# Patient Record
Sex: FEMALE | Race: WHITE | HISPANIC OR LATINO | Employment: OTHER | URBAN - METROPOLITAN AREA
[De-identification: names, ages, dates, MRNs, and addresses within clinical notes are randomized per-mention and may not be internally consistent; named-entity substitution may affect disease eponyms.]

---

## 2017-05-10 RX ORDER — ALBUTEROL SULFATE 0.63 MG/3ML
0.63 SOLUTION RESPIRATORY (INHALATION) EVERY 6 HOURS PRN
Status: ON HOLD | COMMUNITY
End: 2019-05-29 | Stop reason: HOSPADM

## 2017-05-10 RX ORDER — ATORVASTATIN CALCIUM 40 MG/1
40 TABLET, FILM COATED ORAL DAILY
COMMUNITY

## 2017-05-10 RX ORDER — DOXYCYCLINE HYCLATE 100 MG
100 TABLET ORAL 2 TIMES DAILY
COMMUNITY
End: 2018-08-18

## 2017-05-10 RX ORDER — CODEINE PHOSPHATE AND GUAIFENESIN 10; 100 MG/5ML; MG/5ML
5 SOLUTION ORAL 3 TIMES DAILY PRN
Status: ON HOLD | COMMUNITY
End: 2018-09-05 | Stop reason: HOSPADM

## 2017-05-10 RX ORDER — PREDNISONE 20 MG/1
20 TABLET ORAL DAILY
COMMUNITY
End: 2017-06-13

## 2017-05-10 RX ORDER — LEVOTHYROXINE SODIUM 50 UG/1
50 TABLET ORAL DAILY
COMMUNITY

## 2017-05-10 RX ORDER — AMLODIPINE BESYLATE 5 MG/1
5 TABLET ORAL DAILY
COMMUNITY

## 2017-05-10 RX ORDER — ESCITALOPRAM OXALATE 10 MG/1
10 TABLET ORAL DAILY
Status: ON HOLD | COMMUNITY
End: 2018-09-05 | Stop reason: HOSPADM

## 2017-06-13 ENCOUNTER — OFFICE VISIT (OUTPATIENT)
Dept: PULMONOLOGY | Facility: CLINIC | Age: 82
End: 2017-06-13
Payer: MEDICARE

## 2017-06-13 VITALS — SYSTOLIC BLOOD PRESSURE: 110 MMHG | HEART RATE: 53 BPM | DIASTOLIC BLOOD PRESSURE: 60 MMHG | OXYGEN SATURATION: 95 %

## 2017-06-13 DIAGNOSIS — G25.81 RESTLESS LEGS: ICD-10-CM

## 2017-06-13 DIAGNOSIS — R06.09 DYSPNEA ON EXERTION: ICD-10-CM

## 2017-06-13 DIAGNOSIS — R93.89 ABNORMAL CXR: ICD-10-CM

## 2017-06-13 DIAGNOSIS — G47.33 OBSTRUCTIVE SLEEP APNEA SYNDROME: Primary | ICD-10-CM

## 2017-06-13 PROBLEM — G47.30 SLEEP APNEA: Status: ACTIVE | Noted: 2017-06-13

## 2017-06-13 PROCEDURE — 1159F MED LIST DOCD IN RCRD: CPT | Mod: ,,, | Performed by: INTERNAL MEDICINE

## 2017-06-13 PROCEDURE — 99204 OFFICE O/P NEW MOD 45 MIN: CPT | Mod: ,,, | Performed by: INTERNAL MEDICINE

## 2017-06-13 NOTE — PROGRESS NOTES
New Office Visit/Consultation Note    Patient Name: Claribel Moran  MRN: [id number]  : 1934      Reason for visit: Abnormal CXR, dyspnea, possible sleep apnea    HPI:     Had CT scan of chest done by Dr Eduardo showing some chronic scarring and sent here for evaluation.   Seshoshana has chronic SOB, THOMAS and cough but no known h/o chronic lung disease.  She has minimal smoking history but did have second hand smoke exposure, no reported work related exposures (, retired x 20 years).  Has RX for albuterol to try but doesn't have it yet.  Has no known h/o pneumonia or lung injury that she is aware of but does have chronic reflux.  Has noted occ wheezing.  She also has sleep disturbance with some insomnia, fragmented sleep, poor quality sleep,  Doesn't feel rested in the AM, has nocturia and nocturnal frequency, has excessive somnolence during the day ( ESS 12).  Needs further evaluation.  Has also had complaints consistent with restless legs and possible periodic limb movements during sleep.    SLEEP STUDY ORDER    Pt has evidence for sleep apnea including snoring, observed apneas, poor quality sleep, excessive somnolence (Lincoln Sleepiness Score - 12 estimated).  Medically she needs a sleep study for evaluation and diagnosis.  This is a face to face visit regarding the need for a sleep study and possible further evaluation and/or treatment.        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 Rescue      alprazolam (XANAX) 0.5 MG tablet Take 1 tablet (0.5 mg total) by mouth 3 (three) times daily as needed for Anxiety. 30 tablet 0    amlodipine (NORVASC) 5 MG tablet Take 5 mg by mouth once daily.      ascorbic acid (VITAMIN C) 250 MG tablet Take 1 tablet (250 mg total) by mouth once daily.      atorvastatin (LIPITOR) 40 MG tablet Take 40 mg by mouth once daily.      citalopram (CELEXA) 40 MG tablet Take 40 mg by mouth once daily.        doxycycline (VIBRA-TABS) 100 MG tablet Take 100 mg by mouth 2 (two) times daily.      ergocalciferol (ERGOCALCIFEROL) 50,000 unit Cap Take 50,000 Units by mouth every 30 days.       escitalopram oxalate (LEXAPRO) 10 MG tablet Take 10 mg by mouth once daily.      ferrous gluconate (FERGON) 324 MG tablet Take 1 tablet (324 mg total) by mouth 2 (two) times daily with meals.      glipiZIDE (GLUCOTROL) 2.5 MG TR24 Take 5 mg by mouth daily with breakfast.        guaifenesin-codeine 100-10 mg/5 ml (TUSSI-ORGANIDIN NR)  mg/5 mL syrup Take 5 mLs by mouth 3 (three) times daily as needed for Cough.      levothyroxine (SYNTHROID) 50 MCG tablet Take 50 mcg by mouth once daily.      losartan (COZAAR) 100 MG tablet Take 100 mg by mouth once daily.      metformin (GLUCOPHAGE) 500 MG tablet Take 500 mg by mouth 2 (two) times daily with  meals.        multivitamin (THERAGRAN) tablet Take 1 tablet by mouth once daily.      rosuvastatin (CRESTOR) 10 MG tablet Take 10 mg by mouth once daily.       senna-docusate 8.6-50 mg (PERICOLACE) 8.6-50 mg per tablet Take 2 tablets by mouth every evening.      tramadol (ULTRAM) 50 mg tablet Take 50 mg by mouth 3 (three) times daily as needed.      travoprost, benzalkonium, (TRAVATAN) 0.004 % ophthalmic solution Place 1 drop into both eyes every evening.       valsartan (DIOVAN) 160 MG tablet Take 160 mg by mouth once daily. Uses the 320 mg tab and breaks in half.      warfarin (COUMADIN) 4 MG tablet Take 4 mg by mouth once daily.      [DISCONTINUED] predniSONE (DELTASONE) 20 MG tablet Take 20 mg by mouth once daily.      [DISCONTINUED] carvedilol (COREG) 12.5 MG tablet Take 1 tablet (12.5 mg total) by mouth 2 (two) times daily. 60 tablet 3     No current facility-administered medications on file prior to visit.        Allergies  Review of patient's allergies indicates:  No Known Allergies    Review of Systems  Review of Systems   Constitutional: Positive for malaise/fatigue. Negative for chills, diaphoresis, fever and weight loss.   HENT: Negative for congestion.    Eyes: Negative for pain.   Respiratory: Positive for cough, shortness of breath and wheezing. Negative for hemoptysis, sputum production and stridor.    Cardiovascular: Negative for chest pain, palpitations, orthopnea, claudication, leg swelling and PND.   Gastrointestinal: Positive for heartburn and nausea. Negative for abdominal pain, constipation, diarrhea and vomiting.   Genitourinary: Negative for dysuria, frequency and urgency.   Musculoskeletal: Negative for falls and myalgias.   Neurological: Positive for weakness. Negative for sensory change and focal weakness.   Psychiatric/Behavioral: Negative for depression.       Physical Exam    Vitals:    06/13/17 1055   BP: 110/60   Pulse: (!) 53   SpO2: 95%       Physical Exam    Constitutional: She is oriented to person, place, and time. She appears well-developed and well-nourished. No distress.   HENT:   Head: Normocephalic and atraumatic.   Right Ear: External ear normal.   Left Ear: External ear normal.   Nose: Nose normal.   Mouth/Throat: Oropharynx is clear and moist.   Eyes: Conjunctivae and EOM are normal. Pupils are equal, round, and reactive to light.   Neck: Normal range of motion. Neck supple. No JVD present. No tracheal deviation present. No thyromegaly present.   Cardiovascular: Normal rate, regular rhythm, normal heart sounds and intact distal pulses.  Exam reveals no gallop and no friction rub.    No murmur heard.  Pulmonary/Chest: Effort normal and breath sounds normal. No stridor. No respiratory distress. She has no wheezes. She has no rales. She exhibits no tenderness.   Abdominal: Soft. Bowel sounds are normal. She exhibits no distension. There is no tenderness.   Musculoskeletal: Normal range of motion. She exhibits no edema or tenderness.   Lymphadenopathy:     She has no cervical adenopathy.   Neurological: She is alert and oriented to person, place, and time. No cranial nerve deficit.   Skin: Skin is warm and dry. She is not diaphoretic.   Psychiatric: She has a normal mood and affect. Her behavior is normal.   Nursing note and vitals reviewed.      Labs    No results for input(s): CBC in the last 2160 hours.    Invalid input(s): CMP    Xrays    Imaging Results    None         Impression/Plan    Problem List Items Addressed This Visit        Neuro    Sleep apnea  - needs sleep study  - RTC post study       Pulmonary    Dyspnea - Primary  - will order PFT       Other    Abnormal CXR  - probably nonspecific, will follow    Restless legs  - will see sleep study results and follow      Other Visit Diagnoses    None.         Time spent with patient: 45 minutes

## 2017-07-25 ENCOUNTER — TELEPHONE (OUTPATIENT)
Dept: PULMONOLOGY | Facility: CLINIC | Age: 82
End: 2017-07-25

## 2017-07-25 DIAGNOSIS — G47.33 OBSTRUCTIVE SLEEP APNEA SYNDROME: Primary | ICD-10-CM

## 2017-07-26 ENCOUNTER — OFFICE VISIT (OUTPATIENT)
Dept: PULMONOLOGY | Facility: CLINIC | Age: 82
End: 2017-07-26
Payer: MEDICARE

## 2017-07-26 VITALS
HEART RATE: 55 BPM | OXYGEN SATURATION: 97 % | BODY MASS INDEX: 37.98 KG/M2 | SYSTOLIC BLOOD PRESSURE: 110 MMHG | WEIGHT: 201 LBS | DIASTOLIC BLOOD PRESSURE: 74 MMHG

## 2017-07-26 DIAGNOSIS — G25.81 RESTLESS LEGS: ICD-10-CM

## 2017-07-26 DIAGNOSIS — G47.33 OBSTRUCTIVE SLEEP APNEA SYNDROME: Primary | ICD-10-CM

## 2017-07-26 DIAGNOSIS — R06.09 DYSPNEA ON EXERTION: ICD-10-CM

## 2017-07-26 DIAGNOSIS — R93.89 ABNORMAL CXR: ICD-10-CM

## 2017-07-26 PROCEDURE — 99214 OFFICE O/P EST MOD 30 MIN: CPT | Mod: ,,, | Performed by: INTERNAL MEDICINE

## 2017-07-26 PROCEDURE — 1159F MED LIST DOCD IN RCRD: CPT | Mod: ,,, | Performed by: INTERNAL MEDICINE

## 2017-07-26 RX ORDER — CARVEDILOL 12.5 MG/1
TABLET ORAL
Refills: 3 | Status: ON HOLD | COMMUNITY
Start: 2017-06-20 | End: 2018-08-19

## 2017-07-26 RX ORDER — ALBUTEROL SULFATE 90 UG/1
2 AEROSOL, METERED RESPIRATORY (INHALATION)
Refills: 1 | Status: ON HOLD | COMMUNITY
Start: 2017-06-13 | End: 2018-09-05 | Stop reason: HOSPADM

## 2017-07-26 NOTE — PROGRESS NOTES
HPI:    Here for review of sleep study (all questions answered), she needs CPAP titration (has been ordered).  No new complaints, d/w them about how this may explain a lot of her symptoms.  Pt has prior CPAP therapy and has reported good compliance with the machine but she has not had benefit recently and has increased symptoms (which is why we ordered the repeat studies).    SLEEP STUDY ORDER    Pt has evidence for sleep apnea on sleep study RDI -70 (7/2017)  Medically she needs a CPAP titration study for evaluation and diagnosis.  This is a face to face visit regarding the need for a sleep study and possible further evaluation and/or treatment.    Medications    Current Outpatient Prescriptions:     albuterol (ACCUNEB) 1.25 mg/3 mL Nebu, Take 1.25 mg by nebulization every 6 (six) hours as needed. Rescue, Disp: , Rfl:     amlodipine (NORVASC) 5 MG tablet, Take 5 mg by mouth once daily., Disp: , Rfl:     ascorbic acid (VITAMIN C) 250 MG tablet, Take 1 tablet (250 mg total) by mouth once daily., Disp: , Rfl:     atorvastatin (LIPITOR) 40 MG tablet, Take 40 mg by mouth once daily., Disp: , Rfl:     carvedilol (COREG) 12.5 MG tablet, , Disp: , Rfl: 3    citalopram (CELEXA) 40 MG tablet, Take 40 mg by mouth once daily.  , Disp: , Rfl:     doxycycline (VIBRA-TABS) 100 MG tablet, Take 100 mg by mouth 2 (two) times daily., Disp: , Rfl:     ergocalciferol (ERGOCALCIFEROL) 50,000 unit Cap, Take 50,000 Units by mouth every 30 days. , Disp: , Rfl:     escitalopram oxalate (LEXAPRO) 10 MG tablet, Take 10 mg by mouth once daily., Disp: , Rfl:     ferrous gluconate (FERGON) 324 MG tablet, Take 1 tablet (324 mg total) by mouth 2 (two) times daily with meals., Disp: , Rfl:     glipiZIDE (GLUCOTROL) 2.5 MG TR24, Take 5 mg by mouth daily with breakfast.  , Disp: , Rfl:     guaifenesin-codeine 100-10 mg/5 ml (TUSSI-ORGANIDIN NR)  mg/5 mL syrup, Take 5 mLs by mouth 3 (three) times daily as needed for Cough., Disp: ,  Rfl:     levothyroxine (SYNTHROID) 50 MCG tablet, Take 50 mcg by mouth once daily., Disp: , Rfl:     losartan (COZAAR) 100 MG tablet, Take 100 mg by mouth once daily., Disp: , Rfl:     metformin (GLUCOPHAGE) 500 MG tablet, Take 500 mg by mouth 2 (two) times daily with meals.  , Disp: , Rfl:     multivitamin (THERAGRAN) tablet, Take 1 tablet by mouth once daily., Disp: , Rfl:     rosuvastatin (CRESTOR) 10 MG tablet, Take 10 mg by mouth once daily. , Disp: , Rfl:     senna-docusate 8.6-50 mg (PERICOLACE) 8.6-50 mg per tablet, Take 2 tablets by mouth every evening., Disp: , Rfl:     tramadol (ULTRAM) 50 mg tablet, Take 50 mg by mouth 3 (three) times daily as needed., Disp: , Rfl:     travoprost, benzalkonium, (TRAVATAN) 0.004 % ophthalmic solution, Place 1 drop into both eyes every evening. , Disp: , Rfl:     valsartan (DIOVAN) 160 MG tablet, Take 160 mg by mouth once daily. Uses the 320 mg tab and breaks in half., Disp: , Rfl:     VENTOLIN HFA 90 mcg/actuation inhaler, , Disp: , Rfl: 1    warfarin (COUMADIN) 4 MG tablet, Take 4 mg by mouth once daily., Disp: , Rfl:     alprazolam (XANAX) 0.5 MG tablet, Take 1 tablet (0.5 mg total) by mouth 3 (three) times daily as needed for Anxiety., Disp: 30 tablet, Rfl: 0    Allergies  Review of patient's allergies indicates:  No Known Allergies    Social History    History   Smoking Status    Former Smoker    Quit date: 12/28/1954   Smokeless Tobacco    Not on file     ETOH - 0 drinks per week.  History   Drug Use No     Occupation - retired, cook in school system    Family History  Family History   Problem Relation Age of Onset    Stroke Mother     Arthritis Mother     Cancer Mother     Heart disease Mother     Hypertension Mother     Heart disease Father     Hypertension Brother     Drug abuse Daughter     Hypertension Daughter     Depression Son     Diabetes Son     Drug abuse Son     Hypertension Son        ROS  Review of Systems   Constitutional:  Positive for malaise/fatigue. Negative for chills, diaphoresis, fever and weight loss.   HENT: Negative for congestion.    Eyes: Negative for pain.   Respiratory: Positive for shortness of breath. Negative for cough, hemoptysis, sputum production, wheezing and stridor.    Cardiovascular: Negative for chest pain, palpitations, orthopnea, claudication, leg swelling and PND.   Gastrointestinal: Negative for abdominal pain, constipation, diarrhea, heartburn, nausea and vomiting.   Genitourinary: Negative for dysuria, frequency and urgency.   Musculoskeletal: Negative for falls and myalgias.   Neurological: Negative for sensory change, focal weakness and weakness.   Psychiatric/Behavioral: Positive for depression. The patient is not nervous/anxious.         Memory problem       Physical Exam    Vitals:    07/26/17 1012   BP: 110/74   Pulse: (!) 55   SpO2: 97%   Weight: 91.2 kg (201 lb)       Physical Exam   Constitutional: She is oriented to person, place, and time. She appears well-developed and well-nourished. No distress.   HENT:   Head: Normocephalic and atraumatic.   Nose: Nose normal.   Mouth/Throat: Oropharynx is clear and moist.   Eyes: Conjunctivae and EOM are normal. Pupils are equal, round, and reactive to light.   Neck: Normal range of motion. Neck supple. No JVD present. No tracheal deviation present. No thyromegaly present.   Cardiovascular: Normal rate, regular rhythm, normal heart sounds and intact distal pulses.  Exam reveals no gallop and no friction rub.    No murmur heard.  Pulmonary/Chest: Effort normal and breath sounds normal. No stridor. No respiratory distress. She has no wheezes. She has no rales. She exhibits no tenderness.   Abdominal: Soft. Bowel sounds are normal. She exhibits no distension. There is no tenderness.   Musculoskeletal: Normal range of motion. She exhibits no edema or tenderness.   Lymphadenopathy:     She has no cervical adenopathy.   Neurological: She is alert and oriented to  person, place, and time. No cranial nerve deficit.   Skin: Skin is warm and dry. She is not diaphoretic.   Psychiatric: She has a normal mood and affect. Her behavior is normal.   Nursing note and vitals reviewed.      Lab        Xray  Imaging Results    None         Impression/Plan  Problem List Items Addressed This Visit        Neuro    Sleep apnea - Primary  - have ordered CPAP titration  - RTC after study       Pulmonary    Dyspnea  - stable, awaiting PFT       Other    Abnormal CXR  - follow    Restless legs  - follow       Other Visit Diagnoses    None.

## 2017-07-31 DIAGNOSIS — G47.33 OBSTRUCTIVE SLEEP APNEA SYNDROME: Primary | ICD-10-CM

## 2017-11-01 ENCOUNTER — OFFICE VISIT (OUTPATIENT)
Dept: PULMONOLOGY | Facility: CLINIC | Age: 82
End: 2017-11-01
Payer: MEDICARE

## 2017-11-01 VITALS
BODY MASS INDEX: 38.33 KG/M2 | OXYGEN SATURATION: 98 % | HEIGHT: 61 IN | SYSTOLIC BLOOD PRESSURE: 140 MMHG | DIASTOLIC BLOOD PRESSURE: 80 MMHG | WEIGHT: 203 LBS | HEART RATE: 63 BPM

## 2017-11-01 DIAGNOSIS — G47.33 OBSTRUCTIVE SLEEP APNEA SYNDROME: Primary | ICD-10-CM

## 2017-11-01 DIAGNOSIS — R06.09 DYSPNEA ON EXERTION: ICD-10-CM

## 2017-11-01 PROCEDURE — 99214 OFFICE O/P EST MOD 30 MIN: CPT | Mod: ,,, | Performed by: INTERNAL MEDICINE

## 2017-11-01 NOTE — PROGRESS NOTES
HPI:    7/26/2017 - Here for review of sleep study (all questions answered), she needs CPAP titration (has been ordered).  No new complaints, d/w them about how this may explain a lot of her symptoms.  Pt has prior CPAP therapy and has reported good compliance with the machine but she has not had benefit recently and has increased symptoms (which is why we ordered the repeat studies).    11/1/2017 - Here for follow up, has started CPAP, having trouble falling asleep with it on, she is working with it and uses it nightly (sometimes for up to 8 hours).  Still feels that her sleep is disturbed and broken up.  No new complaints.    CPAP Therapy    CPAP therapy - autoPAP - 12- 20    Date of sleep study - 7/17/2017 RDI - 70.6    Pt reports good compliance and benefit with the therapy though she is struggling some with tolerance.    Face to face visit with pt concerning their CPAP therapy.  I have stressed continued compliance with the treatment and all questions were answered.    Medications    Current Outpatient Prescriptions:     albuterol (ACCUNEB) 1.25 mg/3 mL Nebu, Take 1.25 mg by nebulization every 6 (six) hours as needed. Rescue, Disp: , Rfl:     amlodipine (NORVASC) 5 MG tablet, Take 5 mg by mouth once daily., Disp: , Rfl:     ascorbic acid (VITAMIN C) 250 MG tablet, Take 1 tablet (250 mg total) by mouth once daily., Disp: , Rfl:     atorvastatin (LIPITOR) 40 MG tablet, Take 40 mg by mouth once daily., Disp: , Rfl:     carvedilol (COREG) 12.5 MG tablet, , Disp: , Rfl: 3    citalopram (CELEXA) 40 MG tablet, Take 40 mg by mouth once daily.  , Disp: , Rfl:     doxycycline (VIBRA-TABS) 100 MG tablet, Take 100 mg by mouth 2 (two) times daily., Disp: , Rfl:     ergocalciferol (ERGOCALCIFEROL) 50,000 unit Cap, Take 50,000 Units by mouth every 30 days. , Disp: , Rfl:     escitalopram oxalate (LEXAPRO) 10 MG tablet, Take 10 mg by mouth once daily., Disp: , Rfl:     ferrous gluconate (FERGON) 324 MG tablet, Take 1  tablet (324 mg total) by mouth 2 (two) times daily with meals., Disp: , Rfl:     glipiZIDE (GLUCOTROL) 2.5 MG TR24, Take 5 mg by mouth daily with breakfast.  , Disp: , Rfl:     guaifenesin-codeine 100-10 mg/5 ml (TUSSI-ORGANIDIN NR)  mg/5 mL syrup, Take 5 mLs by mouth 3 (three) times daily as needed for Cough., Disp: , Rfl:     levothyroxine (SYNTHROID) 50 MCG tablet, Take 50 mcg by mouth once daily., Disp: , Rfl:     losartan (COZAAR) 100 MG tablet, Take 100 mg by mouth once daily., Disp: , Rfl:     metformin (GLUCOPHAGE) 500 MG tablet, Take 500 mg by mouth 2 (two) times daily with meals.  , Disp: , Rfl:     multivitamin (THERAGRAN) tablet, Take 1 tablet by mouth once daily., Disp: , Rfl:     rosuvastatin (CRESTOR) 10 MG tablet, Take 10 mg by mouth once daily. , Disp: , Rfl:     senna-docusate 8.6-50 mg (PERICOLACE) 8.6-50 mg per tablet, Take 2 tablets by mouth every evening., Disp: , Rfl:     tramadol (ULTRAM) 50 mg tablet, Take 50 mg by mouth 3 (three) times daily as needed., Disp: , Rfl:     travoprost, benzalkonium, (TRAVATAN) 0.004 % ophthalmic solution, Place 1 drop into both eyes every evening. , Disp: , Rfl:     valsartan (DIOVAN) 160 MG tablet, Take 160 mg by mouth once daily. Uses the 320 mg tab and breaks in half., Disp: , Rfl:     VENTOLIN HFA 90 mcg/actuation inhaler, , Disp: , Rfl: 1    warfarin (COUMADIN) 4 MG tablet, Take 4 mg by mouth once daily., Disp: , Rfl:     alprazolam (XANAX) 0.5 MG tablet, Take 1 tablet (0.5 mg total) by mouth 3 (three) times daily as needed for Anxiety., Disp: 30 tablet, Rfl: 0    Allergies  Review of patient's allergies indicates:  No Known Allergies    Social History    History   Smoking Status    Former Smoker    Quit date: 12/28/1954   Smokeless Tobacco    Never Used     ETOH - 0 drinks per week.  History   Drug Use No     Occupation - retired, cook in school system    Family History  Family History   Problem Relation Age of Onset    Stroke  "Mother     Arthritis Mother     Cancer Mother     Heart disease Mother     Hypertension Mother     Heart disease Father     Hypertension Brother     Drug abuse Daughter     Hypertension Daughter     Depression Son     Diabetes Son     Drug abuse Son     Hypertension Son        ROS  Review of Systems   Constitutional: Positive for malaise/fatigue. Negative for chills, diaphoresis, fever and weight loss.   HENT: Negative for congestion.    Eyes: Negative for pain.   Respiratory: Positive for shortness of breath. Negative for cough, hemoptysis, sputum production, wheezing and stridor.    Cardiovascular: Negative for chest pain, palpitations, orthopnea, claudication, leg swelling and PND.   Gastrointestinal: Negative for abdominal pain, constipation, diarrhea, heartburn, nausea and vomiting.   Genitourinary: Negative for dysuria, frequency and urgency.   Musculoskeletal: Negative for falls and myalgias.   Neurological: Negative for sensory change, focal weakness and weakness.   Psychiatric/Behavioral: Positive for depression. The patient is not nervous/anxious.         Memory problem       Physical Exam    Vitals:    11/01/17 1040   BP: (!) 140/80   Pulse: 63   SpO2: 98%   Weight: 92.1 kg (203 lb)   Height: 5' 1" (1.549 m)       Physical Exam   Constitutional: She is oriented to person, place, and time. She appears well-developed and well-nourished. No distress.   HENT:   Head: Normocephalic and atraumatic.   Nose: Nose normal.   Mouth/Throat: Oropharynx is clear and moist.   Eyes: Conjunctivae and EOM are normal. Pupils are equal, round, and reactive to light.   Neck: Normal range of motion. Neck supple. No JVD present. No tracheal deviation present. No thyromegaly present.   Cardiovascular: Normal rate, regular rhythm, normal heart sounds and intact distal pulses.  Exam reveals no gallop and no friction rub.    No murmur heard.  Pulmonary/Chest: Effort normal and breath sounds normal. No stridor. No " respiratory distress. She has no wheezes. She has no rales. She exhibits no tenderness.   Abdominal: Soft. Bowel sounds are normal. She exhibits no distension. There is no tenderness.   Musculoskeletal: Normal range of motion. She exhibits no edema or tenderness.   Lymphadenopathy:     She has no cervical adenopathy.   Neurological: She is alert and oriented to person, place, and time. No cranial nerve deficit.   Skin: Skin is warm and dry. She is not diaphoretic.   Psychiatric: She has a normal mood and affect. Her behavior is normal.   Nursing note and vitals reviewed.      Lab        Xray  Imaging Results    None         Impression/Plan  Problem List Items Addressed This Visit        Neuro    Sleep apnea - Primary  - doing ok with therapy, d/w pt  - RTC 3 months       Pulmonary    Dyspnea  - stable       Other    Abnormal CXR  - follow    Restless legs  - follow       Other Visit Diagnoses    None.

## 2018-02-05 ENCOUNTER — OFFICE VISIT (OUTPATIENT)
Dept: PULMONOLOGY | Facility: CLINIC | Age: 83
End: 2018-02-05
Payer: MEDICARE

## 2018-02-05 VITALS
WEIGHT: 203 LBS | DIASTOLIC BLOOD PRESSURE: 68 MMHG | OXYGEN SATURATION: 98 % | HEART RATE: 62 BPM | HEIGHT: 61 IN | BODY MASS INDEX: 38.33 KG/M2 | SYSTOLIC BLOOD PRESSURE: 120 MMHG

## 2018-02-05 DIAGNOSIS — G47.33 OBSTRUCTIVE SLEEP APNEA SYNDROME: Primary | ICD-10-CM

## 2018-02-05 DIAGNOSIS — R93.89 ABNORMAL CXR: ICD-10-CM

## 2018-02-05 DIAGNOSIS — R06.09 DYSPNEA ON EXERTION: ICD-10-CM

## 2018-02-05 PROCEDURE — 99214 OFFICE O/P EST MOD 30 MIN: CPT | Mod: ,,, | Performed by: INTERNAL MEDICINE

## 2018-02-05 PROCEDURE — 1159F MED LIST DOCD IN RCRD: CPT | Mod: ,,, | Performed by: INTERNAL MEDICINE

## 2018-02-05 NOTE — PROGRESS NOTES
"HPI:    7/26/2017 - Here for review of sleep study (all questions answered), she needs CPAP titration (has been ordered).  No new complaints, d/w them about how this may explain a lot of her symptoms.  Pt has prior CPAP therapy and has reported good compliance with the machine but she has not had benefit recently and has increased symptoms (which is why we ordered the repeat studies).    11/1/2017 - Here for follow up, has started CPAP, having trouble falling asleep with it on, she is working with it and uses it nightly (sometimes for up to 8 hours).  Still feels that her sleep is disturbed and broken up.  No new complaints.    2/5/2018 - Here for follow up, still struggling some with CPAP (has trouble falling asleep while wearing it).  Is using it "about 4-5 hours per night).  Otherwise doing ok, still sleepy during the day.  Breathing has been ok but is "shallow " at times.  Has done well trough the flu season.    CPAP Therapy    CPAP therapy - autoPAP - 12- 20    Date of sleep study - 7/17/2017 RDI - 70.6    Pt reports good compliance and benefit with the therapy though she is struggling some with tolerance.    Face to face visit with pt concerning their CPAP therapy.  I have stressed continued compliance with the treatment and all questions were answered.    Medications    Current Outpatient Prescriptions:     albuterol (ACCUNEB) 1.25 mg/3 mL Nebu, Take 1.25 mg by nebulization every 6 (six) hours as needed. Rescue, Disp: , Rfl:     amlodipine (NORVASC) 5 MG tablet, Take 5 mg by mouth once daily., Disp: , Rfl:     ascorbic acid (VITAMIN C) 250 MG tablet, Take 1 tablet (250 mg total) by mouth once daily., Disp: , Rfl:     atorvastatin (LIPITOR) 40 MG tablet, Take 40 mg by mouth once daily., Disp: , Rfl:     carvedilol (COREG) 12.5 MG tablet, , Disp: , Rfl: 3    citalopram (CELEXA) 40 MG tablet, Take 40 mg by mouth once daily.  , Disp: , Rfl:     doxycycline (VIBRA-TABS) 100 MG tablet, Take 100 mg by mouth 2 " (two) times daily., Disp: , Rfl:     ergocalciferol (ERGOCALCIFEROL) 50,000 unit Cap, Take 50,000 Units by mouth every 30 days. , Disp: , Rfl:     escitalopram oxalate (LEXAPRO) 10 MG tablet, Take 10 mg by mouth once daily., Disp: , Rfl:     ferrous gluconate (FERGON) 324 MG tablet, Take 1 tablet (324 mg total) by mouth 2 (two) times daily with meals., Disp: , Rfl:     glipiZIDE (GLUCOTROL) 2.5 MG TR24, Take 5 mg by mouth daily with breakfast.  , Disp: , Rfl:     guaifenesin-codeine 100-10 mg/5 ml (TUSSI-ORGANIDIN NR)  mg/5 mL syrup, Take 5 mLs by mouth 3 (three) times daily as needed for Cough., Disp: , Rfl:     levothyroxine (SYNTHROID) 50 MCG tablet, Take 50 mcg by mouth once daily., Disp: , Rfl:     losartan (COZAAR) 100 MG tablet, Take 100 mg by mouth once daily., Disp: , Rfl:     metformin (GLUCOPHAGE) 500 MG tablet, Take 500 mg by mouth 2 (two) times daily with meals.  , Disp: , Rfl:     multivitamin (THERAGRAN) tablet, Take 1 tablet by mouth once daily., Disp: , Rfl:     rosuvastatin (CRESTOR) 10 MG tablet, Take 10 mg by mouth once daily. , Disp: , Rfl:     senna-docusate 8.6-50 mg (PERICOLACE) 8.6-50 mg per tablet, Take 2 tablets by mouth every evening., Disp: , Rfl:     tramadol (ULTRAM) 50 mg tablet, Take 50 mg by mouth 3 (three) times daily as needed., Disp: , Rfl:     travoprost, benzalkonium, (TRAVATAN) 0.004 % ophthalmic solution, Place 1 drop into both eyes every evening. , Disp: , Rfl:     valsartan (DIOVAN) 160 MG tablet, Take 160 mg by mouth once daily. Uses the 320 mg tab and breaks in half., Disp: , Rfl:     VENTOLIN HFA 90 mcg/actuation inhaler, , Disp: , Rfl: 1    warfarin (COUMADIN) 4 MG tablet, Take 4 mg by mouth once daily., Disp: , Rfl:     alprazolam (XANAX) 0.5 MG tablet, Take 1 tablet (0.5 mg total) by mouth 3 (three) times daily as needed for Anxiety., Disp: 30 tablet, Rfl: 0    Allergies  Review of patient's allergies indicates:  No Known Allergies    Social  "History    History   Smoking Status    Former Smoker    Quit date: 12/28/1954   Smokeless Tobacco    Never Used     ETOH - 0 drinks per week.  History   Drug Use No     Occupation - retired, cook in school system    Family History  Family History   Problem Relation Age of Onset    Stroke Mother     Arthritis Mother     Cancer Mother     Heart disease Mother     Hypertension Mother     Heart disease Father     Hypertension Brother     Drug abuse Daughter     Hypertension Daughter     Depression Son     Diabetes Son     Drug abuse Son     Hypertension Son        ROS  Review of Systems   Constitutional: Positive for malaise/fatigue. Negative for chills, diaphoresis, fever and weight loss.   HENT: Negative for congestion.    Eyes: Negative for pain.   Respiratory: Positive for shortness of breath. Negative for cough, hemoptysis, sputum production, wheezing and stridor.    Cardiovascular: Negative for chest pain, palpitations, orthopnea, claudication, leg swelling and PND.   Gastrointestinal: Negative for abdominal pain, constipation, diarrhea, heartburn, nausea and vomiting.   Genitourinary: Negative for dysuria, frequency and urgency.   Musculoskeletal: Negative for falls and myalgias.   Neurological: Negative for sensory change, focal weakness and weakness.   Psychiatric/Behavioral: Positive for depression. The patient is not nervous/anxious.         Memory problem       Physical Exam    Vitals:    02/05/18 1146   BP: 120/68   Pulse: 62   SpO2: 98%   Weight: 92.1 kg (203 lb)   Height: 5' 1" (1.549 m)       Physical Exam   Constitutional: She is oriented to person, place, and time. She appears well-developed and well-nourished. No distress.   HENT:   Head: Normocephalic and atraumatic.   Nose: Nose normal.   Mouth/Throat: Oropharynx is clear and moist.   Eyes: Conjunctivae and EOM are normal. Pupils are equal, round, and reactive to light.   Neck: Normal range of motion. Neck supple. No JVD present. No " tracheal deviation present. No thyromegaly present.   Cardiovascular: Normal rate, regular rhythm, normal heart sounds and intact distal pulses.  Exam reveals no gallop and no friction rub.    No murmur heard.  Pulmonary/Chest: Effort normal and breath sounds normal. No stridor. No respiratory distress. She has no wheezes. She has no rales. She exhibits no tenderness.   Abdominal: Soft. Bowel sounds are normal. She exhibits no distension. There is no tenderness.   Musculoskeletal: Normal range of motion. She exhibits no edema or tenderness.   Lymphadenopathy:     She has no cervical adenopathy.   Neurological: She is alert and oriented to person, place, and time. No cranial nerve deficit.   Skin: Skin is warm and dry. She is not diaphoretic.   Psychiatric: She has a normal mood and affect. Her behavior is normal.   Nursing note and vitals reviewed.      Lab        Xray  Imaging Results    None         Impression/Plan  Problem List Items Addressed This Visit        Neuro    Sleep apnea - Primary  - doing ok with therapy, d/w pt  - RTC 6 months  Post nasal drip  - d/w pt about things to try       Pulmonary    Dyspnea  - stable, at baseline       Other    Abnormal CXR  - follow, last CT 2016 - ok    Restless legs  - follow       Other Visit Diagnoses    None.

## 2018-08-18 ENCOUNTER — HOSPITAL ENCOUNTER (INPATIENT)
Facility: HOSPITAL | Age: 83
LOS: 6 days | Discharge: NURSING, REHAB OR LONG-TERM CARE | DRG: 565 | End: 2018-08-24
Attending: EMERGENCY MEDICINE | Admitting: HOSPITALIST
Payer: MEDICARE

## 2018-08-18 DIAGNOSIS — R52 PAIN: ICD-10-CM

## 2018-08-18 DIAGNOSIS — M25.469 EFFUSION OF KNEE, UNSPECIFIED LATERALITY: Primary | ICD-10-CM

## 2018-08-18 LAB
ALBUMIN SERPL BCP-MCNC: 3.3 G/DL
ALP SERPL-CCNC: 93 U/L
ALT SERPL W/O P-5'-P-CCNC: 23 U/L
ANION GAP SERPL CALC-SCNC: 12 MMOL/L
AST SERPL-CCNC: 27 U/L
BASOPHILS # BLD AUTO: 0.03 K/UL
BASOPHILS NFR BLD: 0.4 %
BILIRUB SERPL-MCNC: 0.8 MG/DL
BUN SERPL-MCNC: 16 MG/DL
CALCIUM SERPL-MCNC: 9.5 MG/DL
CHLORIDE SERPL-SCNC: 100 MMOL/L
CO2 SERPL-SCNC: 24 MMOL/L
CREAT SERPL-MCNC: 1 MG/DL
DIFFERENTIAL METHOD: ABNORMAL
EOSINOPHIL # BLD AUTO: 0.2 K/UL
EOSINOPHIL NFR BLD: 2.7 %
ERYTHROCYTE [DISTWIDTH] IN BLOOD BY AUTOMATED COUNT: 15.2 %
EST. GFR  (AFRICAN AMERICAN): >60 ML/MIN/1.73 M^2
EST. GFR  (NON AFRICAN AMERICAN): 52.2 ML/MIN/1.73 M^2
ESTIMATED AVG GLUCOSE: 197 MG/DL
GLUCOSE SERPL-MCNC: 174 MG/DL
HBA1C MFR BLD HPLC: 8.5 %
HCT VFR BLD AUTO: 43.5 %
HGB BLD-MCNC: 13.9 G/DL
IMM GRANULOCYTES # BLD AUTO: 0.02 K/UL
IMM GRANULOCYTES NFR BLD AUTO: 0.2 %
INR PPP: 2.2
LYMPHOCYTES # BLD AUTO: 1.6 K/UL
LYMPHOCYTES NFR BLD: 19.2 %
MCH RBC QN AUTO: 29.1 PG
MCHC RBC AUTO-ENTMCNC: 32 G/DL
MCV RBC AUTO: 91 FL
MONOCYTES # BLD AUTO: 0.8 K/UL
MONOCYTES NFR BLD: 9.2 %
NEUTROPHILS # BLD AUTO: 5.8 K/UL
NEUTROPHILS NFR BLD: 68.3 %
NRBC BLD-RTO: 0 /100 WBC
PLATELET # BLD AUTO: 194 K/UL
PMV BLD AUTO: 11.5 FL
POCT GLUCOSE: 164 MG/DL (ref 70–110)
POCT GLUCOSE: 215 MG/DL (ref 70–110)
POTASSIUM SERPL-SCNC: 4.4 MMOL/L
PROT SERPL-MCNC: 7.5 G/DL
PROTHROMBIN TIME: 21.1 SEC
RBC # BLD AUTO: 4.77 M/UL
SODIUM SERPL-SCNC: 136 MMOL/L
WBC # BLD AUTO: 8.47 K/UL

## 2018-08-18 PROCEDURE — 94640 AIRWAY INHALATION TREATMENT: CPT

## 2018-08-18 PROCEDURE — 96372 THER/PROPH/DIAG INJ SC/IM: CPT

## 2018-08-18 PROCEDURE — 25000003 PHARM REV CODE 250: Performed by: EMERGENCY MEDICINE

## 2018-08-18 PROCEDURE — 25000003 PHARM REV CODE 250: Performed by: STUDENT IN AN ORGANIZED HEALTH CARE EDUCATION/TRAINING PROGRAM

## 2018-08-18 PROCEDURE — 11000001 HC ACUTE MED/SURG PRIVATE ROOM

## 2018-08-18 PROCEDURE — 94761 N-INVAS EAR/PLS OXIMETRY MLT: CPT

## 2018-08-18 PROCEDURE — 99285 EMERGENCY DEPT VISIT HI MDM: CPT | Mod: 25

## 2018-08-18 PROCEDURE — 63600175 PHARM REV CODE 636 W HCPCS: Performed by: STUDENT IN AN ORGANIZED HEALTH CARE EDUCATION/TRAINING PROGRAM

## 2018-08-18 PROCEDURE — 25000242 PHARM REV CODE 250 ALT 637 W/ HCPCS: Performed by: STUDENT IN AN ORGANIZED HEALTH CARE EDUCATION/TRAINING PROGRAM

## 2018-08-18 PROCEDURE — 93005 ELECTROCARDIOGRAM TRACING: CPT

## 2018-08-18 PROCEDURE — 82962 GLUCOSE BLOOD TEST: CPT

## 2018-08-18 PROCEDURE — 80053 COMPREHEN METABOLIC PANEL: CPT

## 2018-08-18 PROCEDURE — 83036 HEMOGLOBIN GLYCOSYLATED A1C: CPT

## 2018-08-18 PROCEDURE — 99232 SBSQ HOSP IP/OBS MODERATE 35: CPT | Mod: GC,,, | Performed by: HOSPITALIST

## 2018-08-18 PROCEDURE — 85610 PROTHROMBIN TIME: CPT

## 2018-08-18 PROCEDURE — 85025 COMPLETE CBC W/AUTO DIFF WBC: CPT

## 2018-08-18 PROCEDURE — 99284 EMERGENCY DEPT VISIT MOD MDM: CPT | Mod: ,,, | Performed by: EMERGENCY MEDICINE

## 2018-08-18 RX ORDER — SODIUM CHLORIDE 0.9 % (FLUSH) 0.9 %
5 SYRINGE (ML) INJECTION
Status: DISCONTINUED | OUTPATIENT
Start: 2018-08-18 | End: 2018-08-24 | Stop reason: HOSPADM

## 2018-08-18 RX ORDER — AMLODIPINE BESYLATE 5 MG/1
5 TABLET ORAL DAILY
Status: DISCONTINUED | OUTPATIENT
Start: 2018-08-18 | End: 2018-08-24 | Stop reason: HOSPADM

## 2018-08-18 RX ORDER — LEVOTHYROXINE SODIUM 50 UG/1
50 TABLET ORAL
Status: DISCONTINUED | OUTPATIENT
Start: 2018-08-18 | End: 2018-08-24 | Stop reason: HOSPADM

## 2018-08-18 RX ORDER — TRAMADOL HYDROCHLORIDE 50 MG/1
50 TABLET ORAL EVERY 6 HOURS PRN
Status: DISCONTINUED | OUTPATIENT
Start: 2018-08-18 | End: 2018-08-24 | Stop reason: HOSPADM

## 2018-08-18 RX ORDER — TRAMADOL HYDROCHLORIDE 50 MG/1
50 TABLET ORAL EVERY 6 HOURS PRN
Status: ON HOLD | COMMUNITY
End: 2018-09-05 | Stop reason: HOSPADM

## 2018-08-18 RX ORDER — GLUCAGON 1 MG
1 KIT INJECTION
Status: DISCONTINUED | OUTPATIENT
Start: 2018-08-18 | End: 2018-08-24 | Stop reason: HOSPADM

## 2018-08-18 RX ORDER — LEVOTHYROXINE SODIUM 50 UG/1
50 TABLET ORAL DAILY
Status: DISCONTINUED | OUTPATIENT
Start: 2018-08-19 | End: 2018-08-18

## 2018-08-18 RX ORDER — IBUPROFEN 200 MG
24 TABLET ORAL
Status: DISCONTINUED | OUTPATIENT
Start: 2018-08-18 | End: 2018-08-24 | Stop reason: HOSPADM

## 2018-08-18 RX ORDER — AMLODIPINE BESYLATE 5 MG/1
5 TABLET ORAL DAILY
Status: DISCONTINUED | OUTPATIENT
Start: 2018-08-19 | End: 2018-08-18

## 2018-08-18 RX ORDER — IBUPROFEN 200 MG
16 TABLET ORAL
Status: DISCONTINUED | OUTPATIENT
Start: 2018-08-18 | End: 2018-08-24 | Stop reason: HOSPADM

## 2018-08-18 RX ORDER — ACETAMINOPHEN 325 MG/1
650 TABLET ORAL EVERY 6 HOURS PRN
Status: DISCONTINUED | OUTPATIENT
Start: 2018-08-18 | End: 2018-08-24 | Stop reason: HOSPADM

## 2018-08-18 RX ORDER — LOSARTAN POTASSIUM 50 MG/1
100 TABLET ORAL DAILY
Status: DISCONTINUED | OUTPATIENT
Start: 2018-08-18 | End: 2018-08-22

## 2018-08-18 RX ORDER — CARVEDILOL 12.5 MG/1
12.5 TABLET ORAL 2 TIMES DAILY
Status: DISCONTINUED | OUTPATIENT
Start: 2018-08-18 | End: 2018-08-18

## 2018-08-18 RX ORDER — IPRATROPIUM BROMIDE AND ALBUTEROL SULFATE 2.5; .5 MG/3ML; MG/3ML
3 SOLUTION RESPIRATORY (INHALATION)
Status: DISCONTINUED | OUTPATIENT
Start: 2018-08-18 | End: 2018-08-24 | Stop reason: HOSPADM

## 2018-08-18 RX ORDER — LOSARTAN POTASSIUM 50 MG/1
100 TABLET ORAL DAILY
Status: DISCONTINUED | OUTPATIENT
Start: 2018-08-19 | End: 2018-08-18

## 2018-08-18 RX ORDER — WARFARIN 2 MG/1
4 TABLET ORAL DAILY
Status: DISCONTINUED | OUTPATIENT
Start: 2018-08-18 | End: 2018-08-23

## 2018-08-18 RX ORDER — INSULIN ASPART 100 [IU]/ML
0-5 INJECTION, SOLUTION INTRAVENOUS; SUBCUTANEOUS
Status: DISCONTINUED | OUTPATIENT
Start: 2018-08-18 | End: 2018-08-24 | Stop reason: HOSPADM

## 2018-08-18 RX ORDER — CARVEDILOL 12.5 MG/1
12.5 TABLET ORAL 2 TIMES DAILY
Status: DISCONTINUED | OUTPATIENT
Start: 2018-08-18 | End: 2018-08-19

## 2018-08-18 RX ORDER — ACETAMINOPHEN 500 MG
1000 TABLET ORAL
Status: COMPLETED | OUTPATIENT
Start: 2018-08-18 | End: 2018-08-18

## 2018-08-18 RX ADMIN — IPRATROPIUM BROMIDE AND ALBUTEROL SULFATE 3 ML: .5; 3 SOLUTION RESPIRATORY (INHALATION) at 09:08

## 2018-08-18 RX ADMIN — WARFARIN SODIUM 4 MG: 2 TABLET ORAL at 06:08

## 2018-08-18 RX ADMIN — TRAMADOL HYDROCHLORIDE 50 MG: 50 TABLET, COATED ORAL at 06:08

## 2018-08-18 RX ADMIN — CARVEDILOL 12.5 MG: 12.5 TABLET, FILM COATED ORAL at 04:08

## 2018-08-18 RX ADMIN — ACETAMINOPHEN 1000 MG: 500 TABLET ORAL at 01:08

## 2018-08-18 RX ADMIN — IPRATROPIUM BROMIDE AND ALBUTEROL SULFATE 3 ML: .5; 3 SOLUTION RESPIRATORY (INHALATION) at 03:08

## 2018-08-18 RX ADMIN — LOSARTAN POTASSIUM 100 MG: 50 TABLET, FILM COATED ORAL at 04:08

## 2018-08-18 RX ADMIN — ACETAMINOPHEN 650 MG: 325 TABLET, FILM COATED ORAL at 10:08

## 2018-08-18 RX ADMIN — INSULIN ASPART 2 UNITS: 100 INJECTION, SOLUTION INTRAVENOUS; SUBCUTANEOUS at 04:08

## 2018-08-18 RX ADMIN — AMLODIPINE BESYLATE 5 MG: 5 TABLET ORAL at 04:08

## 2018-08-18 RX ADMIN — LEVOTHYROXINE SODIUM 50 MCG: 50 TABLET ORAL at 04:08

## 2018-08-18 NOTE — ED TRIAGE NOTES
Arrives per EMS from home- pt has a hx of falling out of bed and was seen at [facility] on 8/16/2018 and dx'\d w/ contusions to left knee and left hip. Returns today w// increased pain in left knee and inability to bear weight. Wearing incontinence pads. AAOx4, skin w/d. Respirations even and un;labored. Placed in gown  . States the only med she took today was tramadol and xanax.

## 2018-08-18 NOTE — HOSPITAL COURSE
8/18- VSS, patient admitted to the 9th floor and started on home anti HTN medications. Tramadol was ordered PRN for severe L knee pain.  8/19- NAEON, patient seen today and she is vitally stable, BP 98/62. Carvedilol was dc'ed, continue on Amlodipine and Losartan. Ice applied on L knee to reduce pain. F/u with CR level as it was increased from 1.0 to 1.3. PT tomorrow.  8/20- NAEON except of one episode of desating at 88%. Currently she is stable on room air. Patient seen today and she is vitally stable, /62. Cr level is 1.2. She reports that L knee pain has improved. CPAP was ordered at bedtime. enoxaparin 90 mg Q12h was started. F/u INR.  8/21- NAEON, patient seen today and vitally stable. On NC 1L sating at 99%. L knee pain has improved. INR today is 1.6. Plan to transfer to SNF at time of discharge.  8/22- 500 cc bolus given for bump in Cr. Held losartan, will monitor Cr and BP. Will discuss with pt/PT/OT as pt may need SNF.  8/23- NAEON, patient seen today and she is vitally table, /78. Cr level improved 1.5>1.3. Waiting for SNF placement.  8/24- NAEON, patient seen today and vitally stable. mucinex 600 BID was ordered as an expectorant. Warfarin dose changed to 6 mg yesterday. Cr level improved to 1.2. L knee pain is much more better and patient took a shower today morning. Waiting for SNF placement.

## 2018-08-18 NOTE — PROGRESS NOTES
[facility] Hospital Medicine  Progress Note    Patient Name: Claribel Moran  MRN: [id number]  Patient Class: IP- Inpatient   Admission Date: 8/18/2018  Length of Stay: 0 days  Attending Physician: Vicky Arenas MD  Primary Care Provider: Moises Eduardo MD    [facility] Medicine Team: [facility] HOSP MED 5 Lisbeth Dalal MD    Subjective:     Principal Problem:<principal problem not specified>    HPI:  83 y.o. female with co-morbidities including: A-Fib, right total knee replacement (2008), left total knee replacement (2011), type 2 DM, HTN, CKD, CAD, and CHF who presents to the ED with a complaint of worsening severe left thigh and knee pain of initial onset 2 days ago. Pt states she fell out of her bed and struck her left knee/hip approximately 2 days ago. She shortly reported to [facility] and received treatment, then was discharged later that day. Pain is 10/10 and begun radiating up and down her leg towards her groin. There are no associated symptoms at this time and no pain at the level below the knee. She is on severe pain at the knee level and above which is very sensitive to touch on examination not relived by 1g PO Tylenol. Pt states she has been unable to stand/ambulate/move comfortably since the injury and has significantly worsened over the 24 hours. She has not taken her coumadin this morning and reports no recent missed medications concerning her coumadin. Denies fever, chills, head trauma, and any LOC. No hx of numbness or tingling sensation. She is on Glipizide but did not took it last month because of insurance issues. Other systemic review was unremarkable.        Hospital Course:  8/18- VSS, patient admitted to the 9th floor and started on home anti HTN medications. Tramadol was ordered PRN for severe L knee pain.    Past Medical History:   Diagnosis Date    Acid reflux 2/14/2013    Anticoagulant long-term use     Anxiety 12/17/2012    Arthritis     Atrial  fibrillation     Blood transfusion     CAD (coronary artery disease) 2013    CHF (congestive heart failure)     CKD (chronic kidney disease) stage 3, GFR 30-59 ml/min 2013    Dry mouth     Encounter for blood transfusion     Hepatitis B 2013    Hyperlipidemia 2012    Hypertension 2012    Reactive airway disease with wheezing 2013    Sleep apnea     Thyroid disease     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    Type II diabetes mellitus 2012    Vitamin D deficiency disease 2012       Past Surgical History:   Procedure Laterality Date    ABDOMINAL SURGERY       SECTION, CLASSIC      FRACTURE SURGERY      ankles, ribs    HYSTERECTOMY      KIDNEY STONE SURGERY      TOTAL KNEE ARTHROPLASTY Right     Right knee    TOTAL KNEE ARTHROPLASTY Left 02/15/2013       Review of patient's allergies indicates:  No Known Allergies    No current facility-administered medications on file prior to encounter.      Current Outpatient Medications on File Prior to Encounter   Medication Sig    albuterol (ACCUNEB) 1.25 mg/3 mL Nebu Take 1.25 mg by nebulization every 6 (six) hours as needed. Rescue    alprazolam (XANAX) 0.5 MG tablet Take 1 tablet (0.5 mg total) by mouth 3 (three) times daily as needed for Anxiety.    amlodipine (NORVASC) 5 MG tablet Take 5 mg by mouth once daily.    ascorbic acid (VITAMIN C) 250 MG tablet Take 1 tablet (250 mg total) by mouth once daily.    atorvastatin (LIPITOR) 40 MG tablet Take 40 mg by mouth once daily.    carvedilol (COREG) 12.5 MG tablet     citalopram (CELEXA) 40 MG tablet Take 40 mg by mouth once daily.      ergocalciferol (ERGOCALCIFEROL) 50,000 unit Cap Take 50,000 Units by mouth every 30 days.     escitalopram oxalate (LEXAPRO) 10 MG tablet Take 10 mg by mouth once daily.    glipiZIDE (GLUCOTROL) 2.5 MG TR24 Take 5 mg by mouth daily with breakfast.      levothyroxine (SYNTHROID) 50 MCG tablet Take 50 mcg by mouth  once daily.    losartan (COZAAR) 100 MG tablet Take 100 mg by mouth once daily.    senna-docusate 8.6-50 mg (PERICOLACE) 8.6-50 mg per tablet Take 2 tablets by mouth every evening.    traMADol (ULTRAM) 50 mg tablet Take 50 mg by mouth every 6 (six) hours as needed for Pain.    valsartan (DIOVAN) 160 MG tablet Take 160 mg by mouth once daily. Uses the 320 mg tab and breaks in half.    VENTOLIN HFA 90 mcg/actuation inhaler     warfarin (COUMADIN) 4 MG tablet Take 4 mg by mouth once daily.    [DISCONTINUED] rosuvastatin (CRESTOR) 10 MG tablet Take 10 mg by mouth once daily.     ferrous gluconate (FERGON) 324 MG tablet Take 1 tablet (324 mg total) by mouth 2 (two) times daily with meals.    guaifenesin-codeine 100-10 mg/5 ml (TUSSI-ORGANIDIN NR)  mg/5 mL syrup Take 5 mLs by mouth 3 (three) times daily as needed for Cough.    metformin (GLUCOPHAGE) 500 MG tablet Take 500 mg by mouth 2 (two) times daily with meals.      multivitamin (THERAGRAN) tablet Take 1 tablet by mouth once daily.    travoprost, benzalkonium, (TRAVATAN) 0.004 % ophthalmic solution Place 1 drop into both eyes every evening.     [DISCONTINUED] doxycycline (VIBRA-TABS) 100 MG tablet Take 100 mg by mouth 2 (two) times daily.     Family History     Problem Relation (Age of Onset)    Arthritis Mother    Cancer Mother    Depression Son    Diabetes Son    Drug abuse Daughter    Heart disease Mother, Father    Hypertension Mother, Brother, Daughter, Son    Stroke Mother        Tobacco Use    Smoking status: Former Smoker     Types: Cigarettes     Last attempt to quit: 1954     Years since quittin.6    Smokeless tobacco: Never Used    Tobacco comment: 1 pack for month only for a few years, but second hand smoke from  for decades.    Substance and Sexual Activity    Alcohol use: No    Drug use: No    Sexual activity: Not Currently     Review of Systems   Constitutional: Negative for appetite change, chills, fatigue and  fever.   HENT: Negative for ear pain and sinus pain.    Eyes: Negative for photophobia and pain.   Respiratory: Negative for cough and shortness of breath.    Cardiovascular: Negative for chest pain and leg swelling.   Gastrointestinal: Negative for abdominal pain, blood in stool, nausea and vomiting.   Endocrine: Negative for polydipsia and polyphagia.   Genitourinary: Negative for dysuria and hematuria.   Musculoskeletal: Positive for arthralgias and joint swelling. Negative for gait problem, neck pain and neck stiffness.        Bilateral knee pain more severe on the L side 2/2 fall 2 days ago which is not relieved by 1g Tylenol PO.   Skin: Positive for color change. Negative for rash and wound.        Redness over the L knee compared to the R knee.   Allergic/Immunologic: Negative for immunocompromised state.   Neurological: Negative for dizziness, facial asymmetry, numbness and headaches.   Psychiatric/Behavioral: Negative for confusion.     Objective:     Vital Signs (Most Recent):  Temp: 97.8 °F (36.6 °C) (08/18/18 1739)  Pulse: 76 (08/18/18 1739)  Resp: 18 (08/18/18 1739)  BP: 136/70 (08/18/18 1739)  SpO2: (!) 93 % (08/18/18 1739) Vital Signs (24h Range):  Temp:  [97.7 °F (36.5 °C)-97.8 °F (36.6 °C)] 97.8 °F (36.6 °C)  Pulse:  [67-97] 76  Resp:  [16-20] 18  SpO2:  [93 %-98 %] 93 %  BP: (128-191)/(70-88) 136/70        There is no height or weight on file to calculate BMI.    Physical Exam   Constitutional: She is oriented to person, place, and time. She appears well-developed and well-nourished. No distress.   HENT:   Head: Normocephalic and atraumatic.   Eyes: EOM are normal. Pupils are equal, round, and reactive to light. Right eye exhibits no discharge. Left eye exhibits no discharge.   Neck: Normal range of motion. Neck supple.   Cardiovascular: Normal rate and normal heart sounds. Exam reveals no gallop.   Irregular pulse was noted.   Pulmonary/Chest: Effort normal and breath sounds normal. No respiratory  distress.   Abdominal: Soft. Bowel sounds are normal. She exhibits no mass. There is no tenderness.   Musculoskeletal: Normal range of motion. She exhibits edema and tenderness. She exhibits no deformity.   Severe, very tender L knee pain which extends up to the L groin. Mild L knee swelling compared to the R knee.   Neurological: She is alert and oriented to person, place, and time. No cranial nerve deficit or sensory deficit.   Skin: Skin is warm and dry. Capillary refill takes less than 2 seconds. She is not diaphoretic. There is erythema.   L knee is warm at palpation when compared it to the R knee.   Psychiatric: She has a normal mood and affect.         CRANIAL NERVES     CN III, IV, VI   Pupils are equal, round, and reactive to light.  Extraocular motions are normal.        Significant Labs: All pertinent labs within the past 24 hours have been reviewed.    Significant Imaging: I have reviewed all pertinent imaging results/findings within the past 24 hours.    Assessment/Plan:      Effusion of knee    - severe L knee pain 2/2 fall from the bed with severe tenderness. Tramadol was ordered PRN.  - Hip, tibia, fibula and ankle X-rays showed no evidence of fractures. L Knee X- ray showed arthroplasty hardware projects in expected position, no evidence of hardware failure, no displaced fracture or subluxation, no suspicious bone lesion and unchanged moderate left knee suprapatellar synovial complex/effusion.          VTE Risk Mitigation (From admission, onward)        Ordered     warfarin (COUMADIN) tablet 4 mg  Daily      08/18/18 4504              Lisbeth Dalal MD  Department of Hospital Medicine   [facility]

## 2018-08-18 NOTE — ED PROVIDER NOTES
Encounter Date: 8/18/2018    SCRIBE #1 NOTE: I, Dallas Portillo, am scribing for, and in the presence of, Dr. Ordoñez.       History     Chief Complaint   Patient presents with    Knee Pain     Pt fell out of bed two days ago and was diagnosed with a contusion. Pt continues to have pain and swelling.      Time patient was seen by the provider: 10:10 AM    The patient is a 83 y.o. 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 Pt states she fell out of her bed and struck her left knee/hip approximately 2 days ago. She shortly reported to [facility] and received treatment, then was discharged later that day. Pain has begun radiating up and down he leg towards her groin. There are no associated symptoms at this time. Pt states she has been unable to stand/ambulate/move comfortably since the injury and has significantly worsened over the 24 hours. She has not taken her coumadin this morning and reports no recent missed medications concerning her coumadin. Denies fever, chills, head trauma, and any LOC.         The history is provided by the patient and medical records.     Review of patient's allergies indicates:  No Known Allergies  Past Medical History:   Diagnosis Date    Acid reflux 2/14/2013    Anticoagulant long-term use     Anxiety 12/17/2012    Arthritis     Atrial fibrillation     Blood transfusion     CAD (coronary artery disease) 2/14/2013    CHF (congestive heart failure)     CKD (chronic kidney disease) stage 3, GFR 30-59 ml/min 2/14/2013    Dry mouth     Encounter for blood transfusion     Hepatitis B 2/14/2013    Hyperlipidemia 12/17/2012    Hypertension 12/17/2012    Reactive airway disease with wheezing 2/19/2013    Sleep apnea     Thyroid disease     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    Type II  diabetes mellitus 2012    Vitamin D deficiency disease 2012     Past Surgical History:   Procedure Laterality Date    ABDOMINAL SURGERY       SECTION, CLASSIC      FRACTURE SURGERY      ankles, ribs    HYSTERECTOMY      TOTAL KNEE ARTHROPLASTY Right     Right knee    TOTAL KNEE ARTHROPLASTY Left 02/15/2013     Family History   Problem Relation Age of Onset    Stroke Mother     Arthritis Mother     Cancer Mother     Heart disease Mother     Hypertension Mother     Heart disease Father     Hypertension Brother     Drug abuse Daughter     Hypertension Daughter     Depression Son     Diabetes Son     Drug abuse Son     Hypertension Son      Social History     Tobacco Use    Smoking status: Former Smoker     Last attempt to quit: 1954     Years since quittin.6    Smokeless tobacco: Never Used   Substance Use Topics    Alcohol use: No    Drug use: No     Review of Systems   Constitutional: Negative for chills and fever.   HENT: Negative for sore throat.    Respiratory: Negative for shortness of breath.    Cardiovascular: Negative for chest pain.   Gastrointestinal: Negative for nausea.   Genitourinary: Negative for dysuria.   Musculoskeletal: Negative for back pain.        Pt presents with severe worsening left leg knee pain that radiates up to the groin.    Skin: Negative for rash.   Neurological: Negative for syncope.   Hematological: Does not bruise/bleed easily.       Physical Exam     Initial Vitals [18 0959]   BP Pulse Resp Temp SpO2   136/86 67 16 -- 98 %      MAP       --         Physical Exam    Nursing note and vitals reviewed.  Constitutional: She appears well-developed and well-nourished.   Appears very uncomfortable.    HENT:   Head: Atraumatic.   Eyes: EOM are normal. Pupils are equal, round, and reactive to light.   Neck: No tracheal deviation present. No JVD present.   Cardiovascular: Normal rate, regular rhythm, normal heart sounds and intact  distal pulses.   Pulmonary/Chest: Breath sounds normal. No stridor. No respiratory distress.   Abdominal: Soft. She exhibits no distension. There is no tenderness.   Musculoskeletal:   Knee is elevated with decreased ROM. Tenderness with palpitation throughout left knee with swelling.    Psychiatric: Her behavior is normal. Thought content normal.         ED Course   Procedures  Labs Reviewed   CBC W/ AUTO DIFFERENTIAL - Abnormal; Notable for the following components:       Result Value    RDW 15.2 (*)     All other components within normal limits   COMPREHENSIVE METABOLIC PANEL - Abnormal; Notable for the following components:    Glucose 174 (*)     Albumin 3.3 (*)     eGFR if non  52.2 (*)     All other components within normal limits   PROTIME-INR - Abnormal; Notable for the following components:    Prothrombin Time 21.1 (*)     INR 2.2 (*)     All other components within normal limits          Imaging Results          X-Ray Knee 1 or 2 View Left (Final result)  Result time 08/18/18 11:05:40   Procedure changed from X-Ray Knee 3 View Left     Final result by Joel Alcaraz MD (08/18/18 11:05:40)                 Impression:      Unchanged moderate left knee suprapatellar synovial complex/effusion.      Electronically signed by: Joel Alcaraz MD  Date:    08/18/2018  Time:    11:05             Narrative:    EXAMINATION:  XR KNEE 1 OR 2 VIEW LEFT    CLINICAL HISTORY:  pain ;  Pain, unspecified    TECHNIQUE:  Left knee, two views    COMPARISON:  August 16, 2018    FINDINGS:  Left knee arthroplasty hardware projects in expected position.  No evidence of hardware failure.    No displaced fracture or subluxation.  No suspicious bone lesion.    Unchanged moderate left knee suprapatellar synovial complex/effusion.                                 Medical Decision Making:   History:   Old Medical Records: I decided to obtain old medical records.  Clinical Tests:   Lab Tests: Ordered and  Reviewed  Radiological Study: Ordered and Reviewed  ED Management:  Discussed care with orthopedic resident due to the fact that she is a former total arthoplasty patient as well as on coumadin. Plan set in place is ace wrap and supportive care.     Patient ultimately admitted due to not being able to take care of herself at home. She cannot move without severe pain, cannot walk, and cannot complete her activities of daily living. She and her family expressed severe concern and discomfort with returning home. Patient will be admitted to prevent further potential complications and continue management.            Scribe Attestation:   Scribe #1: I performed the above scribed service and the documentation accurately describes the services I performed. I attest to the accuracy of the note.    Attending Attestation:             Attending ED Notes:   10:14 AM  Bedside ultrasound demonstrates free fluid in areas superior and medial to the patella and in the underlined joint spaces.              Clinical Impression:   The primary encounter diagnosis was Effusion of knee, unspecified laterality. A diagnosis of Pain was also pertinent to this visit.                             Jose Ordoñez MD  08/18/18 1121       Jose Ordoñez MD  08/18/18 1204

## 2018-08-18 NOTE — ED NOTES
LOC: The patient is awake and alert; oriented x 3 and speaking appropriately.  APPEARANCE: Patient is c/o pain in left knee and hip patient is clean and well groomed, wearing incontinence pads  SKIN: warm and dry, normal skin turgor & moist mucus membranes, skin intact, no breakdown noted.  MUSCULOSKELETAL: Patient moving all extremities well, no obvious swelling or deformities noted, pain in left hip and left knee post fall a few days ago.   RESPIRATORY: Airway is open and patent, respirations are spontaneous, normal effort and rate  CARDIAC: Patient has a normal rate, no peripheral edema noted, capillary refill < 3 seconds; No complaints of chest pain   ABDOMEN: Soft and denies abd pain.

## 2018-08-18 NOTE — HPI
83 y.o. female with co-morbidities including: A-Fib, right total knee replacement (2008), left total knee replacement (2011), type 2 DM, HTN, CKD, CAD, and CHF who presents to the ED with a complaint of worsening severe left thigh and knee pain of initial onset 2 days ago. Pt states she fell out of her bed and struck her left knee/hip approximately 2 days ago. She shortly reported to [facility] and received treatment, then was discharged later that day. Pain is 10/10 and begun radiating up and down her leg towards her groin. There are no associated symptoms at this time and no pain at the level below the knee. She is on severe pain at the knee level and above which is very sensitive to touch on examination not relived by 1g PO Tylenol. Pt states she has been unable to stand/ambulate/move comfortably since the injury and has significantly worsened over the 24 hours. She has not taken her coumadin this morning and reports no recent missed medications concerning her coumadin. Denies fever, chills, head trauma, and any LOC. No hx of numbness or tingling sensation. She is on Glipizide but did not took it last month because of insurance issues. Other systemic review was unremarkable.

## 2018-08-18 NOTE — ASSESSMENT & PLAN NOTE
- severe L knee pain 2/2 fall from the bed with severe tenderness. Tramadol was ordered PRN.  - Hip, tibia, fibula and ankle X-rays showed no evidence of fractures. L Knee X- ray showed arthroplasty hardware projects in expected position, no evidence of hardware failure, no displaced fracture or subluxation, no suspicious bone lesion and unchanged moderate left knee suprapatellar synovial complex/effusion.

## 2018-08-18 NOTE — MEDICAL/APP STUDENT
[facility] Medicine   History & Physical    Patient Name: Claribel Moran  MRN: [id number]  Admission Date: 08/18/2018  Attending Physician: Vicky Arenas MD    PCP:     Moises Eduardo MD    CC:     Chief Complaint   Patient presents with    Knee Pain     Pt fell out of bed two days ago and was diagnosed with a contusion. Pt continues to have pain and swelling.      HISTORY OF PRESENT ILLNESS:     Claribel Moran is a 83 y.o. female presented with worsening knee pain and swelling 2/2 a fall 2 days ago, in the background of prior total knee arthroplasty (2013) and long term anticoagulant use.  She fell out of her bed and struck her left knee/hip, initially reported to [facility] and received treatment and discharged on the same day. Her pain was significantly worsen in the last 24 hr, constant pain, 10/10, radiate to her groin and her leg, limiting her ability to ambulate.  She denies fever, chills, head trauma, N/V, rash, and any focal neurological deficits.     She had total knee replacement bilaterally(2011), and a prior history of ankles and ribs fractures (more than 20 years ago).  History of Afib currently on warfarin.  She reports being compliance to his medication except missing her morning dose today.     In the emergency department, case was discussed with ortho resident, and advised to have supportive care at this point.     Hospital medicine has been asked to admit to prevent further potential complications and continue management.     REVIEW OF SYSTEMS:     -- Constitutional: No fever.  Pos chills.  -- Eyes: No diplopia, pain, tearing, blind spots, or discharge. Burry vision from glaucoma and cataract  -- Ears, nose, mouth, throat, and face: No epistaxis, d/c, bleeding gums, masses, or dental issues. Pos for congestion, sore throat 2/2 to reflux, dry mouth and stiff neck.    -- Respiratory: No cough, hemoptysis, stridor, wheezing. Chronic SOB, and some night  sweat  -- Cardiovascular: No THOMAS, syncope, PND, edema, cyanosis. Chronic chest pain and palpitation.   -- Gastrointestinal: No vomiting, abdominal pain, hematemesis, melena, dyspepsia, or change in bowel habits.  -- Genitourinary: No hematuria, dysuria, urgency, or incontinence. Chronic frequency, polyuria and nocturia. Hx of kidney stone, and vaginal yeast infection.   -- Integument/breast: No pruritis, pigmentation changes, dryness, or changes in hair.  Rash 2/2 vaginal yeast infection.   -- Hematologic/lymphatic: No easy bruising or lymphadenopathy.   -- Musculoskeletal: No acute myalgias, joint swelling, or acute muscular weakness. Pos arthralgias resulted in LRM  -- Neurological: No seizures, headaches, incoordination, paraesthesias, ataxia, vertigo, or tremors.  -- Behavioral/Psych: No auditory or visual hallucinations, depression, or suicidal/homicidal ideations.  -- Endocrine: No heat or cold intolerance, polydipsia, or unintentional weight gain / loss.  -- Allergy/Immunologic: No recurrent infections or adverse reaction to food, insects, or difficulty breathing.    Pain Scale  10 on scale of 1 to 10    PAST MEDICAL / SURGICAL HISTORY:     Past Medical History:   Diagnosis Date    Acid reflux 2013    Anticoagulant long-term use     Anxiety 2012    Arthritis     Atrial fibrillation     Blood transfusion     CAD (coronary artery disease) 2013    CHF (congestive heart failure)     CKD (chronic kidney disease) stage 3, GFR 30-59 ml/min 2013    Dry mouth     Encounter for blood transfusion     Hepatitis B 2013    Hyperlipidemia 2012    Hypertension 2012    Reactive airway disease with wheezing 2013    Sleep apnea     Thyroid disease     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    Type II diabetes mellitus 2012    Vitamin D deficiency disease 2012     Past Surgical History:   Procedure Laterality Date    ABDOMINAL SURGERY        SECTION, CLASSIC      FRACTURE SURGERY      ankles, ribs    HYSTERECTOMY      KIDNEY STONE SURGERY      TOTAL KNEE ARTHROPLASTY Right     Right knee    TOTAL KNEE ARTHROPLASTY Left 02/15/2013       FAMILY HISTORY:     Family History   Problem Relation Age of Onset    Stroke Mother     Arthritis Mother     Cancer Mother         foot    Heart disease Mother     Hypertension Mother     Heart disease Father     Hypertension Brother     Drug abuse Daughter     Hypertension Daughter     Depression Son     Diabetes Son     Hypertension Son        SOCIAL HISTORY:     Social History     Socioeconomic History    Marital status:      Spouse name: None    Number of children: None    Years of education: None    Highest education level: None   Social Needs    Financial resource strain: None    Food insecurity - worry: None    Food insecurity - inability: None    Transportation needs - medical: None    Transportation needs - non-medical: None   Occupational History    None   Tobacco Use    Smoking status: Former Smoker     Types: Cigarettes     Last attempt to quit: 1954     Years since quittin.6    Smokeless tobacco: Never Used    Tobacco comment: 1 pack for month only for a few years, but second hand smoke from  for decades.    Substance and Sexual Activity    Alcohol use: No    Drug use: No    Sexual activity: Not Currently   Other Topics Concern    None   Social History Narrative    None       ALLERGIES:     Review of patient's allergies indicates:  No Known Allergies    HEALTH SCREENING:     No vaccinations for years.     HOME MEDICATIONS:     Prior to Admission medications    Medication Sig Start Date End Date Taking? Authorizing Provider   alprazolam (XANAX) 0.5 MG tablet Take 1 tablet (0.5 mg total) by mouth 3 (three) times daily as needed for Anxiety. 16 Yes Wm Dee MD   amlodipine (NORVASC) 5 MG tablet Take 5 mg by mouth once daily.    Yes Historical Provider, MD   ascorbic acid (VITAMIN C) 250 MG tablet Take 1 tablet (250 mg total) by mouth once daily. 2/28/13  Yes Rene Almaguer NP   atorvastatin (LIPITOR) 40 MG tablet Take 40 mg by mouth once daily.   Yes Historical Provider, MD   carvedilol (COREG) 12.5 MG tablet  6/20/17  Yes Historical Provider, MD   citalopram (CELEXA) 40 MG tablet Take 40 mg by mouth once daily.     Yes Historical Provider, MD   ergocalciferol (ERGOCALCIFEROL) 50,000 unit Cap Take 50,000 Units by mouth every 30 days.    Yes Historical Provider, MD   escitalopram oxalate (LEXAPRO) 10 MG tablet Take 10 mg by mouth once daily.   Yes Historical Provider, MD   glipiZIDE (GLUCOTROL) 2.5 MG TR24 Take 5 mg by mouth daily with breakfast.     Yes Historical Provider, MD   levothyroxine (SYNTHROID) 50 MCG tablet Take 50 mcg by mouth once daily.   Yes Historical Provider, MD   losartan (COZAAR) 100 MG tablet Take 100 mg by mouth once daily.   Yes Historical Provider, MD   senna-docusate 8.6-50 mg (PERICOLACE) 8.6-50 mg per tablet Take 2 tablets by mouth every evening. 2/28/13  Yes Rene Almaguer NP   traMADol (ULTRAM) 50 mg tablet Take 50 mg by mouth every 6 (six) hours as needed for Pain.   Yes Historical Provider, MD   travoprost, benzalkonium, (TRAVATAN) 0.004 % ophthalmic solution Place 1 drop into both eyes every evening.    Yes Historical Provider, MD   valsartan (DIOVAN) 160 MG tablet Take 160 mg by mouth once daily. Uses the 320 mg tab and breaks in half.   Yes Historical Provider, MD   VENTOLIN HFA 90 mcg/actuation inhaler  6/13/17  Yes Historical Provider, MD   warfarin (COUMADIN) 4 MG tablet Take 4 mg by mouth once daily.   Yes Historical Provider, MD   albuterol (ACCUNEB) 1.25 mg/3 mL Nebu Take 1.25 mg by nebulization every 6 (six) hours as needed. Rescue    Historical Provider, MD   guaifenesin-codeine 100-10 mg/5 ml (TUSSI-ORGANIDIN NR)  mg/5 mL syrup Take 5 mLs by mouth 3 (three) times daily as needed  for Cough.    Historical Provider, MD      [facility] MEDICATIONS:     Scheduled Meds:    albuterol-ipratropium  3 mL Nebulization Q4H WAKE     Continuous Infusions:   PRN Meds:     PHYSICAL EXAM:     Wt Readings from Last 1 Encounters:   08/16/18 1126 91.6 kg (202 lb)     There is no height or weight on file to calculate BMI.  Vitals:    08/18/18 1201 08/18/18 1231 08/18/18 1300 08/18/18 1302   BP: 128/70 (!) 191/88  (!) 182/77   Pulse: 79 80  82   Resp: 18 20 18 18   SpO2: (!) 94% (!) 94%  95%      -- General appearance: well developed. appears stated age   -- Head: normocephalic, atraumatic   -- Eyes: conjunctivae clear. Extraocular muscles intact  -- Nose: Nares normal. Septum midline.   -- Mouth/Throat: lips, mucosa, and tongue normal. no throat erythema.   -- Neck: supple, symmetrical, trachea midline, no JVD and thyroid not grossly enlarged, appears symmetric  -- Lungs: clear to auscultation bilaterally. normal respiratory effort. No use of accessory muscles.   -- Chest wall: no tenderness. equal bilateral chest rise   -- Heart: regular rate and regular rhythm. S1, S2 normal.  no click, rub or gallop   -- Abdomen: soft, non-tender, non-distended, non-tympanic; bowel sounds normal; no masses  -- Extremities: no cyanosis, clubbing.  Left knee is tender to palpation, no change of color, no swelling, no warmth.   -- Pulses: 2+ and symmetric   -- Skin: color normal, texture normal, turgor normal. No rashes or lesions.   -- Neurologic: Normal strength and tone of her UE and right LE.  Limited range of motion in her left knee. No focal numbness or weakness. CNII-XII intact. [place] coma scale: eyes open spontaneously-4, oriented & converses-5, obeys commands-6.      LABORATORY STUDIES:     Recent Results (from the past 24 hour(s))   CBC auto differential    Collection Time: 08/18/18 10:30 AM   Result Value Ref Range    WBC 8.47 3.90 - 12.70 K/uL    RBC 4.77 4.00 - 5.40 M/uL    Hemoglobin 13.9 12.0 - 16.0 g/dL     Hematocrit 43.5 37.0 - 48.5 %    MCV 91 82 - 98 fL    MCH 29.1 27.0 - 31.0 pg    MCHC 32.0 32.0 - 36.0 g/dL    RDW 15.2 (H) 11.5 - 14.5 %    Platelets 194 150 - 350 K/uL    MPV 11.5 9.2 - 12.9 fL    Immature Granulocytes 0.2 0.0 - 0.5 %    Gran # (ANC) 5.8 1.8 - 7.7 K/uL    Immature Grans (Abs) 0.02 0.00 - 0.04 K/uL    Lymph # 1.6 1.0 - 4.8 K/uL    Mono # 0.8 0.3 - 1.0 K/uL    Eos # 0.2 0.0 - 0.5 K/uL    Baso # 0.03 0.00 - 0.20 K/uL    nRBC 0 0 /100 WBC    Gran% 68.3 38.0 - 73.0 %    Lymph% 19.2 18.0 - 48.0 %    Mono% 9.2 4.0 - 15.0 %    Eosinophil% 2.7 0.0 - 8.0 %    Basophil% 0.4 0.0 - 1.9 %    Differential Method Automated    Comprehensive metabolic panel    Collection Time: 08/18/18 10:30 AM   Result Value Ref Range    Sodium 136 136 - 145 mmol/L    Potassium 4.4 3.5 - 5.1 mmol/L    Chloride 100 95 - 110 mmol/L    CO2 24 23 - 29 mmol/L    Glucose 174 (H) 70 - 110 mg/dL    BUN, Bld 16 8 - 23 mg/dL    Creatinine 1.0 0.5 - 1.4 mg/dL    Calcium 9.5 8.7 - 10.5 mg/dL    Total Protein 7.5 6.0 - 8.4 g/dL    Albumin 3.3 (L) 3.5 - 5.2 g/dL    Total Bilirubin 0.8 0.1 - 1.0 mg/dL    Alkaline Phosphatase 93 55 - 135 U/L    AST 27 10 - 40 U/L    ALT 23 10 - 44 U/L    Anion Gap 12 8 - 16 mmol/L    eGFR if African American >60.0 >60 mL/min/1.73 m^2    eGFR if non  52.2 (A) >60 mL/min/1.73 m^2   Protime-INR    Collection Time: 08/18/18 10:30 AM   Result Value Ref Range    Prothrombin Time 21.1 (H) 9.0 - 12.5 sec    INR 2.2 (H) 0.8 - 1.2        Lab Results   Component Value Date    INR 2.2 (H) 08/18/2018    INR 1.8 (H) 03/23/2018    INR 2.3 (H) 01/02/2014     Lab Results   Component Value Date    HGBA1C 7.1 (H) 12/06/2017     No results for input(s): POCTGLUCOSE in the last 72 hours.    MICROBIOLOGY DATA:     No Microbiology samples are collected.     IMAGING:     X-ray of knee: Unchanged moderate left knee suprapatellar synovial complex/effusion.    CONSULTS:     Ortho    ASSESSMENT & PLAN:     Claribel Moran is  a 83 y.o. female presented with left knee effusion secondary to fall s/p arthoplasty:     Primary Diagnosis:  Knee effusion     Diagnosis    Effusion of knee [M25.469]  - currently stable, X-ray shows no changes to prior ED visit.   - supportive management with ACE wrap to stabilize joint  - consult ortho   - pain management: ibuprofen, tramadol, tylenol PO PRN    History of Afib   - stable   - continue with home regiment    CHF  - stable   - continue with home regiment     Type 2 Diabetes Mellitus   - stable   - continue with home regiment     Thyroid disease   - stable   - continue with home regiment     Hypertension   - stable   - continue with home regiment     Hyperlipidemia   - stable   - continue with home regiment     Hypertension   - stable   - continue with home regiment      VTE Risk Mitigation (From admission, onward)    Warfarin         Adult PRN medications available   DVT prophylaxis given     DISPOSITION:     Will admit to the Hospital Medicine service for further evaluation and treatment.    Chart reviewed and updated where applicable.    ===============================================================    Jane Cleary  M3 medical student

## 2018-08-18 NOTE — SUBJECTIVE & OBJECTIVE
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      Current Outpatient Medications on File Prior to Encounter   Medication Sig    albuterol (ACCUNEB) 1.25 mg/3 mL Nebu Take 1.25 mg by nebulization every 6 (six) hours as needed. Rescue    alprazolam (XANAX) 0.5 MG tablet Take 1 tablet (0.5 mg total) by mouth 3 (three) times daily as needed for Anxiety.    amlodipine (NORVASC) 5 MG tablet Take 5 mg by mouth once daily.    ascorbic acid (VITAMIN C) 250 MG tablet Take 1 tablet (250 mg total) by mouth once daily.    atorvastatin (LIPITOR) 40 MG tablet Take 40 mg by mouth once daily.    carvedilol (COREG) 12.5 MG tablet     citalopram (CELEXA) 40 MG tablet Take 40 mg by mouth once daily.      ergocalciferol (ERGOCALCIFEROL) 50,000 unit Cap Take 50,000 Units by mouth every 30 days.     escitalopram oxalate (LEXAPRO) 10 MG tablet Take 10 mg by mouth once daily.     glipiZIDE (GLUCOTROL) 2.5 MG TR24 Take 5 mg by mouth daily with breakfast.      levothyroxine (SYNTHROID) 50 MCG tablet Take 50 mcg by mouth once daily.    losartan (COZAAR) 100 MG tablet Take 100 mg by mouth once daily.    senna-docusate 8.6-50 mg (PERICOLACE) 8.6-50 mg per tablet Take 2 tablets by mouth every evening.    traMADol (ULTRAM) 50 mg tablet Take 50 mg by mouth every 6 (six) hours as needed for Pain.    valsartan (DIOVAN) 160 MG tablet Take 160 mg by mouth once daily. Uses the 320 mg tab and breaks in half.    VENTOLIN HFA 90 mcg/actuation inhaler     warfarin (COUMADIN) 4 MG tablet Take 4 mg by mouth once daily.    [DISCONTINUED] rosuvastatin (CRESTOR) 10 MG tablet Take 10 mg by mouth once daily.     ferrous gluconate (FERGON) 324 MG tablet Take 1 tablet (324 mg total) by mouth 2 (two) times daily with meals.    guaifenesin-codeine 100-10 mg/5 ml (TUSSI-ORGANIDIN NR)  mg/5 mL syrup Take 5 mLs by mouth 3 (three) times daily as needed for Cough.    metformin (GLUCOPHAGE) 500 MG tablet Take 500 mg by mouth 2 (two) times daily with meals.      multivitamin (THERAGRAN) tablet Take 1 tablet by mouth once daily.    travoprost, benzalkonium, (TRAVATAN) 0.004 % ophthalmic solution Place 1 drop into both eyes every evening.     [DISCONTINUED] doxycycline (VIBRA-TABS) 100 MG tablet Take 100 mg by mouth 2 (two) times daily.     Family History     Problem Relation (Age of Onset)    Arthritis Mother    Cancer Mother    Depression Son    Diabetes Son    Drug abuse Daughter    Heart disease Mother, Father    Hypertension Mother, Brother, Daughter, Son    Stroke Mother        Tobacco Use    Smoking status: Former Smoker     Types: Cigarettes     Last attempt to quit: 1954     Years since quittin.6    Smokeless tobacco: Never Used    Tobacco comment: 1 pack for month only for a few years, but second hand smoke from  for decades.    Substance and Sexual Activity    Alcohol  use: No    Drug use: No    Sexual activity: Not Currently     Review of Systems   Constitutional: Negative for appetite change, chills, fatigue and fever.   HENT: Negative for ear pain and sinus pain.    Eyes: Negative for photophobia and pain.   Respiratory: Negative for cough and shortness of breath.    Cardiovascular: Negative for chest pain and leg swelling.   Gastrointestinal: Negative for abdominal pain, blood in stool, nausea and vomiting.   Endocrine: Negative for polydipsia and polyphagia.   Genitourinary: Negative for dysuria and hematuria.   Musculoskeletal: Positive for arthralgias and joint swelling. Negative for gait problem, neck pain and neck stiffness.        Bilateral knee pain more severe on the L side 2/2 fall 2 days ago which is not relieved by 1g Tylenol PO.   Skin: Positive for color change. Negative for rash and wound.        Redness over the L knee compared to the R knee.   Allergic/Immunologic: Negative for immunocompromised state.   Neurological: Negative for dizziness, facial asymmetry, numbness and headaches.   Psychiatric/Behavioral: Negative for confusion.     Objective:     Vital Signs (Most Recent):  Temp: 97.8 °F (36.6 °C) (08/18/18 1739)  Pulse: 76 (08/18/18 1739)  Resp: 18 (08/18/18 1739)  BP: 136/70 (08/18/18 1739)  SpO2: (!) 93 % (08/18/18 1739) Vital Signs (24h Range):  Temp:  [97.7 °F (36.5 °C)-97.8 °F (36.6 °C)] 97.8 °F (36.6 °C)  Pulse:  [67-97] 76  Resp:  [16-20] 18  SpO2:  [93 %-98 %] 93 %  BP: (128-191)/(70-88) 136/70        There is no height or weight on file to calculate BMI.    Physical Exam   Constitutional: She is oriented to person, place, and time. She appears well-developed and well-nourished. No distress.   HENT:   Head: Normocephalic and atraumatic.   Eyes: EOM are normal. Pupils are equal, round, and reactive to light. Right eye exhibits no discharge. Left eye exhibits no discharge.   Neck: Normal range of motion. Neck supple.   Cardiovascular: Normal rate  and normal heart sounds. Exam reveals no gallop.   Irregular pulse was noted.   Pulmonary/Chest: Effort normal and breath sounds normal. No respiratory distress.   Abdominal: Soft. Bowel sounds are normal. She exhibits no mass. There is no tenderness.   Musculoskeletal: Normal range of motion. She exhibits edema and tenderness. She exhibits no deformity.   Severe, very tender L knee pain which extends up to the L groin. Mild L knee swelling compared to the R knee.   Neurological: She is alert and oriented to person, place, and time. No cranial nerve deficit or sensory deficit.   Skin: Skin is warm and dry. Capillary refill takes less than 2 seconds. She is not diaphoretic. There is erythema.   L knee is warm at palpation when compared it to the R knee.   Psychiatric: She has a normal mood and affect.         CRANIAL NERVES     CN III, IV, VI   Pupils are equal, round, and reactive to light.  Extraocular motions are normal.        Significant Labs: All pertinent labs within the past 24 hours have been reviewed.    Significant Imaging: I have reviewed all pertinent imaging results/findings within the past 24 hours.

## 2018-08-19 LAB
ANION GAP SERPL CALC-SCNC: 9 MMOL/L
ANION GAP SERPL CALC-SCNC: 9 MMOL/L
BASOPHILS # BLD AUTO: 0.04 K/UL
BASOPHILS NFR BLD: 0.5 %
BUN SERPL-MCNC: 23 MG/DL
BUN SERPL-MCNC: 24 MG/DL
CALCIUM SERPL-MCNC: 9 MG/DL
CALCIUM SERPL-MCNC: 9.2 MG/DL
CHLORIDE SERPL-SCNC: 99 MMOL/L
CHLORIDE SERPL-SCNC: 99 MMOL/L
CO2 SERPL-SCNC: 26 MMOL/L
CO2 SERPL-SCNC: 28 MMOL/L
CREAT SERPL-MCNC: 1.3 MG/DL
CREAT SERPL-MCNC: 1.3 MG/DL
CREAT UR-MCNC: 61 MG/DL
DIFFERENTIAL METHOD: ABNORMAL
EOSINOPHIL # BLD AUTO: 0.2 K/UL
EOSINOPHIL NFR BLD: 2.8 %
ERYTHROCYTE [DISTWIDTH] IN BLOOD BY AUTOMATED COUNT: 15.2 %
EST. GFR  (AFRICAN AMERICAN): 43.8 ML/MIN/1.73 M^2
EST. GFR  (AFRICAN AMERICAN): 43.8 ML/MIN/1.73 M^2
EST. GFR  (NON AFRICAN AMERICAN): 38 ML/MIN/1.73 M^2
EST. GFR  (NON AFRICAN AMERICAN): 38 ML/MIN/1.73 M^2
GLUCOSE SERPL-MCNC: 200 MG/DL
GLUCOSE SERPL-MCNC: 208 MG/DL
HCT VFR BLD AUTO: 40.5 %
HGB BLD-MCNC: 12.9 G/DL
IMM GRANULOCYTES # BLD AUTO: 0.04 K/UL
IMM GRANULOCYTES NFR BLD AUTO: 0.5 %
INR PPP: 1.8
LYMPHOCYTES # BLD AUTO: 1.9 K/UL
LYMPHOCYTES NFR BLD: 24.5 %
MAGNESIUM SERPL-MCNC: 1.9 MG/DL
MCH RBC QN AUTO: 29.2 PG
MCHC RBC AUTO-ENTMCNC: 31.9 G/DL
MCV RBC AUTO: 92 FL
MONOCYTES # BLD AUTO: 0.9 K/UL
MONOCYTES NFR BLD: 11.5 %
NEUTROPHILS # BLD AUTO: 4.7 K/UL
NEUTROPHILS NFR BLD: 60.2 %
NRBC BLD-RTO: 0 /100 WBC
PHOSPHATE SERPL-MCNC: 2.9 MG/DL
PLATELET # BLD AUTO: 180 K/UL
PMV BLD AUTO: 11.6 FL
POCT GLUCOSE: 185 MG/DL (ref 70–110)
POCT GLUCOSE: 193 MG/DL (ref 70–110)
POCT GLUCOSE: 198 MG/DL (ref 70–110)
POCT GLUCOSE: 225 MG/DL (ref 70–110)
POCT GLUCOSE: 233 MG/DL (ref 70–110)
POTASSIUM SERPL-SCNC: 4.2 MMOL/L
POTASSIUM SERPL-SCNC: 4.8 MMOL/L
PROTHROMBIN TIME: 17.6 SEC
RBC # BLD AUTO: 4.42 M/UL
SODIUM SERPL-SCNC: 134 MMOL/L
SODIUM SERPL-SCNC: 136 MMOL/L
SODIUM UR-SCNC: 67 MMOL/L
UUN UR-MCNC: 463 MG/DL
WBC # BLD AUTO: 7.76 K/UL

## 2018-08-19 PROCEDURE — 84540 ASSAY OF URINE/UREA-N: CPT

## 2018-08-19 PROCEDURE — 83735 ASSAY OF MAGNESIUM: CPT

## 2018-08-19 PROCEDURE — 11000001 HC ACUTE MED/SURG PRIVATE ROOM

## 2018-08-19 PROCEDURE — 99900035 HC TECH TIME PER 15 MIN (STAT)

## 2018-08-19 PROCEDURE — 25000003 PHARM REV CODE 250: Performed by: STUDENT IN AN ORGANIZED HEALTH CARE EDUCATION/TRAINING PROGRAM

## 2018-08-19 PROCEDURE — 94640 AIRWAY INHALATION TREATMENT: CPT

## 2018-08-19 PROCEDURE — 85025 COMPLETE CBC W/AUTO DIFF WBC: CPT

## 2018-08-19 PROCEDURE — 27000221 HC OXYGEN, UP TO 24 HOURS

## 2018-08-19 PROCEDURE — 94761 N-INVAS EAR/PLS OXIMETRY MLT: CPT

## 2018-08-19 PROCEDURE — 36415 COLL VENOUS BLD VENIPUNCTURE: CPT

## 2018-08-19 PROCEDURE — 84100 ASSAY OF PHOSPHORUS: CPT

## 2018-08-19 PROCEDURE — 25000242 PHARM REV CODE 250 ALT 637 W/ HCPCS: Performed by: STUDENT IN AN ORGANIZED HEALTH CARE EDUCATION/TRAINING PROGRAM

## 2018-08-19 PROCEDURE — 99232 SBSQ HOSP IP/OBS MODERATE 35: CPT | Mod: GC,,, | Performed by: HOSPITALIST

## 2018-08-19 PROCEDURE — 80048 BASIC METABOLIC PNL TOTAL CA: CPT

## 2018-08-19 PROCEDURE — 97165 OT EVAL LOW COMPLEX 30 MIN: CPT

## 2018-08-19 PROCEDURE — 84300 ASSAY OF URINE SODIUM: CPT

## 2018-08-19 PROCEDURE — 82570 ASSAY OF URINE CREATININE: CPT

## 2018-08-19 PROCEDURE — 85610 PROTHROMBIN TIME: CPT

## 2018-08-19 RX ORDER — ALPRAZOLAM 0.5 MG/1
0.5 TABLET ORAL DAILY PRN
Status: ON HOLD | COMMUNITY
End: 2018-08-19

## 2018-08-19 RX ORDER — ACETAMINOPHEN 500 MG
500 TABLET ORAL DAILY PRN
Status: ON HOLD | COMMUNITY
End: 2018-09-05 | Stop reason: HOSPADM

## 2018-08-19 RX ORDER — CARVEDILOL 12.5 MG/1
6.25 TABLET ORAL 2 TIMES DAILY
Status: ON HOLD | COMMUNITY
End: 2018-09-05 | Stop reason: HOSPADM

## 2018-08-19 RX ORDER — ALPRAZOLAM 0.25 MG/1
0.25 TABLET ORAL DAILY PRN
Status: ON HOLD | COMMUNITY
End: 2018-09-05 | Stop reason: HOSPADM

## 2018-08-19 RX ORDER — ATORVASTATIN CALCIUM 20 MG/1
40 TABLET, FILM COATED ORAL DAILY
Status: DISCONTINUED | OUTPATIENT
Start: 2018-08-19 | End: 2018-08-24 | Stop reason: HOSPADM

## 2018-08-19 RX ORDER — CARVEDILOL 6.25 MG/1
6.25 TABLET ORAL 2 TIMES DAILY
Status: DISCONTINUED | OUTPATIENT
Start: 2018-08-19 | End: 2018-08-19

## 2018-08-19 RX ADMIN — IPRATROPIUM BROMIDE AND ALBUTEROL SULFATE 3 ML: .5; 3 SOLUTION RESPIRATORY (INHALATION) at 08:08

## 2018-08-19 RX ADMIN — TRAMADOL HYDROCHLORIDE 50 MG: 50 TABLET, COATED ORAL at 03:08

## 2018-08-19 RX ADMIN — WARFARIN SODIUM 4 MG: 2 TABLET ORAL at 05:08

## 2018-08-19 RX ADMIN — AMLODIPINE BESYLATE 5 MG: 5 TABLET ORAL at 09:08

## 2018-08-19 RX ADMIN — LOSARTAN POTASSIUM 100 MG: 50 TABLET, FILM COATED ORAL at 09:08

## 2018-08-19 RX ADMIN — TRAMADOL HYDROCHLORIDE 50 MG: 50 TABLET, COATED ORAL at 09:08

## 2018-08-19 RX ADMIN — ATORVASTATIN CALCIUM 40 MG: 20 TABLET, FILM COATED ORAL at 12:08

## 2018-08-19 RX ADMIN — SODIUM CHLORIDE 500 ML: 0.9 INJECTION, SOLUTION INTRAVENOUS at 09:08

## 2018-08-19 RX ADMIN — TRAMADOL HYDROCHLORIDE 50 MG: 50 TABLET, COATED ORAL at 01:08

## 2018-08-19 RX ADMIN — LEVOTHYROXINE SODIUM 50 MCG: 50 TABLET ORAL at 06:08

## 2018-08-19 RX ADMIN — INSULIN ASPART 1 UNITS: 100 INJECTION, SOLUTION INTRAVENOUS; SUBCUTANEOUS at 10:08

## 2018-08-19 RX ADMIN — CARVEDILOL 12.5 MG: 12.5 TABLET, FILM COATED ORAL at 09:08

## 2018-08-19 NOTE — PT/OT/SLP EVAL
Occupational Therapy   Evaluation    Name: Claribel Moran  MRN: [id number]  Admitting Diagnosis:  <principal problem not specified>      Recommendations:     Discharge Recommendations:  home with home health   Discharge Equipment Recommendations:  none  Barriers to discharge:  None    History:     Occupational Profile:  Living Environment: Pt daughter and her son live with her in 1 story house with 9 steps to enter with B railing   Previous level of function: Pt states she was able to complete basic self care with occasional assist as needed - pt having difficulties with step and states she is getting an elevator put in her home   Equipment Used at Home:  3-in-1 commode, rollator, bath bench, walker, jayjay  Assistance upon Discharge: family able to assist     Past Medical History:   Diagnosis Date    Acid reflux 2013    Anticoagulant long-term use     Anxiety 2012    Arthritis     Atrial fibrillation     Blood transfusion     CAD (coronary artery disease) 2013    CHF (congestive heart failure)     CKD (chronic kidney disease) stage 3, GFR 30-59 ml/min 2013    Dry mouth     Encounter for blood transfusion     Hepatitis B 2013    Hyperlipidemia 2012    Hypertension 2012    Reactive airway disease with wheezing 2013    Sleep apnea     Thyroid disease     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    Type II diabetes mellitus 2012    Vitamin D deficiency disease 2012       Past Surgical History:   Procedure Laterality Date    ABDOMINAL SURGERY       SECTION, CLASSIC      FRACTURE SURGERY      ankles, ribs    HYSTERECTOMY      KIDNEY STONE SURGERY      TOTAL KNEE ARTHROPLASTY Right     Right knee    TOTAL KNEE ARTHROPLASTY Left 02/15/2013       Subjective     Chief Complaint: pain in knee   Patient/Family Comments/goals: return to home   Pain/Comfort:  · Pain Rating 1: 9/10  · Location - Side 1: Left  · Location 1: knee  · Pain Addressed  1: Reposition, Distraction    Patients cultural, spiritual, Faith conflicts given the current situation: none stated     Objective:     Communicated with: RN prior to session.  Patient found all lines intact and (lines intact ) upon OT entry to room.    General Precautions: Standard, fall   Orthopedic Precautions:N/A   Braces: N/A     Occupational Performance:    Bed Mobility:    · Pt with functional bed mobility but knee pain inhibits movement and pts willingness to participate,.     Functional Mobility/Transfers:  · Functional Mobility: Pt unable to tolerate standing due to knee pain     Activities of Daily Living:  · Pt able ot complete upper extremity self care with set up and unable to tolerate lower extremity self care     Cognitive/Visual Perceptual:  Cognitive/Psychosocial Skills:     -       Oriented to: Person, Place, Time and Situation   -       Follows Commands/attention:Follows two-step commands  -       Communication: clear/fluent  -       Memory: No Deficits noted  -       Safety awareness/insight to disability: intact   -       Mood/Affect/Coping skills/emotional control: Appropriate to situation    Physical Exam:  Upper Extremity Range of Motion:     -       Right Upper Extremity: WFL    -       Left Upper Extremity: WFL      Upper Extremity Strength:    -       Right Upper Extremity: WFL  -       Left Upper Extremity: WFL      AMPAC 6 Click ADL:  AMPAC Total Score: 17    Treatment & Education:  Evaluation complete and goals set.  Pt educated on safety, role of OT, importance of increased participation in self care for gains , expectations for participation, expectations for gains, POC, energy conservation, caregiver strain. White board updated.     Education:    Patient left supine with all lines intact    Assessment:     Claribel Moran is a 83 y.o. female with a medical diagnosis of <principal problem not specified>.  She presents with the following performance deficits affecting function:  "impaired endurance, impaired self care skills, impaired functional mobilty, decreased lower extremity function.      Rehab Prognosis: Good; patient would benefit from acute skilled OT services to address these deficits and reach maximum level of function.         Clinical Decision Makin.  OT Low:  "Pt evaluation falls under low complexity for evaluation coding due to performance deficits noted in 1-3 areas as stated above and 0 co-morbities affecting current functional status. Data obtained from problem focused assessments. No modifications or assistance was required for completion of evaluation. Only brief occupational profile and history review completed."     Plan:     Patient to be seen   to address the above listed problems via self-care/home management, therapeutic activities, therapeutic exercises  · Plan of Care Expires: 18  · Plan of Care Reviewed with: patient    This Plan of care has been discussed with the patient who was involved in its development and understands and is in agreement with the identified goals and treatment plan    GOALS:   Multidisciplinary Problems     Occupational Therapy Goals        Problem: Occupational Therapy Goal    Goal Priority Disciplines Outcome Interventions   Occupational Therapy Goal     OT, PT/OT Ongoing (interventions implemented as appropriate)    Description:  Goals to be met by: 9/10     Patient will increase functional independence with ADLs by performing:    UE Dressing with [place].  LE Dressing with Contact Guard Assistance.  Grooming while seated with [place].  Toileting from toilet with Contact Guard Assistance for hygiene and clothing management.                       Time Tracking:     OT Date of Treatment: 18  OT Start Time: 0740  OT Stop Time: 0755  OT Total Time (min): 15 min    Billable Minutes:Evaluation 15    Jade Salas, OT  2018    "

## 2018-08-19 NOTE — PLAN OF CARE
Problem: Occupational Therapy Goal  Goal: Occupational Therapy Goal  Goals to be met by: 9/10     Patient will increase functional independence with ADLs by performing:    UE Dressing with [place].  LE Dressing with Contact Guard Assistance.  Grooming while seated with [place].  Toileting from toilet with Contact Guard Assistance for hygiene and clothing management.     Outcome: Ongoing (interventions implemented as appropriate)  Evaluation complete and goals set.  Cont with POC  Jade Salas, OT\  8/19/2018

## 2018-08-19 NOTE — PROGRESS NOTES
[facility] Hospital Medicine  Progress Note    Patient Name: Claribel Moran  MRN: [id number]  Patient Class: IP- Inpatient   Admission Date: 8/18/2018  Length of Stay: 1 days  Attending Physician: Vicky Arneas MD  Primary Care Provider: Moises Eduardo MD    [facility] Medicine Team: [facility] 5 Lisbeth Dalal MD    Subjective:     Principal Problem:<principal problem not specified>    HPI:  83 y.o. female with co-morbidities including: A-Fib, right total knee replacement (2008), left total knee replacement (2011), type 2 DM, HTN, CKD, CAD, and CHF who presents to the ED with a complaint of worsening severe left thigh and knee pain of initial onset 2 days ago. Pt states she fell out of her bed and struck her left knee/hip approximately 2 days ago. She shortly reported to [facility] and received treatment, then was discharged later that day. Pain is 10/10 and begun radiating up and down her leg towards her groin. There are no associated symptoms at this time and no pain at the level below the knee. She is on severe pain at the knee level and above which is very sensitive to touch on examination not relived by 1g PO Tylenol. Pt states she has been unable to stand/ambulate/move comfortably since the injury and has significantly worsened over the 24 hours. She has not taken her coumadin this morning and reports no recent missed medications concerning her coumadin. Denies fever, chills, head trauma, and any LOC. No hx of numbness or tingling sensation. She is on Glipizide but did not took it last month because of insurance issues. Other systemic review was unremarkable.        Hospital Course:  8/18- VSS, patient admitted to the 9th floor and started on home anti HTN medications. Tramadol was ordered PRN for severe L knee pain.  8/19- NAEON, patient seen today and she is vitally stable, BP 98/62. Carvedilol was dc'ed, continue on Amlodipine and Losartan. Ice applied on L knee to  reduce pain. F/u with CR level as it was increased from 1.0 to 1.3. PT tomorrow.    No new subjective & objective note has been filed under this hospital service since the last note was generated.    Assessment/Plan:      Effusion of knee    - severe L knee pain 2/2 fall from the bed with severe tenderness. Tramadol was ordered PRN.  - Hip, tibia, fibula and ankle X-rays showed no evidence of fractures. L Knee X- ray showed arthroplasty hardware projects in expected position, no evidence of hardware failure, no displaced fracture or subluxation, no suspicious bone lesion and unchanged moderate left knee suprapatellar synovial complex/effusion.  - Ice was applied on L knee to reduce the pain. PT to f/u tomorrow. Will reeavulate for pain and swelling.          VTE Risk Mitigation (From admission, onward)        Ordered     warfarin (COUMADIN) tablet 4 mg  Daily      08/18/18 3152              Lisbeth Dalal MD  Department of Hospital Medicine   [facility]

## 2018-08-19 NOTE — SUBJECTIVE & OBJECTIVE
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      Current Outpatient Medications on File Prior to Encounter   Medication Sig    acetaminophen (TYLENOL) 500 MG tablet Take 500 mg by mouth daily as needed.     albuterol (ACCUNEB) 1.25 mg/3 mL Nebu Take 1.25 mg by nebulization every 6 (six) hours as needed. Rescue    ALPRAZolam (XANAX) 0.25 MG tablet Take 0.25 mg by mouth daily as needed for Anxiety.    amlodipine (NORVASC) 5 MG tablet Take 5 mg by mouth once daily.    atorvastatin (LIPITOR) 40 MG tablet Take 40 mg by mouth once daily.    carvedilol (COREG) 12.5 MG tablet Take 6.25 mg by mouth 2 (two) times daily.    citalopram (CELEXA) 40 MG tablet Take 40 mg by mouth once daily.      ergocalciferol (ERGOCALCIFEROL) 50,000 unit Cap Take 50,000 Units by mouth every 30 days.     escitalopram oxalate (LEXAPRO) 10 MG tablet Take 10 mg by mouth once daily.     glipiZIDE (GLUCOTROL) 2.5 MG TR24 Take 5 mg by mouth daily with breakfast.      guaifenesin-codeine 100-10 mg/5 ml (TUSSI-ORGANIDIN NR)  mg/5 mL syrup Take 5 mLs by mouth 3 (three) times daily as needed for Cough.    levothyroxine (SYNTHROID) 50 MCG tablet Take 50 mcg by mouth once daily.    losartan (COZAAR) 100 MG tablet Take 100 mg by mouth once daily.    ranitidine (ZANTAC) 75 MG tablet Take 75 mg by mouth daily as needed for Heartburn.    senna-docusate 8.6-50 mg (PERICOLACE) 8.6-50 mg per tablet Take 2 tablets by mouth every evening.    traMADol (ULTRAM) 50 mg tablet Take 50 mg by mouth every 6 (six) hours as needed for Pain.    VENTOLIN HFA 90 mcg/actuation inhaler Inhale 2 puffs into the lungs every 4 to 6 hours as needed for Shortness of Breath.     warfarin (COUMADIN) 4 MG tablet Takes 1 tablet by mouth daily except 1/2 tablet on Tuesday and Thursday    [DISCONTINUED] alprazolam (XANAX) 0.5 MG tablet Take 1 tablet (0.5 mg total) by mouth 3 (three) times daily as needed for Anxiety.    [DISCONTINUED] ALPRAZolam (XANAX) 0.5 MG tablet Take 0.5 mg by mouth daily as needed for Anxiety (when tramadol is taken).    [DISCONTINUED] ascorbic acid (VITAMIN C) 250 MG tablet Take 1 tablet (250 mg total) by mouth once daily.    [DISCONTINUED] carvedilol (COREG) 12.5 MG tablet     [DISCONTINUED] ferrous gluconate (FERGON) 324 MG tablet Take 1 tablet (324 mg total) by mouth 2 (two) times daily with meals.    [DISCONTINUED] metformin (GLUCOPHAGE) 500 MG tablet Take 500 mg by mouth 2 (two) times daily with meals.      [DISCONTINUED] multivitamin (THERAGRAN) tablet Take 1 tablet by mouth once daily.    [DISCONTINUED] travoprost, benzalkonium, (TRAVATAN) 0.004 % ophthalmic solution Place 1 drop into both eyes every evening.     [DISCONTINUED] valsartan (DIOVAN) 160 MG tablet Take 160 mg by mouth once daily. Uses the 320 mg tab and breaks in half.     Family History     Problem Relation (Age of  Onset)    Arthritis Mother    Cancer Mother    Depression Son    Diabetes Son    Drug abuse Daughter    Heart disease Mother, Father    Hypertension Mother, Brother, Daughter, Son    Stroke Mother        Tobacco Use    Smoking status: Former Smoker     Types: Cigarettes     Last attempt to quit: 1954     Years since quittin.6    Smokeless tobacco: Never Used    Tobacco comment: 1 pack for month only for a few years, but second hand smoke from  for decades.    Substance and Sexual Activity    Alcohol use: No    Drug use: No    Sexual activity: Not Currently     Review of Systems   Constitutional: Negative for appetite change, chills, fatigue and fever.   HENT: Negative for ear pain and sinus pain.    Eyes: Negative for photophobia and pain.   Respiratory: Negative for cough and shortness of breath.    Cardiovascular: Negative for chest pain and leg swelling.   Gastrointestinal: Negative for abdominal pain, blood in stool, nausea and vomiting.   Endocrine: Negative for polydipsia and polyphagia.   Genitourinary: Negative for dysuria and hematuria.   Musculoskeletal: Positive for arthralgias and joint swelling. Negative for gait problem, neck pain and neck stiffness.        Bilateral knee pain more severe on the L side 2/2 fall 2 days ago which is not relieved by 1g Tylenol PO.   Skin: Positive for color change. Negative for rash and wound.        Redness over the L knee compared to the R knee.   Allergic/Immunologic: Negative for immunocompromised state.   Neurological: Negative for dizziness, facial asymmetry, numbness and headaches.   Psychiatric/Behavioral: Negative for confusion.     Objective:     Vital Signs (Most Recent):  Temp: 98 °F (36.7 °C) (18 1156)  Pulse: 76 (18 1157)  Resp: 20 (18 0850)  BP: 98/62 (18 1205)  SpO2: (!) 92 % (18 1156) Vital Signs (24h Range):  Temp:  [97.6 °F (36.4 °C)-98 °F (36.7 °C)] 98 °F (36.7 °C)  Pulse:  [55-97] 76  Resp:   [17-20] 20  SpO2:  [91 %-95 %] 92 %  BP: ()/(43-71) 98/62        There is no height or weight on file to calculate BMI.    Physical Exam   Constitutional: She is oriented to person, place, and time. She appears well-developed and well-nourished. No distress.   HENT:   Head: Normocephalic and atraumatic.   Eyes: EOM are normal. Pupils are equal, round, and reactive to light. Right eye exhibits no discharge. Left eye exhibits no discharge.   Neck: Normal range of motion. Neck supple.   Cardiovascular: Normal rate and normal heart sounds. Exam reveals no gallop.   Irregular pulse was noted.   Pulmonary/Chest: Effort normal and breath sounds normal. No respiratory distress.   Abdominal: Soft. Bowel sounds are normal. She exhibits no mass. There is no tenderness.   Musculoskeletal: Normal range of motion. She exhibits edema and tenderness. She exhibits no deformity.   Severe, very tender L knee pain which extends up to the L groin. Mild L knee swelling compared to the R knee.   Neurological: She is alert and oriented to person, place, and time. No cranial nerve deficit or sensory deficit.   Skin: Skin is warm and dry. Capillary refill takes less than 2 seconds. She is not diaphoretic. There is erythema.   L knee is warm at palpation when compared it to the R knee.   Psychiatric: She has a normal mood and affect.         CRANIAL NERVES     CN III, IV, VI   Pupils are equal, round, and reactive to light.  Extraocular motions are normal.        Significant Labs: All pertinent labs within the past 24 hours have been reviewed.    Significant Imaging: I have reviewed all pertinent imaging results/findings within the past 24 hours.

## 2018-08-19 NOTE — ASSESSMENT & PLAN NOTE
- severe L knee pain 2/2 fall from the bed with severe tenderness. Tramadol was ordered PRN.  - Hip, tibia, fibula and ankle X-rays showed no evidence of fractures. L Knee X- ray showed arthroplasty hardware projects in expected position, no evidence of hardware failure, no displaced fracture or subluxation, no suspicious bone lesion and unchanged moderate left knee suprapatellar synovial complex/effusion.  - Ice was applied on L knee to reduce the pain. PT to f/u tomorrow. Will reeavulate for pain and swelling.

## 2018-08-19 NOTE — PHARMACY MED REC
"Admission Medication Reconciliation - Pharmacy Consult Note    The home medication history was taken by Nicky Flores, Pharmacy Technician.  Based on information gathered and subsequent review by the clinical pharmacist, the items below may need attention.     You may go to "Admission" then "Reconcile Home Medications" tabs to review and/or act upon these items.     Potentially problematic discrepancies with current MAR  o Patient IS taking the following which was not ordered upon admit  o Atorvastatin 40 mg daily   o Citalopram 40 mg daily   o Escitalopram 10 mg daily   o Patient is taking a drug DIFFERENTLY than how ordered upon admit  o Carvedilol 12.5 mg BID ordered, taking 6.25 mg BID at home   o Warfarin 4 mg daily ordered inpatient; takes 4 mg daily except for 2 mg on Tuesdays and Thursdays at home    Potential issues to be addressed PRIOR TO DISCHARGE  o Duplicate therapy with citalopram and escitalopram   o Medication Affordability;  patientt has intermittent filling of medications due to cost / insurance coverage   o Glucose control going home    Please address this information as you see fit.  Feel free to contact us if you have any questions or require assistance.    Mattie Corona, PharmD  PGY-2 Internal Medicine Pharmacy Resident  [phone] [id number]        "

## 2018-08-19 NOTE — PLAN OF CARE
Problem: Diabetes, Type 2 (Adult)  Goal: Signs and Symptoms of Listed Potential Problems Will be Absent, Minimized or Managed (Diabetes, Type 2)  Signs and symptoms of listed potential problems will be absent, minimized or managed by discharge/transition of care (reference Diabetes, Type 2 (Adult) CPG).  Outcome: Ongoing (interventions implemented as appropriate)   08/19/18 1840   Diabetes, Type 2   Problems Assessed (Type 2 Diabetes) all   Problems Present (Type 2 Diabetes) hyperglycemia       Problem: Patient Care Overview  Goal: Plan of Care Review  Outcome: Ongoing (interventions implemented as appropriate)  Urine specimen sent to lab, BS checked as ordered, no distress noted, will continue to monitor.

## 2018-08-19 NOTE — SUBJECTIVE & OBJECTIVE
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      Current Outpatient Medications on File Prior to Encounter   Medication Sig    acetaminophen (TYLENOL) 500 MG tablet Take 500 mg by mouth daily as needed.     albuterol (ACCUNEB) 1.25 mg/3 mL Nebu Take 1.25 mg by nebulization every 6 (six) hours as needed. Rescue    ALPRAZolam (XANAX) 0.25 MG tablet Take 0.25 mg by mouth daily as needed for Anxiety.    amlodipine (NORVASC) 5 MG tablet Take 5 mg by mouth once daily.    atorvastatin (LIPITOR) 40 MG tablet Take 40 mg by mouth once daily.    carvedilol (COREG) 12.5 MG tablet Take 6.25 mg by mouth 2 (two) times daily.    citalopram (CELEXA) 40 MG tablet Take 40 mg by mouth once daily.      ergocalciferol (ERGOCALCIFEROL) 50,000 unit Cap Take 50,000 Units by mouth every 30 days.     escitalopram oxalate (LEXAPRO) 10 MG tablet Take 10 mg by mouth once daily.     glipiZIDE (GLUCOTROL) 2.5 MG TR24 Take 5 mg by mouth daily with breakfast.      guaifenesin-codeine 100-10 mg/5 ml (TUSSI-ORGANIDIN NR)  mg/5 mL syrup Take 5 mLs by mouth 3 (three) times daily as needed for Cough.    levothyroxine (SYNTHROID) 50 MCG tablet Take 50 mcg by mouth once daily.    losartan (COZAAR) 100 MG tablet Take 100 mg by mouth once daily.    ranitidine (ZANTAC) 75 MG tablet Take 75 mg by mouth daily as needed for Heartburn.    senna-docusate 8.6-50 mg (PERICOLACE) 8.6-50 mg per tablet Take 2 tablets by mouth every evening.    traMADol (ULTRAM) 50 mg tablet Take 50 mg by mouth every 6 (six) hours as needed for Pain.    VENTOLIN HFA 90 mcg/actuation inhaler Inhale 2 puffs into the lungs every 4 to 6 hours as needed for Shortness of Breath.     warfarin (COUMADIN) 4 MG tablet Takes 1 tablet by mouth daily except 1/2 tablet on Tuesday and Thursday    [DISCONTINUED] alprazolam (XANAX) 0.5 MG tablet Take 1 tablet (0.5 mg total) by mouth 3 (three) times daily as needed for Anxiety.    [DISCONTINUED] ALPRAZolam (XANAX) 0.5 MG tablet Take 0.5 mg by mouth daily as needed for Anxiety (when tramadol is taken).    [DISCONTINUED] ascorbic acid (VITAMIN C) 250 MG tablet Take 1 tablet (250 mg total) by mouth once daily.    [DISCONTINUED] carvedilol (COREG) 12.5 MG tablet     [DISCONTINUED] ferrous gluconate (FERGON) 324 MG tablet Take 1 tablet (324 mg total) by mouth 2 (two) times daily with meals.    [DISCONTINUED] metformin (GLUCOPHAGE) 500 MG tablet Take 500 mg by mouth 2 (two) times daily with meals.      [DISCONTINUED] multivitamin (THERAGRAN) tablet Take 1 tablet by mouth once daily.    [DISCONTINUED] travoprost, benzalkonium, (TRAVATAN) 0.004 % ophthalmic solution Place 1 drop into both eyes every evening.     [DISCONTINUED] valsartan (DIOVAN) 160 MG tablet Take 160 mg by mouth once daily. Uses the 320 mg tab and breaks in half.     Family History     Problem Relation (Age of  Onset)    Arthritis Mother    Cancer Mother    Depression Son    Diabetes Son    Drug abuse Daughter    Heart disease Mother, Father    Hypertension Mother, Brother, Daughter, Son    Stroke Mother        Tobacco Use    Smoking status: Former Smoker     Types: Cigarettes     Last attempt to quit: 1954     Years since quittin.6    Smokeless tobacco: Never Used    Tobacco comment: 1 pack for month only for a few years, but second hand smoke from  for decades.    Substance and Sexual Activity    Alcohol use: No    Drug use: No    Sexual activity: Not Currently     Review of Systems   Constitutional: Negative for appetite change, chills, fatigue and fever.   HENT: Negative for ear pain and sinus pain.    Eyes: Negative for photophobia and pain.   Respiratory: Negative for cough and shortness of breath.    Cardiovascular: Negative for chest pain and leg swelling.   Gastrointestinal: Negative for abdominal pain, blood in stool, nausea and vomiting.   Endocrine: Negative for polydipsia and polyphagia.   Genitourinary: Negative for dysuria and hematuria.   Musculoskeletal: Positive for arthralgias and joint swelling. Negative for gait problem, neck pain and neck stiffness.        Bilateral knee pain more severe on the L side 2/2 fall 2 days ago which is not relieved by 1g Tylenol PO.   Skin: Positive for color change. Negative for rash and wound.        Redness over the L knee compared to the R knee.   Allergic/Immunologic: Negative for immunocompromised state.   Neurological: Negative for dizziness, facial asymmetry, numbness and headaches.   Psychiatric/Behavioral: Negative for confusion.     Objective:     Vital Signs (Most Recent):  Temp: 97.7 °F (36.5 °C) (18 0836)  Pulse: (!) 55 (18 0850)  Resp: 20 (18 0850)  BP: (!) 140/61 (18 0836)  SpO2: (!) 94 % (18 0850) Vital Signs (24h Range):  Temp:  [97.6 °F (36.4 °C)-97.9 °F (36.6 °C)] 97.7 °F (36.5 °C)  Pulse:  [55-97]  55  Resp:  [17-20] 20  SpO2:  [91 %-95 %] 94 %  BP: (105-191)/(54-88) 140/61        There is no height or weight on file to calculate BMI.    Physical Exam   Constitutional: She is oriented to person, place, and time. She appears well-developed and well-nourished. No distress.   HENT:   Head: Normocephalic and atraumatic.   Eyes: EOM are normal. Pupils are equal, round, and reactive to light. Right eye exhibits no discharge. Left eye exhibits no discharge.   Neck: Normal range of motion. Neck supple.   Cardiovascular: Normal rate and normal heart sounds. Exam reveals no gallop.   Irregular pulse was noted.   Pulmonary/Chest: Effort normal and breath sounds normal. No respiratory distress.   Abdominal: Soft. Bowel sounds are normal. She exhibits no mass. There is no tenderness.   Musculoskeletal: Normal range of motion. She exhibits edema and tenderness. She exhibits no deformity.   Severe, very tender L knee pain which extends up to the L groin. Mild L knee swelling compared to the R knee.   Neurological: She is alert and oriented to person, place, and time. No cranial nerve deficit or sensory deficit.   Skin: Skin is warm and dry. Capillary refill takes less than 2 seconds. She is not diaphoretic. There is erythema.   L knee is warm at palpation when compared it to the R knee.   Psychiatric: She has a normal mood and affect.         CRANIAL NERVES     CN III, IV, VI   Pupils are equal, round, and reactive to light.  Extraocular motions are normal.        Significant Labs: All pertinent labs within the past 24 hours have been reviewed.    Significant Imaging: I have reviewed all pertinent imaging results/findings within the past 24 hours.

## 2018-08-19 NOTE — H&P
[facility] Hospital Medicine  History & Physical    Patient Name: Claribel Moran  MRN: [id number]  Admission Date: 8/18/2018  Attending Physician: Vicky Arenas MD   Primary Care Provider: Moises Eduardo MD    [facility] Medicine Team: [facility] HOSP MED 5 Lsibeth Dalal MD     Patient information was obtained from patient and ER records.     Subjective:     Principal Problem:<principal problem not specified>    Chief Complaint:   Chief Complaint   Patient presents with    Knee Pain     Pt fell out of bed two days ago and was diagnosed with a contusion. Pt continues to have pain and swelling.         HPI: 83 y.o. female with co-morbidities including: A-Fib, right total knee replacement (2008), left total knee replacement (2011), type 2 DM, HTN, CKD, CAD, and CHF who presents to the ED with a complaint of worsening severe left thigh and knee pain of initial onset 2 days ago. Pt states she fell out of her bed and struck her left knee/hip approximately 2 days ago. She shortly reported to [facility] and received treatment, then was discharged later that day. Pain is 10/10 and begun radiating up and down her leg towards her groin. There are no associated symptoms at this time and no pain at the level below the knee. She is on severe pain at the knee level and above which is very sensitive to touch on examination not relived by 1g PO Tylenol. Pt states she has been unable to stand/ambulate/move comfortably since the injury and has significantly worsened over the 24 hours. She has not taken her coumadin this morning and reports no recent missed medications concerning her coumadin. Denies fever, chills, head trauma, and any LOC. No hx of numbness or tingling sensation. She is on Glipizide but did not took it last month because of insurance issues. Other systemic review was unremarkable.        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      Current Outpatient Medications on File Prior to Encounter   Medication Sig    acetaminophen (TYLENOL) 500 MG tablet Take 500 mg by mouth daily as needed.     albuterol (ACCUNEB) 1.25 mg/3 mL Nebu Take 1.25 mg by nebulization every 6 (six) hours as needed. Rescue    ALPRAZolam (XANAX) 0.25 MG tablet Take 0.25 mg by mouth daily as needed for Anxiety.    amlodipine (NORVASC) 5 MG tablet Take 5 mg by mouth once daily.    atorvastatin (LIPITOR) 40 MG tablet Take 40 mg by mouth once daily.    carvedilol (COREG) 12.5 MG tablet Take 6.25 mg by mouth 2 (two) times daily.    citalopram (CELEXA) 40 MG tablet Take 40 mg by mouth once daily.      ergocalciferol (ERGOCALCIFEROL) 50,000 unit Cap Take 50,000 Units by mouth every 30 days.     escitalopram oxalate (LEXAPRO) 10 MG tablet Take 10 mg by mouth once daily.    glipiZIDE (GLUCOTROL) 2.5 MG TR24 Take 5 mg by mouth daily with breakfast.       guaifenesin-codeine 100-10 mg/5 ml (TUSSI-ORGANIDIN NR)  mg/5 mL syrup Take 5 mLs by mouth 3 (three) times daily as needed for Cough.    levothyroxine (SYNTHROID) 50 MCG tablet Take 50 mcg by mouth once daily.    losartan (COZAAR) 100 MG tablet Take 100 mg by mouth once daily.    ranitidine (ZANTAC) 75 MG tablet Take 75 mg by mouth daily as needed for Heartburn.    senna-docusate 8.6-50 mg (PERICOLACE) 8.6-50 mg per tablet Take 2 tablets by mouth every evening.    traMADol (ULTRAM) 50 mg tablet Take 50 mg by mouth every 6 (six) hours as needed for Pain.    VENTOLIN HFA 90 mcg/actuation inhaler Inhale 2 puffs into the lungs every 4 to 6 hours as needed for Shortness of Breath.     warfarin (COUMADIN) 4 MG tablet Takes 1 tablet by mouth daily except 1/2 tablet on Tuesday and Thursday    [DISCONTINUED] alprazolam (XANAX) 0.5 MG tablet Take 1 tablet (0.5 mg total) by mouth 3 (three) times daily as needed for Anxiety.    [DISCONTINUED] ALPRAZolam (XANAX) 0.5 MG tablet Take 0.5 mg by mouth daily as needed for Anxiety (when tramadol is taken).    [DISCONTINUED] ascorbic acid (VITAMIN C) 250 MG tablet Take 1 tablet (250 mg total) by mouth once daily.    [DISCONTINUED] carvedilol (COREG) 12.5 MG tablet     [DISCONTINUED] ferrous gluconate (FERGON) 324 MG tablet Take 1 tablet (324 mg total) by mouth 2 (two) times daily with meals.    [DISCONTINUED] metformin (GLUCOPHAGE) 500 MG tablet Take 500 mg by mouth 2 (two) times daily with meals.      [DISCONTINUED] multivitamin (THERAGRAN) tablet Take 1 tablet by mouth once daily.    [DISCONTINUED] travoprost, benzalkonium, (TRAVATAN) 0.004 % ophthalmic solution Place 1 drop into both eyes every evening.     [DISCONTINUED] valsartan (DIOVAN) 160 MG tablet Take 160 mg by mouth once daily. Uses the 320 mg tab and breaks in half.     Family History     Problem Relation (Age of Onset)    Arthritis Mother    Cancer Mother    Depression Son    Diabetes Son    Drug  abuse Daughter    Heart disease Mother, Father    Hypertension Mother, Brother, Daughter, Son    Stroke Mother        Tobacco Use    Smoking status: Former Smoker     Types: Cigarettes     Last attempt to quit: 1954     Years since quittin.6    Smokeless tobacco: Never Used    Tobacco comment: 1 pack for month only for a few years, but second hand smoke from  for decades.    Substance and Sexual Activity    Alcohol use: No    Drug use: No    Sexual activity: Not Currently     Review of Systems   Constitutional: Negative for appetite change, chills, fatigue and fever.   HENT: Negative for ear pain and sinus pain.    Eyes: Negative for photophobia and pain.   Respiratory: Negative for cough and shortness of breath.    Cardiovascular: Negative for chest pain and leg swelling.   Gastrointestinal: Negative for abdominal pain, blood in stool, nausea and vomiting.   Endocrine: Negative for polydipsia and polyphagia.   Genitourinary: Negative for dysuria and hematuria.   Musculoskeletal: Positive for arthralgias and joint swelling. Negative for gait problem, neck pain and neck stiffness.        Bilateral knee pain more severe on the L side 2/2 fall 2 days ago which is not relieved by 1g Tylenol PO.   Skin: Positive for color change. Negative for rash and wound.        Redness over the L knee compared to the R knee.   Allergic/Immunologic: Negative for immunocompromised state.   Neurological: Negative for dizziness, facial asymmetry, numbness and headaches.   Psychiatric/Behavioral: Negative for confusion.     Objective:     Vital Signs (Most Recent):  Temp: 97.7 °F (36.5 °C) (18 0836)  Pulse: (!) 55 (18 0850)  Resp: 20 (18 0850)  BP: (!) 140/61 (18 0836)  SpO2: (!) 94 % (18 0850) Vital Signs (24h Range):  Temp:  [97.6 °F (36.4 °C)-97.9 °F (36.6 °C)] 97.7 °F (36.5 °C)  Pulse:  [55-97] 55  Resp:  [17-20] 20  SpO2:  [91 %-95 %] 94 %  BP: (105-191)/(54-88) 140/61         There is no height or weight on file to calculate BMI.    Physical Exam   Constitutional: She is oriented to person, place, and time. She appears well-developed and well-nourished. No distress.   HENT:   Head: Normocephalic and atraumatic.   Eyes: EOM are normal. Pupils are equal, round, and reactive to light. Right eye exhibits no discharge. Left eye exhibits no discharge.   Neck: Normal range of motion. Neck supple.   Cardiovascular: Normal rate and normal heart sounds. Exam reveals no gallop.   Irregular pulse was noted.   Pulmonary/Chest: Effort normal and breath sounds normal. No respiratory distress.   Abdominal: Soft. Bowel sounds are normal. She exhibits no mass. There is no tenderness.   Musculoskeletal: Normal range of motion. She exhibits edema and tenderness. She exhibits no deformity.   Severe, very tender L knee pain which extends up to the L groin. Mild L knee swelling compared to the R knee.   Neurological: She is alert and oriented to person, place, and time. No cranial nerve deficit or sensory deficit.   Skin: Skin is warm and dry. Capillary refill takes less than 2 seconds. She is not diaphoretic. There is erythema.   L knee is warm at palpation when compared it to the R knee.   Psychiatric: She has a normal mood and affect.         CRANIAL NERVES     CN III, IV, VI   Pupils are equal, round, and reactive to light.  Extraocular motions are normal.        Significant Labs: All pertinent labs within the past 24 hours have been reviewed.    Significant Imaging: I have reviewed all pertinent imaging results/findings within the past 24 hours.    Assessment/Plan:     Effusion of knee    - severe L knee pain 2/2 fall from the bed with severe tenderness. Tramadol was ordered PRN.  - Hip, tibia, fibula and ankle X-rays showed no evidence of fractures. L Knee X- ray showed arthroplasty hardware projects in expected position, no evidence of hardware failure, no displaced fracture or subluxation, no  suspicious bone lesion and unchanged moderate left knee suprapatellar synovial complex/effusion.          VTE Risk Mitigation (From admission, onward)        Ordered     warfarin (COUMADIN) tablet 4 mg  Daily      08/18/18 1610             Lisbeth Dalal MD  Department of Hospital Medicine   [facility]

## 2018-08-19 NOTE — PLAN OF CARE
Problem: Diabetes, Type 2 (Adult)  Goal: Signs and Symptoms of Listed Potential Problems Will be Absent, Minimized or Managed (Diabetes, Type 2)  Signs and symptoms of listed potential problems will be absent, minimized or managed by discharge/transition of care (reference Diabetes, Type 2 (Adult) CPG).  Outcome: Ongoing (interventions implemented as appropriate)   08/19/18 0753   Diabetes, Type 2   Problems Assessed (Type 2 Diabetes) all   Problems Present (Type 2 Diabetes) hyperglycemia       Problem: Fall Risk (Adult)  Goal: Absence of Falls  Patient will demonstrate the desired outcomes by discharge/transition of care.  Outcome: Ongoing (interventions implemented as appropriate)   08/19/18 0753   Fall Risk (Adult)   Absence of Falls making progress toward outcome       Problem: Patient Care Overview  Goal: Plan of Care Review  Outcome: Outcome(s) achieved Date Met: 08/19/18 08/19/18 0753   Coping/Psychosocial   Plan Of Care Reviewed With patient       Problem: Pressure Ulcer Risk (Robert Scale) (Adult,Obstetrics,Pediatric)  Goal: Skin Integrity  Patient will demonstrate the desired outcomes by discharge/transition of care.  Outcome: Ongoing (interventions implemented as appropriate)   08/19/18 0753   Pressure Ulcer Risk (Robert Scale) (Adult,Obstetrics,Pediatric)   Skin Integrity making progress toward outcome

## 2018-08-20 LAB
ANION GAP SERPL CALC-SCNC: 8 MMOL/L
BASOPHILS # BLD AUTO: 0.04 K/UL
BASOPHILS NFR BLD: 0.6 %
BUN SERPL-MCNC: 25 MG/DL
CALCIUM SERPL-MCNC: 8.9 MG/DL
CHLORIDE SERPL-SCNC: 103 MMOL/L
CO2 SERPL-SCNC: 24 MMOL/L
CREAT SERPL-MCNC: 1.2 MG/DL
DIFFERENTIAL METHOD: ABNORMAL
EOSINOPHIL # BLD AUTO: 0.2 K/UL
EOSINOPHIL NFR BLD: 3 %
ERYTHROCYTE [DISTWIDTH] IN BLOOD BY AUTOMATED COUNT: 15.7 %
EST. GFR  (AFRICAN AMERICAN): 48.3 ML/MIN/1.73 M^2
EST. GFR  (NON AFRICAN AMERICAN): 41.9 ML/MIN/1.73 M^2
GLUCOSE SERPL-MCNC: 188 MG/DL
HCT VFR BLD AUTO: 39 %
HGB BLD-MCNC: 11.9 G/DL
IMM GRANULOCYTES # BLD AUTO: 0.04 K/UL
IMM GRANULOCYTES NFR BLD AUTO: 0.6 %
INR PPP: 1.6
LYMPHOCYTES # BLD AUTO: 1.5 K/UL
LYMPHOCYTES NFR BLD: 21.4 %
MAGNESIUM SERPL-MCNC: 1.7 MG/DL
MCH RBC QN AUTO: 28.7 PG
MCHC RBC AUTO-ENTMCNC: 30.5 G/DL
MCV RBC AUTO: 94 FL
MONOCYTES # BLD AUTO: 0.8 K/UL
MONOCYTES NFR BLD: 11.8 %
NEUTROPHILS # BLD AUTO: 4.3 K/UL
NEUTROPHILS NFR BLD: 62.6 %
NRBC BLD-RTO: 0 /100 WBC
PHOSPHATE SERPL-MCNC: 2.7 MG/DL
PLATELET # BLD AUTO: 174 K/UL
PMV BLD AUTO: 11.3 FL
POCT GLUCOSE: 165 MG/DL (ref 70–110)
POCT GLUCOSE: 176 MG/DL (ref 70–110)
POCT GLUCOSE: 185 MG/DL (ref 70–110)
POCT GLUCOSE: 255 MG/DL (ref 70–110)
POTASSIUM SERPL-SCNC: 4.7 MMOL/L
PROTHROMBIN TIME: 16.2 SEC
RBC # BLD AUTO: 4.15 M/UL
SODIUM SERPL-SCNC: 135 MMOL/L
WBC # BLD AUTO: 6.92 K/UL

## 2018-08-20 PROCEDURE — 97162 PT EVAL MOD COMPLEX 30 MIN: CPT

## 2018-08-20 PROCEDURE — 63600175 PHARM REV CODE 636 W HCPCS: Performed by: STUDENT IN AN ORGANIZED HEALTH CARE EDUCATION/TRAINING PROGRAM

## 2018-08-20 PROCEDURE — 25000242 PHARM REV CODE 250 ALT 637 W/ HCPCS: Performed by: STUDENT IN AN ORGANIZED HEALTH CARE EDUCATION/TRAINING PROGRAM

## 2018-08-20 PROCEDURE — 83735 ASSAY OF MAGNESIUM: CPT

## 2018-08-20 PROCEDURE — 27000221 HC OXYGEN, UP TO 24 HOURS

## 2018-08-20 PROCEDURE — 80048 BASIC METABOLIC PNL TOTAL CA: CPT

## 2018-08-20 PROCEDURE — 25000003 PHARM REV CODE 250: Performed by: STUDENT IN AN ORGANIZED HEALTH CARE EDUCATION/TRAINING PROGRAM

## 2018-08-20 PROCEDURE — G8978 MOBILITY CURRENT STATUS: HCPCS | Mod: CM

## 2018-08-20 PROCEDURE — 99232 SBSQ HOSP IP/OBS MODERATE 35: CPT | Mod: GC,,, | Performed by: HOSPITALIST

## 2018-08-20 PROCEDURE — 97530 THERAPEUTIC ACTIVITIES: CPT

## 2018-08-20 PROCEDURE — 85025 COMPLETE CBC W/AUTO DIFF WBC: CPT

## 2018-08-20 PROCEDURE — 94761 N-INVAS EAR/PLS OXIMETRY MLT: CPT

## 2018-08-20 PROCEDURE — G8980 MOBILITY D/C STATUS: HCPCS | Mod: CM

## 2018-08-20 PROCEDURE — 84100 ASSAY OF PHOSPHORUS: CPT

## 2018-08-20 PROCEDURE — 11000001 HC ACUTE MED/SURG PRIVATE ROOM

## 2018-08-20 PROCEDURE — 94640 AIRWAY INHALATION TREATMENT: CPT

## 2018-08-20 PROCEDURE — G8979 MOBILITY GOAL STATUS: HCPCS | Mod: CL

## 2018-08-20 PROCEDURE — 85610 PROTHROMBIN TIME: CPT

## 2018-08-20 PROCEDURE — 36415 COLL VENOUS BLD VENIPUNCTURE: CPT

## 2018-08-20 RX ORDER — DIPHENHYDRAMINE HCL 25 MG
25 CAPSULE ORAL EVERY 6 HOURS PRN
Status: DISCONTINUED | OUTPATIENT
Start: 2018-08-20 | End: 2018-08-24 | Stop reason: HOSPADM

## 2018-08-20 RX ORDER — ENOXAPARIN SODIUM 100 MG/ML
1 INJECTION SUBCUTANEOUS
Status: DISCONTINUED | OUTPATIENT
Start: 2018-08-20 | End: 2018-08-22

## 2018-08-20 RX ADMIN — ATORVASTATIN CALCIUM 40 MG: 20 TABLET, FILM COATED ORAL at 09:08

## 2018-08-20 RX ADMIN — INSULIN ASPART 1 UNITS: 100 INJECTION, SOLUTION INTRAVENOUS; SUBCUTANEOUS at 08:08

## 2018-08-20 RX ADMIN — DIPHENHYDRAMINE HYDROCHLORIDE 25 MG: 25 CAPSULE ORAL at 05:08

## 2018-08-20 RX ADMIN — LEVOTHYROXINE SODIUM 50 MCG: 50 TABLET ORAL at 07:08

## 2018-08-20 RX ADMIN — TRAMADOL HYDROCHLORIDE 50 MG: 50 TABLET, COATED ORAL at 07:08

## 2018-08-20 RX ADMIN — WARFARIN SODIUM 4 MG: 2 TABLET ORAL at 05:08

## 2018-08-20 RX ADMIN — TRAMADOL HYDROCHLORIDE 50 MG: 50 TABLET, COATED ORAL at 02:08

## 2018-08-20 RX ADMIN — AMLODIPINE BESYLATE 5 MG: 5 TABLET ORAL at 09:08

## 2018-08-20 RX ADMIN — ENOXAPARIN SODIUM 90 MG: 100 INJECTION SUBCUTANEOUS at 12:08

## 2018-08-20 RX ADMIN — IPRATROPIUM BROMIDE AND ALBUTEROL SULFATE 3 ML: .5; 3 SOLUTION RESPIRATORY (INHALATION) at 08:08

## 2018-08-20 RX ADMIN — IPRATROPIUM BROMIDE AND ALBUTEROL SULFATE 3 ML: .5; 3 SOLUTION RESPIRATORY (INHALATION) at 03:08

## 2018-08-20 RX ADMIN — IPRATROPIUM BROMIDE AND ALBUTEROL SULFATE 3 ML: .5; 3 SOLUTION RESPIRATORY (INHALATION) at 11:08

## 2018-08-20 RX ADMIN — LOSARTAN POTASSIUM 100 MG: 50 TABLET, FILM COATED ORAL at 09:08

## 2018-08-20 NOTE — PROGRESS NOTES
Team at bedside, informed that patient is unable to walk. O2 sats on room air is bet 95-97%. MD said ok not to do 6min walk.

## 2018-08-20 NOTE — NURSING
Report received from Faby SORIANO, upon shift change, pt denies denies at this time, call bell in reach, will monitor.

## 2018-08-20 NOTE — PROGRESS NOTES
Home Oxygen Evaluation    Date Performed: 2018    1) Patient's Home O2 Sat on room air, while at rest: 95%        If O2 sats on room air at rest are 88% or below, patient qualifies. No additional testing needed. Document N/A in steps 2 and 3. If 89% or above, complete steps 2.      2) Patient's O2 Sat on room air while exercisin%        If O2 sats on room air while exercising remain 89% or above patient does not qualify, no further testing needed Document N/A in step 3. If O2 sats on room air while exercising are 88% or below, continue to step 3.      3) Patient's O2 Sat while exercising on O2: 95 % at 2 LPM         (Must show improvement from #2 for patients to qualify)    If O2 sats improve on oxygen, patient qualifies for portable oxygen. If not, the patient does not qualify.

## 2018-08-20 NOTE — ASSESSMENT & PLAN NOTE
- she is on Amlodipine and Losartan.  - on 8/19 her BP was 98/62, 500cc boluses was given twice.  - monitor BP.

## 2018-08-20 NOTE — SUBJECTIVE & OBJECTIVE
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      Current Outpatient Medications on File Prior to Encounter   Medication Sig    acetaminophen (TYLENOL) 500 MG tablet Take 500 mg by mouth daily as needed.     albuterol (ACCUNEB) 1.25 mg/3 mL Nebu Take 1.25 mg by nebulization every 6 (six) hours as needed. Rescue    ALPRAZolam (XANAX) 0.25 MG tablet Take 0.25 mg by mouth daily as needed for Anxiety.    amlodipine (NORVASC) 5 MG tablet Take 5 mg by mouth once daily.    atorvastatin (LIPITOR) 40 MG tablet Take 40 mg by mouth once daily.    carvedilol (COREG) 12.5 MG tablet Take 6.25 mg by mouth 2 (two) times daily.    citalopram (CELEXA) 40 MG tablet Take 40 mg by mouth once daily.      ergocalciferol (ERGOCALCIFEROL) 50,000 unit Cap Take 50,000 Units by mouth every 30 days.     escitalopram oxalate (LEXAPRO) 10 MG tablet Take 10 mg by mouth once daily.     glipiZIDE (GLUCOTROL) 2.5 MG TR24 Take 5 mg by mouth daily with breakfast.      guaifenesin-codeine 100-10 mg/5 ml (TUSSI-ORGANIDIN NR)  mg/5 mL syrup Take 5 mLs by mouth 3 (three) times daily as needed for Cough.    levothyroxine (SYNTHROID) 50 MCG tablet Take 50 mcg by mouth once daily.    losartan (COZAAR) 100 MG tablet Take 100 mg by mouth once daily.    ranitidine (ZANTAC) 75 MG tablet Take 75 mg by mouth daily as needed for Heartburn.    senna-docusate 8.6-50 mg (PERICOLACE) 8.6-50 mg per tablet Take 2 tablets by mouth every evening.    traMADol (ULTRAM) 50 mg tablet Take 50 mg by mouth every 6 (six) hours as needed for Pain.    VENTOLIN HFA 90 mcg/actuation inhaler Inhale 2 puffs into the lungs every 4 to 6 hours as needed for Shortness of Breath.     warfarin (COUMADIN) 4 MG tablet Takes 1 tablet by mouth daily except 1/2 tablet on Tuesday and Thursday     Family History     Problem Relation (Age of Onset)    Arthritis Mother    Cancer Mother    Depression Son    Diabetes Son    Drug abuse Daughter    Heart disease Mother, Father    Hypertension Mother, Brother, Daughter, Son    Stroke Mother        Tobacco Use    Smoking status: Former Smoker     Types: Cigarettes     Last attempt to quit: 1954     Years since quittin.6    Smokeless tobacco: Never Used    Tobacco comment: 1 pack for month only for a few years, but second hand smoke from  for decades.    Substance and Sexual Activity    Alcohol use: No    Drug use: No    Sexual activity: Not Currently     Review of Systems   Constitutional: Negative for appetite change, chills, fatigue and fever.   HENT: Negative for ear pain and sinus pain.    Eyes: Negative for photophobia and pain.   Respiratory: Negative for cough and shortness of breath.    Cardiovascular: Negative for chest pain and leg swelling.   Gastrointestinal: Negative for abdominal pain, blood in stool, nausea and vomiting.   Endocrine: Negative for  polydipsia and polyphagia.   Genitourinary: Negative for dysuria and hematuria.   Musculoskeletal: Positive for arthralgias and joint swelling. Negative for gait problem, neck pain and neck stiffness.        Bilateral knee pain more severe on the L side 2/2 fall 2 days ago which is not relieved by 1g Tylenol PO.   Skin: Positive for color change. Negative for rash and wound.        Redness over the L knee compared to the R knee.   Allergic/Immunologic: Negative for immunocompromised state.   Neurological: Negative for dizziness, facial asymmetry, numbness and headaches.   Psychiatric/Behavioral: Negative for confusion.     Objective:     Vital Signs (Most Recent):  Temp: 98.5 °F (36.9 °C) (08/20/18 1445)  Pulse: 75 (08/20/18 1234)  Resp: 18 (08/20/18 1234)  BP: (!) 142/76 (08/20/18 1234)  SpO2: (!) 93 % (08/20/18 1234) Vital Signs (24h Range):  Temp:  [96.5 °F (35.8 °C)-100.6 °F (38.1 °C)] 98.5 °F (36.9 °C)  Pulse:  [70-92] 75  Resp:  [18-20] 18  SpO2:  [92 %-98 %] 93 %  BP: (120-142)/(62-84) 142/76     Weight: 91.6 kg (202 lb)  Body mass index is 38.17 kg/m².    Physical Exam   Constitutional: She is oriented to person, place, and time. She appears well-developed and well-nourished. No distress.   HENT:   Head: Normocephalic and atraumatic.   Eyes: EOM are normal. Pupils are equal, round, and reactive to light. Right eye exhibits no discharge. Left eye exhibits no discharge.   Neck: Normal range of motion. Neck supple.   Cardiovascular: Normal rate and normal heart sounds. Exam reveals no gallop.   Irregular pulse was noted.   Pulmonary/Chest: Effort normal and breath sounds normal. No respiratory distress.   Abdominal: Soft. Bowel sounds are normal. She exhibits no mass. There is no tenderness.   Musculoskeletal: Normal range of motion. She exhibits edema and tenderness. She exhibits no deformity.   Severe, very tender L knee pain which extends up to the L groin. Mild L knee swelling compared to the R knee.    Neurological: She is alert and oriented to person, place, and time. No cranial nerve deficit or sensory deficit.   Skin: Skin is warm and dry. Capillary refill takes less than 2 seconds. She is not diaphoretic. There is erythema.   L knee is warm at palpation when compared it to the R knee.   Psychiatric: She has a normal mood and affect.         CRANIAL NERVES     CN III, IV, VI   Pupils are equal, round, and reactive to light.  Extraocular motions are normal.     Interval History: CPAP was ordered at bedtime. Started on enoxaparin to meet goal of INR > 2.0. She reports L knee pain has improved.    Significant Labs: All pertinent labs within the past 24 hours have been reviewed.    Significant Imaging: I have reviewed all pertinent imaging results/findings within the past 24 hours.

## 2018-08-20 NOTE — PLAN OF CARE
Problem: Physical Therapy Goal  Goal: Physical Therapy Goal  Goals to be met by: 18     Patient will increase functional independence with mobility by performin. Supine to sit with MInimal Assistance  2. Sit to supine with MInimal Assistance  3. Sit to stand transfer with Maximum Assistance with LRD.   4. Bed to chair transfer with Maximum Assistance using Rolling Walker or LRD.  5. Gait  x 10 feet with Maximum Assistance using Rolling Walker or LRD.     Outcome: Ongoing (interventions implemented as appropriate)  Patient evaluated today. All goals established are appropriate for patient progression at this time.   Cm Berrios PT, DPT  2018  Pager: [phone]

## 2018-08-20 NOTE — PLAN OF CARE
Problem: Patient Care Overview  Goal: Plan of Care Review  Outcome: Ongoing (interventions implemented as appropriate)  Patient is alert and oriented,able to verbalized needs and wants. POC ongoing and  reviewed with the patient. Questions and concerns addressed. No acute distress noted. Safety precautions maintained,call light within reached.

## 2018-08-20 NOTE — PLAN OF CARE
Moises Eduardo MD     Payor: MEDICARE / Plan: MEDICARE PART A & B / Product Type: Government /      Extended Emergency Contact Information  Primary Emergency Contact: Soraya Mccarty  Address: [address]  [place], [place] [address] [place] [address]  Home Phone: [phone]  Mobile Phone: [phone]  Relation: Daughter  Secondary Emergency Contact: Nahum Moran  Mobile Phone: [phone]  Relation: Son  Preferred language: English   needed? No        08/20/18 1538   Discharge Assessment   Assessment Type Discharge Planning Assessment   Confirmed/corrected address and phone number on facesheet? Yes   Assessment information obtained from? Patient   Expected Length of Stay (days) 3   Communicated expected length of stay with patient/caregiver yes   Prior to hospitilization cognitive status: Alert/Oriented   Prior to hospitalization functional status: Assistive Equipment   Current cognitive status: Alert/Oriented   Current Functional Status: Assistive Equipment   Lives With child(ruddy), adult;grandchild(ruddy)   Able to Return to Prior Arrangements yes   Is patient able to care for self after discharge? Unable to determine at this time (comments)   Patient's perception of discharge disposition home or selfcare   Readmission Within The Last 30 Days no previous admission in last 30 days   Patient currently being followed by outpatient case management? No   Patient currently receives any other outside agency services? No   Equipment Currently Used at Home 3-in-1 commode;walker, jayjay;rollator;bath bench   Do you have any problems affording any of your prescribed medications? No   Is the patient taking medications as prescribed? yes   Does the patient have transportation home? Yes   Transportation Available family or friend will provide   Does the patient receive services at the [facility]? No   Discharge Plan A Home with family;Home Health   Discharge Plan B Skilled Nursing Facility    Patient/Family In Agreement With Plan yes   Does the patient have transportation to healthcare appointments? Yes

## 2018-08-20 NOTE — PLAN OF CARE
Problem: Patient Care Overview  Goal: Plan of Care Review  Outcome: Ongoing (interventions implemented as appropriate)  POC reviewd with patient, required O2 last night as her O2 concentration was 88%, 86% with SOB with getting on and off bedpan. Received relief with PO pain med for left knee pain, ice applied prn also gave her relief. Report given to Мария

## 2018-08-20 NOTE — PROGRESS NOTES
[facility] Hospital Medicine  Progress Note    Patient Name: Claribel Moran  MRN: [id number]  Patient Class: IP- Inpatient   Admission Date: 8/18/2018  Length of Stay: 2 days  Attending Physician: Vicky Arenas MD  Primary Care Provider: Moises Eduardo MD    [facility] Medicine Team: [facility] HOSP MED 5 Lisbeth Dalal MD    Subjective:     Principal Problem:Effusion of knee    HPI:  83 y.o. female with co-morbidities including: A-Fib, right total knee replacement (2008), left total knee replacement (2011), type 2 DM, HTN, CKD, CAD, and CHF who presents to the ED with a complaint of worsening severe left thigh and knee pain of initial onset 2 days ago. Pt states she fell out of her bed and struck her left knee/hip approximately 2 days ago. She shortly reported to [facility] and received treatment, then was discharged later that day. Pain is 10/10 and begun radiating up and down her leg towards her groin. There are no associated symptoms at this time and no pain at the level below the knee. She is on severe pain at the knee level and above which is very sensitive to touch on examination not relived by 1g PO Tylenol. Pt states she has been unable to stand/ambulate/move comfortably since the injury and has significantly worsened over the 24 hours. She has not taken her coumadin this morning and reports no recent missed medications concerning her coumadin. Denies fever, chills, head trauma, and any LOC. No hx of numbness or tingling sensation. She is on Glipizide but did not took it last month because of insurance issues. Other systemic review was unremarkable.        Hospital Course:  8/18- VSS, patient admitted to the 9th floor and started on home anti HTN medications. Tramadol was ordered PRN for severe L knee pain.  8/19- NAEON, patient seen today and she is vitally stable, BP 98/62. Carvedilol was dc'ed, continue on Amlodipine and Losartan. Ice applied on L knee to reduce pain. F/u  with CR level as it was increased from 1.0 to 1.3. PT tomorrow.  - NAEON except of one episode of desating at 88%. Currently she is stable on room air. Patient seen today and she is vitally stable, /62. Cr level is 1.2. She reports that L knee pain has improved. CPAP was ordered at bedtime. enoxaparin 90 mg Q12h was started. F/u INR.    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      Current Outpatient Medications on File Prior to Encounter   Medication Sig    acetaminophen (TYLENOL) 500 MG tablet Take 500 mg by mouth daily as needed.     albuterol (ACCUNEB) 1.25 mg/3 mL Nebu Take 1.25 mg by nebulization every 6 (six) hours as needed. Rescue    ALPRAZolam (XANAX) 0.25 MG tablet Take 0.25 mg by mouth daily as needed for Anxiety.    amlodipine (NORVASC) 5 MG tablet Take 5 mg by mouth once daily.    atorvastatin (LIPITOR) 40 MG tablet Take 40 mg  by mouth once daily.    carvedilol (COREG) 12.5 MG tablet Take 6.25 mg by mouth 2 (two) times daily.    citalopram (CELEXA) 40 MG tablet Take 40 mg by mouth once daily.      ergocalciferol (ERGOCALCIFEROL) 50,000 unit Cap Take 50,000 Units by mouth every 30 days.     escitalopram oxalate (LEXAPRO) 10 MG tablet Take 10 mg by mouth once daily.    glipiZIDE (GLUCOTROL) 2.5 MG TR24 Take 5 mg by mouth daily with breakfast.      guaifenesin-codeine 100-10 mg/5 ml (TUSSI-ORGANIDIN NR)  mg/5 mL syrup Take 5 mLs by mouth 3 (three) times daily as needed for Cough.    levothyroxine (SYNTHROID) 50 MCG tablet Take 50 mcg by mouth once daily.    losartan (COZAAR) 100 MG tablet Take 100 mg by mouth once daily.    ranitidine (ZANTAC) 75 MG tablet Take 75 mg by mouth daily as needed for Heartburn.    senna-docusate 8.6-50 mg (PERICOLACE) 8.6-50 mg per tablet Take 2 tablets by mouth every evening.    traMADol (ULTRAM) 50 mg tablet Take 50 mg by mouth every 6 (six) hours as needed for Pain.    VENTOLIN HFA 90 mcg/actuation inhaler Inhale 2 puffs into the lungs every 4 to 6 hours as needed for Shortness of Breath.     warfarin (COUMADIN) 4 MG tablet Takes 1 tablet by mouth daily except 1/2 tablet on Tuesday and Thursday     Family History     Problem Relation (Age of Onset)    Arthritis Mother    Cancer Mother    Depression Son    Diabetes Son    Drug abuse Daughter    Heart disease Mother, Father    Hypertension Mother, Brother, Daughter, Son    Stroke Mother        Tobacco Use    Smoking status: Former Smoker     Types: Cigarettes     Last attempt to quit: 1954     Years since quittin.6    Smokeless tobacco: Never Used    Tobacco comment: 1 pack for month only for a few years, but second hand smoke from  for decades.    Substance and Sexual Activity    Alcohol use: No    Drug use: No    Sexual activity: Not Currently     Review of Systems   Constitutional: Negative for appetite change,  chills, fatigue and fever.   HENT: Negative for ear pain and sinus pain.    Eyes: Negative for photophobia and pain.   Respiratory: Negative for cough and shortness of breath.    Cardiovascular: Negative for chest pain and leg swelling.   Gastrointestinal: Negative for abdominal pain, blood in stool, nausea and vomiting.   Endocrine: Negative for polydipsia and polyphagia.   Genitourinary: Negative for dysuria and hematuria.   Musculoskeletal: Positive for arthralgias and joint swelling. Negative for gait problem, neck pain and neck stiffness.        Bilateral knee pain more severe on the L side 2/2 fall 2 days ago which is not relieved by 1g Tylenol PO.   Skin: Positive for color change. Negative for rash and wound.        Redness over the L knee compared to the R knee.   Allergic/Immunologic: Negative for immunocompromised state.   Neurological: Negative for dizziness, facial asymmetry, numbness and headaches.   Psychiatric/Behavioral: Negative for confusion.     Objective:     Vital Signs (Most Recent):  Temp: 98.5 °F (36.9 °C) (08/20/18 1445)  Pulse: 75 (08/20/18 1234)  Resp: 18 (08/20/18 1234)  BP: (!) 142/76 (08/20/18 1234)  SpO2: (!) 93 % (08/20/18 1234) Vital Signs (24h Range):  Temp:  [96.5 °F (35.8 °C)-100.6 °F (38.1 °C)] 98.5 °F (36.9 °C)  Pulse:  [70-92] 75  Resp:  [18-20] 18  SpO2:  [92 %-98 %] 93 %  BP: (120-142)/(62-84) 142/76     Weight: 91.6 kg (202 lb)  Body mass index is 38.17 kg/m².    Physical Exam   Constitutional: She is oriented to person, place, and time. She appears well-developed and well-nourished. No distress.   HENT:   Head: Normocephalic and atraumatic.   Eyes: EOM are normal. Pupils are equal, round, and reactive to light. Right eye exhibits no discharge. Left eye exhibits no discharge.   Neck: Normal range of motion. Neck supple.   Cardiovascular: Normal rate and normal heart sounds. Exam reveals no gallop.   Irregular pulse was noted.   Pulmonary/Chest: Effort normal and breath  sounds normal. No respiratory distress.   Abdominal: Soft. Bowel sounds are normal. She exhibits no mass. There is no tenderness.   Musculoskeletal: Normal range of motion. She exhibits edema and tenderness. She exhibits no deformity.   Severe, very tender L knee pain which extends up to the L groin. Mild L knee swelling compared to the R knee.   Neurological: She is alert and oriented to person, place, and time. No cranial nerve deficit or sensory deficit.   Skin: Skin is warm and dry. Capillary refill takes less than 2 seconds. She is not diaphoretic. There is erythema.   L knee is warm at palpation when compared it to the R knee.   Psychiatric: She has a normal mood and affect.         CRANIAL NERVES     CN III, IV, VI   Pupils are equal, round, and reactive to light.  Extraocular motions are normal.     Interval History: CPAP was ordered at bedtime. Started on enoxaparin to meet goal of INR > 2.0. She reports L knee pain has improved.    Significant Labs: All pertinent labs within the past 24 hours have been reviewed.    Significant Imaging: I have reviewed all pertinent imaging results/findings within the past 24 hours.    Assessment/Plan:      * Effusion of knee    - severe L knee pain 2/2 fall from the bed with severe tenderness. Tramadol was ordered PRN.  - Hip, tibia, fibula and ankle X-rays showed no evidence of fractures. L Knee X- ray showed arthroplasty hardware projects in expected position, no evidence of hardware failure, no displaced fracture or subluxation, no suspicious bone lesion and unchanged moderate left knee suprapatellar synovial complex/effusion.  - Ice was applied on L knee to reduce the pain. PT to f/u tomorrow. Will reeavulate for pain and swelling.        Sleep apnea    - Diagnosed on (7/2017).  - CPAP titration was performed but she denied using it at home.  - CPAP was ordered to be used at bedtime if needed.  - 6 min walk test might be difficult to perform given pt. Hx of L knee  trauma.          TIA (transient ischemic attack)              CAD (coronary artery disease)              Hyperlipidemia    - on Atorvastatin 40 mg.          Hypertension    - she is on Amlodipine and Losartan.  - on 8/19 her BP was 98/62, 500cc boluses was given twice.  - monitor BP.        Diabetes mellitus type II, non insulin dependent    - on isulin aspart 0-5U prn.          Chronic atrial fibrillation    - she is on home dose warfarin.  - enoxaparin was added to meet the goal of INR>2.0.          VTE Risk Mitigation (From admission, onward)        Ordered     enoxaparin injection 90 mg  Every 12 hours (non-standard times)      08/20/18 1031     warfarin (COUMADIN) tablet 4 mg  Daily      08/18/18 1610              Lisbeth Dalal MD  Department of Hospital Medicine   [facility]

## 2018-08-20 NOTE — PT/OT/SLP EVAL
Physical Therapy Evaluation    Patient Name:  Claribel Moran   MRN:  [id number]    Recommendations:     Discharge Recommendations:  ([facility] SNF )   Discharge Equipment Recommendations: (TBD)   Barriers to discharge: Inaccessible home    Assessment:     Claribel Moran is a 83 y.o. female admitted with a medical diagnosis of Effusion of knee.  She presents with the following impairments/functional limitations:  weakness, impaired endurance, impaired functional mobilty, orthopedic precautions, gait instability, impaired balance, decreased ROM, decreased lower extremity function, pain, impaired joint extensibility Patient tolerated evaluation   poorly. Patient limited at this time by increased pian in L knee with all movement and tender to touch at medial knee. Pt did have some relief in pain with gentle effleurage.  Patient will continue to require skilled PT services to address the above impairments to return to prior level of function as independent as possible. Discharge recommendation  to skilled nursing facility  to maximize functional mobility, safety and decrease caregiver burden prior to returning home. Pt may be able to progress with better tolerance to pain.     .    Rehab Prognosis:  fair; patient would benefit from acute skilled PT services to address these deficits and reach maximum level of function.      Recent Surgery: * No surgery found *      Plan:     During this hospitalization, patient to be seen 4 x/week to address the above listed problems via gait training, therapeutic activities, therapeutic exercises, neuromuscular re-education  · Plan of Care Expires:  09/19/18   Plan of Care Reviewed with: patient    Subjective     Communicated with RN prior to session.  Patient found supine upon PT entry to room, agreeable to evaluation.      Chief Complaint: pain in L knee  Patient comments/goals: to return home  Pain/Comfort:  · Pain Rating 1: (not rated; pain at medial knee with touch/movement)  · Location -  Side 1: Left    Patients cultural, spiritual, Orthodox conflicts given the current situation: none stated    Living Environment:  Pt lives in a H with her grandson and daugther with 9 EMILY.   Prior to admission, patients level of function was mod (I ) using AD when walking int he community. Pt not req any assistance with gait or ADLs.  Patient has the following equipment: (3-in- 1 commode; rollator; bath bench; walker,jayjay).  DME owned (not currently used): none.  Upon discharge, patient will have assistance from grandson/daughter.    Objective:     Patient found with: (all lines intact)     General Precautions: Standard, fall   Orthopedic Precautions:N/A   Braces: N/A       Exams    Cognition:  Patient is Oriented X 4, Alert and Cooperative  Patient Follows multistep  commands 100 % of the time.     Coordination  · WFL  · Other noted coordination deficits: none    Sensation  Patient's sensation was Intact to light touch  bilaterally at foot, ankle, shin, leg and knee    Skin integrity    · -       pt with irritated skin at left lateral knee/leg from scratching; noted redness as well   · Edema to L knee compared to R with slight increase in warmth    Posture  · -       Rounded shoulders  · -       Forward head  · -       B knee valgus    ROM and Strength   LLE MMT RLE MMT   Hip flexion 3+/5 lmited by pain 4/5   Knee ext 2/5 limited by pain 4+/5   Knee flex 2/5 limited by pain 4+/5   Ankle DF 3+/5 limited by pain 3+/5   Ankle PF 3+/5 limited by pain 3+/5   Other          LLE ROM RLE ROM   Hip flexion moderately limited within normal limits   Knee ext WFL  within normal limits   Knee flex Limited PROM 2/2 pain within normal limits   Ankle PF WFL within normal limits   Ankle DF WFL within normal limits       Functional Mobilty    Bed Mobility:  Rolling Left:  moderate assistance  Rolling Right: moderate assistance  Scooting: maximal assistance  Supine to Sit: maximal assistance  Sit to Supine: maximal  assistance    Further mobility not tested 2/2 pain with movement      Sitting Balance Static  Dynamic     GOOD-: Takes MODERATE challenges from all directions but inconsistently GOOD-: Incosistently Maintains balance through MODERATE excursions of active trunk movement,      Standing Balance Not assessed Not assessed         AM-PAC 6 CLICK MOBILITY  Total Score:9       Therapeutic Activities and Exercises  ·  Whiteboard updated in patients room to current assistance level  · All of patients questions were answered within the scope of PT    Patient education  · Patient educated on the role of PT and POC  · Patient educated on importance  activity while in the hosptial per tolerance for improved endurance and to limit deconditioning   · Patient educated on safe transfers with nursing as appropriate  · Patient educated on energy conservation, pursed lip breathing  · Patient educated on proper transfer mechanics and safety    Lower extremity exercise:    · Pt instructed on LE exercise: AP, quad sets and glut sets for edema control  · effleurage applied to medial knee x 5 minutes for edema control; pt instructed on self massage while in bed  · Pt states some reduction in pain with massage      Patient left HOB elevated with all lines intact.    GOALS:   Multidisciplinary Problems     Physical Therapy Goals        Problem: Physical Therapy Goal    Goal Priority Disciplines Outcome Goal Variances Interventions   Physical Therapy Goal     PT, PT/OT Ongoing (interventions implemented as appropriate)     Description:  Goals to be met by: 18     Patient will increase functional independence with mobility by performin. Supine to sit with MInimal Assistance  2. Sit to supine with MInimal Assistance  3. Sit to stand transfer with Maximum Assistance with LRD.   4. Bed to chair transfer with Maximum Assistance using Rolling Walker or LRD.  5. Gait  x 10 feet with Maximum Assistance using Rolling Walker or LRD.                        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    Type II diabetes mellitus 2012    Vitamin D deficiency disease 2012       Past Surgical History:   Procedure Laterality Date    ABDOMINAL SURGERY       SECTION, CLASSIC      FRACTURE SURGERY      ankles, ribs    HYSTERECTOMY      KIDNEY STONE SURGERY      TOTAL KNEE ARTHROPLASTY Right     Right knee    TOTAL KNEE ARTHROPLASTY Left 02/15/2013       Clinical Decision Making:     History  Co-morbidities and personal factors that may impact the plan of care Examination  Body Structures and Functions, activity limitations and participation restrictions that may impact the plan of care Clinical Presentation   Decision Making/ Complexity Score   Co-morbidities:   [] Time since onset of injury / illness / exacerbation  [] Status of current condition  []Patient's cognitive status and safety concerns    [x] Multiple Medical Problems (see med hx)  Personal Factors:   [] Patient's age  [] Prior Level of function   [] Patient's home situation (environment and family support)  [] Patient's level of motivation  [] Expected progression of patient      HISTORY:(criteria)    [] 31123 - no personal factors/history    [x] 69957 - has 1-2 personal factor/comorbidity     [] 05299 - has >3 personal factor/comorbidity     Body Regions:  [] Objective examination findings  [] Head     []  Neck   [] Trunk   [] Upper Extremity  [x] Lower Extremity    Body Systems:  [] For communication ability, affect, cognition, language, and learning style: the assessment of the ability to make needs known, consciousness, orientation (person, place, and time), expected emotional /behavioral responses, and learning preferences (eg, learning barriers, education  needs)  [] For the neuromuscular system: a general assessment of gross coordinated movement (eg, balance, gait, locomotion, transfers, and transitions) and motor function  (motor control and motor learning)  [x] For the musculoskeletal system: the assessment of gross symmetry, gross range of motion, gross strength, height, and weight  [] For the integumentary system: the assessment of pliability(texture), presence of scar formation, skin color, and skin integrity  [] For cardiovascular/pulmonary system: the assessment of heart rate, respiratory rate, blood pressure, and edema     Activity limitations:    [] Patient's cognitive status and saf ety concerns          [] Status of current condition      [] Weight bearing restriction  [] Cardiopulmunary Restriction    Participation Restrictions:   [] Goals and goal agreement with the patient     [] Rehab potential (prognosis) and probable outcome      Examination of Body System: (criteria)    [] 73457 - addressing 1-2 elements    [x] 89113 - addressing a total of 3 or more elements     [] 17146 -  Addressing a total of 4 or more elements         Clinical Presentation: (criteria)  Stable - 71175     On examination of body system using standardized tests and measures patient presents with 1-2 elements from any of the following: body structures and functions, activity limitations, and/or participation restrictions.  Leading to a clinical presentation that is considered stable and/or uncomplicated                              Clinical Decision Making  (Eval Complexity):  Low- 22597     Time Tracking:     PT Received On:  08/20/18  PT Start Time: 0939     PT Stop Time: 1000  PT Total Time (min): 21 min     Billable Minutes: Evaluation 10 min and Therapeutic Activity 8 min       Cm Berrios, PT  08/20/2018

## 2018-08-20 NOTE — ASSESSMENT & PLAN NOTE
- Diagnosed on (7/2017).  - CPAP titration was performed but she denied using it at home.  - CPAP was ordered to be used at bedtime if needed.  - 6 min walk test might be difficult to perform given pt. Hx of L knee trauma.

## 2018-08-21 LAB
ANION GAP SERPL CALC-SCNC: 10 MMOL/L
BASOPHILS # BLD AUTO: 0.03 K/UL
BASOPHILS NFR BLD: 0.5 %
BUN SERPL-MCNC: 24 MG/DL
CALCIUM SERPL-MCNC: 9.1 MG/DL
CHLORIDE SERPL-SCNC: 102 MMOL/L
CO2 SERPL-SCNC: 24 MMOL/L
CREAT SERPL-MCNC: 1.2 MG/DL
DIFFERENTIAL METHOD: ABNORMAL
EOSINOPHIL # BLD AUTO: 0.2 K/UL
EOSINOPHIL NFR BLD: 3.7 %
ERYTHROCYTE [DISTWIDTH] IN BLOOD BY AUTOMATED COUNT: 15.7 %
EST. GFR  (AFRICAN AMERICAN): 48.3 ML/MIN/1.73 M^2
EST. GFR  (NON AFRICAN AMERICAN): 41.9 ML/MIN/1.73 M^2
GLUCOSE SERPL-MCNC: 171 MG/DL
HCT VFR BLD AUTO: 38.6 %
HGB BLD-MCNC: 12.2 G/DL
IMM GRANULOCYTES # BLD AUTO: 0.03 K/UL
IMM GRANULOCYTES NFR BLD AUTO: 0.5 %
INR PPP: 1.6
LYMPHOCYTES # BLD AUTO: 1.6 K/UL
LYMPHOCYTES NFR BLD: 27 %
MAGNESIUM SERPL-MCNC: 1.9 MG/DL
MCH RBC QN AUTO: 29.3 PG
MCHC RBC AUTO-ENTMCNC: 31.6 G/DL
MCV RBC AUTO: 93 FL
MONOCYTES # BLD AUTO: 0.7 K/UL
MONOCYTES NFR BLD: 12.1 %
NEUTROPHILS # BLD AUTO: 3.3 K/UL
NEUTROPHILS NFR BLD: 56.2 %
NRBC BLD-RTO: 0 /100 WBC
PHOSPHATE SERPL-MCNC: 2.8 MG/DL
PLATELET # BLD AUTO: 178 K/UL
PMV BLD AUTO: 11.7 FL
POCT GLUCOSE: 140 MG/DL (ref 70–110)
POCT GLUCOSE: 174 MG/DL (ref 70–110)
POCT GLUCOSE: 217 MG/DL (ref 70–110)
POCT GLUCOSE: 258 MG/DL (ref 70–110)
POTASSIUM SERPL-SCNC: 4.9 MMOL/L
PROTHROMBIN TIME: 16 SEC
RBC # BLD AUTO: 4.17 M/UL
SODIUM SERPL-SCNC: 136 MMOL/L
WBC # BLD AUTO: 5.93 K/UL

## 2018-08-21 PROCEDURE — 27000190 HC CPAP FULL FACE MASK W/VALVE

## 2018-08-21 PROCEDURE — 80048 BASIC METABOLIC PNL TOTAL CA: CPT

## 2018-08-21 PROCEDURE — 36415 COLL VENOUS BLD VENIPUNCTURE: CPT

## 2018-08-21 PROCEDURE — 85025 COMPLETE CBC W/AUTO DIFF WBC: CPT

## 2018-08-21 PROCEDURE — 63600175 PHARM REV CODE 636 W HCPCS: Performed by: STUDENT IN AN ORGANIZED HEALTH CARE EDUCATION/TRAINING PROGRAM

## 2018-08-21 PROCEDURE — 25000003 PHARM REV CODE 250: Performed by: STUDENT IN AN ORGANIZED HEALTH CARE EDUCATION/TRAINING PROGRAM

## 2018-08-21 PROCEDURE — 83735 ASSAY OF MAGNESIUM: CPT

## 2018-08-21 PROCEDURE — 27000221 HC OXYGEN, UP TO 24 HOURS

## 2018-08-21 PROCEDURE — 94761 N-INVAS EAR/PLS OXIMETRY MLT: CPT

## 2018-08-21 PROCEDURE — 85610 PROTHROMBIN TIME: CPT

## 2018-08-21 PROCEDURE — 84100 ASSAY OF PHOSPHORUS: CPT

## 2018-08-21 PROCEDURE — 97535 SELF CARE MNGMENT TRAINING: CPT

## 2018-08-21 PROCEDURE — 11000001 HC ACUTE MED/SURG PRIVATE ROOM

## 2018-08-21 PROCEDURE — 99232 SBSQ HOSP IP/OBS MODERATE 35: CPT | Mod: GC,,, | Performed by: HOSPITALIST

## 2018-08-21 PROCEDURE — 94640 AIRWAY INHALATION TREATMENT: CPT

## 2018-08-21 PROCEDURE — 25000242 PHARM REV CODE 250 ALT 637 W/ HCPCS: Performed by: STUDENT IN AN ORGANIZED HEALTH CARE EDUCATION/TRAINING PROGRAM

## 2018-08-21 PROCEDURE — 94660 CPAP INITIATION&MGMT: CPT

## 2018-08-21 PROCEDURE — 25000242 PHARM REV CODE 250 ALT 637 W/ HCPCS: Performed by: HOSPITALIST

## 2018-08-21 RX ORDER — FLUTICASONE FUROATE AND VILANTEROL 100; 25 UG/1; UG/1
1 POWDER RESPIRATORY (INHALATION) DAILY
Status: DISCONTINUED | OUTPATIENT
Start: 2018-08-21 | End: 2018-08-24 | Stop reason: HOSPADM

## 2018-08-21 RX ORDER — POLYETHYLENE GLYCOL 3350 17 G/17G
17 POWDER, FOR SOLUTION ORAL 2 TIMES DAILY
Status: DISCONTINUED | OUTPATIENT
Start: 2018-08-21 | End: 2018-08-24 | Stop reason: HOSPADM

## 2018-08-21 RX ADMIN — ENOXAPARIN SODIUM 90 MG: 100 INJECTION SUBCUTANEOUS at 12:08

## 2018-08-21 RX ADMIN — WARFARIN SODIUM 4 MG: 2 TABLET ORAL at 05:08

## 2018-08-21 RX ADMIN — AMLODIPINE BESYLATE 5 MG: 5 TABLET ORAL at 08:08

## 2018-08-21 RX ADMIN — DIPHENHYDRAMINE HYDROCHLORIDE 25 MG: 25 CAPSULE ORAL at 02:08

## 2018-08-21 RX ADMIN — LEVOTHYROXINE SODIUM 50 MCG: 50 TABLET ORAL at 05:08

## 2018-08-21 RX ADMIN — IPRATROPIUM BROMIDE AND ALBUTEROL SULFATE 3 ML: .5; 3 SOLUTION RESPIRATORY (INHALATION) at 03:08

## 2018-08-21 RX ADMIN — FLUTICASONE FUROATE AND VILANTEROL TRIFENATATE 1 PUFF: 100; 25 POWDER RESPIRATORY (INHALATION) at 11:08

## 2018-08-21 RX ADMIN — TRAMADOL HYDROCHLORIDE 50 MG: 50 TABLET, COATED ORAL at 05:08

## 2018-08-21 RX ADMIN — LOSARTAN POTASSIUM 100 MG: 50 TABLET, FILM COATED ORAL at 08:08

## 2018-08-21 RX ADMIN — IPRATROPIUM BROMIDE AND ALBUTEROL SULFATE 3 ML: .5; 3 SOLUTION RESPIRATORY (INHALATION) at 09:08

## 2018-08-21 RX ADMIN — IPRATROPIUM BROMIDE AND ALBUTEROL SULFATE 3 ML: .5; 3 SOLUTION RESPIRATORY (INHALATION) at 07:08

## 2018-08-21 RX ADMIN — INSULIN ASPART 2 UNITS: 100 INJECTION, SOLUTION INTRAVENOUS; SUBCUTANEOUS at 12:08

## 2018-08-21 RX ADMIN — DIPHENHYDRAMINE HYDROCHLORIDE 25 MG: 25 CAPSULE ORAL at 05:08

## 2018-08-21 RX ADMIN — INSULIN ASPART 1 UNITS: 100 INJECTION, SOLUTION INTRAVENOUS; SUBCUTANEOUS at 10:08

## 2018-08-21 RX ADMIN — IPRATROPIUM BROMIDE AND ALBUTEROL SULFATE 3 ML: .5; 3 SOLUTION RESPIRATORY (INHALATION) at 12:08

## 2018-08-21 RX ADMIN — ATORVASTATIN CALCIUM 40 MG: 20 TABLET, FILM COATED ORAL at 08:08

## 2018-08-21 RX ADMIN — TRAMADOL HYDROCHLORIDE 50 MG: 50 TABLET, COATED ORAL at 02:08

## 2018-08-21 NOTE — ASSESSMENT & PLAN NOTE
- severe L knee pain 2/2 fall from the bed with severe tenderness. Tramadol was ordered PRN.  - Hip, tibia, fibula and ankle X-rays showed no evidence of fractures. L Knee X- ray showed arthroplasty hardware projects in expected position, no evidence of hardware failure, no displaced fracture or subluxation, no suspicious bone lesion and unchanged moderate left knee suprapatellar synovial complex/effusion.  - Ice was applied on L knee to reduce the pain. PT to f/u tomorrow. Will reeavulate for pain and swelling.  - pain has improved. Possible to transfer to SNF.

## 2018-08-21 NOTE — PT/OT/SLP PROGRESS
Occupational Therapy   Treatment    Name: Claribel Moran  MRN: [id number]  Admitting Diagnosis:  Effusion of knee       Recommendations:     Discharge Recommendations: home with home health  Discharge Equipment Recommendations:  walker, rolling, wheelchair  Barriers to discharge:  None    Subjective     Communicated with: patient prior to session.  Pain/Comfort:  · Pain Rating 1: (patient did not rate)  · Location - Side 1: Left  · Location - Orientation 1: generalized  · Location 1: leg  · Pain Addressed 1: Distraction, Reposition  · Pain Rating Post-Intervention 1: (patient did not rate)    Patients cultural, spiritual, Temple conflicts given the current situation: none stated     Objective:     General Precautions: Standard, fall   Orthopedic Precautions:N/A   Braces: N/A     Occupational Performance:    Bed Mobility:    · Patient completed Rolling/Turning to Left with  minimum assistance  · Patient completed Rolling/Turning to Right with minimum assistance  · Patient completed Supine to Sit with minimum assistance  · Patient completed Sit to Supine with moderate assistance     Functional Mobility/Transfers:  · Patient completed Sit <> Stand Transfer with maximal assistance  with  hand-held assist     Activities of Daily Living:  · Upper Body Dressing: moderate assistance Donning and doffing back gown  · Lower Body Dressing: total assistance Donning and doffing socks  · Toileting: maximal assistance (Patient needed to go to the bathroom urgently and needed the bedpan during OT session). Patient was able to assist minimally for hygiene management    Patient left HOB elevated with call button in reach and all needs met.     [facility] 6 Click:  [facility] Total Score: 15  Education:    Assessment:     Claribel Moran is a 83 y.o. female with a medical diagnosis of Effusion of knee.  Performance deficits affecting function are weakness, impaired endurance, impaired self care skills, impaired functional mobilty, gait  instability, impaired balance, decreased ROM, pain, decreased lower extremity function, impaired joint extensibility. Patient tolerated treatment session and was motivated to complete tasks.     Rehab Prognosis:  good; patient would benefit from acute skilled OT services to address these deficits and reach maximum level of function.       Plan:     Patient to be seen 3 x/week to address the above listed problems via self-care/home management, therapeutic activities, therapeutic exercises  · Plan of Care Expires: 09/19/18  · Plan of Care Reviewed with: patient    This Plan of care has been discussed with the patient who was involved in its development and understands and is in agreement with the identified goals and treatment plan    GOALS:   Multidisciplinary Problems     Occupational Therapy Goals        Problem: Occupational Therapy Goal    Goal Priority Disciplines Outcome Interventions   Occupational Therapy Goal     OT, PT/OT Ongoing (interventions implemented as appropriate)    Description:  Goals to be met by: 9/10     Patient will increase functional independence with ADLs by performing:    UE Dressing with Sloan.  LE Dressing with Contact Guard Assistance.  Grooming while seated with Sloan.  Toileting from toilet with Contact Guard Assistance for hygiene and clothing management.                       Time Tracking:     OT Date of Treatment: 08/21/18  OT Start Time: 1330  OT Stop Time: 1400  OT Total Time (min): 30 min    Billable Minutes:Self Care/Home Management 30    DEIDRE Gomez  8/21/2018

## 2018-08-21 NOTE — PROGRESS NOTES
[facility] Hospital Medicine  Progress Note    Patient Name: Claribel Moran  MRN: [id number]  Patient Class: IP- Inpatient   Admission Date: 8/18/2018  Length of Stay: 3 days  Attending Physician: Vicky Arenas MD  Primary Care Provider: Moises Eduardo MD    [facility] Medicine Team: [facility] MED 5 Lisbeth Dalal MD    Subjective:     Principal Problem:Effusion of knee    HPI:  83 y.o. female with co-morbidities including: A-Fib, right total knee replacement (2008), left total knee replacement (2011), type 2 DM, HTN, CKD, CAD, and CHF who presents to the ED with a complaint of worsening severe left thigh and knee pain of initial onset 2 days ago. Pt states she fell out of her bed and struck her left knee/hip approximately 2 days ago. She shortly reported to [facility] and received treatment, then was discharged later that day. Pain is 10/10 and begun radiating up and down her leg towards her groin. There are no associated symptoms at this time and no pain at the level below the knee. She is on severe pain at the knee level and above which is very sensitive to touch on examination not relived by 1g PO Tylenol. Pt states she has been unable to stand/ambulate/move comfortably since the injury and has significantly worsened over the 24 hours. She has not taken her coumadin this morning and reports no recent missed medications concerning her coumadin. Denies fever, chills, head trauma, and any LOC. No hx of numbness or tingling sensation. She is on Glipizide but did not took it last month because of insurance issues. Other systemic review was unremarkable.        Hospital Course:  8/18- VSS, patient admitted to the 9th floor and started on home anti HTN medications. Tramadol was ordered PRN for severe L knee pain.  8/19- NAEON, patient seen today and she is vitally stable, BP 98/62. Carvedilol was dc'ed, continue on Amlodipine and Losartan. Ice applied on L knee to reduce pain. F/u  with CR level as it was increased from 1.0 to 1.3. PT tomorrow.  - NAEON except of one episode of desating at 88%. Currently she is stable on room air. Patient seen today and she is vitally stable, /62. Cr level is 1.2. She reports that L knee pain has improved. CPAP was ordered at bedtime. enoxaparin 90 mg Q12h was started. F/u INR.  - NAEON, patient seen today and vitally stable. On NC 1L sating at 99%. L knee pain has improved. INR today is 1.6. Plan to transfer to SNF at time of discharge.    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      Current Outpatient Medications on File Prior to Encounter   Medication Sig    acetaminophen (TYLENOL) 500 MG tablet Take 500 mg by mouth daily as needed.     albuterol (ACCUNEB) 1.25 mg/3 mL Nebu Take 1.25 mg by nebulization every 6 (six) hours as needed. Rescue    ALPRAZolam (XANAX) 0.25 MG tablet  Take 0.25 mg by mouth daily as needed for Anxiety.    amlodipine (NORVASC) 5 MG tablet Take 5 mg by mouth once daily.    atorvastatin (LIPITOR) 40 MG tablet Take 40 mg by mouth once daily.    carvedilol (COREG) 12.5 MG tablet Take 6.25 mg by mouth 2 (two) times daily.    citalopram (CELEXA) 40 MG tablet Take 40 mg by mouth once daily.      ergocalciferol (ERGOCALCIFEROL) 50,000 unit Cap Take 50,000 Units by mouth every 30 days.     escitalopram oxalate (LEXAPRO) 10 MG tablet Take 10 mg by mouth once daily.    glipiZIDE (GLUCOTROL) 2.5 MG TR24 Take 5 mg by mouth daily with breakfast.      guaifenesin-codeine 100-10 mg/5 ml (TUSSI-ORGANIDIN NR)  mg/5 mL syrup Take 5 mLs by mouth 3 (three) times daily as needed for Cough.    levothyroxine (SYNTHROID) 50 MCG tablet Take 50 mcg by mouth once daily.    losartan (COZAAR) 100 MG tablet Take 100 mg by mouth once daily.    ranitidine (ZANTAC) 75 MG tablet Take 75 mg by mouth daily as needed for Heartburn.    senna-docusate 8.6-50 mg (PERICOLACE) 8.6-50 mg per tablet Take 2 tablets by mouth every evening.    traMADol (ULTRAM) 50 mg tablet Take 50 mg by mouth every 6 (six) hours as needed for Pain.    VENTOLIN HFA 90 mcg/actuation inhaler Inhale 2 puffs into the lungs every 4 to 6 hours as needed for Shortness of Breath.     warfarin (COUMADIN) 4 MG tablet Takes 1 tablet by mouth daily except 1/2 tablet on Tuesday and Thursday     Family History     Problem Relation (Age of Onset)    Arthritis Mother    Cancer Mother    Depression Son    Diabetes Son    Drug abuse Daughter    Heart disease Mother, Father    Hypertension Mother, Brother, Daughter, Son    Stroke Mother        Tobacco Use    Smoking status: Former Smoker     Types: Cigarettes     Last attempt to quit: 1954     Years since quittin.6    Smokeless tobacco: Never Used    Tobacco comment: 1 pack for month only for a few years, but second hand smoke from  for decades.     Substance and Sexual Activity    Alcohol use: No    Drug use: No    Sexual activity: Not Currently     Review of Systems   Constitutional: Negative for appetite change, chills, fatigue and fever.   HENT: Negative for ear pain and sinus pain.    Eyes: Negative for photophobia and pain.   Respiratory: Negative for cough and shortness of breath.    Cardiovascular: Negative for chest pain and leg swelling.   Gastrointestinal: Negative for abdominal pain, blood in stool, nausea and vomiting.   Endocrine: Negative for polydipsia and polyphagia.   Genitourinary: Negative for dysuria and hematuria.   Musculoskeletal: Positive for arthralgias and joint swelling. Negative for gait problem, neck pain and neck stiffness.        Bilateral knee pain more severe on the L side 2/2 fall 2 days ago which is not relieved by 1g Tylenol PO.   Skin: Positive for color change. Negative for rash and wound.        Redness over the L knee compared to the R knee.   Allergic/Immunologic: Negative for immunocompromised state.   Neurological: Negative for dizziness, facial asymmetry, numbness and headaches.   Psychiatric/Behavioral: Negative for confusion.     Objective:     Vital Signs (Most Recent):  Temp: 97.8 °F (36.6 °C) (08/21/18 1545)  Pulse: 91 (08/21/18 1619)  Resp: 20 (08/21/18 1557)  BP: 137/69 (08/21/18 1545)  SpO2: (!) 94 % (08/21/18 1557) Vital Signs (24h Range):  Temp:  [96.4 °F (35.8 °C)-98 °F (36.7 °C)] 97.8 °F (36.6 °C)  Pulse:  [70-98] 91  Resp:  [16-20] 20  SpO2:  [91 %-99 %] 94 %  BP: (117-138)/(60-89) 137/69     Weight: 91.6 kg (202 lb 0 oz)  Body mass index is 38.17 kg/m².    Physical Exam   Constitutional: She is oriented to person, place, and time. She appears well-developed and well-nourished. No distress.   HENT:   Head: Normocephalic and atraumatic.   Eyes: EOM are normal. Pupils are equal, round, and reactive to light. Right eye exhibits no discharge. Left eye exhibits no discharge.   Neck: Normal range of  motion. Neck supple.   Cardiovascular: Normal rate and normal heart sounds. Exam reveals no gallop.   Irregular pulse was noted.   Pulmonary/Chest: Effort normal and breath sounds normal. No respiratory distress.   Abdominal: Soft. Bowel sounds are normal. She exhibits no mass. There is no tenderness.   Musculoskeletal: Normal range of motion. She exhibits edema and tenderness. She exhibits no deformity.   Severe, very tender L knee pain which extends up to the L groin. Mild L knee swelling compared to the R knee.   Neurological: She is alert and oriented to person, place, and time. No cranial nerve deficit or sensory deficit.   Skin: Skin is warm and dry. Capillary refill takes less than 2 seconds. She is not diaphoretic. There is erythema.   L knee is warm at palpation when compared it to the R knee.   Psychiatric: She has a normal mood and affect.         CRANIAL NERVES     CN III, IV, VI   Pupils are equal, round, and reactive to light.  Extraocular motions are normal.     Interval History: CPAP was ordered at bedtime. Started on enoxaparin to meet goal of INR > 2.0. She reports L knee pain has improved.    Significant Labs: All pertinent labs within the past 24 hours have been reviewed.    Significant Imaging: I have reviewed all pertinent imaging results/findings within the past 24 hours.    Assessment/Plan:      * Effusion of knee    - severe L knee pain 2/2 fall from the bed with severe tenderness. Tramadol was ordered PRN.  - Hip, tibia, fibula and ankle X-rays showed no evidence of fractures. L Knee X- ray showed arthroplasty hardware projects in expected position, no evidence of hardware failure, no displaced fracture or subluxation, no suspicious bone lesion and unchanged moderate left knee suprapatellar synovial complex/effusion.  - Ice was applied on L knee to reduce the pain. PT to f/u tomorrow. Will reeavulate for pain and swelling.  - pain has improved. Possible to transfer to SNF.        Sleep  apnea    - Diagnosed on (7/2017).  - CPAP titration was performed but she denied using it at home.  - CPAP was ordered to be used at bedtime if needed.  - 6 min walk test might be difficult to perform given pt. Hx of L knee trauma.          TIA (transient ischemic attack)              CAD (coronary artery disease)              Hyperlipidemia    - on Atorvastatin 40 mg.          Hypertension    - she is on Amlodipine.  - on 8/19 her BP was 98/62, 500cc boluses was given twice.  - monitor BP.  -         Diabetes mellitus type II, non insulin dependent    - on isulin aspart 0-5U prn.          Chronic atrial fibrillation    - she is on home dose warfarin.  - enoxaparin was added to meet the goal of INR>2.0.          VTE Risk Mitigation (From admission, onward)        Ordered     enoxaparin injection 90 mg  Every 12 hours (non-standard times)      08/20/18 1031     warfarin (COUMADIN) tablet 4 mg  Daily      08/18/18 1610              Lisbeth Dalal MD  Department of Hospital Medicine   [facility]

## 2018-08-21 NOTE — SUBJECTIVE & OBJECTIVE
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      Current Outpatient Medications on File Prior to Encounter   Medication Sig    acetaminophen (TYLENOL) 500 MG tablet Take 500 mg by mouth daily as needed.     albuterol (ACCUNEB) 1.25 mg/3 mL Nebu Take 1.25 mg by nebulization every 6 (six) hours as needed. Rescue    ALPRAZolam (XANAX) 0.25 MG tablet Take 0.25 mg by mouth daily as needed for Anxiety.    amlodipine (NORVASC) 5 MG tablet Take 5 mg by mouth once daily.    atorvastatin (LIPITOR) 40 MG tablet Take 40 mg by mouth once daily.    carvedilol (COREG) 12.5 MG tablet Take 6.25 mg by mouth 2 (two) times daily.    citalopram (CELEXA) 40 MG tablet Take 40 mg by mouth once daily.      ergocalciferol (ERGOCALCIFEROL) 50,000 unit Cap Take 50,000 Units by mouth every 30 days.     escitalopram oxalate (LEXAPRO) 10 MG tablet Take 10 mg by mouth once daily.     glipiZIDE (GLUCOTROL) 2.5 MG TR24 Take 5 mg by mouth daily with breakfast.      guaifenesin-codeine 100-10 mg/5 ml (TUSSI-ORGANIDIN NR)  mg/5 mL syrup Take 5 mLs by mouth 3 (three) times daily as needed for Cough.    levothyroxine (SYNTHROID) 50 MCG tablet Take 50 mcg by mouth once daily.    losartan (COZAAR) 100 MG tablet Take 100 mg by mouth once daily.    ranitidine (ZANTAC) 75 MG tablet Take 75 mg by mouth daily as needed for Heartburn.    senna-docusate 8.6-50 mg (PERICOLACE) 8.6-50 mg per tablet Take 2 tablets by mouth every evening.    traMADol (ULTRAM) 50 mg tablet Take 50 mg by mouth every 6 (six) hours as needed for Pain.    VENTOLIN HFA 90 mcg/actuation inhaler Inhale 2 puffs into the lungs every 4 to 6 hours as needed for Shortness of Breath.     warfarin (COUMADIN) 4 MG tablet Takes 1 tablet by mouth daily except 1/2 tablet on Tuesday and Thursday     Family History     Problem Relation (Age of Onset)    Arthritis Mother    Cancer Mother    Depression Son    Diabetes Son    Drug abuse Daughter    Heart disease Mother, Father    Hypertension Mother, Brother, Daughter, Son    Stroke Mother        Tobacco Use    Smoking status: Former Smoker     Types: Cigarettes     Last attempt to quit: 1954     Years since quittin.6    Smokeless tobacco: Never Used    Tobacco comment: 1 pack for month only for a few years, but second hand smoke from  for decades.    Substance and Sexual Activity    Alcohol use: No    Drug use: No    Sexual activity: Not Currently     Review of Systems   Constitutional: Negative for appetite change, chills, fatigue and fever.   HENT: Negative for ear pain and sinus pain.    Eyes: Negative for photophobia and pain.   Respiratory: Negative for cough and shortness of breath.    Cardiovascular: Negative for chest pain and leg swelling.   Gastrointestinal: Negative for abdominal pain, blood in stool, nausea and vomiting.   Endocrine: Negative for  polydipsia and polyphagia.   Genitourinary: Negative for dysuria and hematuria.   Musculoskeletal: Positive for arthralgias and joint swelling. Negative for gait problem, neck pain and neck stiffness.        Bilateral knee pain more severe on the L side 2/2 fall 2 days ago which is not relieved by 1g Tylenol PO.   Skin: Positive for color change. Negative for rash and wound.        Redness over the L knee compared to the R knee.   Allergic/Immunologic: Negative for immunocompromised state.   Neurological: Negative for dizziness, facial asymmetry, numbness and headaches.   Psychiatric/Behavioral: Negative for confusion.     Objective:     Vital Signs (Most Recent):  Temp: 97.8 °F (36.6 °C) (08/21/18 1545)  Pulse: 91 (08/21/18 1619)  Resp: 20 (08/21/18 1557)  BP: 137/69 (08/21/18 1545)  SpO2: (!) 94 % (08/21/18 1557) Vital Signs (24h Range):  Temp:  [96.4 °F (35.8 °C)-98 °F (36.7 °C)] 97.8 °F (36.6 °C)  Pulse:  [70-98] 91  Resp:  [16-20] 20  SpO2:  [91 %-99 %] 94 %  BP: (117-138)/(60-89) 137/69     Weight: 91.6 kg (202 lb 0 oz)  Body mass index is 38.17 kg/m².    Physical Exam   Constitutional: She is oriented to person, place, and time. She appears well-developed and well-nourished. No distress.   HENT:   Head: Normocephalic and atraumatic.   Eyes: EOM are normal. Pupils are equal, round, and reactive to light. Right eye exhibits no discharge. Left eye exhibits no discharge.   Neck: Normal range of motion. Neck supple.   Cardiovascular: Normal rate and normal heart sounds. Exam reveals no gallop.   Irregular pulse was noted.   Pulmonary/Chest: Effort normal and breath sounds normal. No respiratory distress.   Abdominal: Soft. Bowel sounds are normal. She exhibits no mass. There is no tenderness.   Musculoskeletal: Normal range of motion. She exhibits edema and tenderness. She exhibits no deformity.   Severe, very tender L knee pain which extends up to the L groin. Mild L knee swelling compared to the R knee.    Neurological: She is alert and oriented to person, place, and time. No cranial nerve deficit or sensory deficit.   Skin: Skin is warm and dry. Capillary refill takes less than 2 seconds. She is not diaphoretic. There is erythema.   L knee is warm at palpation when compared it to the R knee.   Psychiatric: She has a normal mood and affect.         CRANIAL NERVES     CN III, IV, VI   Pupils are equal, round, and reactive to light.  Extraocular motions are normal.     Interval History: CPAP was ordered at bedtime. Started on enoxaparin to meet goal of INR > 2.0. She reports L knee pain has improved.    Significant Labs: All pertinent labs within the past 24 hours have been reviewed.    Significant Imaging: I have reviewed all pertinent imaging results/findings within the past 24 hours.

## 2018-08-21 NOTE — PLAN OF CARE
Problem: Occupational Therapy Goal  Goal: Occupational Therapy Goal  Goals to be met by: 9/10     Patient will increase functional independence with ADLs by performing:    UE Dressing with Becker.  LE Dressing with Contact Guard Assistance.  Grooming while seated with Becker.  Toileting from toilet with Contact Guard Assistance for hygiene and clothing management.      Outcome: Ongoing (interventions implemented as appropriate)  Patient's goals are appropriate.   DEIDRE Gomez  8/21/2018

## 2018-08-21 NOTE — PLAN OF CARE
Problem: Diabetes, Type 2 (Adult)  Goal: Signs and Symptoms of Listed Potential Problems Will be Absent, Minimized or Managed (Diabetes, Type 2)  Signs and symptoms of listed potential problems will be absent, minimized or managed by discharge/transition of care (reference Diabetes, Type 2 (Adult) CPG).  Outcome: Ongoing (interventions implemented as appropriate)   08/21/18 0207   Diabetes, Type 2   Problems Assessed (Type 2 Diabetes) all   Problems Present (Type 2 Diabetes) hyperglycemia       Problem: Fall Risk (Adult)  Goal: Absence of Falls  Patient will demonstrate the desired outcomes by discharge/transition of care.  Outcome: Ongoing (interventions implemented as appropriate)   08/21/18 0207   Fall Risk (Adult)   Absence of Falls making progress toward outcome       Problem: Patient Care Overview  Goal: Plan of Care Review  Outcome: Ongoing (interventions implemented as appropriate)   08/21/18 0207   Coping/Psychosocial   Plan Of Care Reviewed With patient

## 2018-08-21 NOTE — ASSESSMENT & PLAN NOTE
- she is on Amlodipine.  - on 8/19 her BP was 98/62, 500cc boluses was given twice.  - monitor BP.  -

## 2018-08-22 ENCOUNTER — TELEPHONE (OUTPATIENT)
Dept: PHARMACY | Facility: CLINIC | Age: 83
End: 2018-08-22

## 2018-08-22 LAB
ANION GAP SERPL CALC-SCNC: 8 MMOL/L
BASOPHILS # BLD AUTO: 0.04 K/UL
BASOPHILS NFR BLD: 0.7 %
BUN SERPL-MCNC: 26 MG/DL
CALCIUM SERPL-MCNC: 8.8 MG/DL
CHLORIDE SERPL-SCNC: 102 MMOL/L
CO2 SERPL-SCNC: 25 MMOL/L
CREAT SERPL-MCNC: 1.5 MG/DL
DIFFERENTIAL METHOD: ABNORMAL
EOSINOPHIL # BLD AUTO: 0.2 K/UL
EOSINOPHIL NFR BLD: 3.6 %
ERYTHROCYTE [DISTWIDTH] IN BLOOD BY AUTOMATED COUNT: 15.5 %
EST. GFR  (AFRICAN AMERICAN): 36.9 ML/MIN/1.73 M^2
EST. GFR  (NON AFRICAN AMERICAN): 32 ML/MIN/1.73 M^2
GLUCOSE SERPL-MCNC: 190 MG/DL
HCT VFR BLD AUTO: 37.8 %
HGB BLD-MCNC: 11.9 G/DL
IMM GRANULOCYTES # BLD AUTO: 0.05 K/UL
IMM GRANULOCYTES NFR BLD AUTO: 0.8 %
INR PPP: 1.6
LYMPHOCYTES # BLD AUTO: 1.6 K/UL
LYMPHOCYTES NFR BLD: 26.1 %
MAGNESIUM SERPL-MCNC: 1.9 MG/DL
MCH RBC QN AUTO: 29.1 PG
MCHC RBC AUTO-ENTMCNC: 31.5 G/DL
MCV RBC AUTO: 92 FL
MONOCYTES # BLD AUTO: 0.8 K/UL
MONOCYTES NFR BLD: 13.4 %
NEUTROPHILS # BLD AUTO: 3.4 K/UL
NEUTROPHILS NFR BLD: 55.4 %
NRBC BLD-RTO: 0 /100 WBC
PHOSPHATE SERPL-MCNC: 2.7 MG/DL
PLATELET # BLD AUTO: 178 K/UL
PMV BLD AUTO: 12 FL
POCT GLUCOSE: 166 MG/DL (ref 70–110)
POCT GLUCOSE: 207 MG/DL (ref 70–110)
POCT GLUCOSE: 216 MG/DL (ref 70–110)
POCT GLUCOSE: 274 MG/DL (ref 70–110)
POTASSIUM SERPL-SCNC: 4.6 MMOL/L
PROTHROMBIN TIME: 15.9 SEC
RBC # BLD AUTO: 4.09 M/UL
SODIUM SERPL-SCNC: 135 MMOL/L
WBC # BLD AUTO: 6.13 K/UL

## 2018-08-22 PROCEDURE — 63600175 PHARM REV CODE 636 W HCPCS: Performed by: STUDENT IN AN ORGANIZED HEALTH CARE EDUCATION/TRAINING PROGRAM

## 2018-08-22 PROCEDURE — 25000003 PHARM REV CODE 250: Performed by: STUDENT IN AN ORGANIZED HEALTH CARE EDUCATION/TRAINING PROGRAM

## 2018-08-22 PROCEDURE — 99232 SBSQ HOSP IP/OBS MODERATE 35: CPT | Mod: GC,,, | Performed by: HOSPITALIST

## 2018-08-22 PROCEDURE — 94761 N-INVAS EAR/PLS OXIMETRY MLT: CPT

## 2018-08-22 PROCEDURE — 97530 THERAPEUTIC ACTIVITIES: CPT

## 2018-08-22 PROCEDURE — 80048 BASIC METABOLIC PNL TOTAL CA: CPT

## 2018-08-22 PROCEDURE — 84100 ASSAY OF PHOSPHORUS: CPT

## 2018-08-22 PROCEDURE — 85025 COMPLETE CBC W/AUTO DIFF WBC: CPT

## 2018-08-22 PROCEDURE — 83735 ASSAY OF MAGNESIUM: CPT

## 2018-08-22 PROCEDURE — 36415 COLL VENOUS BLD VENIPUNCTURE: CPT

## 2018-08-22 PROCEDURE — 85610 PROTHROMBIN TIME: CPT

## 2018-08-22 PROCEDURE — 27000221 HC OXYGEN, UP TO 24 HOURS

## 2018-08-22 PROCEDURE — 11000001 HC ACUTE MED/SURG PRIVATE ROOM

## 2018-08-22 PROCEDURE — 94640 AIRWAY INHALATION TREATMENT: CPT

## 2018-08-22 PROCEDURE — 97110 THERAPEUTIC EXERCISES: CPT

## 2018-08-22 PROCEDURE — 25000242 PHARM REV CODE 250 ALT 637 W/ HCPCS: Performed by: STUDENT IN AN ORGANIZED HEALTH CARE EDUCATION/TRAINING PROGRAM

## 2018-08-22 RX ORDER — ENOXAPARIN SODIUM 100 MG/ML
1 INJECTION SUBCUTANEOUS
Status: DISCONTINUED | OUTPATIENT
Start: 2018-08-23 | End: 2018-08-23

## 2018-08-22 RX ADMIN — IPRATROPIUM BROMIDE AND ALBUTEROL SULFATE 3 ML: .5; 3 SOLUTION RESPIRATORY (INHALATION) at 08:08

## 2018-08-22 RX ADMIN — TRAMADOL HYDROCHLORIDE 50 MG: 50 TABLET, COATED ORAL at 08:08

## 2018-08-22 RX ADMIN — DIPHENHYDRAMINE HYDROCHLORIDE 25 MG: 25 CAPSULE ORAL at 08:08

## 2018-08-22 RX ADMIN — IPRATROPIUM BROMIDE AND ALBUTEROL SULFATE 3 ML: .5; 3 SOLUTION RESPIRATORY (INHALATION) at 04:08

## 2018-08-22 RX ADMIN — SODIUM CHLORIDE 500 ML: 0.9 INJECTION, SOLUTION INTRAVENOUS at 08:08

## 2018-08-22 RX ADMIN — LEVOTHYROXINE SODIUM 50 MCG: 50 TABLET ORAL at 06:08

## 2018-08-22 RX ADMIN — TRAMADOL HYDROCHLORIDE 50 MG: 50 TABLET, COATED ORAL at 05:08

## 2018-08-22 RX ADMIN — ATORVASTATIN CALCIUM 40 MG: 20 TABLET, FILM COATED ORAL at 08:08

## 2018-08-22 RX ADMIN — FLUTICASONE FUROATE AND VILANTEROL TRIFENATATE 1 PUFF: 100; 25 POWDER RESPIRATORY (INHALATION) at 08:08

## 2018-08-22 RX ADMIN — TRAMADOL HYDROCHLORIDE 50 MG: 50 TABLET, COATED ORAL at 12:08

## 2018-08-22 RX ADMIN — IPRATROPIUM BROMIDE AND ALBUTEROL SULFATE 3 ML: .5; 3 SOLUTION RESPIRATORY (INHALATION) at 12:08

## 2018-08-22 RX ADMIN — ENOXAPARIN SODIUM 90 MG: 100 INJECTION SUBCUTANEOUS at 12:08

## 2018-08-22 RX ADMIN — DIPHENHYDRAMINE HYDROCHLORIDE 25 MG: 25 CAPSULE ORAL at 05:08

## 2018-08-22 RX ADMIN — POLYETHYLENE GLYCOL 3350 17 G: 17 POWDER, FOR SOLUTION ORAL at 08:08

## 2018-08-22 RX ADMIN — WARFARIN SODIUM 4 MG: 2 TABLET ORAL at 05:08

## 2018-08-22 RX ADMIN — AMLODIPINE BESYLATE 5 MG: 5 TABLET ORAL at 08:08

## 2018-08-22 RX ADMIN — INSULIN ASPART 1 UNITS: 100 INJECTION, SOLUTION INTRAVENOUS; SUBCUTANEOUS at 09:08

## 2018-08-22 RX ADMIN — ACETAMINOPHEN 650 MG: 325 TABLET, FILM COATED ORAL at 09:08

## 2018-08-22 RX ADMIN — INSULIN ASPART 2 UNITS: 100 INJECTION, SOLUTION INTRAVENOUS; SUBCUTANEOUS at 05:08

## 2018-08-22 RX ADMIN — INSULIN ASPART 2 UNITS: 100 INJECTION, SOLUTION INTRAVENOUS; SUBCUTANEOUS at 12:08

## 2018-08-22 RX ADMIN — DIPHENHYDRAMINE HYDROCHLORIDE 25 MG: 25 CAPSULE ORAL at 12:08

## 2018-08-22 NOTE — ASSESSMENT & PLAN NOTE
- she is on Amlodipine.  - on 8/19 her BP was 98/62, 500cc boluses was given twice.  - 8/22 held losartan given bump in Cr, monitor

## 2018-08-22 NOTE — PROGRESS NOTES
[facility] Hospital Medicine  Progress Note    Patient Name: Claribel Moran  MRN: [id number]  Patient Class: IP- Inpatient   Admission Date: 8/18/2018  Length of Stay: 4 days  Attending Physician: Vicky Arenas MD  Primary Care Provider: Moises Eduardo MD    [facility] Medicine Team: [facility] 5 Tamica Leary MD    Subjective:     Principal Problem:Effusion of knee    HPI:  83 y.o. female with co-morbidities including: A-Fib, right total knee replacement (2008), left total knee replacement (2011), type 2 DM, HTN, CKD, CAD, and CHF who presents to the ED with a complaint of worsening severe left thigh and knee pain of initial onset 2 days ago. Pt states she fell out of her bed and struck her left knee/hip approximately 2 days ago. She shortly reported to [facility] and received treatment, then was discharged later that day. Pain is 10/10 and begun radiating up and down her leg towards her groin. There are no associated symptoms at this time and no pain at the level below the knee. She is on severe pain at the knee level and above which is very sensitive to touch on examination not relived by 1g PO Tylenol. Pt states she has been unable to stand/ambulate/move comfortably since the injury and has significantly worsened over the 24 hours. She has not taken her coumadin this morning and reports no recent missed medications concerning her coumadin. Denies fever, chills, head trauma, and any LOC. No hx of numbness or tingling sensation. She is on Glipizide but did not took it last month because of insurance issues. Other systemic review was unremarkable.        Hospital Course:  8/18- VSS, patient admitted to the 9th floor and started on home anti HTN medications. Tramadol was ordered PRN for severe L knee pain.  8/19- NAEON, patient seen today and she is vitally stable, BP 98/62. Carvedilol was dc'ed, continue on Amlodipine and Losartan. Ice applied on L knee to reduce pain. F/u with  CR level as it was increased from 1.0 to 1.3. PT tomorrow.  - NAEON except of one episode of desating at 88%. Currently she is stable on room air. Patient seen today and she is vitally stable, /62. Cr level is 1.2. She reports that L knee pain has improved. CPAP was ordered at bedtime. enoxaparin 90 mg Q12h was started. F/u INR.  - NAEON, patient seen today and vitally stable. On NC 1L sating at 99%. L knee pain has improved. INR today is 1.6. Plan to transfer to SNF at time of discharge.  - 500 cc bolus given for bump in Cr. Held losartan, will monitor Cr and BP. Will discuss with pt/PT/OT as pt may need SNF.    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      Current Outpatient Medications on File Prior to Encounter   Medication Sig    acetaminophen (TYLENOL) 500 MG tablet Take 500 mg by mouth daily as needed.     albuterol (ACCUNEB)  1.25 mg/3 mL Nebu Take 1.25 mg by nebulization every 6 (six) hours as needed. Rescue    ALPRAZolam (XANAX) 0.25 MG tablet Take 0.25 mg by mouth daily as needed for Anxiety.    amlodipine (NORVASC) 5 MG tablet Take 5 mg by mouth once daily.    atorvastatin (LIPITOR) 40 MG tablet Take 40 mg by mouth once daily.    carvedilol (COREG) 12.5 MG tablet Take 6.25 mg by mouth 2 (two) times daily.    citalopram (CELEXA) 40 MG tablet Take 40 mg by mouth once daily.      ergocalciferol (ERGOCALCIFEROL) 50,000 unit Cap Take 50,000 Units by mouth every 30 days.     escitalopram oxalate (LEXAPRO) 10 MG tablet Take 10 mg by mouth once daily.    glipiZIDE (GLUCOTROL) 2.5 MG TR24 Take 5 mg by mouth daily with breakfast.      guaifenesin-codeine 100-10 mg/5 ml (TUSSI-ORGANIDIN NR)  mg/5 mL syrup Take 5 mLs by mouth 3 (three) times daily as needed for Cough.    levothyroxine (SYNTHROID) 50 MCG tablet Take 50 mcg by mouth once daily.    losartan (COZAAR) 100 MG tablet Take 100 mg by mouth once daily.    ranitidine (ZANTAC) 75 MG tablet Take 75 mg by mouth daily as needed for Heartburn.    senna-docusate 8.6-50 mg (PERICOLACE) 8.6-50 mg per tablet Take 2 tablets by mouth every evening.    traMADol (ULTRAM) 50 mg tablet Take 50 mg by mouth every 6 (six) hours as needed for Pain.    VENTOLIN HFA 90 mcg/actuation inhaler Inhale 2 puffs into the lungs every 4 to 6 hours as needed for Shortness of Breath.     warfarin (COUMADIN) 4 MG tablet Takes 1 tablet by mouth daily except 1/2 tablet on Tuesday and Thursday     Family History     Problem Relation (Age of Onset)    Arthritis Mother    Cancer Mother    Depression Son    Diabetes Son    Drug abuse Daughter    Heart disease Mother, Father    Hypertension Mother, Brother, Daughter, Son    Stroke Mother        Tobacco Use    Smoking status: Former Smoker     Types: Cigarettes     Last attempt to quit: 1954     Years since quittin.6    Smokeless tobacco: Never  Used    Tobacco comment: 1 pack for month only for a few years, but second hand smoke from  for decades.    Substance and Sexual Activity    Alcohol use: No    Drug use: No    Sexual activity: Not Currently     Review of Systems   Constitutional: Negative for appetite change, chills, fatigue and fever.   HENT: Negative for ear pain and sinus pain.    Eyes: Negative for photophobia and pain.   Respiratory: Negative for cough and shortness of breath.    Cardiovascular: Negative for chest pain and leg swelling.   Gastrointestinal: Negative for abdominal pain, blood in stool, nausea and vomiting.   Endocrine: Negative for polydipsia and polyphagia.   Genitourinary: Negative for dysuria and hematuria.   Musculoskeletal: Positive for arthralgias and joint swelling. Negative for gait problem, neck pain and neck stiffness.        Bilateral knee pain L>R 2/2 fall, pain improving, reports some stiffness   Skin: Positive for color change. Negative for rash and wound.        Redness over the L knee compared to the R knee.   Allergic/Immunologic: Negative for immunocompromised state.   Neurological: Negative for dizziness, facial asymmetry, numbness and headaches.   Psychiatric/Behavioral: Negative for confusion.     Objective:     Vital Signs (Most Recent):  Temp: 97.3 °F (36.3 °C) (08/22/18 1113)  Pulse: 78 (08/22/18 1226)  Resp: 18 (08/22/18 1226)  BP: (!) 127/58 (08/22/18 1113)  SpO2: (!) 94 % (08/22/18 1226) Vital Signs (24h Range):  Temp:  [96.5 °F (35.8 °C)-98.1 °F (36.7 °C)] 97.3 °F (36.3 °C)  Pulse:  [66-97] 78  Resp:  [16-27] 18  SpO2:  [93 %-100 %] 94 %  BP: (110-137)/(58-69) 127/58     Weight: 91.6 kg (202 lb 0 oz)  Body mass index is 38.17 kg/m².    Physical Exam   Constitutional: She is oriented to person, place, and time. She appears well-developed and well-nourished. No distress.   HENT:   Head: Normocephalic and atraumatic.   Eyes: EOM are normal. Pupils are equal, round, and reactive to light. Right  eye exhibits no discharge. Left eye exhibits no discharge.   Neck: Normal range of motion. Neck supple.   Cardiovascular: Normal rate and normal heart sounds. Exam reveals no gallop.   Irregular pulse was noted.   Pulmonary/Chest: Effort normal and breath sounds normal. No respiratory distress.   Abdominal: Soft. Bowel sounds are normal. She exhibits no mass. There is no tenderness.   Musculoskeletal: Normal range of motion. She exhibits edema and tenderness. She exhibits no deformity.   Tender L knee pain. Mild L knee swelling compared to the R knee.   Neurological: She is alert and oriented to person, place, and time. No cranial nerve deficit or sensory deficit.   Skin: Skin is warm and dry. Capillary refill takes less than 2 seconds. She is not diaphoretic. There is erythema.   Psychiatric: She has a normal mood and affect.         CRANIAL NERVES     CN III, IV, VI   Pupils are equal, round, and reactive to light.  Extraocular motions are normal.     Interval History: CPAP was ordered at bedtime. Started on enoxaparin to meet goal of INR > 2.0. She reports L knee pain has improved.    Significant Labs: All pertinent labs within the past 24 hours have been reviewed.    Significant Imaging: I have reviewed all pertinent imaging results/findings within the past 24 hours.    Assessment/Plan:      * Effusion of knee    - severe L knee pain 2/2 fall from the bed with severe tenderness. Tramadol was ordered PRN.  - Hip, tibia, fibula and ankle X-rays showed no evidence of fractures. L Knee X- ray showed arthroplasty hardware projects in expected position, no evidence of hardware failure, no displaced fracture or subluxation, no suspicious bone lesion and unchanged moderate left knee suprapatellar synovial complex/effusion.  - Ice was applied on L knee to reduce the pain. PT to f/u tomorrow. Will reeavulate for pain and swelling.  - pain has improved. Possible to transfer to SNF.        Sleep apnea    - Diagnosed on  (7/2017).  - CPAP titration was performed but she denied using it at home.  - CPAP was ordered to be used at bedtime if needed.  - 6 min walk test might be difficult to perform given pt. Hx of L knee trauma.          TIA (transient ischemic attack)              CAD (coronary artery disease)              Hyperlipidemia    - on Atorvastatin 40 mg.          Hypertension    - she is on Amlodipine.  - on 8/19 her BP was 98/62, 500cc boluses was given twice.  - 8/22 held losartan given bump in Cr, monitor        Diabetes mellitus type II, non insulin dependent    - on isulin aspart 0-5U prn.          Chronic atrial fibrillation    - she is on home dose warfarin.  - enoxaparin was added to meet the goal of INR>2.0.          VTE Risk Mitigation (From admission, onward)        Ordered     enoxaparin injection 90 mg  Every 24 hours (non-standard times)      08/22/18 1004     warfarin (COUMADIN) tablet 4 mg  Daily      08/18/18 1610              Tamica Leary MD  Department of Hospital Medicine   [facility]

## 2018-08-22 NOTE — TELEPHONE ENCOUNTER
I'm sorry but there are no available programs to assist with the medications on patients profile.  I will help her apply for Low Income Subsidy but I need specific information so we will have to wait until she has been discharge and at home.

## 2018-08-22 NOTE — PLAN OF CARE
Problem: Physical Therapy Goal  Goal: Physical Therapy Goal  Goals to be met by: 18     Patient will increase functional independence with mobility by performin. Supine to sit with MInimal Assistance  2. Sit to supine with MInimal Assistance  3. Sit to stand transfer with Maximum Assistance with LRD.   4. Bed to chair transfer with Maximum Assistance using Rolling Walker or LRD.  5. Gait  x 10 feet with Maximum Assistance using Rolling Walker or LRD.      Outcome: Ongoing (interventions implemented as appropriate)  Goals remain appropriate.

## 2018-08-22 NOTE — PLAN OF CARE
Problem: Diabetes, Type 2 (Adult)  Goal: Signs and Symptoms of Listed Potential Problems Will be Absent, Minimized or Managed (Diabetes, Type 2)  Signs and symptoms of listed potential problems will be absent, minimized or managed by discharge/transition of care (reference Diabetes, Type 2 (Adult) CPG).  Outcome: Ongoing (interventions implemented as appropriate)   08/22/18 1641   Diabetes, Type 2   Problems Assessed (Type 2 Diabetes) all   Problems Present (Type 2 Diabetes) hyperglycemia       Problem: Fall Risk (Adult)  Goal: Identify Related Risk Factors and Signs and Symptoms  Related risk factors and signs and symptoms are identified upon initiation of Human Response Clinical Practice Guideline (CPG)  Outcome: Ongoing (interventions implemented as appropriate)   08/22/18 1641   Fall Risk   Related Risk Factors (Fall Risk) gait/mobility problems;history of falls   Signs and Symptoms (Fall Risk) presence of risk factors       Problem: Patient Care Overview  Goal: Plan of Care Review  Outcome: Ongoing (interventions implemented as appropriate)   08/22/18 1641   Coping/Psychosocial   Plan Of Care Reviewed With patient

## 2018-08-22 NOTE — PLAN OF CARE
Problem: Fall Risk (Adult)  Goal: Identify Related Risk Factors and Signs and Symptoms  Related risk factors and signs and symptoms are identified upon initiation of Human Response Clinical Practice Guideline (CPG)  Outcome: Ongoing (interventions implemented as appropriate)   08/21/18 1700   Fall Risk   Related Risk Factors (Fall Risk) age-related changes;fatigue/slow reaction;gait/mobility problems;history of falls;polypharmacy;culprit medication(s);environment unfamiliar   Signs and Symptoms (Fall Risk) presence of risk factors     Goal: Absence of Falls  Patient will demonstrate the desired outcomes by discharge/transition of care.  Outcome: Ongoing (interventions implemented as appropriate)   08/21/18 1700   Fall Risk (Adult)   Absence of Falls making progress toward outcome

## 2018-08-22 NOTE — SUBJECTIVE & OBJECTIVE
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      Current Outpatient Medications on File Prior to Encounter   Medication Sig    acetaminophen (TYLENOL) 500 MG tablet Take 500 mg by mouth daily as needed.     albuterol (ACCUNEB) 1.25 mg/3 mL Nebu Take 1.25 mg by nebulization every 6 (six) hours as needed. Rescue    ALPRAZolam (XANAX) 0.25 MG tablet Take 0.25 mg by mouth daily as needed for Anxiety.    amlodipine (NORVASC) 5 MG tablet Take 5 mg by mouth once daily.    atorvastatin (LIPITOR) 40 MG tablet Take 40 mg by mouth once daily.    carvedilol (COREG) 12.5 MG tablet Take 6.25 mg by mouth 2 (two) times daily.    citalopram (CELEXA) 40 MG tablet Take 40 mg by mouth once daily.      ergocalciferol (ERGOCALCIFEROL) 50,000 unit Cap Take 50,000 Units by mouth every 30 days.     escitalopram oxalate (LEXAPRO) 10 MG tablet Take 10 mg by mouth once daily.     glipiZIDE (GLUCOTROL) 2.5 MG TR24 Take 5 mg by mouth daily with breakfast.      guaifenesin-codeine 100-10 mg/5 ml (TUSSI-ORGANIDIN NR)  mg/5 mL syrup Take 5 mLs by mouth 3 (three) times daily as needed for Cough.    levothyroxine (SYNTHROID) 50 MCG tablet Take 50 mcg by mouth once daily.    losartan (COZAAR) 100 MG tablet Take 100 mg by mouth once daily.    ranitidine (ZANTAC) 75 MG tablet Take 75 mg by mouth daily as needed for Heartburn.    senna-docusate 8.6-50 mg (PERICOLACE) 8.6-50 mg per tablet Take 2 tablets by mouth every evening.    traMADol (ULTRAM) 50 mg tablet Take 50 mg by mouth every 6 (six) hours as needed for Pain.    VENTOLIN HFA 90 mcg/actuation inhaler Inhale 2 puffs into the lungs every 4 to 6 hours as needed for Shortness of Breath.     warfarin (COUMADIN) 4 MG tablet Takes 1 tablet by mouth daily except 1/2 tablet on Tuesday and Thursday     Family History     Problem Relation (Age of Onset)    Arthritis Mother    Cancer Mother    Depression Son    Diabetes Son    Drug abuse Daughter    Heart disease Mother, Father    Hypertension Mother, Brother, Daughter, Son    Stroke Mother        Tobacco Use    Smoking status: Former Smoker     Types: Cigarettes     Last attempt to quit: 1954     Years since quittin.6    Smokeless tobacco: Never Used    Tobacco comment: 1 pack for month only for a few years, but second hand smoke from  for decades.    Substance and Sexual Activity    Alcohol use: No    Drug use: No    Sexual activity: Not Currently     Review of Systems   Constitutional: Negative for appetite change, chills, fatigue and fever.   HENT: Negative for ear pain and sinus pain.    Eyes: Negative for photophobia and pain.   Respiratory: Negative for cough and shortness of breath.    Cardiovascular: Negative for chest pain and leg swelling.   Gastrointestinal: Negative for abdominal pain, blood in stool, nausea and vomiting.   Endocrine: Negative for  polydipsia and polyphagia.   Genitourinary: Negative for dysuria and hematuria.   Musculoskeletal: Positive for arthralgias and joint swelling. Negative for gait problem, neck pain and neck stiffness.        Bilateral knee pain L>R 2/2 fall, pain improving, reports some stiffness   Skin: Positive for color change. Negative for rash and wound.        Redness over the L knee compared to the R knee.   Allergic/Immunologic: Negative for immunocompromised state.   Neurological: Negative for dizziness, facial asymmetry, numbness and headaches.   Psychiatric/Behavioral: Negative for confusion.     Objective:     Vital Signs (Most Recent):  Temp: 97.3 °F (36.3 °C) (08/22/18 1113)  Pulse: 78 (08/22/18 1226)  Resp: 18 (08/22/18 1226)  BP: (!) 127/58 (08/22/18 1113)  SpO2: (!) 94 % (08/22/18 1226) Vital Signs (24h Range):  Temp:  [96.5 °F (35.8 °C)-98.1 °F (36.7 °C)] 97.3 °F (36.3 °C)  Pulse:  [66-97] 78  Resp:  [16-27] 18  SpO2:  [93 %-100 %] 94 %  BP: (110-137)/(58-69) 127/58     Weight: 91.6 kg (202 lb 0 oz)  Body mass index is 38.17 kg/m².    Physical Exam   Constitutional: She is oriented to person, place, and time. She appears well-developed and well-nourished. No distress.   HENT:   Head: Normocephalic and atraumatic.   Eyes: EOM are normal. Pupils are equal, round, and reactive to light. Right eye exhibits no discharge. Left eye exhibits no discharge.   Neck: Normal range of motion. Neck supple.   Cardiovascular: Normal rate and normal heart sounds. Exam reveals no gallop.   Irregular pulse was noted.   Pulmonary/Chest: Effort normal and breath sounds normal. No respiratory distress.   Abdominal: Soft. Bowel sounds are normal. She exhibits no mass. There is no tenderness.   Musculoskeletal: Normal range of motion. She exhibits edema and tenderness. She exhibits no deformity.   Tender L knee pain. Mild L knee swelling compared to the R knee.   Neurological: She is alert and oriented to person, place, and time. No  cranial nerve deficit or sensory deficit.   Skin: Skin is warm and dry. Capillary refill takes less than 2 seconds. She is not diaphoretic. There is erythema.   Psychiatric: She has a normal mood and affect.         CRANIAL NERVES     CN III, IV, VI   Pupils are equal, round, and reactive to light.  Extraocular motions are normal.     Interval History: CPAP was ordered at bedtime. Started on enoxaparin to meet goal of INR > 2.0. She reports L knee pain has improved.    Significant Labs: All pertinent labs within the past 24 hours have been reviewed.    Significant Imaging: I have reviewed all pertinent imaging results/findings within the past 24 hours.

## 2018-08-22 NOTE — PT/OT/SLP PROGRESS
"Physical Therapy Treatment    Patient Name:  Claribel Moran   MRN:  [id number]    Recommendations:     Discharge Recommendations:  [facility] SNF   Discharge Equipment Recommendations: walker, rolling, wheelchair   Barriers to discharge: Inaccessible home    Assessment:     Claribel Moran is a 83 y.o. female admitted with a medical diagnosis of Effusion of knee.  She presents with the following impairments/functional limitations:  weakness, impaired endurance, impaired self care skills, impaired functional mobilty, gait instability, impaired balance, decreased ROM, decreased lower extremity function, impaired joint extensibility. Pt tolerated session well with focus on bed mobility, transfers, and therex. Pt progresses well tolerating EOB and standing trials this day despite pt c/o L knee pain and poor tolerance for LLE weight bearing. Pt will continue to benefit from therapy services to improve impairments listed above.     Rehab Prognosis:  Fair ; patient would benefit from acute skilled PT services to address these deficits and reach maximum level of function.      Recent Surgery: * No surgery found *      Plan:     During this hospitalization, patient to be seen 4 x/week to address the above listed problems via gait training, therapeutic activities, therapeutic exercises, neuromuscular re-education  · Plan of Care Expires:  09/19/18   Plan of Care Reviewed with: patient    Subjective     Communicated with NSG prior to session.  Patient found supine upon PTA entry to room, agreeable to treatment.      Chief Complaint: L knee pain  Patient comments/goals: "I try to put some weight into it and it just hurts me all the way up so bad."  Pain/Comfort:  · Pain Rating 1: 5/10  · Location - Side 1: Left  · Location - Orientation 1: generalized  · Location 1: knee  · Pain Addressed 1: Reposition, Distraction, Cessation of Activity  · Pain Rating Post-Intervention 1: 10/10    Patients cultural, spiritual, Confucianist conflicts " given the current situation: none stated    Objective:     Patient found with: oxygen     General Precautions: Standard, fall   Orthopedic Precautions:N/A   Braces: N/A     Functional Mobility:  · Bed Mobility:     · Rolling Left:  minimum assistance  · Scooting: moderate assistance  · Supine to Sit: moderate assistance  · Sit to Supine: moderate assistance  · Transfers:     · Sit to Stand:  minimum assistance with rolling walker  · Gait: Pt unable to take steps this day.   · Balance: Pt sits EOB with SBA for safety. Pt stands with Min A, holds LLE in TTWB      AM-PAC 6 CLICK MOBILITY  Turning over in bed (including adjusting bedclothes, sheets and blankets)?: 3  Sitting down on and standing up from a chair with arms (e.g., wheelchair, bedside commode, etc.): 2  Moving from lying on back to sitting on the side of the bed?: 1  Moving to and from a bed to a chair (including a wheelchair)?: 1  Need to walk in hospital room?: 1  Climbing 3-5 steps with a railing?: 1  Basic Mobility Total Score: 9       Therapeutic Activities and Exercises:  Pt assisted with functional mobility as noted above.   Pt sits EOB ~14 minutes with SBA. While at EOB pt performs seated therex and attempts standing x 2 trials.   Pt performs seated BLE therex: AP,GS, LAQ, Seated marching, and Hip ABD/ADD x 20 reps.   Pt stands x 2 trials with Min A and RW, pt attempts LLE weight bearing but is unable to tolerate d/t pain and holds LLE in TTWB.   Pt educated on safety with all mobility and importance of increased time UIC throughout the day.     Patient left supine with all lines intact and call button in reach..    GOALS:   Multidisciplinary Problems     Physical Therapy Goals        Problem: Physical Therapy Goal    Goal Priority Disciplines Outcome Goal Variances Interventions   Physical Therapy Goal     PT, PT/OT Ongoing (interventions implemented as appropriate)     Description:  Goals to be met by: 8/30/18     Patient will increase functional  independence with mobility by performin. Supine to sit with MInimal Assistance  2. Sit to supine with MInimal Assistance  3. Sit to stand transfer with Maximum Assistance with LRD.   4. Bed to chair transfer with Maximum Assistance using Rolling Walker or LRD.  5. Gait  x 10 feet with Maximum Assistance using Rolling Walker or LRD.                       Time Tracking:     PT Received On: 18  PT Start Time: 1433     PT Stop Time: 1457  PT Total Time (min): 24 min     Billable Minutes: Therapeutic Activity 15 and Therapeutic Exercise 9    Treatment Type: Treatment  PT/PTA: PTA     PTA Visit Number: 1     Eriberto Brooks, PTA  2018

## 2018-08-22 NOTE — PLAN OF CARE
Problem: Diabetes, Type 2 (Adult)  Goal: Signs and Symptoms of Listed Potential Problems Will be Absent, Minimized or Managed (Diabetes, Type 2)  Signs and symptoms of listed potential problems will be absent, minimized or managed by discharge/transition of care (reference Diabetes, Type 2 (Adult) CPG).  Outcome: Ongoing (interventions implemented as appropriate)   08/22/18 0206   Diabetes, Type 2   Problems Assessed (Type 2 Diabetes) all   Problems Present (Type 2 Diabetes) hyperglycemia       Problem: Fall Risk (Adult)  Goal: Absence of Falls  Patient will demonstrate the desired outcomes by discharge/transition of care.  Outcome: Ongoing (interventions implemented as appropriate)   08/22/18 0206   Fall Risk (Adult)   Absence of Falls making progress toward outcome       Problem: Patient Care Overview  Goal: Plan of Care Review  Outcome: Ongoing (interventions implemented as appropriate)   08/22/18 0206   Coping/Psychosocial   Plan Of Care Reviewed With patient

## 2018-08-23 LAB
ANION GAP SERPL CALC-SCNC: 9 MMOL/L
BASOPHILS # BLD AUTO: 0.03 K/UL
BASOPHILS NFR BLD: 0.5 %
BUN SERPL-MCNC: 24 MG/DL
CALCIUM SERPL-MCNC: 9.1 MG/DL
CHLORIDE SERPL-SCNC: 104 MMOL/L
CO2 SERPL-SCNC: 24 MMOL/L
CREAT SERPL-MCNC: 1.3 MG/DL
DIFFERENTIAL METHOD: ABNORMAL
EOSINOPHIL # BLD AUTO: 0.3 K/UL
EOSINOPHIL NFR BLD: 4.3 %
ERYTHROCYTE [DISTWIDTH] IN BLOOD BY AUTOMATED COUNT: 15.4 %
EST. GFR  (AFRICAN AMERICAN): 43.8 ML/MIN/1.73 M^2
EST. GFR  (NON AFRICAN AMERICAN): 38 ML/MIN/1.73 M^2
GLUCOSE SERPL-MCNC: 188 MG/DL
HCT VFR BLD AUTO: 38.9 %
HGB BLD-MCNC: 12.2 G/DL
IMM GRANULOCYTES # BLD AUTO: 0.04 K/UL
IMM GRANULOCYTES NFR BLD AUTO: 0.6 %
INR PPP: 1.6
LYMPHOCYTES # BLD AUTO: 1.9 K/UL
LYMPHOCYTES NFR BLD: 28.2 %
MAGNESIUM SERPL-MCNC: 1.9 MG/DL
MCH RBC QN AUTO: 29.1 PG
MCHC RBC AUTO-ENTMCNC: 31.4 G/DL
MCV RBC AUTO: 93 FL
MONOCYTES # BLD AUTO: 0.9 K/UL
MONOCYTES NFR BLD: 13.1 %
NEUTROPHILS # BLD AUTO: 3.5 K/UL
NEUTROPHILS NFR BLD: 53.3 %
NRBC BLD-RTO: 0 /100 WBC
PHOSPHATE SERPL-MCNC: 3 MG/DL
PLATELET # BLD AUTO: 190 K/UL
PMV BLD AUTO: 11.3 FL
POCT GLUCOSE: 182 MG/DL (ref 70–110)
POCT GLUCOSE: 190 MG/DL (ref 70–110)
POCT GLUCOSE: 195 MG/DL (ref 70–110)
POCT GLUCOSE: 220 MG/DL (ref 70–110)
POTASSIUM SERPL-SCNC: 4.8 MMOL/L
PROTHROMBIN TIME: 16 SEC
RBC # BLD AUTO: 4.19 M/UL
SODIUM SERPL-SCNC: 137 MMOL/L
WBC # BLD AUTO: 6.56 K/UL

## 2018-08-23 PROCEDURE — 63600175 PHARM REV CODE 636 W HCPCS: Performed by: HOSPITALIST

## 2018-08-23 PROCEDURE — 94660 CPAP INITIATION&MGMT: CPT

## 2018-08-23 PROCEDURE — 85025 COMPLETE CBC W/AUTO DIFF WBC: CPT

## 2018-08-23 PROCEDURE — 94640 AIRWAY INHALATION TREATMENT: CPT

## 2018-08-23 PROCEDURE — 25000003 PHARM REV CODE 250: Performed by: STUDENT IN AN ORGANIZED HEALTH CARE EDUCATION/TRAINING PROGRAM

## 2018-08-23 PROCEDURE — 11000001 HC ACUTE MED/SURG PRIVATE ROOM

## 2018-08-23 PROCEDURE — 85610 PROTHROMBIN TIME: CPT

## 2018-08-23 PROCEDURE — 80048 BASIC METABOLIC PNL TOTAL CA: CPT

## 2018-08-23 PROCEDURE — 99900035 HC TECH TIME PER 15 MIN (STAT)

## 2018-08-23 PROCEDURE — 25000242 PHARM REV CODE 250 ALT 637 W/ HCPCS: Performed by: STUDENT IN AN ORGANIZED HEALTH CARE EDUCATION/TRAINING PROGRAM

## 2018-08-23 PROCEDURE — 99232 SBSQ HOSP IP/OBS MODERATE 35: CPT | Mod: GC,,, | Performed by: HOSPITALIST

## 2018-08-23 PROCEDURE — 97535 SELF CARE MNGMENT TRAINING: CPT

## 2018-08-23 PROCEDURE — 94761 N-INVAS EAR/PLS OXIMETRY MLT: CPT

## 2018-08-23 PROCEDURE — 83735 ASSAY OF MAGNESIUM: CPT

## 2018-08-23 PROCEDURE — 25000003 PHARM REV CODE 250: Performed by: HOSPITALIST

## 2018-08-23 PROCEDURE — 97530 THERAPEUTIC ACTIVITIES: CPT

## 2018-08-23 PROCEDURE — 27000221 HC OXYGEN, UP TO 24 HOURS

## 2018-08-23 PROCEDURE — 84100 ASSAY OF PHOSPHORUS: CPT

## 2018-08-23 PROCEDURE — 36415 COLL VENOUS BLD VENIPUNCTURE: CPT

## 2018-08-23 RX ORDER — ENOXAPARIN SODIUM 100 MG/ML
1 INJECTION SUBCUTANEOUS
Status: DISCONTINUED | OUTPATIENT
Start: 2018-08-23 | End: 2018-08-24 | Stop reason: HOSPADM

## 2018-08-23 RX ORDER — WARFARIN 2 MG/1
6 TABLET ORAL DAILY
Status: COMPLETED | OUTPATIENT
Start: 2018-08-23 | End: 2018-08-23

## 2018-08-23 RX ADMIN — IPRATROPIUM BROMIDE AND ALBUTEROL SULFATE 3 ML: .5; 3 SOLUTION RESPIRATORY (INHALATION) at 04:08

## 2018-08-23 RX ADMIN — ACETAMINOPHEN 650 MG: 325 TABLET, FILM COATED ORAL at 09:08

## 2018-08-23 RX ADMIN — ENOXAPARIN SODIUM 90 MG: 100 INJECTION SUBCUTANEOUS at 09:08

## 2018-08-23 RX ADMIN — FLUTICASONE FUROATE AND VILANTEROL TRIFENATATE 1 PUFF: 100; 25 POWDER RESPIRATORY (INHALATION) at 09:08

## 2018-08-23 RX ADMIN — WARFARIN SODIUM 6 MG: 2 TABLET ORAL at 05:08

## 2018-08-23 RX ADMIN — TRAMADOL HYDROCHLORIDE 50 MG: 50 TABLET, COATED ORAL at 12:08

## 2018-08-23 RX ADMIN — DIPHENHYDRAMINE HYDROCHLORIDE 25 MG: 25 CAPSULE ORAL at 12:08

## 2018-08-23 RX ADMIN — IPRATROPIUM BROMIDE AND ALBUTEROL SULFATE 3 ML: .5; 3 SOLUTION RESPIRATORY (INHALATION) at 08:08

## 2018-08-23 RX ADMIN — AMLODIPINE BESYLATE 5 MG: 5 TABLET ORAL at 09:08

## 2018-08-23 RX ADMIN — DIPHENHYDRAMINE HYDROCHLORIDE 25 MG: 25 CAPSULE ORAL at 06:08

## 2018-08-23 RX ADMIN — POLYETHYLENE GLYCOL 3350 17 G: 17 POWDER, FOR SOLUTION ORAL at 09:08

## 2018-08-23 RX ADMIN — TRAMADOL HYDROCHLORIDE 50 MG: 50 TABLET, COATED ORAL at 06:08

## 2018-08-23 RX ADMIN — LEVOTHYROXINE SODIUM 50 MCG: 50 TABLET ORAL at 05:08

## 2018-08-23 RX ADMIN — ATORVASTATIN CALCIUM 40 MG: 20 TABLET, FILM COATED ORAL at 09:08

## 2018-08-23 RX ADMIN — INSULIN ASPART 1 UNITS: 100 INJECTION, SOLUTION INTRAVENOUS; SUBCUTANEOUS at 09:08

## 2018-08-23 RX ADMIN — IPRATROPIUM BROMIDE AND ALBUTEROL SULFATE 3 ML: .5; 3 SOLUTION RESPIRATORY (INHALATION) at 12:08

## 2018-08-23 NOTE — PLAN OF CARE
Problem: Diabetes, Type 2 (Adult)  Goal: Signs and Symptoms of Listed Potential Problems Will be Absent, Minimized or Managed (Diabetes, Type 2)  Signs and symptoms of listed potential problems will be absent, minimized or managed by discharge/transition of care (reference Diabetes, Type 2 (Adult) CPG).  Outcome: Ongoing (interventions implemented as appropriate)   08/23/18 1701   Diabetes, Type 2   Problems Assessed (Type 2 Diabetes) all   Problems Present (Type 2 Diabetes) hyperglycemia       Problem: Fall Risk (Adult)  Goal: Identify Related Risk Factors and Signs and Symptoms  Related risk factors and signs and symptoms are identified upon initiation of Human Response Clinical Practice Guideline (CPG)  Outcome: Ongoing (interventions implemented as appropriate)   08/23/18 1701   Fall Risk   Related Risk Factors (Fall Risk) culprit medication(s);bladder function altered;history of falls;gait/mobility problems       Problem: Patient Care Overview  Goal: Plan of Care Review  Outcome: Ongoing (interventions implemented as appropriate)   08/23/18 1701   Coping/Psychosocial   Plan Of Care Reviewed With patient

## 2018-08-23 NOTE — PT/OT/SLP PROGRESS
Physical Therapy      Patient Name:  Clairbel Moran   MRN:  [id number]    Patient not seen today secondary to Unavailable (Comment)(Pt bathing with PCT upon PTA attempt. PTA unable to make follow-up attempt this day. ). Will follow-up per POC.    Eriberto Brooks, PTA

## 2018-08-23 NOTE — PLAN OF CARE
Problem: Occupational Therapy Goal  Goal: Occupational Therapy Goal  Goals to be met by: 9/10     Patient will increase functional independence with ADLs by performing:    UE Dressing with [place].  LE Dressing with Contact Guard Assistance.  Grooming while seated with [place].  Toileting from toilet with Contact Guard Assistance for hygiene and clothing management.      Outcome: Ongoing (interventions implemented as appropriate)  Goals addressed and unmet.  Cont with POC  Jade Salas OT  8/23/2018

## 2018-08-23 NOTE — PLAN OF CARE
Provided patient with SNF list. Will call or visit patient later to obtain choices from snf list       08/23/18 8361   Discharge Reassessment   Assessment Type Discharge Planning Reassessment   Provided patient/caregiver education on the expected discharge date and the discharge plan Yes   Do you have any problems affording any of your prescribed medications? No   Discharge Plan A Skilled Nursing Facility   Discharge Plan B Home with family;Home Health

## 2018-08-23 NOTE — PLAN OF CARE
Problem: Diabetes, Type 2 (Adult)  Goal: Signs and Symptoms of Listed Potential Problems Will be Absent, Minimized or Managed (Diabetes, Type 2)  Signs and symptoms of listed potential problems will be absent, minimized or managed by discharge/transition of care (reference Diabetes, Type 2 (Adult) CPG).  Outcome: Ongoing (interventions implemented as appropriate)   08/23/18 0227   Diabetes, Type 2   Problems Assessed (Type 2 Diabetes) all   Problems Present (Type 2 Diabetes) hyperglycemia       Problem: Fall Risk (Adult)  Goal: Absence of Falls  Patient will demonstrate the desired outcomes by discharge/transition of care.  Outcome: Ongoing (interventions implemented as appropriate)   08/23/18 0227   Fall Risk (Adult)   Absence of Falls making progress toward outcome       Problem: Patient Care Overview  Goal: Plan of Care Review  Outcome: Ongoing (interventions implemented as appropriate)   08/23/18 0227   Coping/Psychosocial   Plan Of Care Reviewed With patient

## 2018-08-23 NOTE — PT/OT/SLP PROGRESS
Occupational Therapy   Treatment    Name: Claribel Moran  MRN: [id number]  Admitting Diagnosis:  Effusion of knee       Recommendations:     Discharge Recommendations: nursing facility, skilled  Discharge Equipment Recommendations:  walker, rolling, wheelchair  Barriers to discharge:  None    Subjective     Communicated with: RN prior to session.  Pain/Comfort:  · Pain Rating 1: 3/10(at rest)  · Location - Side 1: Left  · Location - Orientation 1: generalized  · Location 1: knee  · Pain Addressed 1: Reposition, Distraction  · Pain Rating Post-Intervention 1: 10/10(with movement )    Patients cultural, spiritual, Gnosticist conflicts given the current situation: none stated     Objective:     Patient found with: oxygen(lines intact )    General Precautions: Standard, fall   Orthopedic Precautions:N/A   Braces: N/A     Occupational Performance:    Bed Mobility:    · Pt with pain in knee with bed mobility and unable to tolerate edge of bed    Activities of Daily Living:  · Feeding:  independence    · Grooming: stand by assistance set up    Patient left supine with all lines intact    AMPAC 6 Click:  AMPAC Total Score: 15    Treatment & Education:  Pt educated on safety, role of OT, importance of increased participation in self care for gains , expectations for participation, expectations for gains, POC, energy conservation, caregiver strain. White board updated.   - Upper extremity exercises provided for improved functional performance.  50 forward punches, 50 upward punches, 30 lateral extended arm circles x2, arm presses x15  - grooming with set up     Education:    Assessment:     Claribel Moran is a 83 y.o. female with a medical diagnosis of Effusion of knee.  She presents with pain in knee and decreasing functional mobility . Pt able to complete therex and instructed to perform 3x day Performance deficits affecting function are weakness, impaired endurance, impaired self care skills, impaired functional mobilty,  gait instability, impaired balance, decreased ROM, decreased lower extremity function, impaired joint extensibility.      Rehab Prognosis:  good; patient would benefit from acute skilled OT services to address these deficits and reach maximum level of function.       Plan:     Patient to be seen 3 x/week to address the above listed problems via self-care/home management, therapeutic activities, therapeutic exercises  · Plan of Care Expires: 09/19/18  · Plan of Care Reviewed with: patient    This Plan of care has been discussed with the patient who was involved in its development and understands and is in agreement with the identified goals and treatment plan    GOALS:   Multidisciplinary Problems     Occupational Therapy Goals        Problem: Occupational Therapy Goal    Goal Priority Disciplines Outcome Interventions   Occupational Therapy Goal     OT, PT/OT Ongoing (interventions implemented as appropriate)    Description:  Goals to be met by: 9/10     Patient will increase functional independence with ADLs by performing:    UE Dressing with Chambers.  LE Dressing with Contact Guard Assistance.  Grooming while seated with [place].  Toileting from toilet with Contact Guard Assistance for hygiene and clothing management.                       Time Tracking:     OT Date of Treatment: 08/23/18  OT Start Time: 1345  OT Stop Time: 1415  OT Total Time (min): 30 min    Billable Minutes:Self Care/Home Management 15  Therapeutic Exercise 15    Jade Salas, OT  8/23/2018

## 2018-08-24 ENCOUNTER — HOSPITAL ENCOUNTER (INPATIENT)
Facility: HOSPITAL | Age: 83
LOS: 14 days | Discharge: HOME | DRG: 948 | End: 2018-09-07
Attending: HOSPITALIST | Admitting: HOSPITALIST
Payer: MEDICARE

## 2018-08-24 VITALS
DIASTOLIC BLOOD PRESSURE: 87 MMHG | TEMPERATURE: 98 F | BODY MASS INDEX: 41.22 KG/M2 | HEIGHT: 61 IN | WEIGHT: 218.31 LBS | RESPIRATION RATE: 18 BRPM | SYSTOLIC BLOOD PRESSURE: 132 MMHG | OXYGEN SATURATION: 92 % | HEART RATE: 84 BPM

## 2018-08-24 DIAGNOSIS — I10 ESSENTIAL HYPERTENSION: ICD-10-CM

## 2018-08-24 DIAGNOSIS — Z79.01 ANTICOAGULATED ON COUMADIN: Chronic | ICD-10-CM

## 2018-08-24 DIAGNOSIS — J45.20 MILD INTERMITTENT REACTIVE AIRWAY DISEASE WITH WHEEZING WITHOUT COMPLICATION: ICD-10-CM

## 2018-08-24 DIAGNOSIS — E11.9 DIABETES MELLITUS TYPE II, NON INSULIN DEPENDENT: Chronic | ICD-10-CM

## 2018-08-24 DIAGNOSIS — M25.462 EFFUSION OF LEFT KNEE: ICD-10-CM

## 2018-08-24 DIAGNOSIS — E78.5 HYPERLIPIDEMIA, UNSPECIFIED HYPERLIPIDEMIA TYPE: ICD-10-CM

## 2018-08-24 DIAGNOSIS — N18.30 CKD (CHRONIC KIDNEY DISEASE) STAGE 3, GFR 30-59 ML/MIN: Chronic | ICD-10-CM

## 2018-08-24 DIAGNOSIS — R53.81 DEBILITY: Primary | ICD-10-CM

## 2018-08-24 DIAGNOSIS — I48.20 CHRONIC ATRIAL FIBRILLATION: Chronic | ICD-10-CM

## 2018-08-24 DIAGNOSIS — E03.9 ACQUIRED HYPOTHYROIDISM: ICD-10-CM

## 2018-08-24 DIAGNOSIS — M25.469 EFFUSION OF KNEE, UNSPECIFIED LATERALITY: ICD-10-CM

## 2018-08-24 DIAGNOSIS — G47.33 OBSTRUCTIVE SLEEP APNEA SYNDROME: ICD-10-CM

## 2018-08-24 LAB
ANION GAP SERPL CALC-SCNC: 10 MMOL/L
BASOPHILS # BLD AUTO: 0.04 K/UL
BASOPHILS NFR BLD: 0.5 %
BUN SERPL-MCNC: 22 MG/DL
CALCIUM SERPL-MCNC: 9.7 MG/DL
CHLORIDE SERPL-SCNC: 101 MMOL/L
CO2 SERPL-SCNC: 27 MMOL/L
CREAT SERPL-MCNC: 1.2 MG/DL
DIFFERENTIAL METHOD: ABNORMAL
EOSINOPHIL # BLD AUTO: 0.3 K/UL
EOSINOPHIL NFR BLD: 3.5 %
ERYTHROCYTE [DISTWIDTH] IN BLOOD BY AUTOMATED COUNT: 15.5 %
EST. GFR  (AFRICAN AMERICAN): 48.3 ML/MIN/1.73 M^2
EST. GFR  (NON AFRICAN AMERICAN): 41.9 ML/MIN/1.73 M^2
GLUCOSE SERPL-MCNC: 187 MG/DL
HCT VFR BLD AUTO: 41.3 %
HGB BLD-MCNC: 13 G/DL
IMM GRANULOCYTES # BLD AUTO: 0.06 K/UL
IMM GRANULOCYTES NFR BLD AUTO: 0.8 %
INR PPP: 1.6
LYMPHOCYTES # BLD AUTO: 2 K/UL
LYMPHOCYTES NFR BLD: 24.9 %
MAGNESIUM SERPL-MCNC: 1.9 MG/DL
MCH RBC QN AUTO: 29 PG
MCHC RBC AUTO-ENTMCNC: 31.5 G/DL
MCV RBC AUTO: 92 FL
MONOCYTES # BLD AUTO: 0.7 K/UL
MONOCYTES NFR BLD: 8.9 %
NEUTROPHILS # BLD AUTO: 4.9 K/UL
NEUTROPHILS NFR BLD: 61.4 %
NRBC BLD-RTO: 0 /100 WBC
PHOSPHATE SERPL-MCNC: 3.2 MG/DL
PLATELET # BLD AUTO: 212 K/UL
PMV BLD AUTO: 11.4 FL
POCT GLUCOSE: 287 MG/DL (ref 70–110)
POCT GLUCOSE: 292 MG/DL (ref 70–110)
POCT GLUCOSE: 354 MG/DL (ref 70–110)
POTASSIUM SERPL-SCNC: 5 MMOL/L
PROTHROMBIN TIME: 16.2 SEC
RBC # BLD AUTO: 4.48 M/UL
SODIUM SERPL-SCNC: 138 MMOL/L
WBC # BLD AUTO: 7.96 K/UL

## 2018-08-24 PROCEDURE — 85025 COMPLETE CBC W/AUTO DIFF WBC: CPT

## 2018-08-24 PROCEDURE — 25000242 PHARM REV CODE 250 ALT 637 W/ HCPCS: Performed by: STUDENT IN AN ORGANIZED HEALTH CARE EDUCATION/TRAINING PROGRAM

## 2018-08-24 PROCEDURE — 84100 ASSAY OF PHOSPHORUS: CPT

## 2018-08-24 PROCEDURE — 25000003 PHARM REV CODE 250: Performed by: HOSPITALIST

## 2018-08-24 PROCEDURE — 25000003 PHARM REV CODE 250: Performed by: STUDENT IN AN ORGANIZED HEALTH CARE EDUCATION/TRAINING PROGRAM

## 2018-08-24 PROCEDURE — 94660 CPAP INITIATION&MGMT: CPT

## 2018-08-24 PROCEDURE — 99232 SBSQ HOSP IP/OBS MODERATE 35: CPT | Mod: GC,,, | Performed by: HOSPITALIST

## 2018-08-24 PROCEDURE — 27000190 HC CPAP FULL FACE MASK W/VALVE

## 2018-08-24 PROCEDURE — 63600175 PHARM REV CODE 636 W HCPCS: Performed by: STUDENT IN AN ORGANIZED HEALTH CARE EDUCATION/TRAINING PROGRAM

## 2018-08-24 PROCEDURE — 85610 PROTHROMBIN TIME: CPT

## 2018-08-24 PROCEDURE — 99900035 HC TECH TIME PER 15 MIN (STAT)

## 2018-08-24 PROCEDURE — 94761 N-INVAS EAR/PLS OXIMETRY MLT: CPT

## 2018-08-24 PROCEDURE — 63600175 PHARM REV CODE 636 W HCPCS: Performed by: HOSPITALIST

## 2018-08-24 PROCEDURE — 27000221 HC OXYGEN, UP TO 24 HOURS

## 2018-08-24 PROCEDURE — 11000004 HC SNF PRIVATE

## 2018-08-24 PROCEDURE — 83735 ASSAY OF MAGNESIUM: CPT

## 2018-08-24 PROCEDURE — 97530 THERAPEUTIC ACTIVITIES: CPT

## 2018-08-24 PROCEDURE — 97110 THERAPEUTIC EXERCISES: CPT

## 2018-08-24 PROCEDURE — 36415 COLL VENOUS BLD VENIPUNCTURE: CPT

## 2018-08-24 PROCEDURE — 94640 AIRWAY INHALATION TREATMENT: CPT

## 2018-08-24 PROCEDURE — 80048 BASIC METABOLIC PNL TOTAL CA: CPT

## 2018-08-24 RX ORDER — ATORVASTATIN CALCIUM 20 MG/1
40 TABLET, FILM COATED ORAL DAILY
Status: CANCELLED | OUTPATIENT
Start: 2018-08-25

## 2018-08-24 RX ORDER — TRAMADOL HYDROCHLORIDE 50 MG/1
50 TABLET ORAL EVERY 6 HOURS PRN
Status: DISCONTINUED | OUTPATIENT
Start: 2018-08-24 | End: 2018-09-07 | Stop reason: HOSPADM

## 2018-08-24 RX ORDER — POLYETHYLENE GLYCOL 3350 17 G/17G
17 POWDER, FOR SOLUTION ORAL 2 TIMES DAILY
Status: DISCONTINUED | OUTPATIENT
Start: 2018-08-24 | End: 2018-09-07 | Stop reason: HOSPADM

## 2018-08-24 RX ORDER — POLYETHYLENE GLYCOL 3350 17 G/17G
17 POWDER, FOR SOLUTION ORAL 2 TIMES DAILY
Status: CANCELLED | OUTPATIENT
Start: 2018-08-24

## 2018-08-24 RX ORDER — DIPHENHYDRAMINE HCL 25 MG
25 CAPSULE ORAL EVERY 6 HOURS PRN
Status: CANCELLED | OUTPATIENT
Start: 2018-08-24

## 2018-08-24 RX ORDER — ATORVASTATIN CALCIUM 20 MG/1
40 TABLET, FILM COATED ORAL DAILY
Status: DISCONTINUED | OUTPATIENT
Start: 2018-08-25 | End: 2018-09-07 | Stop reason: HOSPADM

## 2018-08-24 RX ORDER — SODIUM CHLORIDE 0.9 % (FLUSH) 0.9 %
5 SYRINGE (ML) INJECTION
Status: CANCELLED | OUTPATIENT
Start: 2018-08-24

## 2018-08-24 RX ORDER — ACETAMINOPHEN 325 MG/1
650 TABLET ORAL EVERY 6 HOURS PRN
Status: DISCONTINUED | OUTPATIENT
Start: 2018-08-24 | End: 2018-09-07 | Stop reason: HOSPADM

## 2018-08-24 RX ORDER — INSULIN ASPART 100 [IU]/ML
0-5 INJECTION, SOLUTION INTRAVENOUS; SUBCUTANEOUS
Status: CANCELLED | OUTPATIENT
Start: 2018-08-24

## 2018-08-24 RX ORDER — IBUPROFEN 200 MG
24 TABLET ORAL
Status: CANCELLED | OUTPATIENT
Start: 2018-08-24

## 2018-08-24 RX ORDER — IBUPROFEN 200 MG
16 TABLET ORAL
Status: CANCELLED | OUTPATIENT
Start: 2018-08-24

## 2018-08-24 RX ORDER — ENOXAPARIN SODIUM 100 MG/ML
1 INJECTION SUBCUTANEOUS
Status: DISCONTINUED | OUTPATIENT
Start: 2018-08-24 | End: 2018-08-25

## 2018-08-24 RX ORDER — DIPHENHYDRAMINE HCL 25 MG
25 CAPSULE ORAL EVERY 6 HOURS PRN
Status: DISCONTINUED | OUTPATIENT
Start: 2018-08-24 | End: 2018-09-07 | Stop reason: HOSPADM

## 2018-08-24 RX ORDER — GUAIFENESIN 600 MG/1
600 TABLET, EXTENDED RELEASE ORAL 2 TIMES DAILY PRN
Status: DISCONTINUED | OUTPATIENT
Start: 2018-08-24 | End: 2018-09-07 | Stop reason: HOSPADM

## 2018-08-24 RX ORDER — FLUTICASONE FUROATE AND VILANTEROL 100; 25 UG/1; UG/1
1 POWDER RESPIRATORY (INHALATION) DAILY
Status: DISCONTINUED | OUTPATIENT
Start: 2018-08-25 | End: 2018-09-07 | Stop reason: HOSPADM

## 2018-08-24 RX ORDER — INSULIN ASPART 100 [IU]/ML
0-5 INJECTION, SOLUTION INTRAVENOUS; SUBCUTANEOUS
Status: DISCONTINUED | OUTPATIENT
Start: 2018-08-24 | End: 2018-09-07 | Stop reason: HOSPADM

## 2018-08-24 RX ORDER — GUAIFENESIN 600 MG/1
600 TABLET, EXTENDED RELEASE ORAL 2 TIMES DAILY PRN
Status: CANCELLED | OUTPATIENT
Start: 2018-08-24

## 2018-08-24 RX ORDER — LEVOTHYROXINE SODIUM 50 UG/1
50 TABLET ORAL
Status: DISCONTINUED | OUTPATIENT
Start: 2018-08-25 | End: 2018-09-07 | Stop reason: HOSPADM

## 2018-08-24 RX ORDER — GLUCAGON 1 MG
1 KIT INJECTION
Status: CANCELLED | OUTPATIENT
Start: 2018-08-24

## 2018-08-24 RX ORDER — ACETAMINOPHEN 325 MG/1
650 TABLET ORAL EVERY 6 HOURS PRN
Status: CANCELLED | OUTPATIENT
Start: 2018-08-24

## 2018-08-24 RX ORDER — TRAMADOL HYDROCHLORIDE 50 MG/1
50 TABLET ORAL EVERY 6 HOURS PRN
Status: CANCELLED | OUTPATIENT
Start: 2018-08-24

## 2018-08-24 RX ORDER — SODIUM CHLORIDE 0.9 % (FLUSH) 0.9 %
5 SYRINGE (ML) INJECTION
Status: DISCONTINUED | OUTPATIENT
Start: 2018-08-24 | End: 2018-08-25

## 2018-08-24 RX ORDER — GLUCAGON 1 MG
1 KIT INJECTION
Status: DISCONTINUED | OUTPATIENT
Start: 2018-08-24 | End: 2018-09-07 | Stop reason: HOSPADM

## 2018-08-24 RX ORDER — IPRATROPIUM BROMIDE AND ALBUTEROL SULFATE 2.5; .5 MG/3ML; MG/3ML
3 SOLUTION RESPIRATORY (INHALATION)
Status: CANCELLED | OUTPATIENT
Start: 2018-08-24

## 2018-08-24 RX ORDER — LEVOTHYROXINE SODIUM 50 UG/1
50 TABLET ORAL
Status: CANCELLED | OUTPATIENT
Start: 2018-08-25

## 2018-08-24 RX ORDER — WARFARIN 2 MG/1
6 TABLET ORAL DAILY
Status: DISCONTINUED | OUTPATIENT
Start: 2018-08-24 | End: 2018-08-24 | Stop reason: HOSPADM

## 2018-08-24 RX ORDER — GUAIFENESIN 600 MG/1
600 TABLET, EXTENDED RELEASE ORAL 2 TIMES DAILY PRN
Status: DISCONTINUED | OUTPATIENT
Start: 2018-08-24 | End: 2018-08-24 | Stop reason: HOSPADM

## 2018-08-24 RX ORDER — AMLODIPINE BESYLATE 5 MG/1
5 TABLET ORAL DAILY
Status: CANCELLED | OUTPATIENT
Start: 2018-08-25

## 2018-08-24 RX ORDER — IBUPROFEN 200 MG
16 TABLET ORAL
Status: DISCONTINUED | OUTPATIENT
Start: 2018-08-24 | End: 2018-09-07 | Stop reason: HOSPADM

## 2018-08-24 RX ORDER — IPRATROPIUM BROMIDE AND ALBUTEROL SULFATE 2.5; .5 MG/3ML; MG/3ML
3 SOLUTION RESPIRATORY (INHALATION)
Status: DISCONTINUED | OUTPATIENT
Start: 2018-08-24 | End: 2018-08-30

## 2018-08-24 RX ORDER — IBUPROFEN 200 MG
24 TABLET ORAL
Status: DISCONTINUED | OUTPATIENT
Start: 2018-08-24 | End: 2018-09-07 | Stop reason: HOSPADM

## 2018-08-24 RX ORDER — FLUTICASONE FUROATE AND VILANTEROL 100; 25 UG/1; UG/1
1 POWDER RESPIRATORY (INHALATION) DAILY
Status: CANCELLED | OUTPATIENT
Start: 2018-08-25

## 2018-08-24 RX ORDER — AMLODIPINE BESYLATE 5 MG/1
5 TABLET ORAL DAILY
Status: DISCONTINUED | OUTPATIENT
Start: 2018-08-25 | End: 2018-09-07 | Stop reason: HOSPADM

## 2018-08-24 RX ORDER — ENOXAPARIN SODIUM 100 MG/ML
1 INJECTION SUBCUTANEOUS
Status: CANCELLED | OUTPATIENT
Start: 2018-08-24

## 2018-08-24 RX ADMIN — IPRATROPIUM BROMIDE AND ALBUTEROL SULFATE 3 ML: .5; 3 SOLUTION RESPIRATORY (INHALATION) at 03:08

## 2018-08-24 RX ADMIN — AMLODIPINE BESYLATE 5 MG: 5 TABLET ORAL at 10:08

## 2018-08-24 RX ADMIN — INSULIN ASPART 3 UNITS: 100 INJECTION, SOLUTION INTRAVENOUS; SUBCUTANEOUS at 09:08

## 2018-08-24 RX ADMIN — IPRATROPIUM BROMIDE AND ALBUTEROL SULFATE 3 ML: .5; 3 SOLUTION RESPIRATORY (INHALATION) at 12:08

## 2018-08-24 RX ADMIN — LEVOTHYROXINE SODIUM 50 MCG: 50 TABLET ORAL at 06:08

## 2018-08-24 RX ADMIN — WARFARIN SODIUM 6 MG: 2 TABLET ORAL at 07:08

## 2018-08-24 RX ADMIN — IPRATROPIUM BROMIDE AND ALBUTEROL SULFATE 3 ML: .5; 3 SOLUTION RESPIRATORY (INHALATION) at 10:08

## 2018-08-24 RX ADMIN — ATORVASTATIN CALCIUM 40 MG: 20 TABLET, FILM COATED ORAL at 10:08

## 2018-08-24 RX ADMIN — POLYETHYLENE GLYCOL 3350 17 G: 17 POWDER, FOR SOLUTION ORAL at 09:08

## 2018-08-24 RX ADMIN — FLUTICASONE FUROATE AND VILANTEROL TRIFENATATE 1 PUFF: 100; 25 POWDER RESPIRATORY (INHALATION) at 10:08

## 2018-08-24 RX ADMIN — TRAMADOL HYDROCHLORIDE 50 MG: 50 TABLET, COATED ORAL at 09:08

## 2018-08-24 RX ADMIN — ENOXAPARIN SODIUM 90 MG: 100 INJECTION SUBCUTANEOUS at 10:08

## 2018-08-24 RX ADMIN — DIPHENHYDRAMINE HYDROCHLORIDE 25 MG: 25 CAPSULE ORAL at 04:08

## 2018-08-24 RX ADMIN — GUAIFENESIN 600 MG: 600 TABLET, EXTENDED RELEASE ORAL at 02:08

## 2018-08-24 RX ADMIN — IPRATROPIUM BROMIDE AND ALBUTEROL SULFATE 3 ML: .5; 3 SOLUTION RESPIRATORY (INHALATION) at 09:08

## 2018-08-24 RX ADMIN — TRAMADOL HYDROCHLORIDE 50 MG: 50 TABLET, COATED ORAL at 04:08

## 2018-08-24 RX ADMIN — ENOXAPARIN SODIUM 90 MG: 100 INJECTION SUBCUTANEOUS at 09:08

## 2018-08-24 RX ADMIN — INSULIN ASPART 3 UNITS: 100 INJECTION, SOLUTION INTRAVENOUS; SUBCUTANEOUS at 11:08

## 2018-08-24 NOTE — SUBJECTIVE & OBJECTIVE
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      Current Outpatient Medications on File Prior to Encounter   Medication Sig    acetaminophen (TYLENOL) 500 MG tablet Take 500 mg by mouth daily as needed.     albuterol (ACCUNEB) 1.25 mg/3 mL Nebu Take 1.25 mg by nebulization every 6 (six) hours as needed. Rescue    ALPRAZolam (XANAX) 0.25 MG tablet Take 0.25 mg by mouth daily as needed for Anxiety.    amlodipine (NORVASC) 5 MG tablet Take 5 mg by mouth once daily.    atorvastatin (LIPITOR) 40 MG tablet Take 40 mg by mouth once daily.    carvedilol (COREG) 12.5 MG tablet Take 6.25 mg by mouth 2 (two) times daily.    citalopram (CELEXA) 40 MG tablet Take 40 mg by mouth once daily.      ergocalciferol (ERGOCALCIFEROL) 50,000 unit Cap Take 50,000 Units by mouth every 30 days.     escitalopram oxalate (LEXAPRO) 10 MG tablet Take 10 mg by mouth once daily.     glipiZIDE (GLUCOTROL) 2.5 MG TR24 Take 5 mg by mouth daily with breakfast.      guaifenesin-codeine 100-10 mg/5 ml (TUSSI-ORGANIDIN NR)  mg/5 mL syrup Take 5 mLs by mouth 3 (three) times daily as needed for Cough.    levothyroxine (SYNTHROID) 50 MCG tablet Take 50 mcg by mouth once daily.    losartan (COZAAR) 100 MG tablet Take 100 mg by mouth once daily.    ranitidine (ZANTAC) 75 MG tablet Take 75 mg by mouth daily as needed for Heartburn.    senna-docusate 8.6-50 mg (PERICOLACE) 8.6-50 mg per tablet Take 2 tablets by mouth every evening.    traMADol (ULTRAM) 50 mg tablet Take 50 mg by mouth every 6 (six) hours as needed for Pain.    VENTOLIN HFA 90 mcg/actuation inhaler Inhale 2 puffs into the lungs every 4 to 6 hours as needed for Shortness of Breath.     warfarin (COUMADIN) 4 MG tablet Takes 1 tablet by mouth daily except 1/2 tablet on Tuesday and Thursday     Family History     Problem Relation (Age of Onset)    Arthritis Mother    Cancer Mother    Depression Son    Diabetes Son    Drug abuse Daughter    Heart disease Mother, Father    Hypertension Mother, Brother, Daughter, Son    Stroke Mother        Tobacco Use    Smoking status: Former Smoker     Types: Cigarettes     Last attempt to quit: 1954     Years since quittin.6    Smokeless tobacco: Never Used    Tobacco comment: 1 pack for month only for a few years, but second hand smoke from  for decades.    Substance and Sexual Activity    Alcohol use: No    Drug use: No    Sexual activity: Not Currently     Review of Systems   Constitutional: Negative for appetite change, chills, fatigue and fever.   HENT: Negative for ear pain and sinus pain.    Eyes: Negative for photophobia and pain.   Respiratory: Negative for cough and shortness of breath.    Cardiovascular: Negative for chest pain and leg swelling.   Gastrointestinal: Negative for abdominal pain, blood in stool, nausea and vomiting.   Endocrine: Negative for  polydipsia and polyphagia.   Genitourinary: Negative for dysuria and hematuria.   Musculoskeletal: Positive for arthralgias and joint swelling. Negative for gait problem, neck pain and neck stiffness.        Bilateral knee pain L>R 2/2 fall, pain improving, reports some stiffness   Skin: Negative for color change, rash and wound.   Allergic/Immunologic: Negative for immunocompromised state.   Neurological: Negative for dizziness, facial asymmetry, numbness and headaches.   Psychiatric/Behavioral: Negative for confusion.     Objective:     Vital Signs (Most Recent):  Temp: 98 °F (36.7 °C) (08/23/18 1622)  Pulse: 100 (08/23/18 1801)  Resp: 16 (08/23/18 1637)  BP: 125/67 (08/23/18 1622)  SpO2: 97 % (08/23/18 1637) Vital Signs (24h Range):  Temp:  [96.5 °F (35.8 °C)-98.2 °F (36.8 °C)] 98 °F (36.7 °C)  Pulse:  [] 100  Resp:  [15-20] 16  SpO2:  [91 %-98 %] 97 %  BP: (110-143)/(60-78) 125/67     Weight: 91.6 kg (202 lb 0 oz)  Body mass index is 38.17 kg/m².    Physical Exam   Constitutional: She is oriented to person, place, and time. She appears well-developed and well-nourished. No distress.   HENT:   Head: Normocephalic and atraumatic.   Eyes: EOM are normal. Pupils are equal, round, and reactive to light. Right eye exhibits no discharge. Left eye exhibits no discharge.   Neck: Normal range of motion. Neck supple.   Cardiovascular: Normal rate and normal heart sounds. Exam reveals no gallop.   Irregular pulse was noted.   Pulmonary/Chest: Effort normal and breath sounds normal. No respiratory distress.   Abdominal: Soft. Bowel sounds are normal. She exhibits no mass. There is no tenderness.   Musculoskeletal: Normal range of motion. She exhibits edema and tenderness. She exhibits no deformity.   Tender L knee pain. Mild L knee swelling compared to the R knee.   Neurological: She is alert and oriented to person, place, and time. No cranial nerve deficit or sensory deficit.   Skin: Skin is warm and dry. Capillary  refill takes less than 2 seconds. She is not diaphoretic. No erythema.   Psychiatric: She has a normal mood and affect.         CRANIAL NERVES     CN III, IV, VI   Pupils are equal, round, and reactive to light.  Extraocular motions are normal.     Interval History: CPAP was ordered at bedtime. Started on enoxaparin to meet goal of INR > 2.0. She reports L knee pain has improved.    Significant Labs: All pertinent labs within the past 24 hours have been reviewed.    Significant Imaging: I have reviewed all pertinent imaging results/findings within the past 24 hours.

## 2018-08-24 NOTE — PLAN OF CARE
CM explained snf list. Pt requested [facility]. Referral sent to [facility]. Awaiting acceptance. Advised pt to choose two more snf facilities off list.

## 2018-08-24 NOTE — CONSULTS
OSNF consult approved for admit to SNF today will need discharge re-admit Gen SNF orders entered into Epic prior to transfer.  Call nurse report to [phone] call Physician report to .

## 2018-08-24 NOTE — ASSESSMENT & PLAN NOTE
- she is on Amlodipine.  - on 8/19 her BP was 98/62, 500cc boluses was given twice.  - 8/22 held losartan given bump in Cr, monitor  - Cr level improved from 1.5>1.3>1.2   VACCINE ADMINISTRATION RECORD  PARENT / GUARDIAN APPROVAL  Date: 2024  Vaccine administered to: Katrina Gardner     : 2024    MRN: [id number]    A copy of the appropriate Centers for Disease Control and Prevention Vaccine Information statement has been provided. I have read or have had explained the information about the diseases and the vaccines listed below. There was an opportunity to ask questions and any questions were answered satisfactorily. I believe that I understand the benefits and risks of the vaccine cited and ask that the vaccine(s) listed below be given to me or to the person named above (for whom I am authorized to make this request).    VACCINES ADMINISTERED:  Pediarix   and Prevnar      I have read and hereby agree to be bound by the terms of this agreement as stated above. My signature is valid until revoked by me in writing.  This document is signed by parent, relationship: parent on 2024.:                                                                                                                                         Parent / Guardian Signature                                                Date    Mariaa SHULTZ MA served as a witness to authentication that the identity of the person signing electronically is in fact the person represented as signing.    This document was generated by Mariaa SHULTZ MA on 2024.

## 2018-08-24 NOTE — PROGRESS NOTES
[facility] Hospital Medicine  Progress Note    Patient Name: Claribel Moran  MRN: [id number]  Patient Class: IP- Inpatient   Admission Date: 8/18/2018  Length of Stay: 5 days  Attending Physician: Vicky Arenas MD  Primary Care Provider: Moises Eduardo MD    [facility] Medicine Team: [facility] 5 Lisbeth Dalal MD    Subjective:     Principal Problem:Effusion of knee    HPI:  83 y.o. female with co-morbidities including: A-Fib, right total knee replacement (2008), left total knee replacement (2011), type 2 DM, HTN, CKD, CAD, and CHF who presents to the ED with a complaint of worsening severe left thigh and knee pain of initial onset 2 days ago. Pt states she fell out of her bed and struck her left knee/hip approximately 2 days ago. She shortly reported to [facility] and received treatment, then was discharged later that day. Pain is 10/10 and begun radiating up and down her leg towards her groin. There are no associated symptoms at this time and no pain at the level below the knee. She is on severe pain at the knee level and above which is very sensitive to touch on examination not relived by 1g PO Tylenol. Pt states she has been unable to stand/ambulate/move comfortably since the injury and has significantly worsened over the 24 hours. She has not taken her coumadin this morning and reports no recent missed medications concerning her coumadin. Denies fever, chills, head trauma, and any LOC. No hx of numbness or tingling sensation. She is on Glipizide but did not took it last month because of insurance issues. Other systemic review was unremarkable.        Hospital Course:  8/18- VSS, patient admitted to the 9th floor and started on home anti HTN medications. Tramadol was ordered PRN for severe L knee pain.  8/19- NAEON, patient seen today and she is vitally stable, BP 98/62. Carvedilol was dc'ed, continue on Amlodipine and Losartan. Ice applied on L knee to reduce pain. F/u  with CR level as it was increased from 1.0 to 1.3. PT tomorrow.  - NAEON except of one episode of desating at 88%. Currently she is stable on room air. Patient seen today and she is vitally stable, /62. Cr level is 1.2. She reports that L knee pain has improved. CPAP was ordered at bedtime. enoxaparin 90 mg Q12h was started. F/u INR.  - NA, patient seen today and vitally stable. On NC 1L sating at 99%. L knee pain has improved. INR today is 1.6. Plan to transfer to SNF at time of discharge.  - 500 cc bolus given for bump in Cr. Held losartan, will monitor Cr and BP. Will discuss with pt/PT/OT as pt may need SNF.  - NA, patient seen today and she is vitally table, /78. Cr level improved 1.5>1.3. Waiting for SNF placement.    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      Current Outpatient Medications on File Prior to  Encounter   Medication Sig    acetaminophen (TYLENOL) 500 MG tablet Take 500 mg by mouth daily as needed.     albuterol (ACCUNEB) 1.25 mg/3 mL Nebu Take 1.25 mg by nebulization every 6 (six) hours as needed. Rescue    ALPRAZolam (XANAX) 0.25 MG tablet Take 0.25 mg by mouth daily as needed for Anxiety.    amlodipine (NORVASC) 5 MG tablet Take 5 mg by mouth once daily.    atorvastatin (LIPITOR) 40 MG tablet Take 40 mg by mouth once daily.    carvedilol (COREG) 12.5 MG tablet Take 6.25 mg by mouth 2 (two) times daily.    citalopram (CELEXA) 40 MG tablet Take 40 mg by mouth once daily.      ergocalciferol (ERGOCALCIFEROL) 50,000 unit Cap Take 50,000 Units by mouth every 30 days.     escitalopram oxalate (LEXAPRO) 10 MG tablet Take 10 mg by mouth once daily.    glipiZIDE (GLUCOTROL) 2.5 MG TR24 Take 5 mg by mouth daily with breakfast.      guaifenesin-codeine 100-10 mg/5 ml (TUSSI-ORGANIDIN NR)  mg/5 mL syrup Take 5 mLs by mouth 3 (three) times daily as needed for Cough.    levothyroxine (SYNTHROID) 50 MCG tablet Take 50 mcg by mouth once daily.    losartan (COZAAR) 100 MG tablet Take 100 mg by mouth once daily.    ranitidine (ZANTAC) 75 MG tablet Take 75 mg by mouth daily as needed for Heartburn.    senna-docusate 8.6-50 mg (PERICOLACE) 8.6-50 mg per tablet Take 2 tablets by mouth every evening.    traMADol (ULTRAM) 50 mg tablet Take 50 mg by mouth every 6 (six) hours as needed for Pain.    VENTOLIN HFA 90 mcg/actuation inhaler Inhale 2 puffs into the lungs every 4 to 6 hours as needed for Shortness of Breath.     warfarin (COUMADIN) 4 MG tablet Takes 1 tablet by mouth daily except 1/2 tablet on Tuesday and Thursday     Family History     Problem Relation (Age of Onset)    Arthritis Mother    Cancer Mother    Depression Son    Diabetes Son    Drug abuse Daughter    Heart disease Mother, Father    Hypertension Mother, Brother, Daughter, Son    Stroke Mother        Tobacco Use    Smoking status:  Former Smoker     Types: Cigarettes     Last attempt to quit: 1954     Years since quittin.6    Smokeless tobacco: Never Used    Tobacco comment: 1 pack for month only for a few years, but second hand smoke from  for decades.    Substance and Sexual Activity    Alcohol use: No    Drug use: No    Sexual activity: Not Currently     Review of Systems   Constitutional: Negative for appetite change, chills, fatigue and fever.   HENT: Negative for ear pain and sinus pain.    Eyes: Negative for photophobia and pain.   Respiratory: Negative for cough and shortness of breath.    Cardiovascular: Negative for chest pain and leg swelling.   Gastrointestinal: Negative for abdominal pain, blood in stool, nausea and vomiting.   Endocrine: Negative for polydipsia and polyphagia.   Genitourinary: Negative for dysuria and hematuria.   Musculoskeletal: Positive for arthralgias and joint swelling. Negative for gait problem, neck pain and neck stiffness.        Bilateral knee pain L>R 2/2 fall, pain improving, reports some stiffness   Skin: Negative for color change, rash and wound.   Allergic/Immunologic: Negative for immunocompromised state.   Neurological: Negative for dizziness, facial asymmetry, numbness and headaches.   Psychiatric/Behavioral: Negative for confusion.     Objective:     Vital Signs (Most Recent):  Temp: 98 °F (36.7 °C) (18 1622)  Pulse: 100 (18 1801)  Resp: 16 (18 1637)  BP: 125/67 (18 1622)  SpO2: 97 % (18 1637) Vital Signs (24h Range):  Temp:  [96.5 °F (35.8 °C)-98.2 °F (36.8 °C)] 98 °F (36.7 °C)  Pulse:  [] 100  Resp:  [15-20] 16  SpO2:  [91 %-98 %] 97 %  BP: (110-143)/(60-78) 125/67     Weight: 91.6 kg (202 lb 0 oz)  Body mass index is 38.17 kg/m².    Physical Exam   Constitutional: She is oriented to person, place, and time. She appears well-developed and well-nourished. No distress.   HENT:   Head: Normocephalic and atraumatic.   Eyes: EOM are normal.  Pupils are equal, round, and reactive to light. Right eye exhibits no discharge. Left eye exhibits no discharge.   Neck: Normal range of motion. Neck supple.   Cardiovascular: Normal rate and normal heart sounds. Exam reveals no gallop.   Irregular pulse was noted.   Pulmonary/Chest: Effort normal and breath sounds normal. No respiratory distress.   Abdominal: Soft. Bowel sounds are normal. She exhibits no mass. There is no tenderness.   Musculoskeletal: Normal range of motion. She exhibits edema and tenderness. She exhibits no deformity.   Tender L knee pain. Mild L knee swelling compared to the R knee.   Neurological: She is alert and oriented to person, place, and time. No cranial nerve deficit or sensory deficit.   Skin: Skin is warm and dry. Capillary refill takes less than 2 seconds. She is not diaphoretic. No erythema.   Psychiatric: She has a normal mood and affect.         CRANIAL NERVES     CN III, IV, VI   Pupils are equal, round, and reactive to light.  Extraocular motions are normal.     Interval History: CPAP was ordered at bedtime. Started on enoxaparin to meet goal of INR > 2.0. She reports L knee pain has improved.    Significant Labs: All pertinent labs within the past 24 hours have been reviewed.    Significant Imaging: I have reviewed all pertinent imaging results/findings within the past 24 hours.    Assessment/Plan:      * Effusion of knee    - severe L knee pain 2/2 fall from the bed with severe tenderness. Tramadol was ordered PRN.  - Hip, tibia, fibula and ankle X-rays showed no evidence of fractures. L Knee X- ray showed arthroplasty hardware projects in expected position, no evidence of hardware failure, no displaced fracture or subluxation, no suspicious bone lesion and unchanged moderate left knee suprapatellar synovial complex/effusion.  - Ice was applied on L knee to reduce the pain. PT to f/u tomorrow. Will reeavulate for pain and swelling.  - pain has improved. Possible to transfer  to SNF.        Sleep apnea    - Diagnosed on (7/2017).  - CPAP titration was performed but she denied using it at home.  - CPAP was ordered to be used at bedtime if needed.  - 6 min walk test might be difficult to perform given pt. Hx of L knee trauma.          TIA (transient ischemic attack)              CAD (coronary artery disease)              Hyperlipidemia    - on Atorvastatin 40 mg.          Hypertension    - she is on Amlodipine.  - on 8/19 her BP was 98/62, 500cc boluses was given twice.  - 8/22 held losartan given bump in Cr, monitor  - Cr level improved from 1.5>1.3.        Diabetes mellitus type II, non insulin dependent    - on isulin aspart 0-5U prn.          Chronic atrial fibrillation    - she is on home dose warfarin.  - enoxaparin was added to meet the goal of INR>2.0.          VTE Risk Mitigation (From admission, onward)        Ordered     enoxaparin injection 90 mg  Every 12 hours (non-standard times)      08/23/18 1237              Lisbeth Dalal MD  Department of Hospital Medicine   [facility]

## 2018-08-24 NOTE — PROGRESS NOTES
[facility] Hospital Medicine  Progress Note    Patient Name: Claribel Moran  MRN: [id number]  Patient Class: IP- Inpatient   Admission Date: 8/18/2018  Length of Stay: 6 days  Attending Physician: Vicky Arenas MD  Primary Care Provider: Moises Eduardo MD    [facility] Medicine Team: [facility] [place] [facility] MED 5 Lisbeth Dalal MD    Subjective:     Principal Problem:Effusion of knee    HPI:  83 y.o. female with co-morbidities including: A-Fib, right total knee replacement (2008), left total knee replacement (2011), type 2 DM, HTN, CKD, CAD, and CHF who presents to the ED with a complaint of worsening severe left thigh and knee pain of initial onset 2 days ago. Pt states she fell out of her bed and struck her left knee/hip approximately 2 days ago. She shortly reported to [facility] and received treatment, then was discharged later that day. Pain is 10/10 and begun radiating up and down her leg towards her groin. There are no associated symptoms at this time and no pain at the level below the knee. She is on severe pain at the knee level and above which is very sensitive to touch on examination not relived by 1g PO Tylenol. Pt states she has been unable to stand/ambulate/move comfortably since the injury and has significantly worsened over the 24 hours. She has not taken her coumadin this morning and reports no recent missed medications concerning her coumadin. Denies fever, chills, head trauma, and any LOC. No hx of numbness or tingling sensation. She is on Glipizide but did not took it last month because of insurance issues. Other systemic review was unremarkable.    Temp:  [97.6 °F (36.4 °C)-98.8 °F (37.1 °C)] 97.6 °F (36.4 °C)  Pulse:  [] 84  Resp:  [15-23] 18  SpO2:  [92 %-97 %] 92 %  BP: (120-160)/(67-87) 132/87    Physical Exam   Constitutional: She is oriented to person, place, and time. She appears well-developed and well-nourished. No distress.   HENT:   Head: Normocephalic and  atraumatic.   Eyes: EOM are normal. Pupils are equal, round, and reactive to light. Right eye exhibits no discharge. Left eye exhibits no discharge.   Neck: Normal range of motion. Neck supple.   Cardiovascular: Normal rate and normal heart sounds. Exam reveals no gallop.   Irregular pulse was noted.   Pulmonary/Chest: Effort normal and breath sounds normal. No respiratory distress.   Abdominal: Soft. Bowel sounds are normal. She exhibits no mass. There is no tenderness.   Musculoskeletal: Normal range of motion. She exhibits edema and tenderness. She exhibits no deformity.   Tender L knee pain. Mild L knee swelling compared to the R knee.   Neurological: She is alert and oriented to person, place, and time. No cranial nerve deficit or sensory deficit.   Skin: Skin is warm and dry. Capillary refill takes less than 2 seconds. She is not diaphoretic. No erythema.   Psychiatric: She has a normal mood and affect.       Hospital Course:  8/18- VSS, patient admitted to the 9th floor and started on home anti HTN medications. Tramadol was ordered PRN for severe L knee pain.  8/19- NAEON, patient seen today and she is vitally stable, BP 98/62. Carvedilol was dc'ed, continue on Amlodipine and Losartan. Ice applied on L knee to reduce pain. F/u with CR level as it was increased from 1.0 to 1.3. PT tomorrow.  8/20- NAEON except of one episode of desating at 88%. Currently she is stable on room air. Patient seen today and she is vitally stable, /62. Cr level is 1.2. She reports that L knee pain has improved. CPAP was ordered at bedtime. enoxaparin 90 mg Q12h was started. F/u INR.  8/21- NAEON, patient seen today and vitally stable. On NC 1L sating at 99%. L knee pain has improved. INR today is 1.6. Plan to transfer to SNF at time of discharge.  8/22- 500 cc bolus given for bump in Cr. Held losartan, will monitor Cr and BP. Will discuss with pt/PT/OT as pt may need SNF.  8/23- NAEON, patient seen today and she is vitally  table, /78. Cr level improved 1.5>1.3. Waiting for SNF placement.  - NAEON, patient seen today and vitally stable. mucinex 600 BID was ordered as an expectorant. Warfarin dose changed to 6 mg yesterday. Cr level improved to 1.2. L knee pain is much more better and patient took a shower today morning. Waiting for SNF placement.    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      Current Outpatient Medications on File Prior to Encounter   Medication Sig    acetaminophen (TYLENOL) 500 MG tablet Take 500 mg by mouth daily as needed.     albuterol (ACCUNEB) 1.25 mg/3 mL Nebu Take 1.25 mg by nebulization every 6 (six) hours as needed. Rescue    ALPRAZolam (XANAX) 0.25 MG tablet Take 0.25 mg by mouth daily as needed for Anxiety.    amlodipine (NORVASC) 5 MG tablet Take 5 mg by mouth once daily.    atorvastatin (LIPITOR) 40 MG tablet Take 40 mg by  mouth once daily.    carvedilol (COREG) 12.5 MG tablet Take 6.25 mg by mouth 2 (two) times daily.    citalopram (CELEXA) 40 MG tablet Take 40 mg by mouth once daily.      ergocalciferol (ERGOCALCIFEROL) 50,000 unit Cap Take 50,000 Units by mouth every 30 days.     escitalopram oxalate (LEXAPRO) 10 MG tablet Take 10 mg by mouth once daily.    glipiZIDE (GLUCOTROL) 2.5 MG TR24 Take 5 mg by mouth daily with breakfast.      guaifenesin-codeine 100-10 mg/5 ml (TUSSI-ORGANIDIN NR)  mg/5 mL syrup Take 5 mLs by mouth 3 (three) times daily as needed for Cough.    levothyroxine (SYNTHROID) 50 MCG tablet Take 50 mcg by mouth once daily.    losartan (COZAAR) 100 MG tablet Take 100 mg by mouth once daily.    ranitidine (ZANTAC) 75 MG tablet Take 75 mg by mouth daily as needed for Heartburn.    senna-docusate 8.6-50 mg (PERICOLACE) 8.6-50 mg per tablet Take 2 tablets by mouth every evening.    traMADol (ULTRAM) 50 mg tablet Take 50 mg by mouth every 6 (six) hours as needed for Pain.    VENTOLIN HFA 90 mcg/actuation inhaler Inhale 2 puffs into the lungs every 4 to 6 hours as needed for Shortness of Breath.     warfarin (COUMADIN) 4 MG tablet Takes 1 tablet by mouth daily except 1/2 tablet on Tuesday and Thursday     Family History     Problem Relation (Age of Onset)    Arthritis Mother    Cancer Mother    Depression Son    Diabetes Son    Drug abuse Daughter    Heart disease Mother, Father    Hypertension Mother, Brother, Daughter, Son    Stroke Mother        Tobacco Use    Smoking status: Former Smoker     Types: Cigarettes     Last attempt to quit: 1954     Years since quittin.6    Smokeless tobacco: Never Used    Tobacco comment: 1 pack for month only for a few years, but second hand smoke from  for decades.    Substance and Sexual Activity    Alcohol use: No    Drug use: No    Sexual activity: Not Currently     Review of Systems   Constitutional: Negative for appetite change, chills,  fatigue and fever.   HENT: Negative for ear pain and sinus pain.    Eyes: Negative for photophobia and pain.   Respiratory: Negative for cough and shortness of breath.    Cardiovascular: Negative for chest pain and leg swelling.   Gastrointestinal: Negative for abdominal pain, blood in stool, nausea and vomiting.   Endocrine: Negative for polydipsia and polyphagia.   Genitourinary: Negative for dysuria and hematuria.   Musculoskeletal: Positive for arthralgias and joint swelling. Negative for gait problem, neck pain and neck stiffness.        Bilateral knee pain L>R 2/2 fall, pain improving, reports some stiffness   Skin: Negative for color change, rash and wound.   Allergic/Immunologic: Negative for immunocompromised state.   Neurological: Negative for dizziness, facial asymmetry, numbness and headaches.   Psychiatric/Behavioral: Negative for confusion.     Objective:     Vital Signs (Most Recent):  Temp: 97.6 °F (36.4 °C) (08/24/18 1216)  Pulse: 91 (08/24/18 1216)  Resp: 19 (08/24/18 1216)  BP: 132/87 (08/24/18 1216)  SpO2: (!) 94 % (08/24/18 1216) Vital Signs (24h Range):  Temp:  [97.6 °F (36.4 °C)-98.8 °F (37.1 °C)] 97.6 °F (36.4 °C)  Pulse:  [] 91  Resp:  [15-23] 19  SpO2:  [93 %-97 %] 94 %  BP: (120-160)/(67-87) 132/87     Weight: 99 kg (218 lb 4.8 oz)  Body mass index is 41.25 kg/m².    Physical Exam   Constitutional: She is oriented to person, place, and time. She appears well-developed and well-nourished. No distress.   HENT:   Head: Normocephalic and atraumatic.   Eyes: EOM are normal. Pupils are equal, round, and reactive to light. Right eye exhibits no discharge. Left eye exhibits no discharge.   Neck: Normal range of motion. Neck supple.   Cardiovascular: Normal rate and normal heart sounds. Exam reveals no gallop.   Irregular pulse was noted.   Pulmonary/Chest: Effort normal and breath sounds normal. No respiratory distress.   Abdominal: Soft. Bowel sounds are normal. She exhibits no mass. There  is no tenderness.   Musculoskeletal: Normal range of motion. She exhibits edema and tenderness. She exhibits no deformity.   Tender L knee pain. Mild L knee swelling compared to the R knee.   Neurological: She is alert and oriented to person, place, and time. No cranial nerve deficit or sensory deficit.   Skin: Skin is warm and dry. Capillary refill takes less than 2 seconds. She is not diaphoretic. No erythema.   Psychiatric: She has a normal mood and affect.         CRANIAL NERVES     CN III, IV, VI   Pupils are equal, round, and reactive to light.  Extraocular motions are normal.     Interval History: CPAP was ordered at bedtime. Started on enoxaparin to meet goal of INR > 2.0. She reports L knee pain has improved.    Significant Labs: All pertinent labs within the past 24 hours have been reviewed.    Significant Imaging: I have reviewed all pertinent imaging results/findings within the past 24 hours.    Assessment/Plan:      * Effusion of knee    - severe L knee pain 2/2 fall from the bed with severe tenderness. Tramadol was ordered PRN.  - Hip, tibia, fibula and ankle X-rays showed no evidence of fractures. L Knee X- ray showed arthroplasty hardware projects in expected position, no evidence of hardware failure, no displaced fracture or subluxation, no suspicious bone lesion and unchanged moderate left knee suprapatellar synovial complex/effusion.  - Ice was applied on L knee to reduce the pain. PT to f/u tomorrow. Will reeavulate for pain and swelling.  - pain has improved. Possible to transfer to SNF.        Sleep apnea    - Diagnosed on (7/2017).  - CPAP titration was performed but she denied using it at home.  - CPAP was ordered to be used at bedtime if needed.  - 6 min walk test might be difficult to perform given pt. Hx of L knee trauma.          TIA (transient ischemic attack)              CAD (coronary artery disease)              Hyperlipidemia    - on Atorvastatin 40 mg.          Hypertension    - she  is on Amlodipine.  - on 8/19 her BP was 98/62, 500cc boluses was given twice.  - 8/22 held losartan given bump in Cr, monitor  - Cr level improved from 1.5>1.3>1.2        Diabetes mellitus type II, non insulin dependent    - on isulin aspart 0-5U prn.          Chronic atrial fibrillation    - she is on warfarin 6 mg.  - enoxaparin was added to meet the goal of INR>2.0.          VTE Risk Mitigation (From admission, onward)        Ordered     enoxaparin injection 90 mg  Every 12 hours (non-standard times)      08/23/18 2366              Lisbeth Dalal MD  Department of Hospital Medicine   [facility]

## 2018-08-24 NOTE — SUBJECTIVE & OBJECTIVE
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      Current Outpatient Medications on File Prior to Encounter   Medication Sig    acetaminophen (TYLENOL) 500 MG tablet Take 500 mg by mouth daily as needed.     albuterol (ACCUNEB) 1.25 mg/3 mL Nebu Take 1.25 mg by nebulization every 6 (six) hours as needed. Rescue    ALPRAZolam (XANAX) 0.25 MG tablet Take 0.25 mg by mouth daily as needed for Anxiety.    amlodipine (NORVASC) 5 MG tablet Take 5 mg by mouth once daily.    atorvastatin (LIPITOR) 40 MG tablet Take 40 mg by mouth once daily.    carvedilol (COREG) 12.5 MG tablet Take 6.25 mg by mouth 2 (two) times daily.    citalopram (CELEXA) 40 MG tablet Take 40 mg by mouth once daily.      ergocalciferol (ERGOCALCIFEROL) 50,000 unit Cap Take 50,000 Units by mouth every 30 days.     escitalopram oxalate (LEXAPRO) 10 MG tablet Take 10 mg by mouth once daily.     glipiZIDE (GLUCOTROL) 2.5 MG TR24 Take 5 mg by mouth daily with breakfast.      guaifenesin-codeine 100-10 mg/5 ml (TUSSI-ORGANIDIN NR)  mg/5 mL syrup Take 5 mLs by mouth 3 (three) times daily as needed for Cough.    levothyroxine (SYNTHROID) 50 MCG tablet Take 50 mcg by mouth once daily.    losartan (COZAAR) 100 MG tablet Take 100 mg by mouth once daily.    ranitidine (ZANTAC) 75 MG tablet Take 75 mg by mouth daily as needed for Heartburn.    senna-docusate 8.6-50 mg (PERICOLACE) 8.6-50 mg per tablet Take 2 tablets by mouth every evening.    traMADol (ULTRAM) 50 mg tablet Take 50 mg by mouth every 6 (six) hours as needed for Pain.    VENTOLIN HFA 90 mcg/actuation inhaler Inhale 2 puffs into the lungs every 4 to 6 hours as needed for Shortness of Breath.     warfarin (COUMADIN) 4 MG tablet Takes 1 tablet by mouth daily except 1/2 tablet on Tuesday and Thursday     Family History     Problem Relation (Age of Onset)    Arthritis Mother    Cancer Mother    Depression Son    Diabetes Son    Drug abuse Daughter    Heart disease Mother, Father    Hypertension Mother, Brother, Daughter, Son    Stroke Mother        Tobacco Use    Smoking status: Former Smoker     Types: Cigarettes     Last attempt to quit: 1954     Years since quittin.6    Smokeless tobacco: Never Used    Tobacco comment: 1 pack for month only for a few years, but second hand smoke from  for decades.    Substance and Sexual Activity    Alcohol use: No    Drug use: No    Sexual activity: Not Currently     Review of Systems   Constitutional: Negative for appetite change, chills, fatigue and fever.   HENT: Negative for ear pain and sinus pain.    Eyes: Negative for photophobia and pain.   Respiratory: Negative for cough and shortness of breath.    Cardiovascular: Negative for chest pain and leg swelling.   Gastrointestinal: Negative for abdominal pain, blood in stool, nausea and vomiting.   Endocrine: Negative for  polydipsia and polyphagia.   Genitourinary: Negative for dysuria and hematuria.   Musculoskeletal: Positive for arthralgias and joint swelling. Negative for gait problem, neck pain and neck stiffness.        Bilateral knee pain L>R 2/2 fall, pain improving, reports some stiffness   Skin: Negative for color change, rash and wound.   Allergic/Immunologic: Negative for immunocompromised state.   Neurological: Negative for dizziness, facial asymmetry, numbness and headaches.   Psychiatric/Behavioral: Negative for confusion.     Objective:     Vital Signs (Most Recent):  Temp: 97.6 °F (36.4 °C) (08/24/18 1216)  Pulse: 91 (08/24/18 1216)  Resp: 19 (08/24/18 1216)  BP: 132/87 (08/24/18 1216)  SpO2: (!) 94 % (08/24/18 1216) Vital Signs (24h Range):  Temp:  [97.6 °F (36.4 °C)-98.8 °F (37.1 °C)] 97.6 °F (36.4 °C)  Pulse:  [] 91  Resp:  [15-23] 19  SpO2:  [93 %-97 %] 94 %  BP: (120-160)/(67-87) 132/87     Weight: 99 kg (218 lb 4.8 oz)  Body mass index is 41.25 kg/m².    Physical Exam   Constitutional: She is oriented to person, place, and time. She appears well-developed and well-nourished. No distress.   HENT:   Head: Normocephalic and atraumatic.   Eyes: EOM are normal. Pupils are equal, round, and reactive to light. Right eye exhibits no discharge. Left eye exhibits no discharge.   Neck: Normal range of motion. Neck supple.   Cardiovascular: Normal rate and normal heart sounds. Exam reveals no gallop.   Irregular pulse was noted.   Pulmonary/Chest: Effort normal and breath sounds normal. No respiratory distress.   Abdominal: Soft. Bowel sounds are normal. She exhibits no mass. There is no tenderness.   Musculoskeletal: Normal range of motion. She exhibits edema and tenderness. She exhibits no deformity.   Tender L knee pain. Mild L knee swelling compared to the R knee.   Neurological: She is alert and oriented to person, place, and time. No cranial nerve deficit or sensory deficit.   Skin: Skin is warm and dry.  Capillary refill takes less than 2 seconds. She is not diaphoretic. No erythema.   Psychiatric: She has a normal mood and affect.         CRANIAL NERVES     CN III, IV, VI   Pupils are equal, round, and reactive to light.  Extraocular motions are normal.     Interval History: CPAP was ordered at bedtime. Started on enoxaparin to meet goal of INR > 2.0. She reports L knee pain has improved.    Significant Labs: All pertinent labs within the past 24 hours have been reviewed.    Significant Imaging: I have reviewed all pertinent imaging results/findings within the past 24 hours.

## 2018-08-24 NOTE — PLAN OF CARE
Covering SW paged physician to inform that [facility] is able to accept patient today. Orders are needed. SW waiting to hear back.    3:15 PM  Covering CRISTIAN paged resident for IM-5. SW spoke with resident to request SNF orders. SW waiting on SNF orders.     4:12 PM  SW requested SNF orders from team. SW waiting on orders.     RN can call report to [phone].    CRISTIAN arranged transportation with [facility] ([phone]) for  at 6:00 PM.    RN Leigha souza.    4:35 PM  Covering CRISTIAN requested SNF orders from team. SW informed that the physician will complete the orders for SNF. SW Informed team that orders are needed to discharge pt to SNF today and transport is scheduled for 6 pm.     Radha Sanchez, OLIMPIA  [facility]

## 2018-08-24 NOTE — PT/OT/SLP PROGRESS
"Physical Therapy Treatment    Patient Name:  Claribel Moran   MRN:  [id number]    Recommendations:     Discharge Recommendations:  nursing facility, skilled   Discharge Equipment Recommendations: walker, rolling, wheelchair   Barriers to discharge: Inaccessible home    Assessment:     Claribel Moran is a 83 y.o. female admitted with a medical diagnosis of Effusion of knee.  She presents with the following impairments/functional limitations:  weakness, impaired endurance, impaired self care skills, impaired functional mobilty, gait instability, impaired balance, decreased lower extremity function, impaired joint extensibility, decreased ROM. Pt tolerated session well with focus on bed mobility, transfers, and therex. Pt progressing fairly with pt able to accept PWB onto LLE, but pt remains limited by 10/10 pain and limited endurance for standing. Pt will continue to benefit from therapy services to improve impairments listed above.     Rehab Prognosis:  Fair ; patient would benefit from acute skilled PT services to address these deficits and reach maximum level of function.      Recent Surgery: * No surgery found *      Plan:     During this hospitalization, patient to be seen 4 x/week to address the above listed problems via gait training, therapeutic activities, therapeutic exercises, neuromuscular re-education  · Plan of Care Expires:  09/19/18   Plan of Care Reviewed with: patient    Subjective     Communicated with NSG prior to session.  Patient found supine upon PTA entry to room, agreeable to treatment.      Chief Complaint: coughing  Patient comments/goals: "I've been coughing so much, its really tired me out and had me winded."  Pain/Comfort:  · Pain Rating 1: 4/10(at rest)  · Pain Rating Post-Intervention 1: 10/10(while attempting weight bearing)  · Pain Addressed 2: Reposition, Distraction, Cessation of Activity, Nurse notified    Patients cultural, spiritual, Yazdanism conflicts given the current " situation: none stated    Objective:     Patient found with: oxygen     General Precautions: Standard, fall   Orthopedic Precautions:N/A   Braces: N/A     Functional Mobility:  · Bed Mobility:     · Scooting: contact guard assistance  · Supine to Sit: minimum assistance  · Sit to Supine: contact guard assistance  · Transfers:     · Sit to Stand:  minimum assistance with rolling walker  · Balance: Pt sits at EOB with SBA and stands with Min A required and limited weight bearing on LLE.       AM-PAC 6 CLICK MOBILITY  Turning over in bed (including adjusting bedclothes, sheets and blankets)?: 3  Sitting down on and standing up from a chair with arms (e.g., wheelchair, bedside commode, etc.): 2  Moving from lying on back to sitting on the side of the bed?: 1  Moving to and from a bed to a chair (including a wheelchair)?: 1  Need to walk in hospital room?: 1  Climbing 3-5 steps with a railing?: 1  Basic Mobility Total Score: 9       Therapeutic Activities and Exercises:   Pt assisted with functional mobility as noted above.   Pt sits at EOB for ~15 minutes with BUE support and SBA.   Pt performs BLE seated therex: AP, GS, LAQ, Seated Marching, Hip ABD/ADD x 20 reps. LAQ with occasional painful response noted inconsistently throughout reps.   Pt attempts standing x 2 trials with pt able to stand with Min A for ~ 2 minutes each trial. Pt starts off maintaining LLE in TTWB and is able to stand with foot flat to ground and PWB <50% with painful response rated 10/10 and poor tolerance.   Pt educated on increased time upright/UIC throughout the day and on PT POC.     Patient left HOB elevated with all lines intact and call button in reach..    GOALS:   Multidisciplinary Problems     Physical Therapy Goals        Problem: Physical Therapy Goal    Goal Priority Disciplines Outcome Goal Variances Interventions   Physical Therapy Goal     PT, PT/OT Ongoing (interventions implemented as appropriate)     Description:  Goals to be  met by: 18     Patient will increase functional independence with mobility by performin. Supine to sit with MInimal Assistance  2. Sit to supine with MInimal Assistance  3. Sit to stand transfer with Maximum Assistance with LRD.   4. Bed to chair transfer with Maximum Assistance using Rolling Walker or LRD.  5. Gait  x 10 feet with Maximum Assistance using Rolling Walker or LRD.                       Time Tracking:     PT Received On: 18  PT Start Time: 1420     PT Stop Time: 1445  PT Total Time (min): 25 min     Billable Minutes: Therapeutic Activity 15 and Therapeutic Exercise 10    Treatment Type: Treatment  PT/PTA: PTA     PTA Visit Number: 2     Eriberto Brooks, PTA  2018

## 2018-08-24 NOTE — ASSESSMENT & PLAN NOTE
- she is on Amlodipine.  - on 8/19 her BP was 98/62, 500cc boluses was given twice.  - 8/22 held losartan given bump in Cr, monitor  - Cr level improved from 1.5>1.3.

## 2018-08-24 NOTE — PLAN OF CARE
Problem: Patient Care Overview  Goal: Plan of Care Review  Outcome: Ongoing (interventions implemented as appropriate)  Plan of care reviewed. Pt lying in bed AAO on 1.5LNC. No distress noted. Awaiting transport to [facility]. Report has been called. Will cont to monitor

## 2018-08-25 PROBLEM — E03.9 ACQUIRED HYPOTHYROIDISM: Status: ACTIVE | Noted: 2018-08-25

## 2018-08-25 LAB
ANION GAP SERPL CALC-SCNC: 11 MMOL/L
BASOPHILS # BLD AUTO: 0.04 K/UL
BASOPHILS NFR BLD: 0.5 %
BUN SERPL-MCNC: 22 MG/DL
CALCIUM SERPL-MCNC: 9.6 MG/DL
CHLORIDE SERPL-SCNC: 101 MMOL/L
CO2 SERPL-SCNC: 25 MMOL/L
CREAT SERPL-MCNC: 1.2 MG/DL
DIFFERENTIAL METHOD: ABNORMAL
EOSINOPHIL # BLD AUTO: 0.3 K/UL
EOSINOPHIL NFR BLD: 3.9 %
ERYTHROCYTE [DISTWIDTH] IN BLOOD BY AUTOMATED COUNT: 15.4 %
EST. GFR  (AFRICAN AMERICAN): 48.3 ML/MIN/1.73 M^2
EST. GFR  (NON AFRICAN AMERICAN): 41.9 ML/MIN/1.73 M^2
GLUCOSE SERPL-MCNC: 194 MG/DL
HCT VFR BLD AUTO: 40 %
HGB BLD-MCNC: 12.7 G/DL
IMM GRANULOCYTES # BLD AUTO: 0.06 K/UL
IMM GRANULOCYTES NFR BLD AUTO: 0.7 %
INR PPP: 2
LYMPHOCYTES # BLD AUTO: 2.2 K/UL
LYMPHOCYTES NFR BLD: 26.5 %
MAGNESIUM SERPL-MCNC: 2 MG/DL
MCH RBC QN AUTO: 29.1 PG
MCHC RBC AUTO-ENTMCNC: 31.8 G/DL
MCV RBC AUTO: 92 FL
MONOCYTES # BLD AUTO: 0.7 K/UL
MONOCYTES NFR BLD: 8.5 %
NEUTROPHILS # BLD AUTO: 4.9 K/UL
NEUTROPHILS NFR BLD: 59.9 %
NRBC BLD-RTO: 0 /100 WBC
PHOSPHATE SERPL-MCNC: 3.5 MG/DL
PLATELET # BLD AUTO: 228 K/UL
PMV BLD AUTO: 12 FL
POCT GLUCOSE: 174 MG/DL (ref 70–110)
POCT GLUCOSE: 267 MG/DL (ref 70–110)
POCT GLUCOSE: 306 MG/DL (ref 70–110)
POTASSIUM SERPL-SCNC: 4.8 MMOL/L
PROTHROMBIN TIME: 19.5 SEC
RBC # BLD AUTO: 4.36 M/UL
SODIUM SERPL-SCNC: 137 MMOL/L
WBC # BLD AUTO: 8.22 K/UL

## 2018-08-25 PROCEDURE — 97535 SELF CARE MNGMENT TRAINING: CPT

## 2018-08-25 PROCEDURE — 36415 COLL VENOUS BLD VENIPUNCTURE: CPT

## 2018-08-25 PROCEDURE — 11000004 HC SNF PRIVATE

## 2018-08-25 PROCEDURE — 85610 PROTHROMBIN TIME: CPT

## 2018-08-25 PROCEDURE — 99223 1ST HOSP IP/OBS HIGH 75: CPT | Mod: ,,, | Performed by: HOSPITALIST

## 2018-08-25 PROCEDURE — 94640 AIRWAY INHALATION TREATMENT: CPT

## 2018-08-25 PROCEDURE — 27000221 HC OXYGEN, UP TO 24 HOURS

## 2018-08-25 PROCEDURE — 25000003 PHARM REV CODE 250: Performed by: HOSPITALIST

## 2018-08-25 PROCEDURE — 63600175 PHARM REV CODE 636 W HCPCS: Performed by: STUDENT IN AN ORGANIZED HEALTH CARE EDUCATION/TRAINING PROGRAM

## 2018-08-25 PROCEDURE — 97110 THERAPEUTIC EXERCISES: CPT

## 2018-08-25 PROCEDURE — 97165 OT EVAL LOW COMPLEX 30 MIN: CPT

## 2018-08-25 PROCEDURE — 85025 COMPLETE CBC W/AUTO DIFF WBC: CPT

## 2018-08-25 PROCEDURE — 84100 ASSAY OF PHOSPHORUS: CPT

## 2018-08-25 PROCEDURE — 83735 ASSAY OF MAGNESIUM: CPT

## 2018-08-25 PROCEDURE — 25000003 PHARM REV CODE 250: Performed by: STUDENT IN AN ORGANIZED HEALTH CARE EDUCATION/TRAINING PROGRAM

## 2018-08-25 PROCEDURE — 80048 BASIC METABOLIC PNL TOTAL CA: CPT

## 2018-08-25 PROCEDURE — 97116 GAIT TRAINING THERAPY: CPT

## 2018-08-25 PROCEDURE — 25000242 PHARM REV CODE 250 ALT 637 W/ HCPCS: Performed by: STUDENT IN AN ORGANIZED HEALTH CARE EDUCATION/TRAINING PROGRAM

## 2018-08-25 PROCEDURE — 94761 N-INVAS EAR/PLS OXIMETRY MLT: CPT

## 2018-08-25 PROCEDURE — 97161 PT EVAL LOW COMPLEX 20 MIN: CPT

## 2018-08-25 RX ORDER — WARFARIN 2.5 MG/1
5 TABLET ORAL DAILY
Status: DISCONTINUED | OUTPATIENT
Start: 2018-08-25 | End: 2018-09-02

## 2018-08-25 RX ADMIN — IPRATROPIUM BROMIDE AND ALBUTEROL SULFATE 3 ML: .5; 3 SOLUTION RESPIRATORY (INHALATION) at 08:08

## 2018-08-25 RX ADMIN — LEVOTHYROXINE SODIUM 50 MCG: 50 TABLET ORAL at 05:08

## 2018-08-25 RX ADMIN — WARFARIN SODIUM 5 MG: 2.5 TABLET ORAL at 05:08

## 2018-08-25 RX ADMIN — FLUTICASONE FUROATE AND VILANTEROL TRIFENATATE 1 PUFF: 100; 25 POWDER RESPIRATORY (INHALATION) at 10:08

## 2018-08-25 RX ADMIN — ENOXAPARIN SODIUM 90 MG: 100 INJECTION SUBCUTANEOUS at 10:08

## 2018-08-25 RX ADMIN — TRAMADOL HYDROCHLORIDE 50 MG: 50 TABLET, COATED ORAL at 08:08

## 2018-08-25 RX ADMIN — INSULIN ASPART 1 UNITS: 100 INJECTION, SOLUTION INTRAVENOUS; SUBCUTANEOUS at 09:08

## 2018-08-25 RX ADMIN — IPRATROPIUM BROMIDE AND ALBUTEROL SULFATE 3 ML: .5; 3 SOLUTION RESPIRATORY (INHALATION) at 04:08

## 2018-08-25 RX ADMIN — IPRATROPIUM BROMIDE AND ALBUTEROL SULFATE 3 ML: .5; 3 SOLUTION RESPIRATORY (INHALATION) at 12:08

## 2018-08-25 RX ADMIN — INSULIN ASPART 4 UNITS: 100 INJECTION, SOLUTION INTRAVENOUS; SUBCUTANEOUS at 05:08

## 2018-08-25 RX ADMIN — AMLODIPINE BESYLATE 5 MG: 5 TABLET ORAL at 10:08

## 2018-08-25 RX ADMIN — DIPHENHYDRAMINE HYDROCHLORIDE 25 MG: 25 CAPSULE ORAL at 09:08

## 2018-08-25 RX ADMIN — ATORVASTATIN CALCIUM 40 MG: 20 TABLET, FILM COATED ORAL at 10:08

## 2018-08-25 RX ADMIN — INSULIN ASPART 3 UNITS: 100 INJECTION, SOLUTION INTRAVENOUS; SUBCUTANEOUS at 01:08

## 2018-08-25 RX ADMIN — TRAMADOL HYDROCHLORIDE 50 MG: 50 TABLET, COATED ORAL at 10:08

## 2018-08-25 NOTE — H&P
[facility]  Department of [facility]  History & Physical    Patient Name: Claribel Moran  MRN: [id number]  Code Status: Full Code  Admission Date: 8/24/2018  Attending Physician: Majo Farah MD   Primary Care Provider: Moises Eduardo MD    Subjective:     Principal Problem:Debility    No chief complaint on file.       HPI: Chief Complaint/Reason for Admission: Debility    History of Present Illness:  Patient is a 83 y.o. female who has a past medical history of Acid reflux, Anxiety, Arthritis, Atrial fibrillation with Anticoagulant long-term use, coronary artery disease, CHF, CKD stage 3, Hepatitis B, Hyperlipidemia, Hypertension, Reactive airway disease with wheezing, Sleep apnea, Thyroid disease, Total knee replacement status, Type II diabetes mellitus, and Vitamin D deficiency disease presented with left knee pain after mechanical fall. She presented to ED and was discharged home with pain control medications. She presented back to ED for worsening pain. Xray of knee showed moderate left knee suprapatellar synovial complex/effusion. She was treated conservatively and did not require surgical consultation. Patient has been working with PT/OT who recommend SNF for further balance/mobility training.       Past Medical History:   Diagnosis Date    Acid reflux 2/14/2013    Anticoagulant long-term use     Anxiety 12/17/2012    Arthritis     Atrial fibrillation     Blood transfusion     CAD (coronary artery disease) 2/14/2013    CHF (congestive heart failure)     CKD (chronic kidney disease) stage 3, GFR 30-59 ml/min 2/14/2013    Dry mouth     Encounter for blood transfusion     Hepatitis B 2/14/2013    Hyperlipidemia 12/17/2012    Hypertension 12/17/2012    Reactive airway disease with wheezing 2/19/2013    Sleep apnea     Thyroid disease     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    Type II diabetes mellitus 12/17/2012    Vitamin D deficiency disease 12/17/2012        Past Surgical History:   Procedure Laterality Date    ABDOMINAL SURGERY       SECTION, CLASSIC      FRACTURE SURGERY      ankles, ribs    HYSTERECTOMY      KIDNEY STONE SURGERY      TOTAL KNEE ARTHROPLASTY Right     Right knee    TOTAL KNEE ARTHROPLASTY Left 02/15/2013       Review of patient's allergies indicates:  No Known Allergies    Current Facility-Administered Medications on File Prior to Encounter   Medication    [DISCONTINUED] acetaminophen tablet 650 mg    [DISCONTINUED] albuterol-ipratropium 2.5 mg-0.5 mg/3 mL nebulizer solution 3 mL    [DISCONTINUED] amLODIPine tablet 5 mg    [DISCONTINUED] atorvastatin tablet 40 mg    [DISCONTINUED] dextrose 50% injection 12.5 g    [DISCONTINUED] dextrose 50% injection 25 g    [DISCONTINUED] diphenhydrAMINE capsule 25 mg    [DISCONTINUED] enoxaparin injection 90 mg    [DISCONTINUED] fluticasone-vilanterol 100-25 mcg/dose diskus inhaler 1 puff    [DISCONTINUED] glucagon (human recombinant) injection 1 mg    [DISCONTINUED] glucose chewable tablet 16 g    [DISCONTINUED] glucose chewable tablet 24 g    [DISCONTINUED] guaiFENesin 12 hr tablet 600 mg    [DISCONTINUED] insulin aspart U-100 pen 0-5 Units    [DISCONTINUED] levothyroxine tablet 50 mcg    [DISCONTINUED] polyethylene glycol packet 17 g    [DISCONTINUED] sodium chloride 0.9% flush 5 mL    [DISCONTINUED] traMADol tablet 50 mg    [DISCONTINUED] warfarin (COUMADIN) tablet 6 mg     Current Outpatient Medications on File Prior to Encounter   Medication Sig    acetaminophen (TYLENOL) 500 MG tablet Take 500 mg by mouth daily as needed.     albuterol (ACCUNEB) 1.25 mg/3 mL Nebu Take 1.25 mg by nebulization every 6 (six) hours as needed. Rescue    ALPRAZolam (XANAX) 0.25 MG tablet Take 0.25 mg by mouth daily as needed for Anxiety.    amlodipine (NORVASC) 5 MG tablet Take 5 mg by mouth once daily.    atorvastatin (LIPITOR) 40 MG tablet Take 40 mg by mouth once daily.    carvedilol  (COREG) 12.5 MG tablet Take 6.25 mg by mouth 2 (two) times daily.    citalopram (CELEXA) 40 MG tablet Take 40 mg by mouth once daily.      ergocalciferol (ERGOCALCIFEROL) 50,000 unit Cap Take 50,000 Units by mouth every 30 days.     escitalopram oxalate (LEXAPRO) 10 MG tablet Take 10 mg by mouth once daily.    glipiZIDE (GLUCOTROL) 2.5 MG TR24 Take 5 mg by mouth daily with breakfast.      guaifenesin-codeine 100-10 mg/5 ml (TUSSI-ORGANIDIN NR)  mg/5 mL syrup Take 5 mLs by mouth 3 (three) times daily as needed for Cough.    levothyroxine (SYNTHROID) 50 MCG tablet Take 50 mcg by mouth once daily.    losartan (COZAAR) 100 MG tablet Take 100 mg by mouth once daily.    ranitidine (ZANTAC) 75 MG tablet Take 75 mg by mouth daily as needed for Heartburn.    senna-docusate 8.6-50 mg (PERICOLACE) 8.6-50 mg per tablet Take 2 tablets by mouth every evening.    traMADol (ULTRAM) 50 mg tablet Take 50 mg by mouth every 6 (six) hours as needed for Pain.    VENTOLIN HFA 90 mcg/actuation inhaler Inhale 2 puffs into the lungs every 4 to 6 hours as needed for Shortness of Breath.     warfarin (COUMADIN) 4 MG tablet Takes 1 tablet by mouth daily except 1/2 tablet on Tuesday and Thursday     Family History     Problem Relation (Age of Onset)    Arthritis Mother    Cancer Mother    Depression Son    Diabetes Son    Drug abuse Daughter    Heart disease Mother, Father    Hypertension Mother, Brother, Daughter, Son    Stroke Mother        Tobacco Use    Smoking status: Former Smoker     Types: Cigarettes     Last attempt to quit: 1954     Years since quittin.7    Smokeless tobacco: Never Used    Tobacco comment: 1 pack for month only for a few years, but second hand smoke from  for decades.    Substance and Sexual Activity    Alcohol use: No    Drug use: No    Sexual activity: Not Currently     Review of Systems   Constitutional: Negative for chills, fatigue and fever.   HENT: Negative for  congestion, facial swelling, hearing loss and trouble swallowing.    Eyes: Negative for photophobia and visual disturbance.   Respiratory: Negative for chest tightness, shortness of breath and wheezing.    Cardiovascular: Negative for chest pain, palpitations and leg swelling.   Gastrointestinal: Negative for abdominal pain, blood in stool, constipation, diarrhea, nausea and vomiting.   Endocrine: Negative.    Genitourinary: Negative.    Musculoskeletal: Negative for back pain, joint swelling and myalgias.   Skin: Negative.    Allergic/Immunologic: Negative.    Neurological: Negative for dizziness, facial asymmetry, speech difficulty, weakness and numbness.   Hematological: Negative.    Psychiatric/Behavioral: Negative for agitation, confusion and dysphoric mood. The patient is not nervous/anxious.      Objective:     Vital Signs (Most Recent):  Temp: 97.9 °F (36.6 °C) (08/25/18 0804)  Pulse: 86 (08/25/18 1206)  Resp: 18 (08/25/18 1206)  BP: 130/70 (08/25/18 0804)  SpO2: 96 % (08/25/18 1206) Vital Signs (24h Range):  Temp:  [97.9 °F (36.6 °C)-98.3 °F (36.8 °C)] 97.9 °F (36.6 °C)  Pulse:  [84-94] 86  Resp:  [18-21] 18  SpO2:  [92 %-97 %] 96 %  BP: (118-130)/(70-94) 130/70     Weight: 93.9 kg (207 lb)  Body mass index is 39.11 kg/m².    Physical Exam   Constitutional: She is oriented to person, place, and time. Vital signs are normal. She appears well-developed and well-nourished.  Non-toxic appearance. She does not appear ill. No distress.   HENT:   Head: Normocephalic and atraumatic.   Eyes: Conjunctivae and EOM are normal. Pupils are equal, round, and reactive to light. No scleral icterus.   Neck: Normal range of motion and full passive range of motion without pain. Neck supple. No JVD present. Carotid bruit is not present. No thyromegaly present.   Cardiovascular: Normal rate, regular rhythm, S1 normal, S2 normal, normal heart sounds and normal pulses. Exam reveals no gallop, no S3, no S4 and no friction rub.   No  murmur heard.  Pulmonary/Chest: Effort normal and breath sounds normal. No accessory muscle usage. No tachypnea. No respiratory distress. She has no decreased breath sounds. She has no wheezes. She has no rhonchi. She has no rales.   Abdominal: Soft. Normal appearance and bowel sounds are normal. She exhibits no shifting dullness, no distension, no abdominal bruit and no ascites. There is no hepatosplenomegaly. There is no tenderness. There is no rigidity and no guarding.   Musculoskeletal: Normal range of motion. She exhibits no edema or tenderness.        Left knee: She exhibits swelling and effusion.   Neurological: She is alert and oriented to person, place, and time. She has normal strength. She is not disoriented. No cranial nerve deficit or sensory deficit. GCS eye subscore is 4. GCS verbal subscore is 5. GCS motor subscore is 6.   Skin: Skin is warm, dry and intact. No abrasion and no lesion noted.   Psychiatric: She has a normal mood and affect. Her behavior is normal. Judgment and thought content normal. Her mood appears not anxious. Her speech is not slurred. She is not actively hallucinating. Cognition and memory are normal. She does not exhibit a depressed mood. She is attentive.     Significant Labs:   CBC:   Recent Labs   Lab  08/24/18   0408  08/25/18   0624   WBC  7.96  8.22   HGB  13.0  12.7   HCT  41.3  40.0   PLT  212  228     CMP:   Recent Labs   Lab  08/24/18   0408  08/25/18   0624   NA  138  137   K  5.0  4.8   CL  101  101   CO2  27  25   GLU  187*  194*   BUN  22  22   CREATININE  1.2  1.2   CALCIUM  9.7  9.6   ANIONGAP  10  11   EGFRNONAA  41.9*  41.9*       Significant Imaging: I have reviewed all pertinent imaging results/findings within the past 24 hours.    Assessment/Plan:     * Debility    Cont with PT/OT for gait training and strengthening and restoration of ADL's   Fall precautions   Cont DVT prophylaxis with coumadin.  Anticoagulants   Medication Route Frequency    warfarin  (COUMADIN) tablet 5 mg Oral Daily     Lab Results   Component Value Date    INR 2.0 (H) 08/25/2018    INR 1.6 (H) 08/24/2018    INR 1.6 (H) 08/23/2018               Acquired hypothyroidism    Cont with current dose of levothyroxine to treat   Lab Results   Component Value Date    TSH 2.02 12/06/2017    X1YQEWJ 4.6 09/05/2011    FREET4 0.76 09/05/2011     Current treatment plan effective  No change in therapy  Repeat TSH as outpatient with PCP          Effusion of knee    Secondary to trauma  Xray with no fracture or hardware failure.   Cont conservative care with cold compress, leg elevation, anti inflammatory and pain control  PT/OT as tolerated  Consider ortho consult if no improvement for arthrocentesis.         Sleep apnea    Cont CPAP QHS        Anticoagulated on Coumadin    Plan as above.         Reactive airway disease with wheezing    Cont Breo inhaler  Cont duo nebs  Supplemental oxygen as needed.         CKD (chronic kidney disease) stage 3, GFR 30-59 ml/min    Lab Results   Component Value Date    CREATININE 1.2 08/25/2018     Sr Cr stable  Cont to monitor with biweekly labs   Avoid nephrotoxins.   Holding Losartan   Renally dose all medications              Hyperlipidemia    Cont home Atorvastatin to treat         Hypertension    BP Readings from Last 3 Encounters:   08/25/18 130/70   08/24/18 132/87   08/16/18 121/67     Well controlled  Cont amlodipine to treat  Home Coreg and Losartan discontinued  Cont to monitor and restart if required  Low Na diet        Diabetes mellitus type II, non insulin dependent    Hold home oral agents  Low dose correction scale   Cont blood glucose monitoring   ADA diet          Chronic atrial fibrillation    Pulse Readings from Last 3 Encounters:   08/25/18 88   08/24/18 84   08/16/18 89     BB discontinued due to hypotension  Cont to hold and restart if needed  Cont anticoagulation with coumadin  Stop Lovenox as INR therapeutic.           I certify that SNF services are  required to be given on an inpatient basis because Claribel Valderramas needs for skilled nursing care and/or skilled rehabilitation are required on a daily basis and such services can only practically be provided in a skilled nursing facility setting and are for an ongoing condition for which she received inpatient care in the hospital.     No future appointments.    Patient's care plan and discharge planning will be discussed by the SNF team in IDT meeting weekly. Medications to be reviewed and discussed with the SNF unit clinical pharmacist.    Majo Zavala MD  Department of Hospital Medicine  [facility]

## 2018-08-25 NOTE — ASSESSMENT & PLAN NOTE
Pulse Readings from Last 3 Encounters:   08/25/18 88   08/24/18 84   08/16/18 89     BB discontinued due to hypotension  Cont to hold and restart if needed  Cont anticoagulation with coumadin  Stop Lovenox as INR therapeutic.

## 2018-08-25 NOTE — PLAN OF CARE
Problem: Occupational Therapy Goal  Goal: Occupational Therapy Goal  Goals to be met by: 14 days     Patient will increase functional independence with ADLs by performing:    Feeding with Modified [place].  UE Dressing with Modified [place].  LE Dressing with Stand-by Assistance.  Grooming while standing with Stand-by Assistance with RW to steady.  Toileting from bedside commode with Supervision for hygiene and clothing management and RW to steady. .   Bathing from  shower chair/bench with Supervision.  Rolling to Bilateral with Modified [place].   Supine to sit with Modified [place].  Stand pivot transfers with Supervision.  Upper extremity exercise program x10 reps per set, with supervision.  Pt will tolerate up to 10 minutes of functional standing activity with (S) and RW for safety.    Outcome: Ongoing (interventions implemented as appropriate)  Ramos Roca OTR/L      8/25/2018

## 2018-08-25 NOTE — ASSESSMENT & PLAN NOTE
BP Readings from Last 3 Encounters:   08/25/18 130/70   08/24/18 132/87   08/16/18 121/67     Well controlled  Cont amlodipine to treat  Home Coreg and Losartan discontinued  Cont to monitor and restart if required  Low Na diet

## 2018-08-25 NOTE — SUBJECTIVE & OBJECTIVE
Past Medical History:   Diagnosis Date    Acid reflux 2013    Anticoagulant long-term use     Anxiety 2012    Arthritis     Atrial fibrillation     Blood transfusion     CAD (coronary artery disease) 2013    CHF (congestive heart failure)     CKD (chronic kidney disease) stage 3, GFR 30-59 ml/min 2013    Dry mouth     Encounter for blood transfusion     Hepatitis B 2013    Hyperlipidemia 2012    Hypertension 2012    Reactive airway disease with wheezing 2013    Sleep apnea     Thyroid disease     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    Type II diabetes mellitus 2012    Vitamin D deficiency disease 2012       Past Surgical History:   Procedure Laterality Date    ABDOMINAL SURGERY       SECTION, CLASSIC      FRACTURE SURGERY      ankles, ribs    HYSTERECTOMY      KIDNEY STONE SURGERY      TOTAL KNEE ARTHROPLASTY Right     Right knee    TOTAL KNEE ARTHROPLASTY Left 02/15/2013       Review of patient's allergies indicates:  No Known Allergies    Current Facility-Administered Medications on File Prior to Encounter   Medication    [DISCONTINUED] acetaminophen tablet 650 mg    [DISCONTINUED] albuterol-ipratropium 2.5 mg-0.5 mg/3 mL nebulizer solution 3 mL    [DISCONTINUED] amLODIPine tablet 5 mg    [DISCONTINUED] atorvastatin tablet 40 mg    [DISCONTINUED] dextrose 50% injection 12.5 g    [DISCONTINUED] dextrose 50% injection 25 g    [DISCONTINUED] diphenhydrAMINE capsule 25 mg    [DISCONTINUED] enoxaparin injection 90 mg    [DISCONTINUED] fluticasone-vilanterol 100-25 mcg/dose diskus inhaler 1 puff    [DISCONTINUED] glucagon (human recombinant) injection 1 mg    [DISCONTINUED] glucose chewable tablet 16 g    [DISCONTINUED] glucose chewable tablet 24 g    [DISCONTINUED] guaiFENesin 12 hr tablet 600 mg    [DISCONTINUED] insulin aspart U-100 pen 0-5 Units    [DISCONTINUED] levothyroxine tablet 50 mcg    [DISCONTINUED]  polyethylene glycol packet 17 g    [DISCONTINUED] sodium chloride 0.9% flush 5 mL    [DISCONTINUED] traMADol tablet 50 mg    [DISCONTINUED] warfarin (COUMADIN) tablet 6 mg     Current Outpatient Medications on File Prior to Encounter   Medication Sig    acetaminophen (TYLENOL) 500 MG tablet Take 500 mg by mouth daily as needed.     albuterol (ACCUNEB) 1.25 mg/3 mL Nebu Take 1.25 mg by nebulization every 6 (six) hours as needed. Rescue    ALPRAZolam (XANAX) 0.25 MG tablet Take 0.25 mg by mouth daily as needed for Anxiety.    amlodipine (NORVASC) 5 MG tablet Take 5 mg by mouth once daily.    atorvastatin (LIPITOR) 40 MG tablet Take 40 mg by mouth once daily.    carvedilol (COREG) 12.5 MG tablet Take 6.25 mg by mouth 2 (two) times daily.    citalopram (CELEXA) 40 MG tablet Take 40 mg by mouth once daily.      ergocalciferol (ERGOCALCIFEROL) 50,000 unit Cap Take 50,000 Units by mouth every 30 days.     escitalopram oxalate (LEXAPRO) 10 MG tablet Take 10 mg by mouth once daily.    glipiZIDE (GLUCOTROL) 2.5 MG TR24 Take 5 mg by mouth daily with breakfast.      guaifenesin-codeine 100-10 mg/5 ml (TUSSI-ORGANIDIN NR)  mg/5 mL syrup Take 5 mLs by mouth 3 (three) times daily as needed for Cough.    levothyroxine (SYNTHROID) 50 MCG tablet Take 50 mcg by mouth once daily.    losartan (COZAAR) 100 MG tablet Take 100 mg by mouth once daily.    ranitidine (ZANTAC) 75 MG tablet Take 75 mg by mouth daily as needed for Heartburn.    senna-docusate 8.6-50 mg (PERICOLACE) 8.6-50 mg per tablet Take 2 tablets by mouth every evening.    traMADol (ULTRAM) 50 mg tablet Take 50 mg by mouth every 6 (six) hours as needed for Pain.    VENTOLIN HFA 90 mcg/actuation inhaler Inhale 2 puffs into the lungs every 4 to 6 hours as needed for Shortness of Breath.     warfarin (COUMADIN) 4 MG tablet Takes 1 tablet by mouth daily except 1/2 tablet on Tuesday and Thursday     Family History     Problem Relation (Age of Onset)     Arthritis Mother    Cancer Mother    Depression Son    Diabetes Son    Drug abuse Daughter    Heart disease Mother, Father    Hypertension Mother, Brother, Daughter, Son    Stroke Mother        Tobacco Use    Smoking status: Former Smoker     Types: Cigarettes     Last attempt to quit: 1954     Years since quittin.7    Smokeless tobacco: Never Used    Tobacco comment: 1 pack for month only for a few years, but second hand smoke from  for decades.    Substance and Sexual Activity    Alcohol use: No    Drug use: No    Sexual activity: Not Currently     Review of Systems   Constitutional: Negative for chills, fatigue and fever.   HENT: Negative for congestion, facial swelling, hearing loss and trouble swallowing.    Eyes: Negative for photophobia and visual disturbance.   Respiratory: Negative for chest tightness, shortness of breath and wheezing.    Cardiovascular: Negative for chest pain, palpitations and leg swelling.   Gastrointestinal: Negative for abdominal pain, blood in stool, constipation, diarrhea, nausea and vomiting.   Endocrine: Negative.    Genitourinary: Negative.    Musculoskeletal: Negative for back pain, joint swelling and myalgias.   Skin: Negative.    Allergic/Immunologic: Negative.    Neurological: Negative for dizziness, facial asymmetry, speech difficulty, weakness and numbness.   Hematological: Negative.    Psychiatric/Behavioral: Negative for agitation, confusion and dysphoric mood. The patient is not nervous/anxious.      Objective:     Vital Signs (Most Recent):  Temp: 97.9 °F (36.6 °C) (18 0804)  Pulse: 86 (18 1206)  Resp: 18 (18 1206)  BP: 130/70 (18 0804)  SpO2: 96 % (18 1206) Vital Signs (24h Range):  Temp:  [97.9 °F (36.6 °C)-98.3 °F (36.8 °C)] 97.9 °F (36.6 °C)  Pulse:  [84-94] 86  Resp:  [18-21] 18  SpO2:  [92 %-97 %] 96 %  BP: (118-130)/(70-94) 130/70     Weight: 93.9 kg (207 lb)  Body mass index is 39.11 kg/m².    Physical Exam    Constitutional: She is oriented to person, place, and time. Vital signs are normal. She appears well-developed and well-nourished.  Non-toxic appearance. She does not appear ill. No distress.   HENT:   Head: Normocephalic and atraumatic.   Eyes: Conjunctivae and EOM are normal. Pupils are equal, round, and reactive to light. No scleral icterus.   Neck: Normal range of motion and full passive range of motion without pain. Neck supple. No JVD present. Carotid bruit is not present. No thyromegaly present.   Cardiovascular: Normal rate, regular rhythm, S1 normal, S2 normal, normal heart sounds and normal pulses. Exam reveals no gallop, no S3, no S4 and no friction rub.   No murmur heard.  Pulmonary/Chest: Effort normal and breath sounds normal. No accessory muscle usage. No tachypnea. No respiratory distress. She has no decreased breath sounds. She has no wheezes. She has no rhonchi. She has no rales.   Abdominal: Soft. Normal appearance and bowel sounds are normal. She exhibits no shifting dullness, no distension, no abdominal bruit and no ascites. There is no hepatosplenomegaly. There is no tenderness. There is no rigidity and no guarding.   Musculoskeletal: Normal range of motion. She exhibits no edema or tenderness.        Left knee: She exhibits swelling and effusion.   Neurological: She is alert and oriented to person, place, and time. She has normal strength. She is not disoriented. No cranial nerve deficit or sensory deficit. GCS eye subscore is 4. GCS verbal subscore is 5. GCS motor subscore is 6.   Skin: Skin is warm, dry and intact. No abrasion and no lesion noted.   Psychiatric: She has a normal mood and affect. Her behavior is normal. Judgment and thought content normal. Her mood appears not anxious. Her speech is not slurred. She is not actively hallucinating. Cognition and memory are normal. She does not exhibit a depressed mood. She is attentive.     Significant Labs:   CBC:   Recent Labs   Lab   08/24/18   0408  08/25/18   0624   WBC  7.96  8.22   HGB  13.0  12.7   HCT  41.3  40.0   PLT  212  228     CMP:   Recent Labs   Lab  08/24/18 0408  08/25/18 0624   NA  138  137   K  5.0  4.8   CL  101  101   CO2  27  25   GLU  187*  194*   BUN  22  22   CREATININE  1.2  1.2   CALCIUM  9.7  9.6   ANIONGAP  10  11   EGFRNONAA  41.9*  41.9*       Significant Imaging: I have reviewed all pertinent imaging results/findings within the past 24 hours.

## 2018-08-25 NOTE — ASSESSMENT & PLAN NOTE
Lab Results   Component Value Date    CREATININE 1.2 08/25/2018     Sr Cr stable  Cont to monitor with biweekly labs   Avoid nephrotoxins.   Holding Losartan   Renally dose all medications

## 2018-08-25 NOTE — PT/OT/SLP EVAL
PhysicalTherapy   Evaluation/treatment    Claribel Moran   MRN: [id number]     PT Received On: 18  PT Start Time: 1135     PT Stop Time: 1215    PT Total Time (min): 40 min       Billable Minutes:  Evaluation 1, Gait Training 10 and Therapeutic Exercise 20    Diagnosis: Debility- effusion of knee after fall (fell out of bed)  Past Medical History:   Diagnosis Date    Acid reflux 2013    Anticoagulant long-term use     Anxiety 2012    Arthritis     Atrial fibrillation     Blood transfusion     CAD (coronary artery disease) 2013    CHF (congestive heart failure)     CKD (chronic kidney disease) stage 3, GFR 30-59 ml/min 2013    Dry mouth     Encounter for blood transfusion     Hepatitis B 2013    Hyperlipidemia 2012    Hypertension 2012    Reactive airway disease with wheezing 2013    Sleep apnea     Thyroid disease     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    Type II diabetes mellitus 2012    Vitamin D deficiency disease 2012      Past Surgical History:   Procedure Laterality Date    ABDOMINAL SURGERY       SECTION, CLASSIC      FRACTURE SURGERY      ankles, ribs    HYSTERECTOMY      KIDNEY STONE SURGERY      TOTAL KNEE ARTHROPLASTY Right     Right knee    TOTAL KNEE ARTHROPLASTY Left 02/15/2013         General Precautions: Standard, fall  Orthopedic Precautions: N/A   Braces: N/A    Do you have any cultural, spiritual, Mandaeism conflicts, given your current situation?: no    Patient History:  Lives With: (Daughter and grandson live with her)  Living Arrangements: house  Home Accessibility: stairs to enter home  Home Layout: Able to live on 1st floor  Number of Stairs to Enter Home: 9  Stair Railings at Home: outside, present at both sides  Transportation Available: family or friend will provide  Living Environment Comment: Lives in [address] , [address] ; grandson, Shakir, and daughter, Soraya, live with her. They both work (but  "opposite times of day). Someone is essentially with her at all times. They live in [place], [place]. Uses a SPC and/or rolling walker in community.   Equipment Currently Used at Home: 3-in-1 commode, bath bench, cane, straight, walker, rolling  DME owned (not currently used): none    Previous Level of Function:  Ambulation Skills: independent  Transfer Skills: independent  ADL Skills: needs device  Work/Leisure Activity: needs assist    Subjective:  Communicated with pt prior to session.  "There's always someone running in and out."  Chief Complaint: Pain in knee (except for at time of therapy due to pain medication).  Patient goals: None stated    Pain/Comfort  Pain Rating 1: 0/10    Objective:  Patient found seated in wheelchair.      Cognitive Exam:  Oriented to: Person, place situation and time  Follows Commands/attention: Follows multistep  commands  Communication: clear/fluent  Safety awareness/insight to disability: intact    Physical Exam:  Postural examination/scapula alignment: -       Rounded shoulders  -       Forward head    Skin integrity: Visible skin intact  Edema: Moderate medial L knee    Sensation:   -       Intact    Upper Extremity Range of Motion:  Right Upper Extremity: WNL  Left Upper Extremity: WNL    Upper Extremity Strength:  Right Upper Extremity: WNL  Left Upper Extremity: WNL    Lower Extremity Range of Motion:  Right Lower Extremity: WNL  Left Lower Extremity: WNL    Lower Extremity Strength:  Right Lower Extremity: WNL  Left Lower Extremity: WNL     Fine motor coordination:  -       Intact    Gross motor coordination: WFL    Functional Status:  MDS G  Transfer Functional Status: CGA-Min (A)  Walk in Room Functional Status: CGA-Min (A)(within parallel bars)  Eval Only: Number of L/E limb <4/5 MMT: 0  Moving from seated to standing position: Not steady, only able to stabilize with staff assistance  Walking (with assistive device if used): Not steady, only able to stabilize with staff " assistance  Turning around and facing the opposite direction while walking: Not steady, only able to stabilize with staff assistance  Moving on and off the toilet: Not steady, only able to stabilize with staff assistance  Surface-to-surface transfer (transfer between bed and chair or wheelchair): Not steady, only able to stabilize with staff assistance         AM-PAC 6 CLICK MOBILITY  Total Score:14    Transfers:  Sit<>Stand: CGA from wheelchair      Gait:  Amb 9 feet x 2 trials (with a seated rest break in between) using parallel bars for UE support with step-to gait pattern- CGA. (Likely can try use of RW next session as progression).      Wheelchair Mobility:  Patient propels w/c 150 feet with use of UEs, SBA     Therex:  Seated- hip flexion, long arc quads, ankle pumps with knee extension x 20 reps BLE      Balance:  Needs UE support and CGA while standing.    Additional Treatment:  Family training explained to pt. Educated pt on PT plan of care and purpose of kinesiotape.  Pt's L knee as taped via web-cut at 20% tension for both edema relief and facilitation of quadriceps.     Patient left up in chair with call button in reach.    Assessment:  Claribel Moran is a 83 y.o. female with a medical diagnosis of Debility, s/p fall and effusion of L knee.  Pt presents with weakness, impaired balance, pain upon wegiht-bearing through LLE, and impaired overall functional mobility. Her presentation at this time is stable (her evaluation is thus labeled low complexity), though she does require skilled PT services to progress her mobility from use of parallel bars to use of rolling walker (and possibly no AD in the future).     Rehab identified problem list/impairments: weakness, impaired endurance, impaired self care skills, impaired functional mobilty, gait instability, impaired balance, edema    Rehab potential is good.    Activity tolerance: Good    Discharge recommendations: home health PT     Barriers to discharge:  None    Equipment recommendations: none     GOALS:   Multidisciplinary Problems     Physical Therapy Goals        Problem: Physical Therapy Goal    Goal Priority Disciplines Outcome Goal Variances Interventions   Physical Therapy Goal     PT, PT/OT Ongoing (interventions implemented as appropriate)     Description:  Goals to be met by: 2 weeks ()     Patient will increase functional independence with mobility by performin. Supine to sit with Stand-by Assistance.  2. Sit to supine with Stand-by Assistance.  3. Sit to stand transfer with contact guard assistance.  4. Bed to chair transfer with Minimal Assistance using Rolling Walker.  5. Gait  x 10 feet with Moderate Assistance using Rolling Walker.   6. Wheelchair propulsion x 200 feet with Stand-by Assistance using bilateral upper extremities  7. Ascend/Descend 4 inch curb step with Moderate Assistance using Rolling Walker.  8. Stand for 3 minutes with Contact Guard Assistance using Rolling Walker while performing a task.                      PLAN:    Patient to be seen 5 x/week  to address the above listed problems via gait training, therapeutic activities, therapeutic exercises, neuromuscular re-education, wheelchair management/training  Plan of Care Expires: 18    Berenice Khanna, PT 2018

## 2018-08-25 NOTE — PLAN OF CARE
Problem: Physical Therapy Goal  Goal: Physical Therapy Goal  Goals to be met by: 2 weeks ()     Patient will increase functional independence with mobility by performin. Supine to sit with Stand-by Assistance.  2. Sit to supine with Stand-by Assistance.  3. Sit to stand transfer with contact guard assistance.  4. Bed to chair transfer with Minimal Assistance using Rolling Walker.  5. Gait  x 10 feet with Moderate Assistance using Rolling Walker.   6. Wheelchair propulsion x 200 feet with Stand-by Assistance using bilateral upper extremities  7. Ascend/Descend 4 inch curb step with Moderate Assistance using Rolling Walker.  8. Stand for 3 minutes with Contact Guard Assistance using Rolling Walker while performing a task.    Outcome: Ongoing (interventions implemented as appropriate)  Goals set based on presentation and prior level of function.

## 2018-08-25 NOTE — ASSESSMENT & PLAN NOTE
Secondary to trauma  Xray with no fracture or hardware failure.   Cont conservative care with cold compress, leg elevation, anti inflammatory and pain control  PT/OT as tolerated  Consider ortho consult if no improvement for arthrocentesis.

## 2018-08-25 NOTE — PLAN OF CARE
Problem: Fall Risk (Adult)  Goal: Absence of Falls  Patient will demonstrate the desired outcomes by discharge/transition of care.  Outcome: Ongoing (interventions implemented as appropriate)  Utilizes nurse call light when assistance is needed. Call light remains within reach. Remains free of falls, injury or trauma. Will continue to monitor.

## 2018-08-25 NOTE — ASSESSMENT & PLAN NOTE
Cont with current dose of levothyroxine to treat   Lab Results   Component Value Date    TSH 2.02 12/06/2017    [id number] 4.6 09/05/2011    FREET4 0.76 09/05/2011     Current treatment plan effective  No change in therapy  Repeat TSH as outpatient with PCP

## 2018-08-25 NOTE — NURSING
Patient was transported via wheelchair on 2L of oxygen. She left aaox4, IV d/c, with all of her belongings.

## 2018-08-25 NOTE — ASSESSMENT & PLAN NOTE
Cont with PT/OT for gait training and strengthening and restoration of ADL's   Fall precautions   Cont DVT prophylaxis with coumadin.  Anticoagulants   Medication Route Frequency    warfarin (COUMADIN) tablet 5 mg Oral Daily     Lab Results   Component Value Date    INR 2.0 (H) 08/25/2018    INR 1.6 (H) 08/24/2018    INR 1.6 (H) 08/23/2018

## 2018-08-25 NOTE — HPI
Chief Complaint/Reason for Admission: Debility    History of Present Illness:  Patient is a 83 y.o. female who has a past medical history of Acid reflux, Anxiety, Arthritis, Atrial fibrillation with Anticoagulant long-term use, coronary artery disease, CHF, CKD stage 3, Hepatitis B, Hyperlipidemia, Hypertension, Reactive airway disease with wheezing, Sleep apnea, Thyroid disease, Total knee replacement status, Type II diabetes mellitus, and Vitamin D deficiency disease presented with left knee pain after mechanical fall. She presented to ED and was discharged home with pain control medications. She presented back to ED for worsening pain. Xray of knee showed moderate left knee suprapatellar synovial complex/effusion. She was treated conservatively and did not require surgical consultation. Patient has been working with PT/OT who recommend SNF for further balance/mobility training.

## 2018-08-26 LAB
INR PPP: 2.1
POCT GLUCOSE: 195 MG/DL (ref 70–110)
POCT GLUCOSE: 216 MG/DL (ref 70–110)
POCT GLUCOSE: 222 MG/DL (ref 70–110)
POCT GLUCOSE: 286 MG/DL (ref 70–110)
POCT GLUCOSE: 348 MG/DL (ref 70–110)
PROTHROMBIN TIME: 20.8 SEC

## 2018-08-26 PROCEDURE — 99900035 HC TECH TIME PER 15 MIN (STAT)

## 2018-08-26 PROCEDURE — 25000003 PHARM REV CODE 250: Performed by: STUDENT IN AN ORGANIZED HEALTH CARE EDUCATION/TRAINING PROGRAM

## 2018-08-26 PROCEDURE — 94761 N-INVAS EAR/PLS OXIMETRY MLT: CPT

## 2018-08-26 PROCEDURE — 94660 CPAP INITIATION&MGMT: CPT

## 2018-08-26 PROCEDURE — 94640 AIRWAY INHALATION TREATMENT: CPT

## 2018-08-26 PROCEDURE — 25000003 PHARM REV CODE 250: Performed by: HOSPITALIST

## 2018-08-26 PROCEDURE — 85610 PROTHROMBIN TIME: CPT

## 2018-08-26 PROCEDURE — 25000242 PHARM REV CODE 250 ALT 637 W/ HCPCS: Performed by: STUDENT IN AN ORGANIZED HEALTH CARE EDUCATION/TRAINING PROGRAM

## 2018-08-26 PROCEDURE — 11000004 HC SNF PRIVATE

## 2018-08-26 PROCEDURE — 27000221 HC OXYGEN, UP TO 24 HOURS

## 2018-08-26 RX ADMIN — TRAMADOL HYDROCHLORIDE 50 MG: 50 TABLET, COATED ORAL at 10:08

## 2018-08-26 RX ADMIN — INSULIN ASPART 1 UNITS: 100 INJECTION, SOLUTION INTRAVENOUS; SUBCUTANEOUS at 09:08

## 2018-08-26 RX ADMIN — TRAMADOL HYDROCHLORIDE 50 MG: 50 TABLET, COATED ORAL at 05:08

## 2018-08-26 RX ADMIN — IPRATROPIUM BROMIDE AND ALBUTEROL SULFATE 3 ML: .5; 3 SOLUTION RESPIRATORY (INHALATION) at 07:08

## 2018-08-26 RX ADMIN — FLUTICASONE FUROATE AND VILANTEROL TRIFENATATE 1 PUFF: 100; 25 POWDER RESPIRATORY (INHALATION) at 09:08

## 2018-08-26 RX ADMIN — IPRATROPIUM BROMIDE AND ALBUTEROL SULFATE 3 ML: .5; 3 SOLUTION RESPIRATORY (INHALATION) at 03:08

## 2018-08-26 RX ADMIN — INSULIN ASPART 4 UNITS: 100 INJECTION, SOLUTION INTRAVENOUS; SUBCUTANEOUS at 12:08

## 2018-08-26 RX ADMIN — IPRATROPIUM BROMIDE AND ALBUTEROL SULFATE 3 ML: .5; 3 SOLUTION RESPIRATORY (INHALATION) at 11:08

## 2018-08-26 RX ADMIN — ATORVASTATIN CALCIUM 40 MG: 20 TABLET, FILM COATED ORAL at 09:08

## 2018-08-26 RX ADMIN — DIPHENHYDRAMINE HYDROCHLORIDE 25 MG: 25 CAPSULE ORAL at 10:08

## 2018-08-26 RX ADMIN — INSULIN ASPART 2 UNITS: 100 INJECTION, SOLUTION INTRAVENOUS; SUBCUTANEOUS at 05:08

## 2018-08-26 RX ADMIN — WARFARIN SODIUM 5 MG: 2.5 TABLET ORAL at 04:08

## 2018-08-26 RX ADMIN — LEVOTHYROXINE SODIUM 50 MCG: 50 TABLET ORAL at 06:08

## 2018-08-26 RX ADMIN — GUAIFENESIN 600 MG: 600 TABLET, EXTENDED RELEASE ORAL at 02:08

## 2018-08-26 RX ADMIN — DIPHENHYDRAMINE HYDROCHLORIDE 25 MG: 25 CAPSULE ORAL at 04:08

## 2018-08-26 RX ADMIN — AMLODIPINE BESYLATE 5 MG: 5 TABLET ORAL at 09:08

## 2018-08-27 ENCOUNTER — PATIENT OUTREACH (OUTPATIENT)
Dept: ADMINISTRATIVE | Facility: CLINIC | Age: 83
End: 2018-08-27

## 2018-08-27 LAB
ANION GAP SERPL CALC-SCNC: 9 MMOL/L
BASOPHILS # BLD AUTO: 0.05 K/UL
BASOPHILS NFR BLD: 0.7 %
BUN SERPL-MCNC: 18 MG/DL
CALCIUM SERPL-MCNC: 9.5 MG/DL
CHLORIDE SERPL-SCNC: 102 MMOL/L
CO2 SERPL-SCNC: 28 MMOL/L
CREAT SERPL-MCNC: 1.2 MG/DL
DIFFERENTIAL METHOD: ABNORMAL
EOSINOPHIL # BLD AUTO: 0.3 K/UL
EOSINOPHIL NFR BLD: 4 %
ERYTHROCYTE [DISTWIDTH] IN BLOOD BY AUTOMATED COUNT: 15.5 %
EST. GFR  (AFRICAN AMERICAN): 48.3 ML/MIN/1.73 M^2
EST. GFR  (NON AFRICAN AMERICAN): 41.9 ML/MIN/1.73 M^2
GLUCOSE SERPL-MCNC: 187 MG/DL
HCT VFR BLD AUTO: 39.3 %
HGB BLD-MCNC: 12.1 G/DL
IMM GRANULOCYTES # BLD AUTO: 0.06 K/UL
IMM GRANULOCYTES NFR BLD AUTO: 0.8 %
INR PPP: 2.3
LYMPHOCYTES # BLD AUTO: 1.6 K/UL
LYMPHOCYTES NFR BLD: 21.4 %
MAGNESIUM SERPL-MCNC: 1.8 MG/DL
MCH RBC QN AUTO: 28.8 PG
MCHC RBC AUTO-ENTMCNC: 30.8 G/DL
MCV RBC AUTO: 94 FL
MONOCYTES # BLD AUTO: 0.6 K/UL
MONOCYTES NFR BLD: 8.2 %
NEUTROPHILS # BLD AUTO: 5 K/UL
NEUTROPHILS NFR BLD: 64.9 %
NRBC BLD-RTO: 0 /100 WBC
PHOSPHATE SERPL-MCNC: 3.2 MG/DL
PLATELET # BLD AUTO: 232 K/UL
PMV BLD AUTO: 11 FL
POCT GLUCOSE: 174 MG/DL (ref 70–110)
POCT GLUCOSE: 199 MG/DL (ref 70–110)
POCT GLUCOSE: 227 MG/DL (ref 70–110)
POCT GLUCOSE: 239 MG/DL (ref 70–110)
POTASSIUM SERPL-SCNC: 4.7 MMOL/L
PROTHROMBIN TIME: 22.2 SEC
RBC # BLD AUTO: 4.2 M/UL
SODIUM SERPL-SCNC: 139 MMOL/L
WBC # BLD AUTO: 7.67 K/UL

## 2018-08-27 PROCEDURE — 83735 ASSAY OF MAGNESIUM: CPT

## 2018-08-27 PROCEDURE — 97530 THERAPEUTIC ACTIVITIES: CPT

## 2018-08-27 PROCEDURE — 36415 COLL VENOUS BLD VENIPUNCTURE: CPT

## 2018-08-27 PROCEDURE — 85610 PROTHROMBIN TIME: CPT

## 2018-08-27 PROCEDURE — 11000004 HC SNF PRIVATE

## 2018-08-27 PROCEDURE — 97802 MEDICAL NUTRITION INDIV IN: CPT

## 2018-08-27 PROCEDURE — 97535 SELF CARE MNGMENT TRAINING: CPT

## 2018-08-27 PROCEDURE — 97116 GAIT TRAINING THERAPY: CPT

## 2018-08-27 PROCEDURE — 94640 AIRWAY INHALATION TREATMENT: CPT

## 2018-08-27 PROCEDURE — 99309 SBSQ NF CARE MODERATE MDM 30: CPT | Mod: ,,, | Performed by: NURSE PRACTITIONER

## 2018-08-27 PROCEDURE — 85025 COMPLETE CBC W/AUTO DIFF WBC: CPT

## 2018-08-27 PROCEDURE — 84100 ASSAY OF PHOSPHORUS: CPT

## 2018-08-27 PROCEDURE — 25000242 PHARM REV CODE 250 ALT 637 W/ HCPCS: Performed by: STUDENT IN AN ORGANIZED HEALTH CARE EDUCATION/TRAINING PROGRAM

## 2018-08-27 PROCEDURE — 27000221 HC OXYGEN, UP TO 24 HOURS

## 2018-08-27 PROCEDURE — 80048 BASIC METABOLIC PNL TOTAL CA: CPT

## 2018-08-27 PROCEDURE — 97110 THERAPEUTIC EXERCISES: CPT

## 2018-08-27 PROCEDURE — 94761 N-INVAS EAR/PLS OXIMETRY MLT: CPT

## 2018-08-27 PROCEDURE — 25000003 PHARM REV CODE 250: Performed by: STUDENT IN AN ORGANIZED HEALTH CARE EDUCATION/TRAINING PROGRAM

## 2018-08-27 PROCEDURE — 99900035 HC TECH TIME PER 15 MIN (STAT)

## 2018-08-27 PROCEDURE — 25000003 PHARM REV CODE 250: Performed by: HOSPITALIST

## 2018-08-27 PROCEDURE — 94660 CPAP INITIATION&MGMT: CPT

## 2018-08-27 RX ORDER — GLIPIZIDE 2.5 MG/1
2.5 TABLET, EXTENDED RELEASE ORAL
Status: DISCONTINUED | OUTPATIENT
Start: 2018-08-27 | End: 2018-08-29

## 2018-08-27 RX ADMIN — IPRATROPIUM BROMIDE AND ALBUTEROL SULFATE 3 ML: .5; 3 SOLUTION RESPIRATORY (INHALATION) at 03:08

## 2018-08-27 RX ADMIN — TRAMADOL HYDROCHLORIDE 50 MG: 50 TABLET, COATED ORAL at 10:08

## 2018-08-27 RX ADMIN — IPRATROPIUM BROMIDE AND ALBUTEROL SULFATE 3 ML: .5; 3 SOLUTION RESPIRATORY (INHALATION) at 07:08

## 2018-08-27 RX ADMIN — WARFARIN SODIUM 5 MG: 2.5 TABLET ORAL at 05:08

## 2018-08-27 RX ADMIN — FLUTICASONE FUROATE AND VILANTEROL TRIFENATATE 1 PUFF: 100; 25 POWDER RESPIRATORY (INHALATION) at 08:08

## 2018-08-27 RX ADMIN — DIPHENHYDRAMINE HYDROCHLORIDE 25 MG: 25 CAPSULE ORAL at 10:08

## 2018-08-27 RX ADMIN — TRAMADOL HYDROCHLORIDE 50 MG: 50 TABLET, COATED ORAL at 03:08

## 2018-08-27 RX ADMIN — INSULIN ASPART 2 UNITS: 100 INJECTION, SOLUTION INTRAVENOUS; SUBCUTANEOUS at 12:08

## 2018-08-27 RX ADMIN — IPRATROPIUM BROMIDE AND ALBUTEROL SULFATE 3 ML: .5; 3 SOLUTION RESPIRATORY (INHALATION) at 08:08

## 2018-08-27 RX ADMIN — LEVOTHYROXINE SODIUM 50 MCG: 50 TABLET ORAL at 06:08

## 2018-08-27 RX ADMIN — ATORVASTATIN CALCIUM 40 MG: 20 TABLET, FILM COATED ORAL at 08:08

## 2018-08-27 RX ADMIN — DIPHENHYDRAMINE HYDROCHLORIDE 25 MG: 25 CAPSULE ORAL at 03:08

## 2018-08-27 RX ADMIN — AMLODIPINE BESYLATE 5 MG: 5 TABLET ORAL at 08:08

## 2018-08-27 RX ADMIN — INSULIN ASPART 1 UNITS: 100 INJECTION, SOLUTION INTRAVENOUS; SUBCUTANEOUS at 10:08

## 2018-08-27 RX ADMIN — GUAIFENESIN 600 MG: 600 TABLET, EXTENDED RELEASE ORAL at 12:08

## 2018-08-27 NOTE — HOSPITAL COURSE
8/27/18  Patient seen at bedside, she reports pain is controlled with prescribed regimen.  She states she is eating, drinking, voiding and having BM without issues.  She states she resides with her daughter and grandchild and plans to return to same.  Discussed her elevated glucose readings, she reports she has not taken her home dosing of glipizide in a month due to getting it paid for by insurance company.  No other issues at this time.      9/4/18  Patient seen at bedside, she has no complaints today.

## 2018-08-27 NOTE — ASSESSMENT & PLAN NOTE
Pulse Readings from Last 3 Encounters:   08/27/18 81   08/24/18 84   08/16/18 89     BB discontinued due to hypotension  Cont to hold and restart if needed  Cont anticoagulation with coumadin  Stop Lovenox as INR therapeutic.     8/27/18  HR is stable and BP is stable.  B-blocker stopped due to hypotension.  Continue Coumadin, INR target is 2-3.  INR is therapeutic.  Lab Results   Component Value Date    INR 2.3 (H) 08/27/2018    INR 2.1 (H) 08/26/2018    INR 2.0 (H) 08/25/2018

## 2018-08-27 NOTE — ASSESSMENT & PLAN NOTE
Cont Breo inhaler  Cont duo nebs  Supplemental oxygen as needed.    8/27/18  No acute issues.  Continue duo nebs and Breo as ordered.

## 2018-08-27 NOTE — SUBJECTIVE & OBJECTIVE
Interval History:   8/27/18  Patient seen at bedside, she reports pain is controlled with prescribed regimen.  She states she is eating, drinking, voiding and having BM without issues.  She states she resides with her daughter and grandchild and plans to return to same.  Discussed her elevated glucose readings, she reports she has not taken her home dosing of glipizide in a month due to getting it paid for by insurance company.  No other issues at this time.          Review of Systems   Constitutional: Negative for appetite change, fatigue and fever.   Respiratory: Negative for cough and shortness of breath.    Cardiovascular: Negative for chest pain, palpitations and leg swelling.   Gastrointestinal: Negative for abdominal pain, constipation, diarrhea, nausea and vomiting.   Endocrine: Negative for polydipsia, polyphagia and polyuria.   Genitourinary: Negative for dysuria and frequency.   Musculoskeletal: Positive for arthralgias.   Skin: Negative for rash.   Psychiatric/Behavioral: Negative for confusion and hallucinations.     Objective:     Vital Signs (Most Recent):  Temp: 98.4 °F (36.9 °C) (08/27/18 0825)  Pulse: 81 (08/27/18 0826)  Resp: 18 (08/27/18 0826)  BP: 134/68 (08/27/18 0825)  SpO2: 95 % (08/27/18 0826) Vital Signs (24h Range):  Temp:  [98.4 °F (36.9 °C)] 98.4 °F (36.9 °C)  Pulse:  [] 81  Resp:  [18-22] 18  SpO2:  [94 %-97 %] 95 %  BP: (129-134)/(68-76) 134/68     Weight: 93.6 kg (206 lb 5.6 oz)  Body mass index is 38.99 kg/m².  No intake or output data in the 24 hours ending 08/27/18 1201   Physical Exam   Constitutional: She is oriented to person, place, and time. She appears well-developed and well-nourished.   Cardiovascular: Normal rate, regular rhythm, normal heart sounds and intact distal pulses.   No murmur heard.  Pulmonary/Chest: Effort normal and breath sounds normal. No respiratory distress.   Abdominal: Soft. Normal appearance and bowel sounds are normal. She exhibits no distension.  There is no tenderness.   Musculoskeletal: Normal range of motion. She exhibits no edema.   Neurological: She is alert and oriented to person, place, and time. She has normal strength.   Skin: Skin is warm, dry and intact. Capillary refill takes less than 2 seconds. No erythema.   Psychiatric: She has a normal mood and affect.       Significant Labs:   BMP:   Recent Labs   Lab  08/27/18   0620   GLU  187*   NA  139   K  4.7   CL  102   CO2  28   BUN  18   CREATININE  1.2   CALCIUM  9.5   MG  1.8     CBC:   Recent Labs   Lab  08/27/18   0620   WBC  7.67   HGB  12.1   HCT  39.3   PLT  232       Significant Imaging: n/a

## 2018-08-27 NOTE — ASSESSMENT & PLAN NOTE
Secondary to trauma  Xray with no fracture or hardware failure.   Cont conservative care with cold compress, leg elevation, anti inflammatory and pain control  PT/OT as tolerated  Consider ortho consult if no improvement for arthrocentesis.     8/27/18  Pain well controlled.  Continue use of cool compress, leg elevation, and Tramadol as ordered.

## 2018-08-27 NOTE — PT/OT/SLP PROGRESS
Occupational Therapy  Treatment    Claribel Moran   MRN: [id number]   Admitting Diagnosis: Debility    OT Date of Treatment: 08/27/18  Total Time (min): 50 min    Billable Minutes:  Self Care/Home Management 25, Therapeutic Activity 10 and Therapeutic Exercise 15    General Precautions: Standard, fall  Orthopedic Precautions: N/A  Braces: N/A    Do you have any cultural, spiritual, Spiritism conflicts, given your current situation?: none stated    Subjective:  Communicated with nurse prior to session.      Pain/Comfort  Pain Rating 1: 5/10  Location - Side 1: Left  Location - Orientation 1: generalized  Location 1: knee  Pain Addressed 1: Reposition, Distraction  Pain Rating Post-Intervention 1: 5/10    Objective:  Patient found with: oxygen    Functional Status:  MDS G  Bed Mobility Functional Status: S-SBA(supine to sitting and scooting in bed.)    Transfer Functional Status: CGA-Min (A)(Sit to standing from EOB, W/C and BSC to RW.  SPT with RW from EOB<> W/C  and W/C<> BSC .)    Dressing Functional Status: CGA   (UBD performed Mod (I)ly and LBD CGA donning pants and socks using reacher   and sock aide.)    Personal Hygiene Functional Status: S-SBA(seated sinkside to groom.)      Moving from seated to standing position: Not steady, only able to stabilize with staff assistance  Walking (with assistive device if used): Not steady, only able to stabilize with staff assistance  Turning around and facing the opposite direction while walking: Not steady, only able to stabilize with staff assistance  Moving on and off the toilet: Not steady, only able to stabilize with staff assistance  Surface-to-surface transfer (transfer between bed and chair or wheelchair): Not steady, only able to stabilize with staff assistance           AMPA 6 Click:  AMPAC Total Score: 21    OT Exercises: UE Ergometer performed 15 minutes on UE UBE with Mod resistance.  UE exercises performed to increase functional endurance and strength.   Strengthening required in order increase independence when performing self care tasks, W/C propulsion , functional standing activities as well as when performing functional tasks.    Additional Treatment:  - White board updated  - Self care tasks completed-- as noted above   - She performed dynamic sitting activity incorporating reaching in all planes while sitting in W/C and working on self care tasks.    Patient left up in chair with call button in reach and nurse notified    ASSESSMENT:  Claribel Moran is a 83 y.o. female with a medical diagnosis of Debility .  Pt was agreeable to OT and tolerated Tx without incidence but continues to require (A) to perform functional activities to completion. OT intervention required to address performance deficits affecting performance of self care tasks, functional mobility, functional transfers, functional strength and endurance to perform ADLS.  Pt is making progress but continues to require OT Intervention to perform functional transfers, functional standing activities, and self care tasks with standing component more independently. OT is recommended to further her functional (I)ce and safety. Goals remain appropriate and continued OT is recommended.      Rehab identified problem list/impairments: weakness, impaired endurance, impaired self care skills, impaired functional mobilty, gait instability, impaired balance, decreased lower extremity function, decreased safety awareness, pain, decreased ROM, orthopedic precautions    Rehab potential is good    Activity tolerance: Good    Discharge recommendations: home with home health     Barriers to discharge: None     Equipment recommendations: none     GOALS:   Multidisciplinary Problems     Occupational Therapy Goals        Problem: Occupational Therapy Goal    Goal Priority Disciplines Outcome Interventions   Occupational Therapy Goal     OT, PT/OT Ongoing (interventions implemented as appropriate)    Description:  Goals to  be met by: 14 days     Patient will increase functional independence with ADLs by performing:    Feeding with Modified [place].    Met  UE Dressing with Modified [place].   Met  LE Dressing with Stand-by Assistance.  Grooming while standing with Stand-by Assistance with RW to steady.  Toileting from bedside commode with Supervision for hygiene and clothing management and RW to steady. .   Bathing from  shower chair/bench with Supervision.  Rolling to Bilateral with Modified [place].   Supine to sit with Modified [place].  Stand pivot transfers with Supervision.  Upper extremity exercise program x10 reps per set, with supervision.  Pt will tolerate up to 10 minutes of functional standing activity with (S) and RW for safety.                       Plan:  Patient to be seen 5 x/week to address the above listed problems via self-care/home management, therapeutic activities, therapeutic exercises  Plan of Care expires: 09/25/18  Plan of Care reviewed with: patient    Ramos Roca, OTR/JENISE  08/27/2018

## 2018-08-27 NOTE — PLAN OF CARE
Problem: Occupational Therapy Goal  Goal: Occupational Therapy Goal  Goals to be met by: 14 days     Patient will increase functional independence with ADLs by performing:    Feeding with Modified [place].    Met  UE Dressing with Modified [place].   Met  LE Dressing with Stand-by Assistance.  Grooming while standing with Stand-by Assistance with RW to steady.  Toileting from bedside commode with Supervision for hygiene and clothing management and RW to steady. .   Bathing from  shower chair/bench with Supervision.  Rolling to Bilateral with Modified [place].   Supine to sit with Modified [place].  Stand pivot transfers with Supervision.  Upper extremity exercise program x10 reps per set, with supervision.  Pt will tolerate up to 10 minutes of functional standing activity with (S) and RW for safety.     Outcome: Ongoing (interventions implemented as appropriate)  Ramos Roca, OTR/L      8/27/2018

## 2018-08-27 NOTE — CLINICAL REVIEW
Clinical Pharmacy Chart Review Note      Admit Date: 8/24/2018   LOS: 3 days       Claribel Moran is a 83 y.o. female admitted to SNF for PT/OT after hospitalization for debility.    Active Hospital Problems    Diagnosis  POA    *Debility [R53.81]  Unknown    Acquired hypothyroidism [E03.9]  Yes    Effusion of knee [M25.469]  Yes    Sleep apnea [G47.30]  Yes    Anticoagulated on Coumadin [Z51.81, Z79.01]  Not Applicable     Chronic    Reactive airway disease with wheezing [J45.909]  Unknown    CKD (chronic kidney disease) stage 3, GFR 30-59 ml/min [N18.3]  Yes     Chronic    Chronic atrial fibrillation [I48.2]  Yes     Chronic    Diabetes mellitus type II, non insulin dependent [E11.9]  Yes     Chronic    Hypertension [I10]  Yes    Hyperlipidemia [E78.5]  Yes      Resolved Hospital Problems   No resolved problems to display.     Review of patient's allergies indicates:  No Known Allergies  Patient Active Problem List    Diagnosis Date Noted    Acquired hypothyroidism 08/25/2018    Effusion of knee 08/18/2018    Abnormal CXR 06/13/2017    Sleep apnea 06/13/2017    Restless legs 06/13/2017    Diastolic dysfunction, left ventricle 12/31/2013    Abdominal pain, right lateral 12/30/2013    Dyspnea 12/29/2013    Anticoagulated on Coumadin 12/29/2013    TIA (transient ischemic attack) 12/28/2013    Debility 02/20/2013    Reactive airway disease with wheezing 02/19/2013    CKD (chronic kidney disease) stage 3, GFR 30-59 ml/min 02/14/2013    CAD (coronary artery disease) 02/14/2013    Chronic atrial fibrillation 12/17/2012    Diabetes mellitus type II, non insulin dependent 12/17/2012    Hypertension 12/17/2012    Hyperlipidemia 12/17/2012       Scheduled Meds:    albuterol-ipratropium  3 mL Nebulization Q4H WAKE    amLODIPine  5 mg Oral Daily    atorvastatin  40 mg Oral Daily    fluticasone-vilanterol  1 puff Inhalation Daily    glipiZIDE  2.5 mg Oral Daily with breakfast    levothyroxine   50 mcg Oral Before breakfast    polyethylene glycol  17 g Oral BID    warfarin  5 mg Oral Daily     Continuous Infusions:   PRN Meds: acetaminophen, dextrose 50%, dextrose 50%, diphenhydrAMINE, glucagon (human recombinant), glucose, glucose, guaiFENesin, insulin aspart U-100, traMADol    OBJECTIVE:     Vital Signs (Last 24H)  Temp:  [98.4 °F (36.9 °C)]   Pulse:  []   Resp:  [18-22]   BP: (129-134)/(68-76)   SpO2:  [94 %-97 %]     Laboratory:  CBC:   Recent Labs   Lab  08/24/18   0408  08/25/18   0624 08/27/18   0620   WBC  7.96  8.22  7.67   RBC  4.48  4.36  4.20   HGB  13.0  12.7  12.1   HCT  41.3  40.0  39.3   PLT  212  228  232   MCV  92  92  94   MCH  29.0  29.1  28.8   MCHC  31.5*  31.8*  30.8*     BMP:   Recent Labs   Lab  08/24/18   0408  08/25/18   0624  08/27/18   0620   GLU  187*  194*  187*   NA  138  137  139   K  5.0  4.8  4.7   CL  101  101  102   CO2  27  25  28   BUN  22  22  18   CREATININE  1.2  1.2  1.2   CALCIUM  9.7  9.6  9.5   MG  1.9  2.0  1.8     CMP:   Recent Labs   Lab  08/24/18   0408  08/25/18   0624  08/27/18   0620   GLU  187*  194*  187*   CALCIUM  9.7  9.6  9.5   NA  138  137  139   K  5.0  4.8  4.7   CO2  27  25  28   CL  101  101  102   BUN  22  22  18   CREATININE  1.2  1.2  1.2     LFTs: No results for input(s): ALT, AST, ALKPHOS, BILITOT, PROT, ALBUMIN in the last 168 hours.  Coagulation:   Recent Labs   Lab  08/25/18   0624 08/26/18   0609  08/27/18   0620   INR  2.0*  2.1*  2.3*         ASSESSMENT/PLAN:     Active Hospital Problems    Diagnosis    *Debility   --PT/OT: APAP 650 mg q6hrs prn mild, moderate pain; tramadol 50 mg q6hrs prn severe pain  --bowel regimen for constipation; hold for loose or frequent stools: Miralax twice daily  --DVT prophylaxis: Warfarin       Acquired hypothyroidism   --Levothyroxine 50 mcg daily  Lab Results   Component Value Date    TSH 2.02 12/06/2017         Effusion of knee   --Secondary to trauma  --See pain management above       Sleep apnea   --Continue CPAP qHS      Reactive airway disease with wheezing   --Albuterol-ipratropium 2.5-0.5 mg/3ml per neb q4hrs while awake  --Fluticasone-vilanterol 100-25 mcg 1 inhalation daily  --Guaifenesin 12hr 600 mg twice daily prn expectorant      CKD (chronic kidney disease) stage 3, GFR 30-59 ml/min  **Monitor renal function and adjust medications accordingly  8/27/2018 Estimated Creatinine Clearance: 37.1 mL/min (based on SCr of 1.2 mg/dL). Meds reviewed, no adj needed.      Chronic atrial fibrillation   **Monitor pulse  --Warfarin 5 mg daily for anticoagulation (monitor INR daily and adjust dose accordingly)    Lab Results   Component Value Date    INR 2.3 (H) 08/27/2018    INR 2.1 (H) 08/26/2018    INR 2.0 (H) 08/25/2018         Diabetes mellitus type II, non insulin dependent   **Monitor blood glucose  --SSI  --Glipizide 24hr 2.5 mg daily with breakfast    Lab Results   Component Value Date    HGBA1C 8.5 (H) 08/18/2018     POCT Glucose   Date Value Ref Range Status   08/27/2018 227 (H) 70 - 110 mg/dL Final   08/27/2018 174 (H) 70 - 110 mg/dL Final   08/26/2018 222 (H) 70 - 110 mg/dL Final   08/26/2018 216 (H) 70 - 110 mg/dL Final   08/26/2018 348 (H) 70 - 110 mg/dL Final   08/26/2018 195 (H) 70 - 110 mg/dL Final   08/25/2018 286 (H) 70 - 110 mg/dL Final   08/25/2018 306 (H) 70 - 110 mg/dL Final   08/25/2018 267 (H) 70 - 110 mg/dL Final   08/25/2018 174 (H) 70 - 110 mg/dL Final   08/24/2018 287 (H) 70 - 110 mg/dL Final   08/24/2018 354 (H) 70 - 110 mg/dL Final         Hypertension  **Monitor BP  --Amlodipine 5 mg daily    BP Readings from Last 3 Encounters:   08/27/18 134/68   08/24/18 132/87   08/16/18 121/67         Hyperlipidemia   --Atorvastatin 40 mg daily  Lab Results   Component Value Date    CHOL 156 12/06/2017    CHOL 146 12/28/2013    CHOL 245 (H) 05/07/2005     Lab Results   Component Value Date    HDL 46 12/06/2017    HDL 50 12/28/2013    HDL 57.0 05/07/2005     Lab Results    Component Value Date    LDLCALC 82 12/06/2017    LDLCALC 66.2 12/28/2013    LDLCALC 151.4 (H) 05/07/2005     Lab Results   Component Value Date    TRIG 138 12/06/2017    TRIG 149 12/28/2013    TRIG 183 (H) 05/07/2005     Lab Results   Component Value Date    CHOLHDL 29.5 12/06/2017    CHOLHDL 34.2 12/28/2013    CHOLHDL 23.3 05/07/2005     Lab Results   Component Value Date    ALT 23 08/18/2018    AST 27 08/18/2018    ALKPHOS 93 08/18/2018    BILITOT 0.8 08/18/2018               I have reviewed the medications in compliance with CMS Regulation F329 of the RAJENDRA Appendix PP.      Sada Dueñas, Pharm. D.  Clinical Pharmacist  [facility]

## 2018-08-27 NOTE — PLAN OF CARE
Problem: Physical Therapy Goal  Goal: Physical Therapy Goal  Goals to be met by: 2 weeks ()     Patient will increase functional independence with mobility by performin. Supine to sit with Stand-by Assistance.  2. Sit to supine with Stand-by Assistance.  3. Sit to stand transfer with contact guard assistance.  4. Bed to chair transfer with Minimal Assistance using Rolling Walker.= met 2018   newGOALS 2018  Bed to chair trf w/ SBA w/ RW  5. Gait  x 10 feet with Moderate Assistance using Rolling Walker.= met 2018   NEW GOAL 2018 gait x100 feet w/ RW and SBA   6. Wheelchair propulsion x 200 feet with Stand-by Assistance using bilateral upper extremities  7. Ascend/Descend 4 inch curb step with Moderate Assistance using Rolling Walker.  8. Stand for 3 minutes with Contact Guard Assistance using Rolling Walker while performing a task.     Goals updated. Randa Hawkins, PT 2018

## 2018-08-27 NOTE — PROGRESS NOTES
[facility] Care  Department of [facility] Medicine  Progress Note    Patient Name: Claribel Moran  MRN: [id number]  Code Status: Full Code  Admission Date: 8/24/2018  Length of Stay: 3 days  Attending Physician: Majo Farah MD  Primary Care Provider: Moises Eduardo MD    Subjective:     Principal Problem:Debility    HPI:  Chief Complaint/Reason for Admission: Debility    History of Present Illness:  Patient is a 83 y.o. female who has a past medical history of Acid reflux, Anxiety, Arthritis, Atrial fibrillation with Anticoagulant long-term use, coronary artery disease, CHF, CKD stage 3, Hepatitis B, Hyperlipidemia, Hypertension, Reactive airway disease with wheezing, Sleep apnea, Thyroid disease, Total knee replacement status, Type II diabetes mellitus, and Vitamin D deficiency disease presented with left knee pain after mechanical fall. She presented to ED and was discharged home with pain control medications. She presented back to ED for worsening pain. Xray of knee showed moderate left knee suprapatellar synovial complex/effusion. She was treated conservatively and did not require surgical consultation. Patient has been working with PT/OT who recommend SNF for further balance/mobility training.       Interval History:   8/27/18  Patient seen at bedside, she reports pain is controlled with prescribed regimen.  She states she is eating, drinking, voiding and having BM without issues.  She states she resides with her daughter and grandchild and plans to return to same.  Discussed her elevated glucose readings, she reports she has not taken her home dosing of glipizide in a month due to getting it paid for by insurance company.  No other issues at this time.          Review of Systems   Constitutional: Negative for appetite change, fatigue and fever.   Respiratory: Negative for cough and shortness of breath.    Cardiovascular: Negative for chest pain, palpitations and leg swelling.   Gastrointestinal:  Negative for abdominal pain, constipation, diarrhea, nausea and vomiting.   Endocrine: Negative for polydipsia, polyphagia and polyuria.   Genitourinary: Negative for dysuria and frequency.   Musculoskeletal: Positive for arthralgias.   Skin: Negative for rash.   Psychiatric/Behavioral: Negative for confusion and hallucinations.     Objective:     Vital Signs (Most Recent):  Temp: 98.4 °F (36.9 °C) (08/27/18 0825)  Pulse: 81 (08/27/18 0826)  Resp: 18 (08/27/18 0826)  BP: 134/68 (08/27/18 0825)  SpO2: 95 % (08/27/18 0826) Vital Signs (24h Range):  Temp:  [98.4 °F (36.9 °C)] 98.4 °F (36.9 °C)  Pulse:  [] 81  Resp:  [18-22] 18  SpO2:  [94 %-97 %] 95 %  BP: (129-134)/(68-76) 134/68     Weight: 93.6 kg (206 lb 5.6 oz)  Body mass index is 38.99 kg/m².  No intake or output data in the 24 hours ending 08/27/18 1201   Physical Exam   Constitutional: She is oriented to person, place, and time. She appears well-developed and well-nourished.   Cardiovascular: Normal rate, regular rhythm, normal heart sounds and intact distal pulses.   No murmur heard.  Pulmonary/Chest: Effort normal and breath sounds normal. No respiratory distress.   Abdominal: Soft. Normal appearance and bowel sounds are normal. She exhibits no distension. There is no tenderness.   Musculoskeletal: Normal range of motion. She exhibits no edema.   Neurological: She is alert and oriented to person, place, and time. She has normal strength.   Skin: Skin is warm, dry and intact. Capillary refill takes less than 2 seconds. No erythema.   Psychiatric: She has a normal mood and affect.       Significant Labs:   BMP:   Recent Labs   Lab  08/27/18   0620   GLU  187*   NA  139   K  4.7   CL  102   CO2  28   BUN  18   CREATININE  1.2   CALCIUM  9.5   MG  1.8     CBC:   Recent Labs   Lab  08/27/18   0620   WBC  7.67   HGB  12.1   HCT  39.3   PLT  232       Significant Imaging: n/a    Assessment/Plan:      * Debility    Cont with PT/OT for gait training and  strengthening and restoration of ADL's   Fall precautions   Cont DVT prophylaxis with coumadin.  Anticoagulants   Medication Route Frequency    warfarin (COUMADIN) tablet 5 mg Oral Daily     Lab Results   Component Value Date    INR 2.3 (H) 08/27/2018    INR 2.1 (H) 08/26/2018    INR 2.0 (H) 08/25/2018 8/27/18  Continue therapy to improve functional goals.  Plans to discharge home with family.  Continue coumadin for dvt ppx.          Acquired hypothyroidism    Cont with current dose of levothyroxine to treat   Lab Results   Component Value Date    TSH 2.02 12/06/2017    B9ASPYN 4.6 09/05/2011    FREET4 0.76 09/05/2011     Current treatment plan effective  No change in therapy  Repeat TSH as outpatient with PCP    8/27/18  Chronic, continue home dosing of synthroid and follow up with PCP.        Effusion of knee    Secondary to trauma  Xray with no fracture or hardware failure.   Cont conservative care with cold compress, leg elevation, anti inflammatory and pain control  PT/OT as tolerated  Consider ortho consult if no improvement for arthrocentesis.     8/27/18  Pain well controlled.  Continue use of cool compress, leg elevation, and Tramadol as ordered.          Sleep apnea    Cont CPAP QHS    8/27/18  Chronic, continue CPAP per home settings.        Anticoagulated on Coumadin    Plan as above.     8/27/18  Plan as above.        Reactive airway disease with wheezing    Cont Breo inhaler  Cont duo nebs  Supplemental oxygen as needed.    8/27/18  No acute issues.  Continue duo nebs and Breo as ordered.         CKD (chronic kidney disease) stage 3, GFR 30-59 ml/min    Lab Results   Component Value Date    CREATININE 1.2 08/27/2018     Sr Cr stable  Cont to monitor with biweekly labs   Avoid nephrotoxins.   Holding Losartan   Renally dose all medications      8/27/18  Creating is stable.  Per MD note, losartan on hold, BP is stable with Norvasc alone.  Continue to follow.        Hyperlipidemia    Cont home  Atorvastatin to treat     8/27/18  No acute issues, continue Lipitor as ordered.        Hypertension    BP Readings from Last 3 Encounters:   08/27/18 134/68   08/24/18 132/87   08/16/18 121/67     Well controlled  Cont amlodipine to treat  Home Coreg and Losartan discontinued  Cont to monitor and restart if required  Low Na diet    8/27/18  BP is stable.  Continue Norvasc as ordered.  Coreg and Losartan has been discontinued per MD note.        Diabetes mellitus type II, non insulin dependent    Hold home oral agents  Low dose correction scale   Cont blood glucose monitoring   ADA diet    8/27/18  Fasting glucose high as well as last a1c.  Lab Results   Component Value Date    HGBA1C 8.5 (H) 08/18/2018     POCT Glucose   Date Value Ref Range Status   08/27/2018 227 (H) 70 - 110 mg/dL Final   08/27/2018 174 (H) 70 - 110 mg/dL Final   08/26/2018 222 (H) 70 - 110 mg/dL Final   08/26/2018 216 (H) 70 - 110 mg/dL Final   08/26/2018 348 (H) 70 - 110 mg/dL Final   08/26/2018 195 (H) 70 - 110 mg/dL Final   08/25/2018 286 (H) 70 - 110 mg/dL Final   08/25/2018 306 (H) 70 - 110 mg/dL Final   08/25/2018 267 (H) 70 - 110 mg/dL Final   08/25/2018 174 (H) 70 - 110 mg/dL Final   08/24/2018 287 (H) 70 - 110 mg/dL Final   08/24/2018 354 (H) 70 - 110 mg/dL Final     Currently on Novolog low correction scale.  Patient states having difficulty getting glipizide approved with insurance.  Will restart 2.5 mg of glipizide today and follow.          Chronic atrial fibrillation    Pulse Readings from Last 3 Encounters:   08/27/18 81   08/24/18 84   08/16/18 89     BB discontinued due to hypotension  Cont to hold and restart if needed  Cont anticoagulation with coumadin  Stop Lovenox as INR therapeutic.     8/27/18  HR is stable and BP is stable.  B-blocker stopped due to hypotension.  Continue Coumadin, INR target is 2-3.  INR is therapeutic.  Lab Results   Component Value Date    INR 2.3 (H) 08/27/2018    INR 2.1 (H) 08/26/2018    INR 2.0  (H) 08/25/2018                 Rene Almaguer NP  Department of Hospital Medicine  [facility]

## 2018-08-27 NOTE — ASSESSMENT & PLAN NOTE
Lab Results   Component Value Date    CREATININE 1.2 08/27/2018     Sr Cr stable  Cont to monitor with biweekly labs   Avoid nephrotoxins.   Holding Losartan   Renally dose all medications      8/27/18  Creating is stable.  Per MD note, losartan on hold, BP is stable with Norvasc alone.  Continue to follow.

## 2018-08-27 NOTE — ASSESSMENT & PLAN NOTE
Hold home oral agents  Low dose correction scale   Cont blood glucose monitoring   ADA diet    8/27/18  Fasting glucose high as well as last a1c.  Lab Results   Component Value Date    HGBA1C 8.5 (H) 08/18/2018     POCT Glucose   Date Value Ref Range Status   08/27/2018 227 (H) 70 - 110 mg/dL Final   08/27/2018 174 (H) 70 - 110 mg/dL Final   08/26/2018 222 (H) 70 - 110 mg/dL Final   08/26/2018 216 (H) 70 - 110 mg/dL Final   08/26/2018 348 (H) 70 - 110 mg/dL Final   08/26/2018 195 (H) 70 - 110 mg/dL Final   08/25/2018 286 (H) 70 - 110 mg/dL Final   08/25/2018 306 (H) 70 - 110 mg/dL Final   08/25/2018 267 (H) 70 - 110 mg/dL Final   08/25/2018 174 (H) 70 - 110 mg/dL Final   08/24/2018 287 (H) 70 - 110 mg/dL Final   08/24/2018 354 (H) 70 - 110 mg/dL Final     Currently on Novolog low correction scale.  Patient states having difficulty getting glipizide approved with insurance.  Will restart 2.5 mg of glipizide today and follow.

## 2018-08-27 NOTE — PT/OT/SLP PROGRESS
Physical Therapy  Treatment    Claribel Moran   MRN: [id number]   Admitting Diagnosis: Debility    PT Received On: 08/27/18          Billable Minutes:  Gait Training 20, Therapeutic Activity 10 and Therapeutic Exercise 15    Treatment Type: Treatment  PT/PTA: PT     PTA Visit Number: 0       General Precautions: Standard, fall  Orthopedic Precautions: N/A   Braces: N/A    Do you have any cultural, spiritual, Anglican conflicts, given your current situation?: no    Subjective:  Communicated with patient  prior to session.  Patient agreeable to session    Pain/Comfort  Pain Rating 1: 9/10  Location - Side 1: Left  Location - Orientation 1: generalized  Location 1: knee  Pain Addressed 1: Reposition, Distraction, Cessation of Activity  Pain Rating Post-Intervention 1: 5/10    Objective:  Patient found seated in w/c at lunch activity group with Patient found with: (no longer using oxygen)       Functional Status:  MDS G  Bed Mobility Functional Status: CGA-Min (A)  Transfer Functional Status: CGA-Min (A)  Walk in Room Functional Status: CGA-Min (A)  Walk in Corridor Functional Status: CGA-Min (A)  Locomotion on Unit Functional Status: CGA-Min (A)          AM-PAC 6 CLICK MOBILITY  Total Score:17    Bed Mobility:  Sit>Supine:on mat w/ min assist for LLE  Supine>Sit: on mat w/ min assist for LLE    Transfers:  Sit<>Stand: from w/c w/ RW and min assist; to/from w/c w/ RW and CGA; to/from mat w/ RW and CGA  Stand Pivot Transfer: mat>w/c w/ RW and CGA  Cues for technique, hand placement,safe techanique    Gait:  Amb w/ RW and CGA/ occ min assist w/ w/c closely follow 35 feet; 27 feet. Slow pace. Cues for technique. Use of UEs to reduce weight on LLE (painful knee). One standing rest break each trial   Amb in room w/ RW and CGA from doorway to chair, 18 feet, cues for technique.  Advanced Gait:  Stairs: np  Curb Step: np    Wheelchair Mobility:  Not performed     Therex:  Quad sets,   Glute sets,   Ankle  Pumps,   hip  abduction/adduction,  heelslides,   SAQ,   LAQ   Hip flexion  x20 reps w/ assist as needed. Cues and rest breaks for technique.    Additional Treatment:      Patient left up in chair with call button in reach.    Assessment:  Claribel Moran is a 83 y.o. female with a medical diagnosis of Debility.  Patient limited by left knee pain but improved participation and able to ambulate longer distance than at evaluation. Good effort. Patient will benefit from continued physical therapy to address deficits and improve safety and functional mobility. Continue with physical therapy plan of care. .    Rehab identified problem list/impairments: weakness, impaired endurance, impaired self care skills, impaired functional mobilty, gait instability, impaired balance, edema    Rehab potential is good.    Activity tolerance: Good    Discharge recommendations: home health PT     Barriers to discharge: None    Equipment recommendations: none     GOALS:   Multidisciplinary Problems     Physical Therapy Goals        Problem: Physical Therapy Goal    Goal Priority Disciplines Outcome Goal Variances Interventions   Physical Therapy Goal     PT, PT/OT Ongoing (interventions implemented as appropriate)     Description:  Goals to be met by: 2 weeks ()     Patient will increase functional independence with mobility by performin. Supine to sit with Stand-by Assistance.  2. Sit to supine with Stand-by Assistance.  3. Sit to stand transfer with contact guard assistance.  4. Bed to chair transfer with Minimal Assistance using Rolling Walker.= met 2018   newGOALS 2018  Bed to chair trf w/ SBA w/ RW  5. Gait  x 10 feet with Moderate Assistance using Rolling Walker.= met 2018   NEW GOAL 2018 gait x100 feet w/ RW and SBA   6. Wheelchair propulsion x 200 feet with Stand-by Assistance using bilateral upper extremities  7. Ascend/Descend 4 inch curb step with Moderate Assistance using Rolling Walker.  8. Stand for 3  minutes with Contact Guard Assistance using Rolling Walker while performing a task.                       PLAN:    Patient to be seen 5 x/week  to address the above listed problems via gait training, therapeutic activities, therapeutic exercises, neuromuscular re-education, wheelchair management/training  Plan of Care expires: 09/24/18  Plan of Care reviewed with: patient    Randa MONTELONGO Shruthi, PT  08/27/2018

## 2018-08-27 NOTE — ASSESSMENT & PLAN NOTE
Cont with PT/OT for gait training and strengthening and restoration of ADL's   Fall precautions   Cont DVT prophylaxis with coumadin.  Anticoagulants   Medication Route Frequency    warfarin (COUMADIN) tablet 5 mg Oral Daily     Lab Results   Component Value Date    INR 2.3 (H) 08/27/2018    INR 2.1 (H) 08/26/2018    INR 2.0 (H) 08/25/2018 8/27/18  Continue therapy to improve functional goals.  Plans to discharge home with family.  Continue coumadin for dvt ppx.

## 2018-08-27 NOTE — PROGRESS NOTES
08/27/2018  2:25 PM  Discharge Planning Assessment    Expected length of stay:  [] 7 days   [] 10 days  [x] 14 days   [] 21 days   [] > 30 days    Communicated expected length of stay with patient/caregiver:  [x] Yes   [] No    Anticipated discharge date:  9/7/2018    Assessment information obtained from:  [x] Patient   [] Caregiver     Arrived from:   [x] Home   [] Assisted Living    [] Nursing Home   [] SNF   [] Rehab  [] LTACH   [] Group Home   [] Foster Care   [] Psych   [] Shelter   [] Homeless   [] Transfer  [] Correctional Facility  [] Name of Facility:      Patient currently lives with:    [] Alone   [] Spouse   [x] Daughter   [] Son   [] Grandparents   []  Parents   [] Siblings   [] Friends   [] Domestic Partner   [] Facility Resident      [] Foster Home    [] Other:       Extended Emergency Contact Information  Primary Emergency Contact: Soraya Mccarty  Address: [address] [place] [address]  Home Phone: [phone]  Mobile Phone: [phone]  Relation: Daughter  Secondary Emergency Contact: Nahum Moran  Mobile Phone: [phone]  Relation: Son  Preferred language: English   needed? No     Prior to hospitalization cognitive status:   [x] Alert/Oriented  [] No Deficits [] Risk of Harm to Self/Others   [] Not Oriented to Person   [] Not Oriented to Place   [] Not Oriented to Time   [] Coma/Sedated/Intubated  [] Judgement Impaired    []  Unable to Assess   [] Inappropriate Behavior  [] Infant/Toddler    Prior to hospitalization functional status:   [x] Independent   [x] Assistive Equipment   [] Assistive Person    [] Completely Dependent  [] Infant/Toddler/Child Appropriate   [] Infant/Toddler/Child Delayed    []  Adolescent     Current cognitive status:   [x] Alert/Oriented  [] No Deficits [] Risk of Harm to Self/Others   [] Not Oriented to Person   [] Not Oriented to Place   [] Not Oriented to Time   [] Coma/Sedated/Intubated  [] Judgement Impaired     []  Unable to Assess   [] Inappropriate Behavior  [] Infant/Toddler    Current functional status:     [] Independent   [x] Assistive Equipment   [x] Assistive Person     [] Completely Dependent   [] Infant/Toddler/Child Appropriate   [] Infant/Toddler/Child Delayed     [] Adolescent     Capacity to Care for Self:   Is patient able to return to prior living arrangements after discharge: [x] Yes  [] No     Is patient able to care for self after discharge?   [] Yes   [x] No     [] Pediatric     Does the patient have family/friends to assist after discharge?:  [x] Yes   [] No    [] N/A   Comments:  Daughter      Patient/caregiver perception of discharge disposition:   [] Home   [x] Home with Family  [x] Home Health   [] SNF   [] Rehab   [] LTAC    []  New Nursing Home Placement  [] Return to Nursing Home    [] Shelter     [] Assisted Living  [] Foster Home   [] Other:      Readmit:   Has patient margarita in the hospital in the last 30 days? [] Yes   [x] No     If YES, was patient admitted for the same reason?  [] Yes   [] No       Home Health:   Patient currently receives home health services?:   [] Yes   [x] No     Patient previously received home health services and would like to resume services if necessary   [x] Yes   [] No      If YES, name of home health provider:does not remember agency name    DME:   Patient currently uses DME:   [x] Yes   [] No        List of equipment currently used:     [] Wheelchair   [] Standard Walker  [x] Rolling Walker  []  Rollator    [] Oxygen    [] Portable oxygen   [] Nebulizer    [] Apnea Monitor    [] Crutches  [] Hospital Bed   []  Lift Device   [] Scooter [x] Cane     [] Prosthesis   []  BSC   [x] Tub Bench   [] Catheter Supplies    [] Ostomy Supplies   [] Trach Supplies     [] Suction Machine        [] Home Vent    [] Bipap   [] Other:         Medications:    Can the patient afford all prescribed medications?  [x] Yes   [] No     If NO, what medication:       Is the patient taking  medications as prescribed?    [x] Yes   [] No    Financial Concerns:   Does the patient have any financial concerns?   [] Yes   [x] No      If YES, what are the concerns:      Transportation:   Does the patient have transportation to healthcare appointments?   [x] Yes   [] No     If YES, what means of transportation does the patient have?   [] Car   [x] Family/Friend  [] Bicycle   [] Motorcycle   [] Public Transportation [] Ambulance[] Wheelchair van   [] Name of Provider    Dialysis:   Does the patient currently receive dialysis?   [] Yes   [x] No      If YES, what is the name of the provider:        Moises Eduardo MD   [address] [facility]  [place] [address]  [phone]   [phone]         APS/CPS involved in the case:  [] Yes   [x] No   If YES, name of :     If YES, phone number of :        Discharge Plan A:  [] Home   [x] Home with Family  [x] Home Health   [] SNF   [] Rehab   [] LTAC   []  New Nursing Home Placement  [] Return to Nursing Home    [] Assisted Living    [] Shelter     [] Private Duty Nursing   [] Foster Home    [] Psych    [] [facility]  [] WIC       [] Home Hospice   [] Inpatient Hospice   [] Other    Discharge Plan B:  [] Home   [] Home with Family  [] Home Health   [] SNF   [] Rehab   [] LTAC  []  New Nursing Home Placement   [] Return to  Nursing Home    [] Assisted Living   [] Shelter  [] Private Duty Nursing   [] Foster Home     [] Psych     [] [facility]  [] WIC    [] Home Hospice     [] Inpatient Hospice   [] Other     [x] Patient and family in agreement with discharge plan.    Met with patient in room to discuss discharge plan and date. Patient AAO. Discharge plan is to return home with assist of daughter. Informed patient therapy recommends a 2 week SNF stay and that discharge would be 9/7/2018. Patient stated understanding to discharge date. Discharge date written on dry erase board in room. Patient stated using home health in the past but could not remember  agency name. CM and SW will continue to follow for any additional needs.  Kimberley Durham RN, CM Skilled  [id number]

## 2018-08-27 NOTE — ASSESSMENT & PLAN NOTE
Cont with current dose of levothyroxine to treat   Lab Results   Component Value Date    TSH 2.02 12/06/2017    P6BQFYL 4.6 09/05/2011    FREET4 0.76 09/05/2011     Current treatment plan effective  No change in therapy  Repeat TSH as outpatient with PCP    8/27/18  Chronic, continue home dosing of synthroid and follow up with PCP.

## 2018-08-27 NOTE — ASSESSMENT & PLAN NOTE
BP Readings from Last 3 Encounters:   08/27/18 134/68   08/24/18 132/87   08/16/18 121/67     Well controlled  Cont amlodipine to treat  Home Coreg and Losartan discontinued  Cont to monitor and restart if required  Low Na diet    8/27/18  BP is stable.  Continue Norvasc as ordered.  Coreg and Losartan has been discontinued per MD note.

## 2018-08-28 LAB
INR PPP: 2.3
POCT GLUCOSE: 195 MG/DL (ref 70–110)
POCT GLUCOSE: 226 MG/DL (ref 70–110)
POCT GLUCOSE: 253 MG/DL (ref 70–110)
POCT GLUCOSE: 271 MG/DL (ref 70–110)
PROTHROMBIN TIME: 22.2 SEC

## 2018-08-28 PROCEDURE — 11000004 HC SNF PRIVATE

## 2018-08-28 PROCEDURE — 25000242 PHARM REV CODE 250 ALT 637 W/ HCPCS: Performed by: STUDENT IN AN ORGANIZED HEALTH CARE EDUCATION/TRAINING PROGRAM

## 2018-08-28 PROCEDURE — 92610 EVALUATE SWALLOWING FUNCTION: CPT

## 2018-08-28 PROCEDURE — 97116 GAIT TRAINING THERAPY: CPT

## 2018-08-28 PROCEDURE — 97110 THERAPEUTIC EXERCISES: CPT

## 2018-08-28 PROCEDURE — 25000003 PHARM REV CODE 250: Performed by: HOSPITALIST

## 2018-08-28 PROCEDURE — 94640 AIRWAY INHALATION TREATMENT: CPT

## 2018-08-28 PROCEDURE — 97530 THERAPEUTIC ACTIVITIES: CPT

## 2018-08-28 PROCEDURE — 94660 CPAP INITIATION&MGMT: CPT

## 2018-08-28 PROCEDURE — 97535 SELF CARE MNGMENT TRAINING: CPT

## 2018-08-28 PROCEDURE — 85610 PROTHROMBIN TIME: CPT

## 2018-08-28 PROCEDURE — 25000003 PHARM REV CODE 250: Performed by: STUDENT IN AN ORGANIZED HEALTH CARE EDUCATION/TRAINING PROGRAM

## 2018-08-28 PROCEDURE — 99900035 HC TECH TIME PER 15 MIN (STAT)

## 2018-08-28 PROCEDURE — 36415 COLL VENOUS BLD VENIPUNCTURE: CPT

## 2018-08-28 PROCEDURE — 94761 N-INVAS EAR/PLS OXIMETRY MLT: CPT

## 2018-08-28 RX ADMIN — WARFARIN SODIUM 5 MG: 2.5 TABLET ORAL at 05:08

## 2018-08-28 RX ADMIN — TRAMADOL HYDROCHLORIDE 50 MG: 50 TABLET, COATED ORAL at 05:08

## 2018-08-28 RX ADMIN — IPRATROPIUM BROMIDE AND ALBUTEROL SULFATE 3 ML: .5; 3 SOLUTION RESPIRATORY (INHALATION) at 08:08

## 2018-08-28 RX ADMIN — FLUTICASONE FUROATE AND VILANTEROL TRIFENATATE 1 PUFF: 100; 25 POWDER RESPIRATORY (INHALATION) at 08:08

## 2018-08-28 RX ADMIN — INSULIN ASPART 2 UNITS: 100 INJECTION, SOLUTION INTRAVENOUS; SUBCUTANEOUS at 12:08

## 2018-08-28 RX ADMIN — IPRATROPIUM BROMIDE AND ALBUTEROL SULFATE 3 ML: .5; 3 SOLUTION RESPIRATORY (INHALATION) at 03:08

## 2018-08-28 RX ADMIN — AMLODIPINE BESYLATE 5 MG: 5 TABLET ORAL at 08:08

## 2018-08-28 RX ADMIN — IPRATROPIUM BROMIDE AND ALBUTEROL SULFATE 3 ML: .5; 3 SOLUTION RESPIRATORY (INHALATION) at 12:08

## 2018-08-28 RX ADMIN — LEVOTHYROXINE SODIUM 50 MCG: 50 TABLET ORAL at 05:08

## 2018-08-28 RX ADMIN — TRAMADOL HYDROCHLORIDE 50 MG: 50 TABLET, COATED ORAL at 08:08

## 2018-08-28 RX ADMIN — INSULIN ASPART 3 UNITS: 100 INJECTION, SOLUTION INTRAVENOUS; SUBCUTANEOUS at 05:08

## 2018-08-28 RX ADMIN — INSULIN ASPART 1 UNITS: 100 INJECTION, SOLUTION INTRAVENOUS; SUBCUTANEOUS at 10:08

## 2018-08-28 RX ADMIN — ATORVASTATIN CALCIUM 40 MG: 20 TABLET, FILM COATED ORAL at 08:08

## 2018-08-28 RX ADMIN — DIPHENHYDRAMINE HYDROCHLORIDE 25 MG: 25 CAPSULE ORAL at 05:08

## 2018-08-28 NOTE — PT/OT/SLP DISCHARGE
Physical Therapy Discharge Summary    Name: Claribel Moran  MRN: [id number]   Principal Problem: Effusion of knee     Patient Discharged from acute Physical Therapy on 18.  Please refer to prior PT noted date on 18 for functional status.     Assessment:     Patient was discharged unexpectedly.  Information required to complete an accurate discharge summary is unknown.  Refer to therapy initial evaluation and last progress note for initial and most recent functional status and goal achievement.  Recommendations made may be found in medical record.    Objective:     GOALS:   Multidisciplinary Problems     Physical Therapy Goals        Problem: Physical Therapy Goal    Goal Priority Disciplines Outcome Goal Variances Interventions   Physical Therapy Goal     PT, PT/OT Ongoing (interventions implemented as appropriate)     Description:  Goals to be met by: 18     Patient will increase functional independence with mobility by performin. Supine to sit with MInimal Assistance  2. Sit to supine with MInimal Assistance  3. Sit to stand transfer with Maximum Assistance with LRD.   4. Bed to chair transfer with Maximum Assistance using Rolling Walker or LRD.  5. Gait  x 10 feet with Maximum Assistance using Rolling Walker or LRD.                       Reasons for Discontinuation of Therapy Services  Transfer to alternate level of care.      Plan:     Patient Discharged to: Skilled Nursing Facility.    Cm Berrios, PT  2018

## 2018-08-28 NOTE — PLAN OF CARE
Problem: Fall Risk (Adult)  Goal: Absence of Falls  Patient will demonstrate the desired outcomes by discharge/transition of care.  Outcome: Ongoing (interventions implemented as appropriate)  Pt. Had no falls this shift.will continue to monitor.

## 2018-08-28 NOTE — CONSULTS
"  [facility] - Skilled Nursing Care  Adult Nutrition  Consult Note    SUMMARY     Recommendations    Recommendation/Intervention: Continue  diabetic diet, honor food preferences, Cardiac, RD to follow pt does not need coumadin food drug interaction teaching. Diabetes education  Goals: PO to meet 85% of EEN in 72 hrs  Nutrition Goal Status: new  Communication of RD Recs: discussed on rounds    Reason for Assessment    Reason for Assessment: consult  Diagnosis: (Debility sp fall)  Relevant Medical History: DM, HTN, CHF, Hep B, BARNEY, obesity, CKD III, anxiety, Vit D deficiency,   Interdisciplinary Rounds: attended  General Information Comments: pt does not follow diabetic diet at home. is intolerant to milk pt does not eat leafy greens at home  Nutrition Discharge Planning: DC on diabetic diet, coumadin precautions    Nutrition Risk Screen    Nutrition Risk Screen: no indicators present    Nutrition/Diet History    Patient Reported Diet/Restrictions/Preferences: general  Food Preferences: no milk no ice cream  no beans no leafy greens will eat salads and cabbage  Do you have any cultural, spiritual, Tenriism conflicts, given your current situation?: none  Food Allergies: NKFA  Factors Affecting Nutritional Intake: None identified at this time    Anthropometrics    Temp: 98.4 °F (36.9 °C)  Height Method: Stated  Height: 5' 1" (154.9 cm)  Height (inches): 61 in  Weight Method: Standard Scale  Weight: 93.6 kg (206 lb 5.6 oz)  Weight (lb): 206.35 lb  Ideal Body Weight (IBW), Female: 105 lb  % Ideal Body Weight, Female (lb): 196.52 lb  BMI (Calculated): 39.1  BMI Grade: 35 - 39.9 - obesity - grade II  Usual Body Weight (UBW), k.8 kg  % Usual Body Weight: 102.17  % Weight Change From Usual Weight: 1.96 %       Lab/Procedures/Meds    Pertinent Labs Reviewed: reviewed  Pertinent Labs Comments: glucose 174, HgA1c 8.5  Pertinent Medications Reviewed: reviewed  Pertinent Medications Comments: warfarin, statin, polyethylene " glycol    Physical Findings/Assessment  NFPE completed mild muscle loss  Overall Physical Appearance: loss of muscle mass, obese  Tubes: (-)  Oral/Mouth Cavity: tooth/teeth missing(chews on one side)  Skin: (swollen knee from fall from bed)    Estimated/Assessed Needs    Weight Used For Calorie Calculations: 93.6 kg (206 lb 5.6 oz)  Energy Calorie Requirements (kcal): 1328  Energy Need Method: [place]-St Jeor(no activity factor)  Protein Requirements: 72g  Weight Used For Protein Calculations: 47.7 kg (105 lb 2.6 oz)(x 1.5 IBW)  Fluid Requirements (mL): 1500 or per MD  Fluid Need Method: RDA Method  RDA Method (mL): 1328  CHO Requirement: 50% of calories      Nutrition Prescription Ordered    Current Diet Order: 2000 diabetic cardiac  Nutrition Order Comments: Coumadin precautions no milk or ice cream  Oral Nutrition Supplement: none    Evaluation of Received Nutrient/Fluid Intake    Energy Calories Required: meeting needs  Protein Required: meeting needs  Fluid Required: meeting needs  Tolerance: tolerating  % Intake of Estimated Energy Needs: 75 - 100 %  % Meal Intake: 75 - 100 %    Nutrition Risk    Level of Risk/Frequency of Follow-up: low     Assessment and Plan  Obesity related to over consumption of calories for size as evidenced by BMI 39 and patient report that she does not follow special diet at home.    Monitor and Evaluation    Food and Nutrient Intake: food and beverage intake  Food and Nutrient Adminstration: diet order  Knowledge/Beliefs/Attitudes: food and nutrition knowledge/skill  Anthropometric Measurements: weight change  Biochemical Data, Medical Tests and Procedures: electrolyte and renal panel, glucose/endocrine profile  Nutrition-Focused Physical Findings: overall appearance     Nutrition Follow-Up     Yes

## 2018-08-28 NOTE — PLAN OF CARE
Problem: Physical Therapy Goal  Goal: Physical Therapy Goal  Goals to be met by: 2 weeks ()     Patient will increase functional independence with mobility by performin. Supine to sit with Stand-by Assistance. Met (2018)  2. Sit to supine with Stand-by Assistance. Met (2018)  3. Sit to stand transfer with contact guard assistance. Met (2018)  4. Bed to chair transfer with Minimal Assistance using Rolling Walker.= met 2018   newGOALS 2018  Bed to chair trf w/ SBA w/ RW  5. Gait  x 10 feet with Moderate Assistance using Rolling Walker.= met 2018   NEW GOAL 2018 gait x100 feet w/ RW and SBA   6. Wheelchair propulsion x 200 feet with Stand-by Assistance using bilateral upper extremities  7. Ascend/Descend 4 inch curb step with Moderate Assistance using Rolling Walker. Met (2018)  8. Stand for 3 minutes with Contact Guard Assistance using Rolling Walker while performing a task.      Outcome: Ongoing (interventions implemented as appropriate)  Met four goals today.

## 2018-08-28 NOTE — PLAN OF CARE
Problem: Patient Care Overview  Goal: Plan of Care Review  Outcome: Ongoing (interventions implemented as appropriate)  Obesity related to over consumption of calories for size as evidenced by BMI 39 and patient report that she does not follow special diet at home.      Recommendation/Intervention: Continue 2000 diabetic diet, honor food preferences, Cardiac, RD to follow pt does not need coumadin food drug interaction teaching. Diabetes education  Goals: PO to meet 85% of EEN in 72 hrs  Nutrition Goal Status: new  Communication of RD Recs: discussed on rounds

## 2018-08-28 NOTE — PLAN OF CARE
Problem: Occupational Therapy Goal  Goal: Occupational Therapy Goal  Goals to be met by: 14 days     Patient will increase functional independence with ADLs by performing:    Feeding with Modified [place].    Met  UE Dressing with Modified [place].   Met  LE Dressing with Stand-by Assistance.  Grooming while standing with Stand-by Assistance with RW to steady.  Toileting from bedside commode with Supervision for hygiene and clothing management and RW to steady. .   Bathing from  shower chair/bench with Supervision.  Rolling to Bilateral with Modified [place].   Supine to sit with Modified [place].  Stand pivot transfers with Supervision.  Upper extremity exercise program x10 reps per set, with supervision.  Pt will tolerate up to 10 minutes of functional standing activity with (S) and RW for safety.      Outcome: Ongoing (interventions implemented as appropriate)  Ramos Roca, OTR/L      8/28/2018

## 2018-08-28 NOTE — PT/OT/SLP PROGRESS
Occupational Therapy  Treatment    Claribel Moran   MRN: [id number]   Admitting Diagnosis: Debility    OT Date of Treatment: 08/28/18  Total Time (min): 55 min    Billable Minutes:  Self Care/Home Management 25, Therapeutic Activity 15 and Therapeutic Exercise 15    General Precautions: Standard, aspiration, fall  Orthopedic Precautions: N/A  Braces: N/A    Do you have any cultural, spiritual, Voodoo conflicts, given your current situation?: none stated    Subjective:  Communicated with nurse prior to session.      Pain/Comfort  Pain Rating 1: 0/10  Pain Rating Post-Intervention 1: 0/10    Objective:  Patient found with: oxygen    Functional Status:  MDS G  Transfer Functional Status: CGA-Min (A)(CGA SPT with RW to steady.)    Dressing Functional Status: 2:CGA-Min (A)(UBD performed with no assist but set up. LBD CGA when standing. Reacher and sock aide used to don socks.)    Pt propelled W/C from her room to therapy gym 203ft with (B) UES with no rest breaks.     Personal Hygiene Functional Status: mod(I) - (I)(seated to groom, CGA when standing.)      Moving from seated to standing position: Not steady, only able to stabilize with staff assistance  Walking (with assistive device if used): Not steady, only able to stabilize with staff assistance  Turning around and facing the opposite direction while walking: Not steady, only able to stabilize with staff assistance  Moving on and off the toilet: Not steady, only able to stabilize with staff assistance  Surface-to-surface transfer (transfer between bed and chair or wheelchair): Not steady, only able to stabilize with staff assistance           AMPA 6 Click:  AMPAC Total Score: 21    OT Exercises: UE Ergometer perfomed 14 minutes on UE UBE with Min resistance  UE exercises performed to increase functional endurance and strength.  Strengthening required in order increase independence when performing self care tasks, W/C propulsion , functional standing activities as  well as when performing functional tasks.    Additional Treatment:  Pt worked on functional standing activity consisting of standing with RW  while reaching in all planes , crossing of midline and reaching to varying heights to facilitate (B) wt shifting and stability in standing to increase (I)ce when performing self care, and functional activities with standing component.  Pt tolerated up to 5 Min.20sec, and 4 min 2 sec respectively  In standing with SBA and RW to steady.    Patient left up in chair with all lines intact, call button in reach and nurse notified    ASSESSMENT:  Claribel Moran is a 83 y.o. female with a medical diagnosis of Debility . Pt was agreeable to OT and tolerated Tx without incidence but continues to require (A) to perform functional activities to completion. OT intervention required to address performance deficits affecting performance of self care tasks, functional mobility, functional transfers, functional strength and endurance to perform ADLS.  Pt is making progress but continues to require OT Intervention to perform functional transfers, functional standing activities, and self care tasks with standing component more independently. OT is recommended to further her functional (I)ce and safety. Goals remain appropriate and continued OT is recommended.      Rehab identified problem list/impairments: weakness, impaired endurance, impaired self care skills, impaired functional mobilty, gait instability, impaired balance, decreased lower extremity function, decreased safety awareness, pain, decreased ROM, orthopedic precautions    Rehab potential is good    Activity tolerance: Good    Discharge recommendations: home with home health     Barriers to discharge: None     Equipment recommendations: none     GOALS:   Multidisciplinary Problems     Occupational Therapy Goals        Problem: Occupational Therapy Goal    Goal Priority Disciplines Outcome Interventions   Occupational Therapy Goal      OT, PT/OT Ongoing (interventions implemented as appropriate)    Description:  Goals to be met by: 14 days     Patient will increase functional independence with ADLs by performing:    Feeding with Modified [place].    Met  UE Dressing with Modified [place].   Met  LE Dressing with Stand-by Assistance.  Grooming while standing with Stand-by Assistance with RW to steady.  Toileting from bedside commode with Supervision for hygiene and clothing management and RW to steady. .   Bathing from  shower chair/bench with Supervision.  Rolling to Bilateral with Modified [place].   Supine to sit with Modified [place].  Stand pivot transfers with Supervision.  Upper extremity exercise program x10 reps per set, with supervision.  Pt will tolerate up to 10 minutes of functional standing activity with (S) and RW for safety.                       Plan:  Patient to be seen 5 x/week to address the above listed problems via self-care/home management, therapeutic activities, therapeutic exercises  Plan of Care expires: 09/25/18  Plan of Care reviewed with: patient    Ramos Roca, SHAZIA/JENISE  08/28/2018

## 2018-08-28 NOTE — PT/OT/SLP PROGRESS
Physical Therapy  Treatment    Claribel Moran   MRN: [id number]   Admitting Diagnosis: Debility    PT Received On: 08/28/18  Total Time (min): 45       Billable Minutes:  Gait Training 15, Therapeutic Activity 15 and Therapeutic Exercise 15    Treatment Type: Treatment  PT/PTA: PT     PTA Visit Number: 0       General Precautions: Standard, aspiration, fall(reflux)  Orthopedic Precautions: N/A   Braces: N/A    Do you have any cultural, spiritual, Oriental orthodox conflicts, given your current situation?: no    Subjective:  Communicated with pt prior to session.  Agreeable to PT services.    Pain/Comfort  Pain Rating 1: 0/10    Objective:  Patient found seated in wheelchair.         Functional Status:  MDS G  Bed Mobility Functional Status: S-SBA  Transfer Functional Status: CGA-Min (A)  Walk in Room Functional Status: CGA-Min (A)  Walk in Corridor Functional Status: CGA-Min (A)  Locomotion on Unit Functional Status: CGA-Min (A)  Moving from seated to standing position: Not steady, only able to stabilize with staff assistance  Walking (with assistive device if used): Not steady, only able to stabilize with staff assistance  Turning around and facing the opposite direction while walking: Not steady, only able to stabilize with staff assistance  Moving on and off the toilet: Activity did not occur  Surface-to-surface transfer (transfer between bed and chair or wheelchair): Not steady, only able to stabilize with staff assistance          AM-PAC 6 CLICK MOBILITY  Total Score:17    Bed Mobility:  Sit>Supine:SBA with instructions; may benefit from step stool  Supine>Sit: SBA    Transfers:  Sit<>Stand: CGA from wheelchair, edge of mat  Stand Pivot Transfer: CGA with use of rolling walker    Gait:  Amb 55, 38 feet with seated rest break in between- SBA with use of rolling walker. Step-to gait pattern.      Advanced Gait:  Curb Step: 4 inch curb step with use of rolling walker, Min A     Therex:  Supine on bolster- strengthening  exercises for intrinsic muscles of L foot (to improve positioning when standing/ambulating): DF, inversion, eversion. Seated PF (all with red theraband).      Additional Treatment:  Kinesiotape re-applied to knee for edema and quadriceps support- web-cut over L patella with 20% tension.    Patient left up in chair with call button in reach.    Assessment:  Claribel Moran is a 83 y.o. female with a medical diagnosis of Debility.  Ms. Moran met four goals today and will benefit from continued PT services to continue improving functionally prior to returning home.  Continue to address posture to alleviate pain in medial knee.    Rehab identified problem list/impairments: weakness, impaired endurance, impaired self care skills, impaired functional mobilty, gait instability, impaired balance, edema    Rehab potential is good.    Activity tolerance: Good    Discharge recommendations: home health PT     Barriers to discharge: None    Equipment recommendations: none     GOALS:   Multidisciplinary Problems     Physical Therapy Goals        Problem: Physical Therapy Goal    Goal Priority Disciplines Outcome Goal Variances Interventions   Physical Therapy Goal     PT, PT/OT Ongoing (interventions implemented as appropriate)     Description:  Goals to be met by: 2 weeks ()     Patient will increase functional independence with mobility by performin. Supine to sit with Stand-by Assistance. Met (2018)  2. Sit to supine with Stand-by Assistance. Met (2018)  3. Sit to stand transfer with contact guard assistance. Met (2018)  4. Bed to chair transfer with Minimal Assistance using Rolling Walker.= met 2018   newGOALS 2018  Bed to chair trf w/ SBA w/ RW  5. Gait  x 10 feet with Moderate Assistance using Rolling Walker.= met 2018   NEW GOAL 2018 gait x100 feet w/ RW and SBA   6. Wheelchair propulsion x 200 feet with Stand-by Assistance using bilateral upper extremities  7. Ascend/Descend  4 inch curb step with Moderate Assistance using Rolling Walker. Met (8/28/2018)  8. Stand for 3 minutes with Contact Guard Assistance using Rolling Walker while performing a task.                        PLAN:    Patient to be seen 5 x/week  to address the above listed problems via gait training, therapeutic activities, therapeutic exercises, neuromuscular re-education, wheelchair management/training  Plan of Care expires: 09/24/18  Plan of Care reviewed with: patient    Berenice Khanna, PT  08/28/2018

## 2018-08-28 NOTE — PT/OT/SLP EVAL
"Speech Language Pathology  Swallow Evaluation/Discharge    Claribel Moran   MRN: [id number]   Admitting Diagnosis: Debility    Diet recommendations: Solid Diet Level: Regular  Liquid Diet Level: Thin 1 bite/sip at a time, Alternating bites/sips, HOB to 90 degrees, Meds whole 1 at a time, Monitor for s/s of aspiration, Remain upright 30 minutes post meal, Small bites/sips and Standard and reflux aspiration precautions    SLP Treatment Date: 18  Speech Start Time: 1049     Speech Stop Time: 1107     Speech Total (min): 18 min       TREATMENT BILLABLE MINUTES:  Eval Swallow and Oral Function 18    Diagnosis: Debility      Past Medical History:   Diagnosis Date    Acid reflux 2013    Anticoagulant long-term use     Anxiety 2012    Arthritis     Atrial fibrillation     Blood transfusion     CAD (coronary artery disease) 2013    CHF (congestive heart failure)     CKD (chronic kidney disease) stage 3, GFR 30-59 ml/min 2013    Dry mouth     Encounter for blood transfusion     Hepatitis B 2013    Hyperlipidemia 2012    Hypertension 2012    Reactive airway disease with wheezing 2013    Sleep apnea     Thyroid disease     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 : Yes  Keep patient NPO?: No General Precautions: Standard, aspiration, fall(reflux)        Prior diet: regular/thins    Subjective:  "I eat in the bed." pt admitted that at home she is not always in the most optimal position during meals.       Objective:        Oral Musculature " Evaluation  Oral Musculature: WFL  Dentition: present and adequate  Secretion Management: adequate  Mucosal Quality: good  Mandibular Strength and Mobility: WFL  Oral Labial Strength and Mobility: WFL  Lingual Strength and Mobility: WFL  Velar Elevation: WFL  Buccal Strength and Mobility: WFL  Volitional Cough: strong  Volitional Swallow: elicited  Voice Prior to PO Intake: dry, clear     Bedside Swallow Eval:  Consistencies Assessed: Thin liquids cup x 2, straw x 4  Puree 1/2 tsp x 1, full tsp x 1  Solids 1/2 cracker  Oral Phase: WNL  Pharyngeal Phase: no overt clinical signs/symptoms of aspiration  no overt clinical signs/symptoms of pharyngeal dysphagia    Additional Treatment:  Education provided to pt regarding role of SLP, purpose of swallow evaluation, aspiration, overt s/s of aspiration, complications associated with aspiration, aspiration and reflux precautions, and strategies to improve safety during PO intake.  SLP explained that no further SLP services are warranted at this time, but encouraged pt to report any concerns for aspiration. Pt expressed understanding and agreement.       Assessment:  Claribel Moran is a 83 y.o. female with a medical diagnosis of Debility.  Swallowing function found to be WFL.  No further skilled SLP services warranted at this time, but pt encouraged to follow general aspiration and reflux precautions to reduce risks of aspiration.       Discharge recommendations: Discharge Facility/Level Of Care Needs: (no further skilled SLP services warranted at this time)     Goals:   Multidisciplinary Problems     SLP Goals     Not on file                 Plan:   Plan of Care reviewed with: patient  SLP Follow-up?: No              PATRICIA Dixon CCC-SLP  08/28/2018      PATRICIA Dixon CCC-SLP  Speech Language Pathologist  [phone]  8/28/2018

## 2018-08-29 LAB
INR PPP: 2
POCT GLUCOSE: 161 MG/DL (ref 70–110)
POCT GLUCOSE: 178 MG/DL (ref 70–110)
POCT GLUCOSE: 202 MG/DL (ref 70–110)
POCT GLUCOSE: 257 MG/DL (ref 70–110)
PROTHROMBIN TIME: 19.5 SEC

## 2018-08-29 PROCEDURE — 36415 COLL VENOUS BLD VENIPUNCTURE: CPT

## 2018-08-29 PROCEDURE — 97110 THERAPEUTIC EXERCISES: CPT

## 2018-08-29 PROCEDURE — 25000003 PHARM REV CODE 250: Performed by: STUDENT IN AN ORGANIZED HEALTH CARE EDUCATION/TRAINING PROGRAM

## 2018-08-29 PROCEDURE — 97530 THERAPEUTIC ACTIVITIES: CPT

## 2018-08-29 PROCEDURE — 25000242 PHARM REV CODE 250 ALT 637 W/ HCPCS: Performed by: STUDENT IN AN ORGANIZED HEALTH CARE EDUCATION/TRAINING PROGRAM

## 2018-08-29 PROCEDURE — 94640 AIRWAY INHALATION TREATMENT: CPT

## 2018-08-29 PROCEDURE — 94761 N-INVAS EAR/PLS OXIMETRY MLT: CPT

## 2018-08-29 PROCEDURE — 25000003 PHARM REV CODE 250: Performed by: HOSPITALIST

## 2018-08-29 PROCEDURE — 97535 SELF CARE MNGMENT TRAINING: CPT

## 2018-08-29 PROCEDURE — 25000003 PHARM REV CODE 250: Performed by: NURSE PRACTITIONER

## 2018-08-29 PROCEDURE — 99900035 HC TECH TIME PER 15 MIN (STAT)

## 2018-08-29 PROCEDURE — 27000221 HC OXYGEN, UP TO 24 HOURS

## 2018-08-29 PROCEDURE — 94660 CPAP INITIATION&MGMT: CPT

## 2018-08-29 PROCEDURE — 85610 PROTHROMBIN TIME: CPT

## 2018-08-29 PROCEDURE — 97116 GAIT TRAINING THERAPY: CPT

## 2018-08-29 PROCEDURE — 11000004 HC SNF PRIVATE

## 2018-08-29 RX ORDER — GLIPIZIDE 5 MG/1
5 TABLET, FILM COATED, EXTENDED RELEASE ORAL
Status: DISCONTINUED | OUTPATIENT
Start: 2018-08-30 | End: 2018-09-07 | Stop reason: HOSPADM

## 2018-08-29 RX ADMIN — ATORVASTATIN CALCIUM 40 MG: 20 TABLET, FILM COATED ORAL at 09:08

## 2018-08-29 RX ADMIN — AMLODIPINE BESYLATE 5 MG: 5 TABLET ORAL at 09:08

## 2018-08-29 RX ADMIN — DIPHENHYDRAMINE HYDROCHLORIDE 25 MG: 25 CAPSULE ORAL at 09:08

## 2018-08-29 RX ADMIN — ACETAMINOPHEN 650 MG: 325 TABLET ORAL at 09:08

## 2018-08-29 RX ADMIN — INSULIN ASPART 2 UNITS: 100 INJECTION, SOLUTION INTRAVENOUS; SUBCUTANEOUS at 09:08

## 2018-08-29 RX ADMIN — WARFARIN SODIUM 5 MG: 2.5 TABLET ORAL at 05:08

## 2018-08-29 RX ADMIN — INSULIN ASPART 1 UNITS: 100 INJECTION, SOLUTION INTRAVENOUS; SUBCUTANEOUS at 09:08

## 2018-08-29 RX ADMIN — GUAIFENESIN 600 MG: 600 TABLET, EXTENDED RELEASE ORAL at 09:08

## 2018-08-29 RX ADMIN — FLUTICASONE FUROATE AND VILANTEROL TRIFENATATE 1 PUFF: 100; 25 POWDER RESPIRATORY (INHALATION) at 09:08

## 2018-08-29 RX ADMIN — IPRATROPIUM BROMIDE AND ALBUTEROL SULFATE 3 ML: .5; 3 SOLUTION RESPIRATORY (INHALATION) at 08:08

## 2018-08-29 RX ADMIN — TRAMADOL HYDROCHLORIDE 50 MG: 50 TABLET, COATED ORAL at 09:08

## 2018-08-29 RX ADMIN — GLIPIZIDE 2.5 MG: 2.5 TABLET, FILM COATED, EXTENDED RELEASE ORAL at 09:08

## 2018-08-29 RX ADMIN — IPRATROPIUM BROMIDE AND ALBUTEROL SULFATE 3 ML: .5; 3 SOLUTION RESPIRATORY (INHALATION) at 11:08

## 2018-08-29 RX ADMIN — LEVOTHYROXINE SODIUM 50 MCG: 50 TABLET ORAL at 06:08

## 2018-08-29 NOTE — PLAN OF CARE
Problem: Occupational Therapy Goal  Goal: Occupational Therapy Goal  Goals to be met by: 14 days     Patient will increase functional independence with ADLs by performing:    Feeding with Modified [place].    Met  UE Dressing with Modified [place].   Met  LE Dressing with Stand-by Assistance.  Grooming while standing with Stand-by Assistance with RW to steady.  Toileting from bedside commode with Supervision for hygiene and clothing management and RW to steady. .   Bathing from  shower chair/bench with Supervision.  Rolling to Bilateral with Modified [place].   Supine to sit with Modified [place].  Stand pivot transfers with Supervision.  Upper extremity exercise program x10 reps per set, with supervision.  Pt will tolerate up to 10 minutes of functional standing activity with (S) and RW for safety.      Outcome: Ongoing (interventions implemented as appropriate)  Ramos Roca, OTR/L      8/29/2018

## 2018-08-29 NOTE — PLAN OF CARE
Problem: Physical Therapy Goal  Goal: Physical Therapy Goal  Goals to be met by: 2 weeks ()     Patient will increase functional independence with mobility by performin. Supine to sit with Stand-by Assistance. Met (2018)  2. Sit to supine with Stand-by Assistance. Met (2018)  3. Sit to stand transfer with contact guard assistance. Met (2018)  4. Bed to chair transfer with Minimal Assistance using Rolling Walker.= met 2018   newGOALS 2018  Bed to chair trf w/ SBA w/ RW  5. Gait  x 10 feet with Moderate Assistance using Rolling Walker.= met 2018   NEW GOAL 2018 gait x100 feet w/ RW and SBA   6. Wheelchair propulsion x 200 feet with Stand-by Assistance using bilateral upper extremities  7. Ascend/Descend 4 inch curb step with Moderate Assistance using Rolling Walker. Met (2018)  8. Stand for 3 minutes with Contact Guard Assistance using Rolling Walker while performing a task.       Outcome: Ongoing (interventions implemented as appropriate)  Goals remain appropriate.

## 2018-08-29 NOTE — PT/OT/SLP PROGRESS
Physical Therapy  Treatment    Claribel Moran   MRN: [id number]   Admitting Diagnosis: Debility    PT Received On: 08/29/18  Total Time (min): 45       Billable Minutes:  Gait Training 15, Therapeutic Activity 15 and Therapeutic Exercise 15    Treatment Type: Treatment  PT/PTA: PT     PTA Visit Number: 0       General Precautions: Standard, aspiration, fall  Orthopedic Precautions: N/A   Braces: N/A    Do you have any cultural, spiritual, Congregation conflicts, given your current situation?: no    Subjective:  Communicated with pt prior to session.  Pt was agreeable to PT services.    Pain/Comfort  Pain Rating 1: 0/10    Objective:  Patient found seated in wheelchair after OT with Patient found with: (n/a)       Functional Status:  MDS G  Bed Mobility Functional Status: S-SBA  Transfer Functional Status: S-SBA  Walk in Room Functional Status: S-SBA  Walk in Corridor Functional Status: S-SBA  Locomotion on Unit Functional Status: S-SBA          AM-PAC 6 CLICK MOBILITY  Total Score:18    Transfers:  Sit<>Stand: SBA from wheelchair    Gait:  Amb 26, 53 feet with seated rest break in between trials using a rolling walker- SBA, swing-to pattern.  Pt was instructed to attempt more knee flexion during loading response on LLE, and pt reports a better gait pattern/easier to ambulate. Did present with swing-through gait pattern thereafter, slow laura.     Therex:  Seated- hip flexion, long arc quads, ankle pumps with knee extension x 20 reps BLE    Balance:  · Needs SBA with standing balance using a rolling walker.  · Also stood for 2 mins, 30 seconds playing [place]odle with unilateral  UE support on RW- SBA. No loss of balance.    Additional Treatment:  Completed 10 minutes on the lower body ergometer to improve AROM of her L knee and to work her quads and hamstrings bilaterally.      Patient left up in chair with call button in reach.    Assessment:  Claribel Moran is a 83 y.o. female with a medical diagnosis of  Debility.  Ms. Moran did well today in that her gait pattern improved when she implemented my suggestion (which mimics a more normal gait pattern). She will benefit from continued strengthening of the structures around her knee to improve her overall knee stability, alleviate pain, and allow for better functional mobility.    Rehab identified problem list/impairments: weakness, impaired endurance, impaired self care skills, impaired functional mobilty, gait instability, impaired balance, edema    Rehab potential is good.    Activity tolerance: Good    Discharge recommendations: home health PT     Barriers to discharge: None    Equipment recommendations: none     GOALS:   Multidisciplinary Problems     Physical Therapy Goals        Problem: Physical Therapy Goal    Goal Priority Disciplines Outcome Goal Variances Interventions   Physical Therapy Goal     PT, PT/OT Ongoing (interventions implemented as appropriate)     Description:  Goals to be met by: 2 weeks ()     Patient will increase functional independence with mobility by performin. Supine to sit with Stand-by Assistance. Met (2018)  2. Sit to supine with Stand-by Assistance. Met (2018)  3. Sit to stand transfer with contact guard assistance. Met (2018)  4. Bed to chair transfer with Minimal Assistance using Rolling Walker.= met 2018   newGOALS 2018  Bed to chair trf w/ SBA w/ RW  5. Gait  x 10 feet with Moderate Assistance using Rolling Walker.= met 2018   NEW GOAL 2018 gait x100 feet w/ RW and SBA   6. Wheelchair propulsion x 200 feet with Stand-by Assistance using bilateral upper extremities  7. Ascend/Descend 4 inch curb step with Moderate Assistance using Rolling Walker. Met (2018)  8. Stand for 3 minutes with Contact Guard Assistance using Rolling Walker while performing a task.                        PLAN:    Patient to be seen 5 x/week  to address the above listed problems via gait training,  therapeutic activities, therapeutic exercises, neuromuscular re-education, wheelchair management/training  Plan of Care expires: 09/24/18  Plan of Care reviewed with: patient    Berenice BONE Jey, PT  08/29/2018

## 2018-08-29 NOTE — PT/OT/SLP PROGRESS
Occupational Therapy  Treatment    Claribel Moran   MRN: [id number]   Admitting Diagnosis: Debility    OT Date of Treatment: 08/29/18  Total Time (min): 54 min    Billable Minutes:  Self Care/Home Management 44 and Therapeutic Activity 10    General Precautions: Standard, aspiration, fall  Orthopedic Precautions: N/A  Braces: N/A    Do you have any cultural, spiritual, Yarsani conflicts, given your current situation?: none stated    Subjective:  Communicated with nurse prior to session.      Pain/Comfort  Pain Rating 1: 0/10  Pain Rating Post-Intervention 1: 0/10    Objective:  Patient found with: (no lines)    Functional Status:  MDS G  Transfer Functional Status: S-SBA(SPT from EOB , W/C and BSC with RW.)    Locomotion on Unit Functional Status: S-SBA(Pt propelled W/C from her room to therapy gym 213 ft using (B) UEs)     Dressing Functional Status:  S-SBA(UBD performed Mod (I)ly, LBS performed with (S) with RW .)    Eating Functional Status: Mod(I) - (I)(no assist needed.)    Toilet Use Functional Status: S-SBA (Pt performed toileting on BSC with RW to stand. )    Personal Hygiene Functional Status: S-SBA (Performed grooming standing with RW sinkside )    Bathing Functional Status: S-SBA  (Pt showered from shower seat with (S) for safety.)         Moving from seated to standing position: Not steady, but able to stabilize without staff assistance  Walking (with assistive device if used): Not steady, only able to stabilize with staff assistance  Turning around and facing the opposite direction while walking: Not steady, only able to stabilize with staff assistance  Moving on and off the toilet: Not steady, only able to stabilize with staff assistance  Surface-to-surface transfer (transfer between bed and chair or wheelchair): Not steady, only able to stabilize with staff assistance           AMPAC 6 Click:  AMPAC Total Score: 21      Additional Treatment:  Pt edu on safety when performing self care tasks standing.    - White board updated  - Self care tasks completed-- as noted above   -  She performed dynamic sitting activity incorporating reaching in all planes while sitting unsupported EOB and working on self care tasks.    Patient left up in chair with call button in reach and nurse notified    ASSESSMENT:  Claribel Moran is a 83 y.o. female with a medical diagnosis of Debility . Pt was agreeable to OT and tolerated Tx without incidence but continues to require (A) to perform functional activities to completion. OT intervention required to address performance deficits affecting performance of self care tasks, functional mobility, functional transfers, functional strength and endurance to perform ADLS.  Pt is making progress but continues to require OT Intervention to perform functional transfers, functional standing activities, and self care tasks with standing component more independently. OT is recommended to further her functional (I)ce and safety. Goals remain appropriate and continued OT is recommended.      Rehab identified problem list/impairments: weakness, impaired endurance, impaired self care skills, impaired functional mobilty, gait instability, impaired balance, decreased lower extremity function, decreased safety awareness, pain, decreased ROM, orthopedic precautions    Rehab potential is good    Activity tolerance: Good    Discharge recommendations: home with home health     Barriers to discharge: None     Equipment recommendations: none     GOALS:   Multidisciplinary Problems     Occupational Therapy Goals        Problem: Occupational Therapy Goal    Goal Priority Disciplines Outcome Interventions   Occupational Therapy Goal     OT, PT/OT Ongoing (interventions implemented as appropriate)    Description:  Goals to be met by: 14 days     Patient will increase functional independence with ADLs by performing:    Feeding with Modified [place].    Met  UE Dressing with Modified [place].   Met  LE  Dressing with Stand-by Assistance.  Grooming while standing with Stand-by Assistance with RW to steady.  Toileting from bedside commode with Supervision for hygiene and clothing management and RW to steady. .   Bathing from  shower chair/bench with Supervision.  Rolling to Bilateral with Modified [place].   Supine to sit with Modified [place].  Stand pivot transfers with Supervision.  Upper extremity exercise program x10 reps per set, with supervision.  Pt will tolerate up to 10 minutes of functional standing activity with (S) and RW for safety.                       Plan:  Patient to be seen 5 x/week to address the above listed problems via self-care/home management, therapeutic activities, therapeutic exercises  Plan of Care expires: 09/25/18  Plan of Care reviewed with: patient    Ramos Roca, OTR/L  08/29/2018

## 2018-08-30 LAB
ANION GAP SERPL CALC-SCNC: 10 MMOL/L
BASOPHILS # BLD AUTO: 0.05 K/UL
BASOPHILS NFR BLD: 0.6 %
BUN SERPL-MCNC: 21 MG/DL
CALCIUM SERPL-MCNC: 9.7 MG/DL
CHLORIDE SERPL-SCNC: 103 MMOL/L
CO2 SERPL-SCNC: 27 MMOL/L
CREAT SERPL-MCNC: 1.2 MG/DL
DIFFERENTIAL METHOD: ABNORMAL
EOSINOPHIL # BLD AUTO: 0.4 K/UL
EOSINOPHIL NFR BLD: 4.6 %
ERYTHROCYTE [DISTWIDTH] IN BLOOD BY AUTOMATED COUNT: 15.7 %
EST. GFR  (AFRICAN AMERICAN): 48.3 ML/MIN/1.73 M^2
EST. GFR  (NON AFRICAN AMERICAN): 41.9 ML/MIN/1.73 M^2
GLUCOSE SERPL-MCNC: 176 MG/DL
HCT VFR BLD AUTO: 40.5 %
HGB BLD-MCNC: 12.4 G/DL
IMM GRANULOCYTES # BLD AUTO: 0.07 K/UL
IMM GRANULOCYTES NFR BLD AUTO: 0.9 %
INR PPP: 2.1
LYMPHOCYTES # BLD AUTO: 1.8 K/UL
LYMPHOCYTES NFR BLD: 22.1 %
MAGNESIUM SERPL-MCNC: 1.9 MG/DL
MCH RBC QN AUTO: 28.4 PG
MCHC RBC AUTO-ENTMCNC: 30.6 G/DL
MCV RBC AUTO: 93 FL
MONOCYTES # BLD AUTO: 0.6 K/UL
MONOCYTES NFR BLD: 7.5 %
NEUTROPHILS # BLD AUTO: 5.2 K/UL
NEUTROPHILS NFR BLD: 64.3 %
NRBC BLD-RTO: 0 /100 WBC
PHOSPHATE SERPL-MCNC: 3.4 MG/DL
PLATELET # BLD AUTO: 247 K/UL
PMV BLD AUTO: 11.7 FL
POCT GLUCOSE: 165 MG/DL (ref 70–110)
POCT GLUCOSE: 171 MG/DL (ref 70–110)
POCT GLUCOSE: 183 MG/DL (ref 70–110)
POCT GLUCOSE: 218 MG/DL (ref 70–110)
POTASSIUM SERPL-SCNC: 4.3 MMOL/L
PROTHROMBIN TIME: 20 SEC
RBC # BLD AUTO: 4.36 M/UL
SODIUM SERPL-SCNC: 140 MMOL/L
WBC # BLD AUTO: 8.02 K/UL

## 2018-08-30 PROCEDURE — 99900035 HC TECH TIME PER 15 MIN (STAT)

## 2018-08-30 PROCEDURE — 97110 THERAPEUTIC EXERCISES: CPT

## 2018-08-30 PROCEDURE — 84100 ASSAY OF PHOSPHORUS: CPT

## 2018-08-30 PROCEDURE — 27000221 HC OXYGEN, UP TO 24 HOURS

## 2018-08-30 PROCEDURE — 36415 COLL VENOUS BLD VENIPUNCTURE: CPT

## 2018-08-30 PROCEDURE — 94761 N-INVAS EAR/PLS OXIMETRY MLT: CPT

## 2018-08-30 PROCEDURE — 94640 AIRWAY INHALATION TREATMENT: CPT

## 2018-08-30 PROCEDURE — 85610 PROTHROMBIN TIME: CPT

## 2018-08-30 PROCEDURE — 11000004 HC SNF PRIVATE

## 2018-08-30 PROCEDURE — 97530 THERAPEUTIC ACTIVITIES: CPT

## 2018-08-30 PROCEDURE — 25000003 PHARM REV CODE 250: Performed by: STUDENT IN AN ORGANIZED HEALTH CARE EDUCATION/TRAINING PROGRAM

## 2018-08-30 PROCEDURE — 85025 COMPLETE CBC W/AUTO DIFF WBC: CPT

## 2018-08-30 PROCEDURE — 83735 ASSAY OF MAGNESIUM: CPT

## 2018-08-30 PROCEDURE — 80048 BASIC METABOLIC PNL TOTAL CA: CPT

## 2018-08-30 PROCEDURE — 25000242 PHARM REV CODE 250 ALT 637 W/ HCPCS: Performed by: STUDENT IN AN ORGANIZED HEALTH CARE EDUCATION/TRAINING PROGRAM

## 2018-08-30 PROCEDURE — 25000003 PHARM REV CODE 250: Performed by: NURSE PRACTITIONER

## 2018-08-30 PROCEDURE — 25000003 PHARM REV CODE 250: Performed by: HOSPITALIST

## 2018-08-30 PROCEDURE — 97116 GAIT TRAINING THERAPY: CPT

## 2018-08-30 PROCEDURE — 94660 CPAP INITIATION&MGMT: CPT

## 2018-08-30 PROCEDURE — 25000242 PHARM REV CODE 250 ALT 637 W/ HCPCS: Performed by: NURSE PRACTITIONER

## 2018-08-30 RX ORDER — METFORMIN HYDROCHLORIDE 500 MG/1
500 TABLET, EXTENDED RELEASE ORAL
Status: DISCONTINUED | OUTPATIENT
Start: 2018-08-30 | End: 2018-09-07 | Stop reason: HOSPADM

## 2018-08-30 RX ORDER — IPRATROPIUM BROMIDE AND ALBUTEROL SULFATE 2.5; .5 MG/3ML; MG/3ML
3 SOLUTION RESPIRATORY (INHALATION)
Status: DISCONTINUED | OUTPATIENT
Start: 2018-08-30 | End: 2018-09-07 | Stop reason: HOSPADM

## 2018-08-30 RX ADMIN — WARFARIN SODIUM 5 MG: 2.5 TABLET ORAL at 05:08

## 2018-08-30 RX ADMIN — GUAIFENESIN 600 MG: 600 TABLET, EXTENDED RELEASE ORAL at 07:08

## 2018-08-30 RX ADMIN — ACETAMINOPHEN 650 MG: 325 TABLET ORAL at 07:08

## 2018-08-30 RX ADMIN — IPRATROPIUM BROMIDE AND ALBUTEROL SULFATE 3 ML: .5; 3 SOLUTION RESPIRATORY (INHALATION) at 07:08

## 2018-08-30 RX ADMIN — GLIPIZIDE 5 MG: 2.5 TABLET, FILM COATED, EXTENDED RELEASE ORAL at 09:08

## 2018-08-30 RX ADMIN — LEVOTHYROXINE SODIUM 50 MCG: 50 TABLET ORAL at 06:08

## 2018-08-30 RX ADMIN — ATORVASTATIN CALCIUM 40 MG: 20 TABLET, FILM COATED ORAL at 09:08

## 2018-08-30 RX ADMIN — DIPHENHYDRAMINE HYDROCHLORIDE 25 MG: 25 CAPSULE ORAL at 06:08

## 2018-08-30 RX ADMIN — INSULIN ASPART 1 UNITS: 100 INJECTION, SOLUTION INTRAVENOUS; SUBCUTANEOUS at 09:08

## 2018-08-30 RX ADMIN — DIPHENHYDRAMINE HYDROCHLORIDE 25 MG: 25 CAPSULE ORAL at 07:08

## 2018-08-30 RX ADMIN — TRAMADOL HYDROCHLORIDE 50 MG: 50 TABLET, COATED ORAL at 07:08

## 2018-08-30 RX ADMIN — FLUTICASONE FUROATE AND VILANTEROL TRIFENATATE 1 PUFF: 100; 25 POWDER RESPIRATORY (INHALATION) at 09:08

## 2018-08-30 RX ADMIN — IPRATROPIUM BROMIDE AND ALBUTEROL SULFATE 3 ML: .5; 3 SOLUTION RESPIRATORY (INHALATION) at 11:08

## 2018-08-30 RX ADMIN — AMLODIPINE BESYLATE 5 MG: 5 TABLET ORAL at 09:08

## 2018-08-30 RX ADMIN — METFORMIN HYDROCHLORIDE 500 MG: 500 TABLET, EXTENDED RELEASE ORAL at 05:08

## 2018-08-30 RX ADMIN — TRAMADOL HYDROCHLORIDE 50 MG: 50 TABLET, COATED ORAL at 06:08

## 2018-08-30 NOTE — PLAN OF CARE
Problem: Physical Therapy Goal  Goal: Physical Therapy Goal  Goals to be met by: 2 weeks ()     Patient will increase functional independence with mobility by performin. Supine to sit with Stand-by Assistance. Met (2018)  2. Sit to supine with Stand-by Assistance. Met (2018)  3. Sit to stand transfer with contact guard assistance. Met (2018)  4. Bed to chair transfer with Minimal Assistance using Rolling Walker.= met 2018   newGOALS 2018  Bed to chair trf w/ SBA w/ RW  5. Gait  x 10 feet with Moderate Assistance using Rolling Walker.= met 2018   NEW GOAL 2018 gait x100 feet w/ RW and SBA   6. Wheelchair propulsion x 200 feet with Stand-by Assistance using bilateral upper extremities  7. Ascend/Descend 4 inch curb step with Moderate Assistance using Rolling Walker. Met (2018)  8. Stand for 3 minutes with Contact Guard Assistance using Rolling Walker while performing a task.       Goals remain appropriate. Continue with PT POC as indicated.

## 2018-08-30 NOTE — PT/OT/SLP PROGRESS
Physical Therapy  Treatment    Claribel Moran   MRN: [id number]   Admitting Diagnosis: Debility    PT Received On: 08/30/18  Total Time (min): 46       Billable Minutes:  Gait Training 10, Therapeutic Activity 15 and Therapeutic Exercise 21    Treatment Type: Treatment  PT/PTA: PTA     PTA Visit Number: 1       General Precautions: Standard, aspiration, fall  Orthopedic Precautions: N/A   Braces: N/A    Do you have any cultural, spiritual, [employer]ian conflicts, given your current situation?: no    Subjective:  Communicated with nursing prior to session.  Pt agreed to work with therapy.     Pain/Comfort  Pain Rating 1: 0/10  Pain Rating Post-Intervention 1: 0/10    Objective:  Patient found seated w/c with         Functional Status:  MDS G  Bed Mobility Functional Status: S-SBA  Transfer Functional Status: S-SBA  Walk in Room Functional Status: S-SBA  Walk in Corridor Functional Status: S-SBA  Locomotion on Unit Functional Status: S-SBA  Moving from seated to standing position: Not steady, but able to stabilize without staff assistance  Walking (with assistive device if used): Not steady, only able to stabilize with staff assistance  Turning around and facing the opposite direction while walking: Not steady, only able to stabilize with staff assistance  Moving on and off the toilet: Activity did not occur  Surface-to-surface transfer (transfer between bed and chair or wheelchair): Not steady, only able to stabilize with staff assistance          AM-PAC 6 CLICK MOBILITY  Total Score:18    Bed Mobility:  Sit>Supine:not performed   Supine>Sit: not performed     Transfers:  Sit<>Stand: to/from w/c SBA (mulitple trials)   Stand Pivot Transfer: w/c to nustep with RW and SBA    Gait:  Amb x2 trials (56 ft and 42 ft) with RW and SBA     Therex:  -B LE therex x20 reps: AP, LAQ, and Hip Flexion    Balance:  -Pt performed balloon taps for ~2.5 min with RW and SBA    Additional Treatment:  -Seated Stepper x15 min level 3 to improve  overall endurance.     Patient left up in chair with call button in reach and nursing notified.    Assessment:  Claribel Moran is a 83 y.o. female with a medical diagnosis of Debility.  Pt tolerated treatment well, and will continue to benefit from PT services at this time. Continue with PT POC as indicated.    Rehab identified problem list/impairments: weakness, impaired endurance, impaired self care skills, impaired functional mobilty, gait instability, impaired balance, decreased lower extremity function, decreased safety awareness, pain, decreased ROM, orthopedic precautions    Rehab potential is good.    Activity tolerance: Good    Discharge recommendations: home health PT     Barriers to discharge: None    Equipment recommendations: none     GOALS:   Multidisciplinary Problems     Physical Therapy Goals        Problem: Physical Therapy Goal    Goal Priority Disciplines Outcome Goal Variances Interventions   Physical Therapy Goal     PT, PT/OT Ongoing (interventions implemented as appropriate)     Description:  Goals to be met by: 2 weeks ()     Patient will increase functional independence with mobility by performin. Supine to sit with Stand-by Assistance. Met (2018)  2. Sit to supine with Stand-by Assistance. Met (2018)  3. Sit to stand transfer with contact guard assistance. Met (2018)  4. Bed to chair transfer with Minimal Assistance using Rolling Walker.= met 2018   newGOALS 2018  Bed to chair trf w/ SBA w/ RW  5. Gait  x 10 feet with Moderate Assistance using Rolling Walker.= met 2018   NEW GOAL 2018 gait x100 feet w/ RW and SBA   6. Wheelchair propulsion x 200 feet with Stand-by Assistance using bilateral upper extremities  7. Ascend/Descend 4 inch curb step with Moderate Assistance using Rolling Walker. Met (2018)  8. Stand for 3 minutes with Contact Guard Assistance using Rolling Walker while performing a task.                        PLAN:    Patient  to be seen 5 x/week  to address the above listed problems via gait training, therapeutic activities, therapeutic exercises, neuromuscular re-education, wheelchair management/training  Plan of Care expires: 09/24/18  Plan of Care reviewed with: patient    Sada Brendan, PTA  08/30/2018

## 2018-08-30 NOTE — TREATMENT PLAN
Occupational Therapy        Claribel Moran  MRN: [id number]  Room/Bed: [place] [address]/[place] [address] A    Pt not seen this PM secondary to being too fatigued to participate in therapy.  Will attempt next AM.    SHAZIA Adams/JENISE  8/30/2018

## 2018-08-30 NOTE — PLAN OF CARE
Problem: Patient Care Overview  Goal: Plan of Care Review  Outcome: Revised  Repositions independently, no new skin breakdowns noted. Afebrile. Monitored for pain and safety. Safety maintained. Denies pain

## 2018-08-31 LAB
INR PPP: 2.1
POCT GLUCOSE: 136 MG/DL (ref 70–110)
POCT GLUCOSE: 162 MG/DL (ref 70–110)
POCT GLUCOSE: 169 MG/DL (ref 70–110)
POCT GLUCOSE: 213 MG/DL (ref 70–110)
PROTHROMBIN TIME: 20.8 SEC

## 2018-08-31 PROCEDURE — 11000004 HC SNF PRIVATE

## 2018-08-31 PROCEDURE — 97530 THERAPEUTIC ACTIVITIES: CPT

## 2018-08-31 PROCEDURE — 97116 GAIT TRAINING THERAPY: CPT

## 2018-08-31 PROCEDURE — 25000242 PHARM REV CODE 250 ALT 637 W/ HCPCS: Performed by: NURSE PRACTITIONER

## 2018-08-31 PROCEDURE — 85610 PROTHROMBIN TIME: CPT

## 2018-08-31 PROCEDURE — 25000003 PHARM REV CODE 250: Performed by: HOSPITALIST

## 2018-08-31 PROCEDURE — 36415 COLL VENOUS BLD VENIPUNCTURE: CPT

## 2018-08-31 PROCEDURE — 97110 THERAPEUTIC EXERCISES: CPT

## 2018-08-31 PROCEDURE — 94640 AIRWAY INHALATION TREATMENT: CPT

## 2018-08-31 PROCEDURE — 27000221 HC OXYGEN, UP TO 24 HOURS

## 2018-08-31 PROCEDURE — 94660 CPAP INITIATION&MGMT: CPT

## 2018-08-31 PROCEDURE — 99900058 HC 022 PAID UNDER SNF PPS

## 2018-08-31 PROCEDURE — 25000003 PHARM REV CODE 250: Performed by: NURSE PRACTITIONER

## 2018-08-31 PROCEDURE — 94761 N-INVAS EAR/PLS OXIMETRY MLT: CPT

## 2018-08-31 PROCEDURE — 99900035 HC TECH TIME PER 15 MIN (STAT)

## 2018-08-31 PROCEDURE — 25000003 PHARM REV CODE 250: Performed by: STUDENT IN AN ORGANIZED HEALTH CARE EDUCATION/TRAINING PROGRAM

## 2018-08-31 PROCEDURE — 97535 SELF CARE MNGMENT TRAINING: CPT

## 2018-08-31 RX ADMIN — ACETAMINOPHEN 650 MG: 325 TABLET ORAL at 07:08

## 2018-08-31 RX ADMIN — WARFARIN SODIUM 5 MG: 2.5 TABLET ORAL at 04:08

## 2018-08-31 RX ADMIN — LEVOTHYROXINE SODIUM 50 MCG: 50 TABLET ORAL at 05:08

## 2018-08-31 RX ADMIN — IPRATROPIUM BROMIDE AND ALBUTEROL SULFATE 3 ML: .5; 3 SOLUTION RESPIRATORY (INHALATION) at 08:08

## 2018-08-31 RX ADMIN — ACETAMINOPHEN 650 MG: 325 TABLET ORAL at 08:08

## 2018-08-31 RX ADMIN — DIPHENHYDRAMINE HYDROCHLORIDE 25 MG: 25 CAPSULE ORAL at 08:08

## 2018-08-31 RX ADMIN — INSULIN ASPART 1 UNITS: 100 INJECTION, SOLUTION INTRAVENOUS; SUBCUTANEOUS at 09:08

## 2018-08-31 RX ADMIN — IPRATROPIUM BROMIDE AND ALBUTEROL SULFATE 3 ML: .5; 3 SOLUTION RESPIRATORY (INHALATION) at 07:08

## 2018-08-31 RX ADMIN — GLIPIZIDE 5 MG: 2.5 TABLET, FILM COATED, EXTENDED RELEASE ORAL at 09:08

## 2018-08-31 RX ADMIN — ATORVASTATIN CALCIUM 40 MG: 20 TABLET, FILM COATED ORAL at 09:08

## 2018-08-31 RX ADMIN — TRAMADOL HYDROCHLORIDE 50 MG: 50 TABLET, COATED ORAL at 07:08

## 2018-08-31 RX ADMIN — GUAIFENESIN 600 MG: 600 TABLET, EXTENDED RELEASE ORAL at 08:08

## 2018-08-31 RX ADMIN — AMLODIPINE BESYLATE 5 MG: 5 TABLET ORAL at 09:08

## 2018-08-31 RX ADMIN — TRAMADOL HYDROCHLORIDE 50 MG: 50 TABLET, COATED ORAL at 08:08

## 2018-08-31 RX ADMIN — FLUTICASONE FUROATE AND VILANTEROL TRIFENATATE 1 PUFF: 100; 25 POWDER RESPIRATORY (INHALATION) at 09:08

## 2018-08-31 RX ADMIN — METFORMIN HYDROCHLORIDE 500 MG: 500 TABLET, EXTENDED RELEASE ORAL at 04:08

## 2018-08-31 RX ADMIN — IPRATROPIUM BROMIDE AND ALBUTEROL SULFATE 3 ML: .5; 3 SOLUTION RESPIRATORY (INHALATION) at 01:08

## 2018-08-31 RX ADMIN — DIPHENHYDRAMINE HYDROCHLORIDE 25 MG: 25 CAPSULE ORAL at 07:08

## 2018-08-31 NOTE — PT/OT/SLP PROGRESS
Physical Therapy  Treatment    Claribel Moran   MRN: [id number]   Admitting Diagnosis: Debility    PT Received On: 08/31/18  Total Time (min): 45       Billable Minutes:  Gait Training 10, Therapeutic Activity 15 and Therapeutic Exercise 20    Treatment Type: Treatment  PT/PTA: PTA     PTA Visit Number: 2       General Precautions: Standard, aspiration, fall  Orthopedic Precautions: N/A   Braces: N/A    Do you have any cultural, spiritual, L[employer] day conflicts, given your current situation?: no    Subjective:  Communicated with nursing prior to session.  Pt agreed to work with therapy.     Pain/Comfort  Pain Rating 1: 0/10  Pain Rating Post-Intervention 1: 0/10    Objective:  Patient found seated w/c.         Functional Status:  MDS G  Bed Mobility Functional Status: S-SBA  Transfer Functional Status: S-SBA  Walk in Room Functional Status: S-SBA  Walk in Corridor Functional Status: S-SBA  Locomotion on Unit Functional Status: S-SBA  Moving from seated to standing position: Not steady, but able to stabilize without staff assistance  Walking (with assistive device if used): Not steady, only able to stabilize with staff assistance  Turning around and facing the opposite direction while walking: Not steady, only able to stabilize with staff assistance  Moving on and off the toilet: Not steady, only able to stabilize with staff assistance  Surface-to-surface transfer (transfer between bed and chair or wheelchair): Not steady, only able to stabilize with staff assistance          AM-PAC 6 CLICK MOBILITY  Total Score:18    Bed Mobility:  Sit>Supine:not performed   Supine>Sit: not performed    Transfers:  Sit<>Stand: to/from w/c, to/from nustep, to/from toilet with RW and SBA  Stand Pivot Transfer: nustep to w/c with RW and SBA    Gait:  Amb 230 ft with RW and CGA-SBA     Therex:  Seated B LE therex 2x20 reps: AP, LAQ, HF, and GS    Additional Treatment:  -Seated Stepper x15 min level 5 to improve overall strength and  endurance.     Patient left up in chair with call button in reach and nursing notified.    Assessment:  Claribel Moran is a 83 y.o. female with a medical diagnosis of Debility.  Pt tolerated well, and will continue to benefit from PT services at this time. Continue with PT POC as indicated.    Rehab identified problem list/impairments: weakness, impaired endurance, impaired self care skills, impaired functional mobilty, gait instability, impaired balance, decreased lower extremity function, decreased safety awareness, pain, decreased ROM, orthopedic precautions    Rehab potential is good.    Activity tolerance: Good    Discharge recommendations: home health PT     Barriers to discharge: None    Equipment recommendations: none     GOALS:   Multidisciplinary Problems     Physical Therapy Goals        Problem: Physical Therapy Goal    Goal Priority Disciplines Outcome Goal Variances Interventions   Physical Therapy Goal     PT, PT/OT Ongoing (interventions implemented as appropriate)     Description:  Goals to be met by: 2 weeks ()     Patient will increase functional independence with mobility by performin. Supine to sit with Stand-by Assistance. Met (2018)  2. Sit to supine with Stand-by Assistance. Met (2018)  3. Sit to stand transfer with contact guard assistance. Met (2018)  4. Bed to chair transfer with Minimal Assistance using Rolling Walker.= met 2018   newGOALS 2018  Bed to chair trf w/ SBA w/ RW  5. Gait  x 10 feet with Moderate Assistance using Rolling Walker.= met 2018   NEW GOAL 2018 gait x100 feet w/ RW and SBA   6. Wheelchair propulsion x 200 feet with Stand-by Assistance using bilateral upper extremities  7. Ascend/Descend 4 inch curb step with Moderate Assistance using Rolling Walker. Met (2018)  8. Stand for 3 minutes with Contact Guard Assistance using Rolling Walker while performing a task.                        PLAN:    Patient to be seen 5 x/week   to address the above listed problems via gait training, therapeutic activities, therapeutic exercises, neuromuscular re-education, wheelchair management/training  Plan of Care expires: 09/24/18  Plan of Care reviewed with: patient    Sada Cardona, PTA  08/31/2018

## 2018-08-31 NOTE — PLAN OF CARE
Problem: Physical Therapy Goal  Goal: Physical Therapy Goal  Goals to be met by: 2 weeks ()     Patient will increase functional independence with mobility by performin. Supine to sit with Stand-by Assistance. Met (2018)  2. Sit to supine with Stand-by Assistance. Met (2018)  3. Sit to stand transfer with contact guard assistance. Met (2018)  4. Bed to chair transfer with Minimal Assistance using Rolling Walker.= met 2018   newGOALS 2018  Bed to chair trf w/ SBA w/ RW  5. Gait  x 10 feet with Moderate Assistance using Rolling Walker.= met 2018   NEW GOAL 2018 gait x100 feet w/ RW and SBA   6. Wheelchair propulsion x 200 feet with Stand-by Assistance using bilateral upper extremities  7. Ascend/Descend 4 inch curb step with Moderate Assistance using Rolling Walker. Met (2018)  8. Stand for 3 minutes with Contact Guard Assistance using Rolling Walker while performing a task.       Goals remain appropriate at time. Continue with PT POC as indicated.

## 2018-08-31 NOTE — PLAN OF CARE
Problem: Patient Care Overview  Goal: Plan of Care Review  Outcome: Revised  Repositions independently, no new skin breakdowns noted. Afebrile. Monitored for pain and safety. Safety maintained. Pain meds effective

## 2018-08-31 NOTE — PLAN OF CARE
Problem: Occupational Therapy Goal  Goal: Occupational Therapy Goal  Goals to be met by: 14 days     Patient will increase functional independence with ADLs by performing:    Feeding with Modified [place].    Met  UE Dressing with Modified [place].   Met  LE Dressing with Stand-by Assistance.  Grooming while standing with Stand-by Assistance with RW to steady.  Toileting from bedside commode with Supervision for hygiene and clothing management and RW to steady. .   Bathing from  shower chair/bench with Supervision.  Rolling to Bilateral with Modified [place].   Supine to sit with Modified [place].  Stand pivot transfers with Supervision.  Upper extremity exercise program x10 reps per set, with supervision.  Pt will tolerate up to 10 minutes of functional standing activity with (S) and RW for safety.      Outcome: Ongoing (interventions implemented as appropriate)  Ramos Roca, SYDR/L      8/31/2018

## 2018-08-31 NOTE — TREATMENT PLAN
Rehab Services' DME recommendations    Claribel Moran  MRN: [id number]      **   Pt has an [employer] W/C but needs footrests.    Leg Support Standard and Heel Loops    [x] Hip Kit Standard/Short    [x] Home health PT and OT      Ramos Roca, OTR/L 8/31/2018

## 2018-08-31 NOTE — PT/OT/SLP PROGRESS
Occupational Therapy  Treatment    Claribel Moran   MRN: [id number]   Admitting Diagnosis: Debility    OT Date of Treatment: 08/31/18  Total Time (min): 60 min    Billable Minutes:  Self Care/Home Management 25, Therapeutic Activity 25 and Therapeutic Exercise 10    General Precautions: Standard, aspiration, fall  Orthopedic Precautions: N/A  Braces: N/A    Do you have any cultural, spiritual, Worship conflicts, given your current situation?: none stated    Subjective:  Communicated with nurse prior to session.      Pain/Comfort  Pain Rating 1: 0/10  Pain Rating Post-Intervention 1: 0/10    Objective:  Patient found with: (no lines)    Functional Status:  MDS G  Transfer Functional Status: S-SBA(SPT from EOB<> W/C and W/C<> BSC)    Locomotion on Unit Functional Status: S-SBA(Pt propelled W/C from her room to therapy gym with (B)   UEs a  distance of 210ft .)    Dressing Functional Status:  S-SBA(Pt donning pullover blouse , pants and socks with (S).Reacher and   sock aide used to don socks and pants.)    Personal Hygiene Functional Status:  Mod(I)     (seated to groom.)    Moving from seated to standing position: Not steady, but able to stabilize without staff assistance  Walking (with assistive device if used): Not steady, only able to stabilize with staff assistance  Turning around and facing the opposite direction while walking: Not steady, only able to stabilize with staff assistance  Moving on and off the toilet: Not steady, only able to stabilize with staff assistance  Surface-to-surface transfer (transfer between bed and chair or wheelchair): Not steady, only able to stabilize with staff assistance           AMPAC 6 Click:  AMPAC Total Score: 21    OT Exercises: UE Ergometer performed 15 minutes on UE UBE with Mod resistance. UE exercises performed to increase functional endurance and strength.  Strengthening required in order increase independence when performing self care tasks, W/C propulsion , functional  standing activities as well as when performing functional tasks.    Additional Treatment:  Pt worked on functional standing activity consisting of standing with RW  while reaching in all planes , crossing of midline and reaching to varying heights to facilitate (B) wt shifting and stability in standing to increase (I)ce when performing self care, and functional activities with standing component.  Pt tolerated up to 8 min 13 sec, 5 min 4 sec and 3 min 6 sec respectively in standing with SBA and RW to steady.    Pt edu on use of A/E for dressing.    - White board updated  - Self care tasks completed-- as noted above   - She performed dynamic sitting activity incorporating reaching in all planes while sitting in W/C.    Patient left up in chair with call button in reach and nurse notified    ASSESSMENT:  Claribel Moran is a 83 y.o. female with a medical diagnosis of Debility .  Pt was agreeable to OT and tolerated Tx without incidence but continues to require (A) to perform functional activities to completion. OT intervention required to address performance deficits affecting performance of self care tasks, functional mobility, functional transfers, functional strength and endurance to perform ADLS.  Pt is making progress but continues to require OT Intervention to perform functional transfers, functional standing activities, and self care tasks with standing component more independently. OT is recommended to further her functional (I)ce and safety. Goals remain appropriate and continued OT is recommended.      Rehab identified problem list/impairments: weakness, impaired endurance, impaired self care skills, impaired functional mobilty, gait instability, impaired balance, decreased lower extremity function, decreased safety awareness, pain, decreased ROM, orthopedic precautions    Rehab potential is good    Activity tolerance: Good    Discharge recommendations: home with home health     Barriers to discharge: None      Equipment recommendations: none     GOALS:   Multidisciplinary Problems     Occupational Therapy Goals        Problem: Occupational Therapy Goal    Goal Priority Disciplines Outcome Interventions   Occupational Therapy Goal     OT, PT/OT Ongoing (interventions implemented as appropriate)    Description:  Goals to be met by: 14 days     Patient will increase functional independence with ADLs by performing:    Feeding with Modified [place].    Met  UE Dressing with Modified [place].   Met  LE Dressing with Stand-by Assistance.  Grooming while standing with Stand-by Assistance with RW to steady.  Toileting from bedside commode with Supervision for hygiene and clothing management and RW to steady. .   Bathing from  shower chair/bench with Supervision.  Rolling to Bilateral with Modified [place].   Supine to sit with Modified [place].  Stand pivot transfers with Supervision.  Upper extremity exercise program x10 reps per set, with supervision.  Pt will tolerate up to 10 minutes of functional standing activity with (S) and RW for safety.                       Plan:  Patient to be seen 5 x/week to address the above listed problems via self-care/home management, therapeutic activities, therapeutic exercises  Plan of Care expires: 09/25/18  Plan of Care reviewed with: patient    Ramos Roca OTR/JENISE  08/31/2018

## 2018-09-01 LAB
INR PPP: 2.1
POCT GLUCOSE: 111 MG/DL (ref 70–110)
POCT GLUCOSE: 154 MG/DL (ref 70–110)
POCT GLUCOSE: 162 MG/DL (ref 70–110)
POCT GLUCOSE: 219 MG/DL (ref 70–110)
PROTHROMBIN TIME: 20.6 SEC

## 2018-09-01 PROCEDURE — 11000004 HC SNF PRIVATE

## 2018-09-01 PROCEDURE — 27000221 HC OXYGEN, UP TO 24 HOURS

## 2018-09-01 PROCEDURE — 97535 SELF CARE MNGMENT TRAINING: CPT

## 2018-09-01 PROCEDURE — 94660 CPAP INITIATION&MGMT: CPT

## 2018-09-01 PROCEDURE — 97530 THERAPEUTIC ACTIVITIES: CPT

## 2018-09-01 PROCEDURE — 85610 PROTHROMBIN TIME: CPT

## 2018-09-01 PROCEDURE — 94640 AIRWAY INHALATION TREATMENT: CPT

## 2018-09-01 PROCEDURE — 25000003 PHARM REV CODE 250: Performed by: HOSPITALIST

## 2018-09-01 PROCEDURE — 97110 THERAPEUTIC EXERCISES: CPT

## 2018-09-01 PROCEDURE — 25000003 PHARM REV CODE 250: Performed by: STUDENT IN AN ORGANIZED HEALTH CARE EDUCATION/TRAINING PROGRAM

## 2018-09-01 PROCEDURE — 25000003 PHARM REV CODE 250: Performed by: NURSE PRACTITIONER

## 2018-09-01 PROCEDURE — 99900035 HC TECH TIME PER 15 MIN (STAT)

## 2018-09-01 PROCEDURE — 94761 N-INVAS EAR/PLS OXIMETRY MLT: CPT

## 2018-09-01 PROCEDURE — 25000242 PHARM REV CODE 250 ALT 637 W/ HCPCS: Performed by: NURSE PRACTITIONER

## 2018-09-01 PROCEDURE — 97116 GAIT TRAINING THERAPY: CPT

## 2018-09-01 RX ADMIN — DIPHENHYDRAMINE HYDROCHLORIDE 25 MG: 25 CAPSULE ORAL at 03:09

## 2018-09-01 RX ADMIN — GLIPIZIDE 5 MG: 2.5 TABLET, FILM COATED, EXTENDED RELEASE ORAL at 10:09

## 2018-09-01 RX ADMIN — TRAMADOL HYDROCHLORIDE 50 MG: 50 TABLET, COATED ORAL at 09:09

## 2018-09-01 RX ADMIN — DIPHENHYDRAMINE HYDROCHLORIDE 25 MG: 25 CAPSULE ORAL at 10:09

## 2018-09-01 RX ADMIN — ACETAMINOPHEN 650 MG: 325 TABLET ORAL at 09:09

## 2018-09-01 RX ADMIN — WARFARIN SODIUM 5 MG: 2.5 TABLET ORAL at 05:09

## 2018-09-01 RX ADMIN — FLUTICASONE FUROATE AND VILANTEROL TRIFENATATE 1 PUFF: 100; 25 POWDER RESPIRATORY (INHALATION) at 09:09

## 2018-09-01 RX ADMIN — METFORMIN HYDROCHLORIDE 500 MG: 500 TABLET, EXTENDED RELEASE ORAL at 05:09

## 2018-09-01 RX ADMIN — LEVOTHYROXINE SODIUM 50 MCG: 50 TABLET ORAL at 05:09

## 2018-09-01 RX ADMIN — IPRATROPIUM BROMIDE AND ALBUTEROL SULFATE 3 ML: .5; 3 SOLUTION RESPIRATORY (INHALATION) at 01:09

## 2018-09-01 RX ADMIN — TRAMADOL HYDROCHLORIDE 50 MG: 50 TABLET, COATED ORAL at 03:09

## 2018-09-01 RX ADMIN — ACETAMINOPHEN 650 MG: 325 TABLET ORAL at 10:09

## 2018-09-01 RX ADMIN — IPRATROPIUM BROMIDE AND ALBUTEROL SULFATE 3 ML: .5; 3 SOLUTION RESPIRATORY (INHALATION) at 08:09

## 2018-09-01 RX ADMIN — INSULIN ASPART 1 UNITS: 100 INJECTION, SOLUTION INTRAVENOUS; SUBCUTANEOUS at 10:09

## 2018-09-01 RX ADMIN — ACETAMINOPHEN 650 MG: 325 TABLET ORAL at 03:09

## 2018-09-01 RX ADMIN — GUAIFENESIN 600 MG: 600 TABLET, EXTENDED RELEASE ORAL at 10:09

## 2018-09-01 RX ADMIN — TRAMADOL HYDROCHLORIDE 50 MG: 50 TABLET, COATED ORAL at 10:09

## 2018-09-01 RX ADMIN — ATORVASTATIN CALCIUM 40 MG: 20 TABLET, FILM COATED ORAL at 09:09

## 2018-09-01 RX ADMIN — DIPHENHYDRAMINE HYDROCHLORIDE 25 MG: 25 CAPSULE ORAL at 09:09

## 2018-09-01 RX ADMIN — AMLODIPINE BESYLATE 5 MG: 5 TABLET ORAL at 09:09

## 2018-09-01 NOTE — PT/OT/SLP PROGRESS
Occupational Therapy  Treatment    Claribel Moran   MRN: [id number]   Admitting Diagnosis: Debility    OT Date of Treatment: 09/01/18  Total Time (min): 30 min    Billable Minutes:  Self Care/Home Management 20 and Therapeutic Exercise 10    General Precautions: Standard, aspiration, fall  Orthopedic Precautions: N/A  Braces: N/A    Do you have any cultural, spiritual, [employer] conflicts, given your current situation?: none stated    Subjective:  Communicated with patient prior to session.    Pain/Comfort  Pain Rating 1: 0/10  Pain Rating Post-Intervention 1: 0/10    Objective:       Functional Status:  MDS G  Bed Mobility Functional Status: S supine>sit  Transfer Functional Status: S bed<>Toilet using grab bar and RW  Walk in Room Functional Status: S using RW  Dressing Functional Status: S Donning back gown; [place] and doffing socks using reacher and sock aid.   Toilet Use Functional Status: S   Personal Hygiene Functional Status: S standing at sink to wash and dry hands.        [facility] 6 Click:  [facility] Total Score: 21    OT Exercises: Patient performed 3 x 10 (chest press, front rows, and back rows) focusing to improve strength and endurance to increase independence with ADLs.     Patient left sitting EOB with call button in reach, family present and all needs met.    ASSESSMENT:  Claribel Moran is a 84 y.o. female with a medical diagnosis of Debility and presents with the deficits listed below. Patient tolerated treatment session and was motivated to complete tasks. Patient continues to benefit from skilled OT services to achieve maximal independence.    Rehab identified problem list/impairments: weakness, impaired endurance, impaired self care skills, impaired functional mobilty, gait instability, impaired balance, decreased lower extremity function, decreased safety awareness, pain, decreased ROM, orthopedic precautions    Rehab potential is good    Activity tolerance: Good    Discharge recommendations: home  with home health     Barriers to discharge: None     Equipment recommendations: none     GOALS:   Multidisciplinary Problems     Occupational Therapy Goals        Problem: Occupational Therapy Goal    Goal Priority Disciplines Outcome Interventions   Occupational Therapy Goal     OT, PT/OT Ongoing (interventions implemented as appropriate)    Description:  Goals to be met by: 14 days     Patient will increase functional independence with ADLs by performing:    Feeding with Modified [place].    Met  UE Dressing with Modified [place].   Met  LE Dressing with Stand-by Assistance.  Grooming while standing with Stand-by Assistance with RW to steady.  Toileting from bedside commode with Supervision for hygiene and clothing management and RW to steady. .   Bathing from  shower chair/bench with Supervision.  Rolling to Bilateral with Modified [place].   Supine to sit with Modified [place].  Stand pivot transfers with Supervision.  Upper extremity exercise program x10 reps per set, with supervision.  Pt will tolerate up to 10 minutes of functional standing activity with (S) and RW for safety.                       Plan:  Patient to be seen 5 x/week to address the above listed problems via self-care/home management, therapeutic activities, therapeutic exercises  Plan of Care expires: 09/25/18  Plan of Care reviewed with: patient    DEIDRE Gomez  09/01/2018

## 2018-09-01 NOTE — PLAN OF CARE
Problem: Occupational Therapy Goal  Goal: Occupational Therapy Goal  Goals to be met by: 14 days     Patient will increase functional independence with ADLs by performing:    Feeding with Modified [place].    Met  UE Dressing with Modified [place].   Met  LE Dressing with Stand-by Assistance.  Grooming while standing with Stand-by Assistance with RW to steady.  Toileting from bedside commode with Supervision for hygiene and clothing management and RW to steady. .   Bathing from  shower chair/bench with Supervision.  Rolling to Bilateral with Modified [place].   Supine to sit with Modified [place].  Stand pivot transfers with Supervision.  Upper extremity exercise program x10 reps per set, with supervision.  Pt will tolerate up to 10 minutes of functional standing activity with (S) and RW for safety.      Outcome: Ongoing (interventions implemented as appropriate)  Patient's goals are appropriate.   DEIDRE Gomez  9/1/2018

## 2018-09-01 NOTE — PT/OT/SLP PROGRESS
Physical Therapy  Treatment    Claribel Moran   MRN: [id number]   Admitting Diagnosis: Debility    PT Received On: 09/01/18  Total Time (min): 40       Billable Minutes:  Gait Training 10, Therapeutic Activity 10 and Therapeutic Exercise 20    Treatment Type: Treatment  PT/PTA: PTA     PTA Visit Number: 3       General Precautions: Standard, aspiration, fall  Orthopedic Precautions: N/A   Braces: N/A    Do you have any cultural, spiritual, Taoism conflicts, given your current situation?: no    Subjective:  Communicated with nursing prior to session.  Pt agreed to work with therapy.     Pain/Comfort  Pain Rating 1: 0/10  Pain Rating Post-Intervention 1: 0/10    Objective:  Patient found supine in bed.         Functional Status:  MDS G  Bed Mobility Functional Status: S-SBA  Transfer Functional Status: S-SBA  Walk in Room Functional Status: S-SBA  Walk in Corridor Functional Status: S-SBA  Locomotion on Unit Functional Status: S-SBA  Moving from seated to standing position: Not steady, but able to stabilize without staff assistance  Walking (with assistive device if used): Not steady, only able to stabilize with staff assistance  Turning around and facing the opposite direction while walking: Not steady, only able to stabilize with staff assistance  Moving on and off the toilet: Activity did not occur  Surface-to-surface transfer (transfer between bed and chair or wheelchair): Not steady, only able to stabilize with staff assistance          AM-PAC 6 CLICK MOBILITY  Total Score:18    Bed Mobility:  Sit>Supine:not performed   Supine>Sit: SPV on bed    Transfers:  Sit<>Stand: to/from bed, w/c with RW and SBA      Gait:  Amb 200 ft with RW and SBA      Therex:  B LE therex 2x20 reps: AP, LAQ, Hip Flexion, and GS    Additional Treatment:  -Seated Stepper x12 min level 5 to improve overall endurance.     Patient left up in chair, nursing present  with call button in reach.    Assessment:  Claribel Moran is a 84 y.o.  female with a medical diagnosis of Debility. Pt continues to progress towards goals and will continue to benefit from PT services at this time. Continue with PT POC as indicated.    Rehab identified problem list/impairments: weakness, impaired endurance, impaired self care skills, impaired functional mobilty, gait instability, impaired balance, decreased lower extremity function, decreased safety awareness, pain, decreased ROM, orthopedic precautions    Rehab potential is good.    Activity tolerance: Good    Discharge recommendations: home health PT     Barriers to discharge: None    Equipment recommendations: none     GOALS:   Multidisciplinary Problems     Physical Therapy Goals        Problem: Physical Therapy Goal    Goal Priority Disciplines Outcome Goal Variances Interventions   Physical Therapy Goal     PT, PT/OT Ongoing (interventions implemented as appropriate)     Description:  Goals to be met by: 2 weeks ()     Patient will increase functional independence with mobility by performin. Supine to sit with Stand-by Assistance. Met (2018)  2. Sit to supine with Stand-by Assistance. Met (2018)  3. Sit to stand transfer with contact guard assistance. Met (2018)  4. Bed to chair transfer with Minimal Assistance using Rolling Walker.= met 2018   newGOALS 2018  Bed to chair trf w/ SBA w/ RW  5. Gait  x 10 feet with Moderate Assistance using Rolling Walker.= met 2018   NEW GOAL 2018 gait x100 feet w/ RW and SBA   6. Wheelchair propulsion x 200 feet with Stand-by Assistance using bilateral upper extremities  7. Ascend/Descend 4 inch curb step with Moderate Assistance using Rolling Walker. Met (2018)  8. Stand for 3 minutes with Contact Guard Assistance using Rolling Walker while performing a task.                        PLAN:    Patient to be seen 5 x/week  to address the above listed problems via gait training, therapeutic activities, therapeutic exercises,  neuromuscular re-education, wheelchair management/training  Plan of Care expires: 09/24/18  Plan of Care reviewed with: patient    Sada Brendan, PTA  09/01/2018

## 2018-09-02 LAB
INR PPP: 2
POCT GLUCOSE: 104 MG/DL (ref 70–110)
POCT GLUCOSE: 178 MG/DL (ref 70–110)
POCT GLUCOSE: 184 MG/DL (ref 70–110)
POCT GLUCOSE: 206 MG/DL (ref 70–110)
PROTHROMBIN TIME: 19.3 SEC

## 2018-09-02 PROCEDURE — 25000003 PHARM REV CODE 250: Performed by: INTERNAL MEDICINE

## 2018-09-02 PROCEDURE — 25000003 PHARM REV CODE 250: Performed by: NURSE PRACTITIONER

## 2018-09-02 PROCEDURE — 94761 N-INVAS EAR/PLS OXIMETRY MLT: CPT

## 2018-09-02 PROCEDURE — 25000242 PHARM REV CODE 250 ALT 637 W/ HCPCS: Performed by: NURSE PRACTITIONER

## 2018-09-02 PROCEDURE — 11000004 HC SNF PRIVATE

## 2018-09-02 PROCEDURE — 85610 PROTHROMBIN TIME: CPT

## 2018-09-02 PROCEDURE — 99900035 HC TECH TIME PER 15 MIN (STAT)

## 2018-09-02 PROCEDURE — 25000003 PHARM REV CODE 250: Performed by: STUDENT IN AN ORGANIZED HEALTH CARE EDUCATION/TRAINING PROGRAM

## 2018-09-02 PROCEDURE — 94640 AIRWAY INHALATION TREATMENT: CPT

## 2018-09-02 PROCEDURE — 94660 CPAP INITIATION&MGMT: CPT

## 2018-09-02 RX ORDER — WARFARIN 2.5 MG/1
5 TABLET ORAL DAILY
Status: DISCONTINUED | OUTPATIENT
Start: 2018-09-03 | End: 2018-09-07 | Stop reason: HOSPADM

## 2018-09-02 RX ADMIN — FLUTICASONE FUROATE AND VILANTEROL TRIFENATATE 1 PUFF: 100; 25 POWDER RESPIRATORY (INHALATION) at 09:09

## 2018-09-02 RX ADMIN — IPRATROPIUM BROMIDE AND ALBUTEROL SULFATE 3 ML: .5; 3 SOLUTION RESPIRATORY (INHALATION) at 07:09

## 2018-09-02 RX ADMIN — INSULIN ASPART 2 UNITS: 100 INJECTION, SOLUTION INTRAVENOUS; SUBCUTANEOUS at 05:09

## 2018-09-02 RX ADMIN — GLIPIZIDE 5 MG: 2.5 TABLET, FILM COATED, EXTENDED RELEASE ORAL at 09:09

## 2018-09-02 RX ADMIN — DIPHENHYDRAMINE HYDROCHLORIDE 25 MG: 25 CAPSULE ORAL at 08:09

## 2018-09-02 RX ADMIN — ATORVASTATIN CALCIUM 40 MG: 20 TABLET, FILM COATED ORAL at 09:09

## 2018-09-02 RX ADMIN — POLYETHYLENE GLYCOL 3350 17 G: 17 POWDER, FOR SOLUTION ORAL at 09:09

## 2018-09-02 RX ADMIN — TRAMADOL HYDROCHLORIDE 50 MG: 50 TABLET, COATED ORAL at 08:09

## 2018-09-02 RX ADMIN — AMLODIPINE BESYLATE 5 MG: 5 TABLET ORAL at 09:09

## 2018-09-02 RX ADMIN — GUAIFENESIN 600 MG: 600 TABLET, EXTENDED RELEASE ORAL at 08:09

## 2018-09-02 RX ADMIN — METFORMIN HYDROCHLORIDE 500 MG: 500 TABLET, EXTENDED RELEASE ORAL at 05:09

## 2018-09-02 RX ADMIN — LEVOTHYROXINE SODIUM 50 MCG: 50 TABLET ORAL at 06:09

## 2018-09-02 RX ADMIN — ACETAMINOPHEN 650 MG: 325 TABLET ORAL at 08:09

## 2018-09-02 RX ADMIN — WARFARIN SODIUM 6 MG: 2.5 TABLET ORAL at 05:09

## 2018-09-02 RX ADMIN — IPRATROPIUM BROMIDE AND ALBUTEROL SULFATE 3 ML: .5; 3 SOLUTION RESPIRATORY (INHALATION) at 01:09

## 2018-09-02 NOTE — PLAN OF CARE
Problem: Patient Care Overview  Goal: Plan of Care Review  Outcome: Ongoing (interventions implemented as appropriate)  Scheduled medications administered. Pt is eating supper at this time. Ms Moran offered pain medication. Pt declined. She stated that she was not having any pain at this time. Ms Moran was instructed to use call button to signal staff for assistance. Pt verbalized understanding of information. Call light is within reach. Will continue with plan of care.

## 2018-09-03 LAB
INR PPP: 2.2
POCT GLUCOSE: 133 MG/DL (ref 70–110)
POCT GLUCOSE: 154 MG/DL (ref 70–110)
POCT GLUCOSE: 162 MG/DL (ref 70–110)
POCT GLUCOSE: 238 MG/DL (ref 70–110)
PROTHROMBIN TIME: 21.3 SEC

## 2018-09-03 PROCEDURE — 94640 AIRWAY INHALATION TREATMENT: CPT

## 2018-09-03 PROCEDURE — 25000003 PHARM REV CODE 250: Performed by: STUDENT IN AN ORGANIZED HEALTH CARE EDUCATION/TRAINING PROGRAM

## 2018-09-03 PROCEDURE — 99900035 HC TECH TIME PER 15 MIN (STAT)

## 2018-09-03 PROCEDURE — 25000003 PHARM REV CODE 250: Performed by: INTERNAL MEDICINE

## 2018-09-03 PROCEDURE — 36415 COLL VENOUS BLD VENIPUNCTURE: CPT

## 2018-09-03 PROCEDURE — 94660 CPAP INITIATION&MGMT: CPT

## 2018-09-03 PROCEDURE — 85610 PROTHROMBIN TIME: CPT

## 2018-09-03 PROCEDURE — 25000003 PHARM REV CODE 250: Performed by: NURSE PRACTITIONER

## 2018-09-03 PROCEDURE — 94761 N-INVAS EAR/PLS OXIMETRY MLT: CPT

## 2018-09-03 PROCEDURE — 11000004 HC SNF PRIVATE

## 2018-09-03 PROCEDURE — 27000221 HC OXYGEN, UP TO 24 HOURS

## 2018-09-03 PROCEDURE — 25000242 PHARM REV CODE 250 ALT 637 W/ HCPCS: Performed by: NURSE PRACTITIONER

## 2018-09-03 RX ADMIN — AMLODIPINE BESYLATE 5 MG: 5 TABLET ORAL at 09:09

## 2018-09-03 RX ADMIN — GUAIFENESIN 600 MG: 600 TABLET, EXTENDED RELEASE ORAL at 08:09

## 2018-09-03 RX ADMIN — FLUTICASONE FUROATE AND VILANTEROL TRIFENATATE 1 PUFF: 100; 25 POWDER RESPIRATORY (INHALATION) at 09:09

## 2018-09-03 RX ADMIN — GLIPIZIDE 5 MG: 2.5 TABLET, FILM COATED, EXTENDED RELEASE ORAL at 09:09

## 2018-09-03 RX ADMIN — ACETAMINOPHEN 650 MG: 325 TABLET ORAL at 08:09

## 2018-09-03 RX ADMIN — IPRATROPIUM BROMIDE AND ALBUTEROL SULFATE 3 ML: .5; 3 SOLUTION RESPIRATORY (INHALATION) at 01:09

## 2018-09-03 RX ADMIN — WARFARIN SODIUM 5 MG: 2.5 TABLET ORAL at 04:09

## 2018-09-03 RX ADMIN — TRAMADOL HYDROCHLORIDE 50 MG: 50 TABLET, COATED ORAL at 08:09

## 2018-09-03 RX ADMIN — LEVOTHYROXINE SODIUM 50 MCG: 50 TABLET ORAL at 06:09

## 2018-09-03 RX ADMIN — ATORVASTATIN CALCIUM 40 MG: 20 TABLET, FILM COATED ORAL at 09:09

## 2018-09-03 RX ADMIN — IPRATROPIUM BROMIDE AND ALBUTEROL SULFATE 3 ML: .5; 3 SOLUTION RESPIRATORY (INHALATION) at 08:09

## 2018-09-03 RX ADMIN — IPRATROPIUM BROMIDE AND ALBUTEROL SULFATE 3 ML: .5; 3 SOLUTION RESPIRATORY (INHALATION) at 07:09

## 2018-09-03 RX ADMIN — INSULIN ASPART 2 UNITS: 100 INJECTION, SOLUTION INTRAVENOUS; SUBCUTANEOUS at 04:09

## 2018-09-03 RX ADMIN — METFORMIN HYDROCHLORIDE 500 MG: 500 TABLET, EXTENDED RELEASE ORAL at 04:09

## 2018-09-03 RX ADMIN — DIPHENHYDRAMINE HYDROCHLORIDE 25 MG: 25 CAPSULE ORAL at 08:09

## 2018-09-04 LAB
ANION GAP SERPL CALC-SCNC: 13 MMOL/L
BASOPHILS # BLD AUTO: 0.04 K/UL
BASOPHILS NFR BLD: 0.5 %
BUN SERPL-MCNC: 18 MG/DL
CALCIUM SERPL-MCNC: 9.6 MG/DL
CHLORIDE SERPL-SCNC: 104 MMOL/L
CO2 SERPL-SCNC: 21 MMOL/L
CREAT SERPL-MCNC: 1 MG/DL
DIFFERENTIAL METHOD: ABNORMAL
EOSINOPHIL # BLD AUTO: 0.3 K/UL
EOSINOPHIL NFR BLD: 3.4 %
ERYTHROCYTE [DISTWIDTH] IN BLOOD BY AUTOMATED COUNT: 15.6 %
EST. GFR  (AFRICAN AMERICAN): 59.8 ML/MIN/1.73 M^2
EST. GFR  (NON AFRICAN AMERICAN): 51.9 ML/MIN/1.73 M^2
GLUCOSE SERPL-MCNC: 135 MG/DL
HCT VFR BLD AUTO: 40.2 %
HGB BLD-MCNC: 12.3 G/DL
IMM GRANULOCYTES # BLD AUTO: 0.06 K/UL
IMM GRANULOCYTES NFR BLD AUTO: 0.8 %
INR PPP: 2.1
LYMPHOCYTES # BLD AUTO: 1.7 K/UL
LYMPHOCYTES NFR BLD: 22 %
MAGNESIUM SERPL-MCNC: 1.9 MG/DL
MCH RBC QN AUTO: 28.4 PG
MCHC RBC AUTO-ENTMCNC: 30.6 G/DL
MCV RBC AUTO: 93 FL
MONOCYTES # BLD AUTO: 0.7 K/UL
MONOCYTES NFR BLD: 8.3 %
NEUTROPHILS # BLD AUTO: 5.1 K/UL
NEUTROPHILS NFR BLD: 65 %
NRBC BLD-RTO: 0 /100 WBC
PHOSPHATE SERPL-MCNC: 3.2 MG/DL
PLATELET # BLD AUTO: 247 K/UL
PMV BLD AUTO: 11.3 FL
POCT GLUCOSE: 141 MG/DL (ref 70–110)
POCT GLUCOSE: 143 MG/DL (ref 70–110)
POCT GLUCOSE: 149 MG/DL (ref 70–110)
POTASSIUM SERPL-SCNC: 4.4 MMOL/L
PROTHROMBIN TIME: 20.2 SEC
RBC # BLD AUTO: 4.33 M/UL
SODIUM SERPL-SCNC: 138 MMOL/L
WBC # BLD AUTO: 7.87 K/UL

## 2018-09-04 PROCEDURE — 97803 MED NUTRITION INDIV SUBSEQ: CPT

## 2018-09-04 PROCEDURE — 99309 SBSQ NF CARE MODERATE MDM 30: CPT | Mod: ,,, | Performed by: NURSE PRACTITIONER

## 2018-09-04 PROCEDURE — 80048 BASIC METABOLIC PNL TOTAL CA: CPT

## 2018-09-04 PROCEDURE — 97110 THERAPEUTIC EXERCISES: CPT

## 2018-09-04 PROCEDURE — 25000242 PHARM REV CODE 250 ALT 637 W/ HCPCS: Performed by: NURSE PRACTITIONER

## 2018-09-04 PROCEDURE — 84100 ASSAY OF PHOSPHORUS: CPT

## 2018-09-04 PROCEDURE — 97530 THERAPEUTIC ACTIVITIES: CPT

## 2018-09-04 PROCEDURE — 97116 GAIT TRAINING THERAPY: CPT

## 2018-09-04 PROCEDURE — 94640 AIRWAY INHALATION TREATMENT: CPT

## 2018-09-04 PROCEDURE — 83735 ASSAY OF MAGNESIUM: CPT

## 2018-09-04 PROCEDURE — 25000003 PHARM REV CODE 250: Performed by: INTERNAL MEDICINE

## 2018-09-04 PROCEDURE — 36415 COLL VENOUS BLD VENIPUNCTURE: CPT

## 2018-09-04 PROCEDURE — 97535 SELF CARE MNGMENT TRAINING: CPT

## 2018-09-04 PROCEDURE — 25000003 PHARM REV CODE 250: Performed by: NURSE PRACTITIONER

## 2018-09-04 PROCEDURE — 25000003 PHARM REV CODE 250: Performed by: STUDENT IN AN ORGANIZED HEALTH CARE EDUCATION/TRAINING PROGRAM

## 2018-09-04 PROCEDURE — 94761 N-INVAS EAR/PLS OXIMETRY MLT: CPT

## 2018-09-04 PROCEDURE — 11000004 HC SNF PRIVATE

## 2018-09-04 PROCEDURE — 85025 COMPLETE CBC W/AUTO DIFF WBC: CPT

## 2018-09-04 PROCEDURE — 85610 PROTHROMBIN TIME: CPT

## 2018-09-04 RX ADMIN — TRAMADOL HYDROCHLORIDE 50 MG: 50 TABLET, COATED ORAL at 09:09

## 2018-09-04 RX ADMIN — GUAIFENESIN 600 MG: 600 TABLET, EXTENDED RELEASE ORAL at 09:09

## 2018-09-04 RX ADMIN — GLIPIZIDE 5 MG: 2.5 TABLET, FILM COATED, EXTENDED RELEASE ORAL at 09:09

## 2018-09-04 RX ADMIN — WARFARIN SODIUM 5 MG: 2.5 TABLET ORAL at 05:09

## 2018-09-04 RX ADMIN — ACETAMINOPHEN 650 MG: 325 TABLET ORAL at 09:09

## 2018-09-04 RX ADMIN — DIPHENHYDRAMINE HYDROCHLORIDE 25 MG: 25 CAPSULE ORAL at 09:09

## 2018-09-04 RX ADMIN — ATORVASTATIN CALCIUM 40 MG: 20 TABLET, FILM COATED ORAL at 09:09

## 2018-09-04 RX ADMIN — FLUTICASONE FUROATE AND VILANTEROL TRIFENATATE 1 PUFF: 100; 25 POWDER RESPIRATORY (INHALATION) at 09:09

## 2018-09-04 RX ADMIN — AMLODIPINE BESYLATE 5 MG: 5 TABLET ORAL at 09:09

## 2018-09-04 RX ADMIN — METFORMIN HYDROCHLORIDE 500 MG: 500 TABLET, EXTENDED RELEASE ORAL at 05:09

## 2018-09-04 RX ADMIN — LEVOTHYROXINE SODIUM 50 MCG: 50 TABLET ORAL at 05:09

## 2018-09-04 RX ADMIN — IPRATROPIUM BROMIDE AND ALBUTEROL SULFATE 3 ML: .5; 3 SOLUTION RESPIRATORY (INHALATION) at 07:09

## 2018-09-04 NOTE — PLAN OF CARE
Problem: Occupational Therapy Goal  Goal: Occupational Therapy Goal  Goals to be met by: 14 days     Patient will increase functional independence with ADLs by performing:    Feeding with Modified [place].    Met  UE Dressing with Modified [place].   Met  LE Dressing with Stand-by Assistance. Met  Grooming while standing with Stand-by Assistance with RW to steady.  Toileting from bedside commode with Supervision for hygiene and clothing management and RW to steady. .   Bathing from  shower chair/bench with Supervision. Met   Rolling to Bilateral with Modified [place].   Supine to sit with Modified [place].  Stand pivot transfers with Supervision. Met   Upper extremity exercise program x10 reps per set, with supervision.  Pt will tolerate up to 10 minutes of functional standing activity with (S) and RW for safety.      Outcome: Ongoing (interventions implemented as appropriate)  Patient's goals are appropriate.  DEIDRE Gomez  9/4/2018

## 2018-09-04 NOTE — PROGRESS NOTES
" [facility] - Skilled Nursing Care  Adult Nutrition  Progress Note    SUMMARY       Recommendations    Recommendation/Intervention: Continue 2000 diabetic, cardiac diet, honor food preferences. Monitor pt wt and nutrition related lab values. RD to follow  Goals: po >85% to meet EEN and EPN while admitted  Nutrition Goal Status: progressing towards goal    Reason for Assessment    Reason for Assessment: RD follow-up  Diagnosis: (Debility sp fall)  Relevant Medical History: DM, HTN, CHF, Hep B, BARNEY, obesity, CKD III, anxiety, Vit D deficiency,   Interdisciplinary Rounds: attended  General Information Comments: Pt reports good appetite, po 100%. Pt educated on diabetic diet, carb counting, portion control, and label reading. Pt verbalized understanding with appropriate questions. Pt wt is stable. No N/V/C/D reported at this time.   Nutrition Discharge Planning: DC on diabetic diet, coumadin precautions     Nutrition Risk Screen    Nutrition Risk Screen: no indicators present    Nutrition/Diet History    Patient Reported Diet/Restrictions/Preferences: general  Food Preferences: no milk no ice cream  no beans no leafy greens will eat salads and cabbage  Do you have any cultural, spiritual, Protestant conflicts, given your current situation?: none  Food Allergies: NKFA  Factors Affecting Nutritional Intake: None identified at this time    Anthropometrics    Temp: 98.1 °F (36.7 °C)  Height Method: Stated  Height: 5' 1" (154.9 cm)  Height (inches): 61 in  Weight Method: Standard Scale  Weight: 91.7 kg (202 lb 2.6 oz)  Weight (lb): 202.16 lb  Ideal Body Weight (IBW), Female: 105 lb  % Ideal Body Weight, Female (lb): 196.52 lb  BMI (Calculated): 39.1  BMI Grade: 35 - 39.9 - obesity - grade II  Usual Body Weight (UBW), k.8 kg  % Usual Body Weight: 102.17  % Weight Change From Usual Weight: 1.96 %       Lab/Procedures/Meds    Pertinent Labs Reviewed: reviewed  Pertinent Labs Comments: glucose 135; POCT glucose 143  Pertinent " Medications Reviewed: reviewed  Pertinent Medications Comments: warfarin, statin, polyethylene glycol; metformin; levothyroxine    Physical Findings/Assessment    Overall Physical Appearance: loss of muscle mass, obese  Tubes: (-)  Oral/Mouth Cavity: tooth/teeth missing(chews on one side)  Skin: (swollen knee from fall from bed)    Estimated/Assessed Needs    Weight Used For Calorie Calculations: 93.6 kg (206 lb 5.6 oz)  Energy Calorie Requirements (kcal): 1328  Energy Need Method: McPherson-St Jeor(no activity factor)  Protein Requirements: 72g  Weight Used For Protein Calculations: 47.7 kg (105 lb 2.6 oz)(x 1.5 IBW)  Fluid Requirements (mL): 1500 or per MD  Fluid Need Method: RDA Method  RDA Method (mL): 1328  CHO Requirement: 50% of calories      Nutrition Prescription Ordered    Current Diet Order: 2000 diabetic cardiac  Nutrition Order Comments: Coumadin precautions no milk or ice cream  Oral Nutrition Supplement: none    Evaluation of Received Nutrient/Fluid Intake    Energy Calories Required: meeting needs  Protein Required: meeting needs  Fluid Required: meeting needs  Tolerance: tolerating  % Intake of Estimated Energy Needs: 75 - 100 %  % Meal Intake: 75 - 100 %    Nutrition Risk    Level of Risk/Frequency of Follow-up: low     Assessment and Plan   Obesity related to over consumption of calories for size as evidenced by BMI 39 and patient report that she does not follow special diet at home.    Monitor and Evaluation    Food and Nutrient Intake: food and beverage intake  Food and Nutrient Adminstration: diet order  Knowledge/Beliefs/Attitudes: food and nutrition knowledge/skill  Anthropometric Measurements: weight change  Biochemical Data, Medical Tests and Procedures: electrolyte and renal panel, glucose/endocrine profile  Nutrition-Focused Physical Findings: overall appearance     Nutrition Follow-Up    RD Follow-up?: Yes

## 2018-09-04 NOTE — ASSESSMENT & PLAN NOTE
Pulse Readings from Last 3 Encounters:   09/04/18 84   08/24/18 84   08/16/18 89     BB discontinued due to hypotension  Cont to hold and restart if needed  Cont anticoagulation with coumadin  Stop Lovenox as INR therapeutic.     8/27/18  HR is stable and BP is stable.  B-blocker stopped due to hypotension.  Continue Coumadin, INR target is 2-3.  INR is therapeutic.  Lab Results   Component Value Date    INR 2.1 (H) 09/04/2018    INR 2.2 (H) 09/03/2018    INR 2.0 (H) 09/02/2018 9/4/18  HR and BP is stable.  Pulse Readings from Last 3 Encounters:   09/04/18 84   08/24/18 84   08/16/18 89     BP Readings from Last 3 Encounters:   09/04/18 (!) 148/78   08/24/18 132/87   08/16/18 121/67     Continue coumadin at current dosing and follow up with treating provider after discharge.

## 2018-09-04 NOTE — ASSESSMENT & PLAN NOTE
Secondary to trauma  Xray with no fracture or hardware failure.   Cont conservative care with cold compress, leg elevation, anti inflammatory and pain control  PT/OT as tolerated  Consider ortho consult if no improvement for arthrocentesis.     8/27/18  Pain well controlled.  Continue use of cool compress, leg elevation, and Tramadol as ordered.    9/4/18  Pain is well controlled.  Continue leg elevation and tramadol as ordered.

## 2018-09-04 NOTE — ASSESSMENT & PLAN NOTE
Hold home oral agents  Low dose correction scale   Cont blood glucose monitoring   ADA diet    8/27/18  Fasting glucose high as well as last a1c.  Lab Results   Component Value Date    HGBA1C 8.5 (H) 08/18/2018     POCT Glucose   Date Value Ref Range Status   09/04/2018 141 (H) 70 - 110 mg/dL Final   09/04/2018 143 (H) 70 - 110 mg/dL Final   09/03/2018 154 (H) 70 - 110 mg/dL Final   09/03/2018 238 (H) 70 - 110 mg/dL Final   09/03/2018 162 (H) 70 - 110 mg/dL Final   09/03/2018 133 (H) 70 - 110 mg/dL Final   09/02/2018 178 (H) 70 - 110 mg/dL Final   09/02/2018 206 (H) 70 - 110 mg/dL Final   09/02/2018 184 (H) 70 - 110 mg/dL Final   09/02/2018 104 70 - 110 mg/dL Final   09/01/2018 219 (H) 70 - 110 mg/dL Final   09/01/2018 111 (H) 70 - 110 mg/dL Final     Currently on Novolog low correction scale.  Patient states having difficulty getting glipizide approved with insurance.  Will restart 2.5 mg of glipizide today and follow.    9/4/18  CBG is stable, continue glipizide and metformin as ordered.

## 2018-09-04 NOTE — PT/OT/SLP PROGRESS
Occupational Therapy  Treatment    Claribel Moran   MRN: [id number]   Admitting Diagnosis: Debility    OT Date of Treatment: 09/04/18  Total Time (min): 45 min    Billable Minutes:  Self Care/Home Management 45    General Precautions: Standard, aspiration, fall  Orthopedic Precautions: N/A  Braces: N/A    Do you have any cultural, spiritual, Hoahaoism conflicts, given your current situation?: none stated    Subjective:  Communicated with patient prior to session.    Pain/Comfort  Pain Rating 1: 0/10  Pain Rating Post-Intervention 1: 0/10    Objective:       Functional Status:  MDS G  Bed Mobility Functional Status: S sit>supine  Transfer Functional Status: S bedside chair>shower bench using RW; shower bench>w/c stand pivot; w/c>bedside chair stand pivot   Walk in Room Functional Status: S using RW  Dressing Functional Status: Setup: [place] and donning front gown; S [place] pants and depends. Donning socks.   Personal Hygiene Functional Status: mod(I) - (I)(seated in w/c at sink) oral care, washing face, and hair care  Bathing Functional Status: S shower       AMPA 6 Click:  AMPA Total Score: 19    Patient left supine with call button in reach and all needs met.     ASSESSMENT:  Claribel Moran is a 84 y.o. female with a medical diagnosis of Debility and presents with the deficits listed below. Patient tolerated treatment session and was motivated to complete ADLs. Patient continues to benefit from skilled OT services to achieve maximal independence.     Rehab identified problem list/impairments: weakness, impaired endurance, impaired self care skills, impaired functional mobilty, gait instability, impaired balance, decreased lower extremity function, decreased safety awareness, pain, decreased ROM, orthopedic precautions    Rehab potential is good    Activity tolerance: Good    Discharge recommendations: home with home health     Barriers to discharge: None     Equipment recommendations: none     GOALS:    Multidisciplinary Problems     Occupational Therapy Goals        Problem: Occupational Therapy Goal    Goal Priority Disciplines Outcome Interventions   Occupational Therapy Goal     OT, PT/OT Ongoing (interventions implemented as appropriate)    Description:  Goals to be met by: 14 days     Patient will increase functional independence with ADLs by performing:    Feeding with Modified [place].    Met  UE Dressing with Modified [place].   Met  LE Dressing with Stand-by Assistance. Met  Grooming while standing with Stand-by Assistance with RW to steady.  Toileting from bedside commode with Supervision for hygiene and clothing management and RW to steady. .   Bathing from  shower chair/bench with Supervision. Met   Rolling to Bilateral with Modified [place].   Supine to sit with Modified [place].  Stand pivot transfers with Supervision. Met   Upper extremity exercise program x10 reps per set, with supervision.  Pt will tolerate up to 10 minutes of functional standing activity with (S) and RW for safety.                        Plan:  Patient to be seen 5 x/week to address the above listed problems via self-care/home management, therapeutic activities, therapeutic exercises  Plan of Care expires: 09/25/18  Plan of Care reviewed with: patient    DEIDRE Gomez  09/04/2018

## 2018-09-04 NOTE — PROGRESS NOTES
Ms Moran is lying in bed at this time. Pt is watching television. No distress noted. No c/o pain or discomfort voiced. Call light is within reach. Ms Wells was instructed to use call button to signal staff for assistance. Pt verbalized understanding of information.

## 2018-09-04 NOTE — ASSESSMENT & PLAN NOTE
Cont with PT/OT for gait training and strengthening and restoration of ADL's   Fall precautions   Cont DVT prophylaxis with coumadin.  Anticoagulants   Medication Route Frequency    warfarin (COUMADIN) tablet 5 mg Oral Daily     Lab Results   Component Value Date    INR 2.1 (H) 09/04/2018    INR 2.2 (H) 09/03/2018    INR 2.0 (H) 09/02/2018 8/27/18  Continue therapy to improve functional goals.  Plans to discharge home with family.  Continue coumadin for dvt ppx.    9/4/18  Participating in therapy, no acute issues.  INR is therapeutic.

## 2018-09-04 NOTE — PLAN OF CARE
09/04/2018  4:18 PM     09/04/18 1618   Medicare Message   Important Message from Medicare regarding Discharge Appeal Rights Given to patient/caregiver;Explained to patient/caregiver;Signed/date by patient/caregiver   CRISTIAN served NOMNC to patient notifying of discharge date of 9/7/18 and appeal right.  Patient expressed understanding and satisfaction regarding discharge date.  Patient signed NOMNC, and CRISTIAN provided patient with copy.  CRISTIAN placed original in patient's blue chart in nurse's station.    JAMESON Mercado, SW  [phone]

## 2018-09-04 NOTE — PROGRESS NOTES
[facility] - [facility]illed Nursing Care  Department of [facility] Medicine  Progress Note    Patient Name: Claribel Moran  MRN: [id number]  Code Status: Full Code  Admission Date: 8/24/2018  Length of Stay: 11 days  Attending Physician: Andie Trevizo MD  Primary Care Provider: Moises Eduardo MD    Subjective:     Principal Problem:Debility    HPI:  Chief Complaint/Reason for Admission: Debility    History of Present Illness:  Patient is a 83 y.o. female who has a past medical history of Acid reflux, Anxiety, Arthritis, Atrial fibrillation with Anticoagulant long-term use, coronary artery disease, CHF, CKD stage 3, Hepatitis B, Hyperlipidemia, Hypertension, Reactive airway disease with wheezing, Sleep apnea, Thyroid disease, Total knee replacement status, Type II diabetes mellitus, and Vitamin D deficiency disease presented with left knee pain after mechanical fall. She presented to ED and was discharged home with pain control medications. She presented back to ED for worsening pain. Xray of knee showed moderate left knee suprapatellar synovial complex/effusion. She was treated conservatively and did not require surgical consultation. Patient has been working with PT/OT who recommend SNF for further balance/mobility training.       Interval History:   8/27/18  Patient seen at bedside, she reports pain is controlled with prescribed regimen.  She states she is eating, drinking, voiding and having BM without issues.  She states she resides with her daughter and grandchild and plans to return to same.  Discussed her elevated glucose readings, she reports she has not taken her home dosing of glipizide in a month due to getting it paid for by insurance company.  No other issues at this time.      9/4/18  Patient seen at bedside, she has no complaints today.        Review of Systems   Constitutional: Negative for appetite change, fatigue and fever.   Respiratory: Negative for cough and shortness of breath.    Cardiovascular:  Negative for chest pain, palpitations and leg swelling.   Gastrointestinal: Negative for abdominal pain, constipation, diarrhea, nausea and vomiting.   Genitourinary: Negative for dysuria.   Musculoskeletal: Positive for arthralgias.   Skin: Negative for rash.     Objective:     Vital Signs (Most Recent):  Temp: 98.1 °F (36.7 °C) (09/04/18 0718)  Pulse: 84 (09/04/18 0906)  Resp: 18 (09/04/18 0906)  BP: (!) 148/78 (09/04/18 0718)  SpO2: 96 % (09/04/18 0747) Vital Signs (24h Range):  Temp:  [98.1 °F (36.7 °C)-98.3 °F (36.8 °C)] 98.1 °F (36.7 °C)  Pulse:  [72-87] 84  Resp:  [18-23] 18  SpO2:  [96 %-98 %] 96 %  BP: (119-148)/(60-78) 148/78     Weight: 91.7 kg (202 lb 2.6 oz)  Body mass index is 38.2 kg/m².  No intake or output data in the 24 hours ending 09/04/18 1157   Physical Exam   Constitutional: She is oriented to person, place, and time. She appears well-developed and well-nourished.   Cardiovascular: Normal rate, regular rhythm, normal heart sounds and intact distal pulses.   No murmur heard.  Pulmonary/Chest: Effort normal and breath sounds normal. No respiratory distress.   Abdominal: Soft. Normal appearance and bowel sounds are normal. She exhibits no distension. There is no tenderness.   Musculoskeletal: Normal range of motion. She exhibits no edema.   Neurological: She is alert and oriented to person, place, and time. She has normal strength.   Skin: Skin is warm, dry and intact. Capillary refill takes less than 2 seconds. No erythema.       Significant Labs:   BMP:   Recent Labs   Lab  09/04/18 0627   GLU  135*   NA  138   K  4.4   CL  104   CO2  21*   BUN  18   CREATININE  1.0   CALCIUM  9.6   MG  1.9     CBC:   Recent Labs   Lab  09/04/18 0627   WBC  7.87   HGB  12.3   HCT  40.2   PLT  247       Significant Imaging: n/a    Assessment/Plan:      * Debility    Cont with PT/OT for gait training and strengthening and restoration of ADL's   Fall precautions   Cont DVT prophylaxis with  coumadin.  Anticoagulants   Medication Route Frequency    warfarin (COUMADIN) tablet 5 mg Oral Daily     Lab Results   Component Value Date    INR 2.1 (H) 09/04/2018    INR 2.2 (H) 09/03/2018    INR 2.0 (H) 09/02/2018 8/27/18  Continue therapy to improve functional goals.  Plans to discharge home with family.  Continue coumadin for dvt ppx.    9/4/18  Participating in therapy, no acute issues.  INR is therapeutic.        Effusion of knee    Secondary to trauma  Xray with no fracture or hardware failure.   Cont conservative care with cold compress, leg elevation, anti inflammatory and pain control  PT/OT as tolerated  Consider ortho consult if no improvement for arthrocentesis.     8/27/18  Pain well controlled.  Continue use of cool compress, leg elevation, and Tramadol as ordered.    9/4/18  Pain is well controlled.  Continue leg elevation and tramadol as ordered.          Anticoagulated on Coumadin    Plan as above.     8/27/18  Plan as above.    9/4/18  Plan as above.        CKD (chronic kidney disease) stage 3, GFR 30-59 ml/min    Lab Results   Component Value Date    CREATININE 1.0 09/04/2018     Sr Cr stable  Cont to monitor with biweekly labs   Avoid nephrotoxins.   Holding Losartan   Renally dose all medications      8/27/18  Creating is stable.  Per MD note, losartan on hold, BP is stable with Norvasc alone.  Continue to follow.    9/4/18  Creatinine is stable.  BP is stable, continue Norvasc as ordered.        Hypertension    BP Readings from Last 3 Encounters:   09/04/18 (!) 148/78   08/24/18 132/87   08/16/18 121/67     Well controlled  Cont amlodipine to treat  Home Coreg and Losartan discontinued  Cont to monitor and restart if required  Low Na diet    8/27/18  BP is stable.  Continue Norvasc as ordered.  Coreg and Losartan has been discontinued per MD note.    9/4/18  BP Readings from Last 3 Encounters:   09/04/18 (!) 148/78   08/24/18 132/87   08/16/18 121/67     BP is stable.  Continue Norvasc  as ordered.        Diabetes mellitus type II, non insulin dependent    Hold home oral agents  Low dose correction scale   Cont blood glucose monitoring   ADA diet    8/27/18  Fasting glucose high as well as last a1c.  Lab Results   Component Value Date    HGBA1C 8.5 (H) 08/18/2018     POCT Glucose   Date Value Ref Range Status   09/04/2018 141 (H) 70 - 110 mg/dL Final   09/04/2018 143 (H) 70 - 110 mg/dL Final   09/03/2018 154 (H) 70 - 110 mg/dL Final   09/03/2018 238 (H) 70 - 110 mg/dL Final   09/03/2018 162 (H) 70 - 110 mg/dL Final   09/03/2018 133 (H) 70 - 110 mg/dL Final   09/02/2018 178 (H) 70 - 110 mg/dL Final   09/02/2018 206 (H) 70 - 110 mg/dL Final   09/02/2018 184 (H) 70 - 110 mg/dL Final   09/02/2018 104 70 - 110 mg/dL Final   09/01/2018 219 (H) 70 - 110 mg/dL Final   09/01/2018 111 (H) 70 - 110 mg/dL Final     Currently on Novolog low correction scale.  Patient states having difficulty getting glipizide approved with insurance.  Will restart 2.5 mg of glipizide today and follow.    9/4/18  CBG is stable, continue glipizide and metformin as ordered.        Chronic atrial fibrillation    Pulse Readings from Last 3 Encounters:   09/04/18 84   08/24/18 84   08/16/18 89     BB discontinued due to hypotension  Cont to hold and restart if needed  Cont anticoagulation with coumadin  Stop Lovenox as INR therapeutic.     8/27/18  HR is stable and BP is stable.  B-blocker stopped due to hypotension.  Continue Coumadin, INR target is 2-3.  INR is therapeutic.  Lab Results   Component Value Date    INR 2.1 (H) 09/04/2018    INR 2.2 (H) 09/03/2018    INR 2.0 (H) 09/02/2018 9/4/18  HR and BP is stable.  Pulse Readings from Last 3 Encounters:   09/04/18 84   08/24/18 84   08/16/18 89     BP Readings from Last 3 Encounters:   09/04/18 (!) 148/78   08/24/18 132/87   08/16/18 121/67     Continue coumadin at current dosing and follow up with treating provider after discharge.            Rene Almaguer NP  Department  of Hospital Medicine  [facility] - [facility]

## 2018-09-04 NOTE — ASSESSMENT & PLAN NOTE
Lab Results   Component Value Date    CREATININE 1.0 09/04/2018     Sr Cr stable  Cont to monitor with biweekly labs   Avoid nephrotoxins.   Holding Losartan   Renally dose all medications      8/27/18  Creating is stable.  Per MD note, losartan on hold, BP is stable with Norvasc alone.  Continue to follow.    9/4/18  Creatinine is stable.  BP is stable, continue Norvasc as ordered.

## 2018-09-04 NOTE — PT/OT/SLP PROGRESS
Physical Therapy  Treatment    Claribel Moran   MRN: [id number]   Admitting Diagnosis: Debility    PT Received On: 09/04/18  Total Time (min): 45       Billable Minutes:  Gait Training 10, Therapeutic Activity 15 and Therapeutic Exercise 20    Treatment Type: Treatment  PT/PTA: PTA     PTA Visit Number: 4       General Precautions: Standard, aspiration, fall  Orthopedic Precautions: N/A   Braces: N/A    Do you have any cultural, spiritual, Evangelical conflicts, given your current situation?: no    Subjective:  Communicated with nursing prior to session.  Pt agreed to work with therapy.     Pain/Comfort  Pain Rating 1: 0/10  Pain Rating Post-Intervention 1: 0/10    Objective:  Patient found supine in bed.         Functional Status:  MDS G  Bed Mobility Functional Status: S-SBA  Transfer Functional Status: S-SBA  Walk in Room Functional Status: S-SBA  Walk in Corridor Functional Status: S-SBA  Locomotion on Unit Functional Status: S-SBA  Moving from seated to standing position: Not steady, but able to stabilize without staff assistance  Walking (with assistive device if used): Not steady, only able to stabilize with staff assistance  Turning around and facing the opposite direction while walking: Not steady, only able to stabilize with staff assistance  Moving on and off the toilet: Activity did not occur  Surface-to-surface transfer (transfer between bed and chair or wheelchair): Not steady, only able to stabilize with staff assistance          AM-PAC 6 CLICK MOBILITY  Total Score:18    Bed Mobility:  Sit>Supine:not performed  Supine>Sit: SPV on bed    Transfers:  Sit<>Stand: to/from bed, to/from w/c with SBA-close SPV    Gait:  Amb 210 ft with RW and SBA    Therex:  B LE therex 2x20 reps: AP, LAQ, Hip Flexion, and GS    Additional Treatment:  Donned pants and gown at start of treatment session.   UBE x10 min to improve overall endurance.   Mini-elliptical x10 min to improve overall B LE ROM, strength, and  endurance.    Patient left up in chair with call button in reach.    Assessment:  Claribel Moran is a 84 y.o. female with a medical diagnosis of Debility.  Pt continues to progress towards goals and will continue to benefit from PT services at this time. Continue with PT POC as indicated.    Rehab identified problem list/impairments: weakness, impaired endurance, impaired self care skills, impaired functional mobilty, gait instability, impaired balance, decreased lower extremity function, decreased safety awareness, decreased ROM, orthopedic precautions    Rehab potential is good.    Activity tolerance: Good    Discharge recommendations: home health PT     Barriers to discharge: None    Equipment recommendations: none     GOALS:   Multidisciplinary Problems     Physical Therapy Goals        Problem: Physical Therapy Goal    Goal Priority Disciplines Outcome Goal Variances Interventions   Physical Therapy Goal     PT, PT/OT Ongoing (interventions implemented as appropriate)     Description:  Goals to be met by: 2 weeks ()     Patient will increase functional independence with mobility by performin. Supine to sit with Stand-by Assistance. Met (2018)  2. Sit to supine with Stand-by Assistance. Met (2018)  3. Sit to stand transfer with contact guard assistance. Met (2018)  4. Bed to chair transfer with Minimal Assistance using Rolling Walker.= met 2018   newGOALS 2018  Bed to chair trf w/ SBA w/ RW  5. Gait  x 10 feet with Moderate Assistance using Rolling Walker.= met 2018   NEW GOAL 2018 gait x100 feet w/ RW and SBA   6. Wheelchair propulsion x 200 feet with Stand-by Assistance using bilateral upper extremities  7. Ascend/Descend 4 inch curb step with Moderate Assistance using Rolling Walker. Met (2018)  8. Stand for 3 minutes with Contact Guard Assistance using Rolling Walker while performing a task.                        PLAN:    Patient to be seen 5 x/week  to  address the above listed problems via gait training, therapeutic activities, therapeutic exercises, neuromuscular re-education, wheelchair management/training  Plan of Care expires: 09/24/18  Plan of Care reviewed with: patient    Sada Cardona, PTA  09/04/2018

## 2018-09-04 NOTE — PLAN OF CARE
Problem: Physical Therapy Goal  Goal: Physical Therapy Goal  Goals to be met by: 2 weeks ()     Patient will increase functional independence with mobility by performin. Supine to sit with Stand-by Assistance. Met (2018)  2. Sit to supine with Stand-by Assistance. Met (2018)  3. Sit to stand transfer with contact guard assistance. Met (2018)  4. Bed to chair transfer with Minimal Assistance using Rolling Walker.= met 2018   newGOALS 2018  Bed to chair trf w/ SBA w/ RW  5. Gait  x 10 feet with Moderate Assistance using Rolling Walker.= met 2018   NEW GOAL 2018 gait x100 feet w/ RW and SBA. Met 2018  6. Wheelchair propulsion x 200 feet with Stand-by Assistance using bilateral upper extremities  7. Ascend/Descend 4 inch curb step with Moderate Assistance using Rolling Walker. Met (2018)  8. Stand for 3 minutes with Contact Guard Assistance using Rolling Walker while performing a task.       Goals remain appropriate. Continue with PT POC

## 2018-09-04 NOTE — SUBJECTIVE & OBJECTIVE
Interval History:   8/27/18  Patient seen at bedside, she reports pain is controlled with prescribed regimen.  She states she is eating, drinking, voiding and having BM without issues.  She states she resides with her daughter and grandchild and plans to return to same.  Discussed her elevated glucose readings, she reports she has not taken her home dosing of glipizide in a month due to getting it paid for by insurance company.  No other issues at this time.      9/4/18  Patient seen at bedside, she has no complaints today.        Review of Systems   Constitutional: Negative for appetite change, fatigue and fever.   Respiratory: Negative for cough and shortness of breath.    Cardiovascular: Negative for chest pain, palpitations and leg swelling.   Gastrointestinal: Negative for abdominal pain, constipation, diarrhea, nausea and vomiting.   Genitourinary: Negative for dysuria.   Musculoskeletal: Positive for arthralgias.   Skin: Negative for rash.     Objective:     Vital Signs (Most Recent):  Temp: 98.1 °F (36.7 °C) (09/04/18 0718)  Pulse: 84 (09/04/18 0906)  Resp: 18 (09/04/18 0906)  BP: (!) 148/78 (09/04/18 0718)  SpO2: 96 % (09/04/18 0747) Vital Signs (24h Range):  Temp:  [98.1 °F (36.7 °C)-98.3 °F (36.8 °C)] 98.1 °F (36.7 °C)  Pulse:  [72-87] 84  Resp:  [18-23] 18  SpO2:  [96 %-98 %] 96 %  BP: (119-148)/(60-78) 148/78     Weight: 91.7 kg (202 lb 2.6 oz)  Body mass index is 38.2 kg/m².  No intake or output data in the 24 hours ending 09/04/18 1157   Physical Exam   Constitutional: She is oriented to person, place, and time. She appears well-developed and well-nourished.   Cardiovascular: Normal rate, regular rhythm, normal heart sounds and intact distal pulses.   No murmur heard.  Pulmonary/Chest: Effort normal and breath sounds normal. No respiratory distress.   Abdominal: Soft. Normal appearance and bowel sounds are normal. She exhibits no distension. There is no tenderness.   Musculoskeletal: Normal range of  motion. She exhibits no edema.   Neurological: She is alert and oriented to person, place, and time. She has normal strength.   Skin: Skin is warm, dry and intact. Capillary refill takes less than 2 seconds. No erythema.       Significant Labs:   BMP:   Recent Labs   Lab  09/04/18   0627   GLU  135*   NA  138   K  4.4   CL  104   CO2  21*   BUN  18   CREATININE  1.0   CALCIUM  9.6   MG  1.9     CBC:   Recent Labs   Lab  09/04/18   0627   WBC  7.87   HGB  12.3   HCT  40.2   PLT  247       Significant Imaging: n/a

## 2018-09-04 NOTE — PLAN OF CARE
Problem: Patient Care Overview  Goal: Plan of Care Review  Outcome: Ongoing (interventions implemented as appropriate)  Ms Moran is participating in physical therapy at this time. Pt received 50 mg of Tramadol for c/o lt knee pain rated at 7 on a pain scale of 0-10. Safety measures maintained. Will continue with plan of care.

## 2018-09-04 NOTE — ASSESSMENT & PLAN NOTE
BP Readings from Last 3 Encounters:   09/04/18 (!) 148/78   08/24/18 132/87   08/16/18 121/67     Well controlled  Cont amlodipine to treat  Home Coreg and Losartan discontinued  Cont to monitor and restart if required  Low Na diet    8/27/18  BP is stable.  Continue Norvasc as ordered.  Coreg and Losartan has been discontinued per MD note.    9/4/18  BP Readings from Last 3 Encounters:   09/04/18 (!) 148/78   08/24/18 132/87   08/16/18 121/67     BP is stable.  Continue Norvasc as ordered.

## 2018-09-05 LAB
INR PPP: 2
POCT GLUCOSE: 138 MG/DL (ref 70–110)
POCT GLUCOSE: 156 MG/DL (ref 70–110)
POCT GLUCOSE: 158 MG/DL (ref 70–110)
POCT GLUCOSE: 159 MG/DL (ref 70–110)
PROTHROMBIN TIME: 19.6 SEC

## 2018-09-05 PROCEDURE — 99900035 HC TECH TIME PER 15 MIN (STAT)

## 2018-09-05 PROCEDURE — 97530 THERAPEUTIC ACTIVITIES: CPT

## 2018-09-05 PROCEDURE — 25000242 PHARM REV CODE 250 ALT 637 W/ HCPCS: Performed by: NURSE PRACTITIONER

## 2018-09-05 PROCEDURE — 11000004 HC SNF PRIVATE

## 2018-09-05 PROCEDURE — 25000003 PHARM REV CODE 250: Performed by: NURSE PRACTITIONER

## 2018-09-05 PROCEDURE — 36415 COLL VENOUS BLD VENIPUNCTURE: CPT

## 2018-09-05 PROCEDURE — 97535 SELF CARE MNGMENT TRAINING: CPT

## 2018-09-05 PROCEDURE — 94664 DEMO&/EVAL PT USE INHALER: CPT

## 2018-09-05 PROCEDURE — 97110 THERAPEUTIC EXERCISES: CPT

## 2018-09-05 PROCEDURE — 85610 PROTHROMBIN TIME: CPT

## 2018-09-05 PROCEDURE — 97116 GAIT TRAINING THERAPY: CPT

## 2018-09-05 PROCEDURE — 25000003 PHARM REV CODE 250: Performed by: STUDENT IN AN ORGANIZED HEALTH CARE EDUCATION/TRAINING PROGRAM

## 2018-09-05 PROCEDURE — 25000003 PHARM REV CODE 250: Performed by: INTERNAL MEDICINE

## 2018-09-05 PROCEDURE — 94640 AIRWAY INHALATION TREATMENT: CPT

## 2018-09-05 PROCEDURE — 94761 N-INVAS EAR/PLS OXIMETRY MLT: CPT

## 2018-09-05 RX ORDER — WARFARIN SODIUM 5 MG/1
5 TABLET ORAL DAILY
Qty: 30 TABLET | Refills: 3 | Status: ON HOLD | OUTPATIENT
Start: 2018-09-05 | End: 2019-02-04 | Stop reason: HOSPADM

## 2018-09-05 RX ORDER — ACETAMINOPHEN 325 MG/1
650 TABLET ORAL EVERY 6 HOURS PRN
Refills: 0 | COMMUNITY
Start: 2018-09-05 | End: 2021-01-29

## 2018-09-05 RX ORDER — GLIPIZIDE 5 MG/1
5 TABLET, FILM COATED, EXTENDED RELEASE ORAL
Qty: 30 TABLET | Refills: 3 | Status: SHIPPED | OUTPATIENT
Start: 2018-09-05 | End: 2019-04-11

## 2018-09-05 RX ORDER — GUAIFENESIN 600 MG/1
600 TABLET, EXTENDED RELEASE ORAL 2 TIMES DAILY PRN
COMMUNITY
Start: 2018-09-05 | End: 2018-10-05

## 2018-09-05 RX ORDER — METFORMIN HYDROCHLORIDE 500 MG/1
500 TABLET, EXTENDED RELEASE ORAL
Qty: 90 TABLET | Refills: 0 | Status: SHIPPED | OUTPATIENT
Start: 2018-09-05 | End: 2018-10-05 | Stop reason: SDUPTHER

## 2018-09-05 RX ORDER — TRAMADOL HYDROCHLORIDE 50 MG/1
50 TABLET ORAL EVERY 8 HOURS PRN
Qty: 20 TABLET | Refills: 0 | Status: SHIPPED | OUTPATIENT
Start: 2018-09-05 | End: 2018-09-15

## 2018-09-05 RX ORDER — FLUTICASONE FUROATE AND VILANTEROL 100; 25 UG/1; UG/1
1 POWDER RESPIRATORY (INHALATION) DAILY
Qty: 30 EACH | Refills: 2 | Status: SHIPPED | OUTPATIENT
Start: 2018-09-05 | End: 2021-01-29

## 2018-09-05 RX ADMIN — TRAMADOL HYDROCHLORIDE 50 MG: 50 TABLET, COATED ORAL at 10:09

## 2018-09-05 RX ADMIN — LEVOTHYROXINE SODIUM 50 MCG: 50 TABLET ORAL at 05:09

## 2018-09-05 RX ADMIN — IPRATROPIUM BROMIDE AND ALBUTEROL SULFATE 3 ML: .5; 3 SOLUTION RESPIRATORY (INHALATION) at 07:09

## 2018-09-05 RX ADMIN — METFORMIN HYDROCHLORIDE 500 MG: 500 TABLET, EXTENDED RELEASE ORAL at 05:09

## 2018-09-05 RX ADMIN — GLIPIZIDE 5 MG: 2.5 TABLET, FILM COATED, EXTENDED RELEASE ORAL at 10:09

## 2018-09-05 RX ADMIN — ATORVASTATIN CALCIUM 40 MG: 20 TABLET, FILM COATED ORAL at 10:09

## 2018-09-05 RX ADMIN — FLUTICASONE FUROATE AND VILANTEROL TRIFENATATE 1 PUFF: 100; 25 POWDER RESPIRATORY (INHALATION) at 10:09

## 2018-09-05 RX ADMIN — DIPHENHYDRAMINE HYDROCHLORIDE 25 MG: 25 CAPSULE ORAL at 10:09

## 2018-09-05 RX ADMIN — ACETAMINOPHEN 650 MG: 325 TABLET ORAL at 10:09

## 2018-09-05 RX ADMIN — WARFARIN SODIUM 5 MG: 2.5 TABLET ORAL at 05:09

## 2018-09-05 RX ADMIN — IPRATROPIUM BROMIDE AND ALBUTEROL SULFATE 3 ML: .5; 3 SOLUTION RESPIRATORY (INHALATION) at 01:09

## 2018-09-05 RX ADMIN — AMLODIPINE BESYLATE 5 MG: 5 TABLET ORAL at 10:09

## 2018-09-05 RX ADMIN — IPRATROPIUM BROMIDE AND ALBUTEROL SULFATE 3 ML: .5; 3 SOLUTION RESPIRATORY (INHALATION) at 08:09

## 2018-09-05 NOTE — PT/OT/SLP PROGRESS
Occupational Therapy  Treatment    Claribel Moran   MRN: [id number]   Admitting Diagnosis: Debility    OT Date of Treatment: 09/05/18  Total Time (min): 61 min    Billable Minutes:  Self Care/Home Management 10, Therapeutic Activity 30 and Therapeutic Exercise 21    General Precautions: Standard, aspiration, fall  Orthopedic Precautions: N/A  Braces: N/A    Do you have any cultural, spiritual, Buddhist conflicts, given your current situation?: none stated    Subjective:  Communicated with nurse prior to session.      Pain/Comfort  Pain Rating 1: 0/10  Pain Rating Post-Intervention 1: 0/10    Objective:  Patient found with: (no lines)    Functional Status:  MDS G  Bed Mobility Functional Status: S-SBA(supine to sitting EOB, rolling and scooting.)    Transfer Functional Status: S-SBA(sit to standing transition from EOB, W/C and BSC to RW.  SPT with RW needing (S)  with RW.)    Locomotion on Unit Functional Status: S-SBA(Pt propelled W/C from her room to therapy gym and back   204ft each way with no assist )    Dressing Functional Status:  S-SBA(UBD Mod (I), LBD (S) with RW to steady when standing.)    Toilet Use Functional Status: S-SBA    Bathing Functional Status: S-SBA(from shower bench)    Moving from seated to standing position: Not steady, but able to stabilize without staff assistance  Walking (with assistive device if used): Not steady, but able to stabilize without staff assistance  Turning around and facing the opposite direction while walking: Not steady, but able to stabilize without staff assistance  Moving on and off the toilet: Not steady, but able to stabilize without staff assistance  Surface-to-surface transfer (transfer between bed and chair or wheelchair): Not steady, but able to stabilize without staff assistance           AMPAC 6 Click:  AMPAC Total Score: 19    OT Exercises: UE Ergometer performed 15 minutes on UE UBE with Mod resistance.   UE exercises performed to increase functional endurance  and strength.  Strengthening required in order increase independence when performing self care tasks, W/C propulsion , functional standing activities as well as when performing functional tasks.      Additional Treatment:  Pt worked on functional standing activity consisting of standing with RW while reaching in all planes , crossing of midline and reaching to varying heights to facilitate (B) wt shifting and stability in standing to increase (I)ce when performing self care, and functional activities with standing component.  Pt tolerated up to 7 min 34 sec and 5 min 22sec in standing with SBA and RW to steady.    Patient left up in chair with call button in reach and nurse notified    ASSESSMENT:  Claribel Moran is a 84 y.o. female with a medical diagnosis of Debility . Pt was agreeable to OT and tolerated Tx without incidence but continues to require (A) to perform functional activities to completion. OT intervention required to address performance deficits affecting performance of self care tasks, functional mobility, functional transfers, functional strength and endurance to perform ADLS.  Pt is making progress but continues to require OT Intervention to perform functional transfers, functional standing activities, and self care tasks with standing component more independently. OT is recommended to further her functional (I)ce and safety. Goals remain appropriate and continued OT is recommended.      Rehab identified problem list/impairments: weakness, impaired endurance, impaired self care skills, impaired functional mobilty, gait instability, impaired balance, decreased lower extremity function, decreased safety awareness, pain, decreased ROM, orthopedic precautions    Rehab potential is good    Activity tolerance: Good    Discharge recommendations: home with home health     Barriers to discharge: None     Equipment recommendations: none     GOALS:   Multidisciplinary Problems     Occupational Therapy Goals         Problem: Occupational Therapy Goal    Goal Priority Disciplines Outcome Interventions   Occupational Therapy Goal     OT, PT/OT Ongoing (interventions implemented as appropriate)    Description:  Goals to be met by: 14 days     Patient will increase functional independence with ADLs by performing:    Feeding with Modified [place].    Met  UE Dressing with Modified [place].   Met  LE Dressing with Stand-by Assistance. Met  Grooming while standing with Stand-by Assistance with RW to steady.  Toileting from bedside commode with Supervision for hygiene and clothing management and RW to steady. .   Bathing from  shower chair/bench with Supervision. Met   Rolling to Bilateral with Modified [place].   Supine to sit with Modified [place].  Stand pivot transfers with Supervision. Met   Upper extremity exercise program x10 reps per set, with supervision.  Pt will tolerate up to 10 minutes of functional standing activity with (S) and RW for safety.                        Plan:  Patient to be seen 5 x/week to address the above listed problems via self-care/home management, therapeutic activities, therapeutic exercises  Plan of Care expires: 09/25/18  Plan of Care reviewed with: patient    SHAZIA Adams/JENISE  09/05/2018

## 2018-09-05 NOTE — PLAN OF CARE
Problem: Occupational Therapy Goal  Goal: Occupational Therapy Goal  Goals to be met by: 14 days     Patient will increase functional independence with ADLs by performing:    Feeding with Modified [place].    Met  UE Dressing with Modified [place].   Met  LE Dressing with Stand-by Assistance. Met  Grooming while standing with Stand-by Assistance with RW to steady.  Toileting from bedside commode with Supervision for hygiene and clothing management and RW to steady. .   Bathing from  shower chair/bench with Supervision. Met   Rolling to Bilateral with Modified [place].   Supine to sit with Modified [place].  Stand pivot transfers with Supervision. Met   Upper extremity exercise program x10 reps per set, with supervision.  Pt will tolerate up to 10 minutes of functional standing activity with (S) and RW for safety.       Outcome: Ongoing (interventions implemented as appropriate)  SHAZIA Adams/JENISE      9/5/2018

## 2018-09-05 NOTE — PLAN OF CARE
[facility] - Skilled Nursing Care    HOME HEALTH ORDERS  FACE TO FACE ENCOUNTER    Patient Name: Claribel Moran  YOB: 1934    PCP: Moises Eduardo MD   PCP Address: [facility] [address] / [facility] [address]  PCP Phone Number: [phone]  PCP Fax: [phone]    Encounter Date: 09/05/2018    Admit to Home Health    Diagnoses:  Active Hospital Problems    Diagnosis  POA    *Debility [R53.81]  Yes    Acquired hypothyroidism [E03.9]  Yes    Effusion of knee [M25.469]  Yes    Sleep apnea [G47.30]  Yes    Anticoagulated on Coumadin [Z51.81, Z79.01]  Not Applicable     Chronic    Reactive airway disease with wheezing [J45.909]  Yes    CKD (chronic kidney disease) stage 3, GFR 30-59 ml/min [N18.3]  Yes     Chronic    Chronic atrial fibrillation [I48.2]  Yes     Chronic    Diabetes mellitus type II, non insulin dependent [E11.9]  Yes     Chronic    Hypertension [I10]  Yes    Hyperlipidemia [E78.5]  Yes    Obesity [E66.9]  Yes      Resolved Hospital Problems   No resolved problems to display.       No future appointments.        I have seen and examined this patient face to face today. My clinical findings that support the need for the home health skilled services and home bound status are the following:  Weakness/numbness causing balance and gait disturbance due to Weakness/Debility making it taxing to leave home.    Allergies:Review of patient's allergies indicates:  No Known Allergies    Diet: cardiac diet and diabetic diet: 2000 calorie    Activities: activity as tolerated    Nursing:   SN to complete comprehensive assessment including routine vital signs. Instruct on disease process and s/s of complications to report to MD. Review/verify medication list sent home with the patient at time of discharge  and instruct patient/caregiver as needed. Frequency may be adjusted depending on start of care date.    Notify MD if SBP > 160 or < 90; DBP > 90 or < 50; HR > 120 or < 50; Temp > 101;        CONSULTS:    Physical Therapy to evaluate and treat. Evaluate for home safety and equipment needs; Establish/upgrade home exercise program. Perform / instruct on therapeutic exercises, gait training, transfer training, and Range of Motion.  Occupational Therapy to evaluate and treat. Evaluate home environment for safety and equipment needs. Perform/Instruct on transfers, ADL training, ROM, and therapeutic exercises.  Aide to provide assistance with personal care, ADLs, and vital signs.    MISCELLANEOUS CARE:  Diabetic Care:   SN to perform and educate Diabetic management with blood glucose monitoring:, Fingerstick blood sugar AC and HS and Report CBG < 60 or > 350 to physician.    WOUND CARE ORDERS  n/a    Home health nurse to check INR weekly starting 9/10/18 with results to her cardiologist, Dr. Moises Eduardo.    Medications: Review discharge medications with patient and family and provide education.      Current Discharge Medication List      START taking these medications    Details   fluticasone-vilanterol (BREO) 100-25 mcg/dose diskus inhaler Inhale 1 puff into the lungs once daily. Controller  Qty: 30 each, Refills: 2      guaiFENesin (MUCINEX) 600 mg 12 hr tablet Take 1 tablet (600 mg total) by mouth 2 (two) times daily as needed (Expectorant).      metFORMIN (GLUCOPHAGE-XR) 500 MG 24 hr tablet Take 1 tablet (500 mg total) by mouth before dinner.  Qty: 90 tablet, Refills: 0         CONTINUE these medications which have CHANGED    Details   acetaminophen (TYLENOL) 325 MG tablet Take 2 tablets (650 mg total) by mouth every 6 (six) hours as needed.  Refills: 0      glipiZIDE (GLUCOTROL) 5 MG TR24 Take 1 tablet (5 mg total) by mouth daily with breakfast.  Qty: 30 tablet, Refills: 3      traMADol (ULTRAM) 50 mg tablet Take 1 tablet (50 mg total) by mouth every 8 (eight) hours as needed for Pain.  Qty: 20 tablet, Refills: 0      warfarin (COUMADIN) 5 MG tablet Take 1 tablet (5 mg total) by mouth  Daily.  Qty: 30 tablet, Refills: 3         CONTINUE these medications which have NOT CHANGED    Details   albuterol (ACCUNEB) 1.25 mg/3 mL Nebu Take 1.25 mg by nebulization every 6 (six) hours as needed. Rescue      amlodipine (NORVASC) 5 MG tablet Take 5 mg by mouth once daily.      atorvastatin (LIPITOR) 40 MG tablet Take 40 mg by mouth once daily.      ergocalciferol (ERGOCALCIFEROL) 50,000 unit Cap Take 50,000 Units by mouth every 30 days.       levothyroxine (SYNTHROID) 50 MCG tablet Take 50 mcg by mouth once daily.      ranitidine (ZANTAC) 75 MG tablet Take 75 mg by mouth daily as needed for Heartburn.      senna-docusate 8.6-50 mg (PERICOLACE) 8.6-50 mg per tablet Take 2 tablets by mouth every evening.         STOP taking these medications       ALPRAZolam (XANAX) 0.25 MG tablet Comments:   Reason for Stopping:         carvedilol (COREG) 12.5 MG tablet Comments:   Reason for Stopping:         citalopram (CELEXA) 40 MG tablet Comments:   Reason for Stopping:         escitalopram oxalate (LEXAPRO) 10 MG tablet Comments:   Reason for Stopping:         guaifenesin-codeine 100-10 mg/5 ml (TUSSI-ORGANIDIN NR)  mg/5 mL syrup Comments:   Reason for Stopping:         losartan (COZAAR) 100 MG tablet Comments:   Reason for Stopping:         VENTOLIN HFA 90 mcg/actuation inhaler Comments:   Reason for Stopping:               I certify that this patient is confined to her home and needs physical therapy and occupational therapy.

## 2018-09-05 NOTE — PT/OT/SLP PROGRESS
Physical Therapy  Treatment    Claribel Moran   MRN: [id number]   Admitting Diagnosis: Debility    PT Received On: 09/05/18  Total Time (min): 60       Billable Minutes:  Gait Training 15, Therapeutic Activity 30 and Therapeutic Exercise 15    Treatment Type: Treatment  PT/PTA: PT     PTA Visit Number: 0       General Precautions: Standard, aspiration, fall  Orthopedic Precautions: N/A   Braces: N/A    Do you have any cultural, spiritual, Anabaptism conflicts, given your current situation?: no    Subjective:  Communicated with pt prior to session. Agreeable to PT services.    Pain/Comfort  Pain Rating 1: 0/10    Objective:  Patient found supine in bed.         Functional Status:  MDS G  Bed Mobility Functional Status: S-SBA  Transfer Functional Status: S-SBA  Walk in Room Functional Status: S-SBA  Walk in Corridor Functional Status: S-SBA  Locomotion on Unit Functional Status: S-SBA  Moving from seated to standing position: Not steady, but able to stabilize without staff assistance  Walking (with assistive device if used): Not steady, but able to stabilize without staff assistance  Turning around and facing the opposite direction while walking: Not steady, but able to stabilize without staff assistance  Moving on and off the toilet: Not steady, but able to stabilize without staff assistance  Surface-to-surface transfer (transfer between bed and chair or wheelchair): Not steady, but able to stabilize without staff assistance          AM-PAC 6 CLICK MOBILITY  Total Score:18    Bed Mobility:  Sit>Supine: mod I with bedrails  Supine>Sit: mod I with bedrails    Transfers:  Sit<>Stand: supervision from toilet, bed, wc  Stand Pivot Transfer: SBA with UE support    Gait:  Amb without an assistive device in her room for ~5-10 feet at a time while holding on to a stable surface for majority of her gait distance- SBA.    Advanced Gait:  Stairs: 4 steps, ascending with B handrails, descending with 1 handrail (to R of pt). Pt has  one handrail that she can reach at once. Declined negotiating steps again.    Wheelchair Mobility:  Patient propels w/c 200 feet with modified independence using her UEs.    Therex (in parallel bars with SBA):  Standing: hip abduction, hip extension, heel raises, hip flexion x 15 reps  Standing- side-to-side weight-shifts, torso twists x 10 reps (to work on balance and core strengthening).    Balance:  Able to stand >3 minutes with supervision while brushing her teeth, washing her face, etc.  Pt was able to sit and leonardo her pants and shirt while on seated on shower bench in bathroom.     Additional Treatment:  Completed 10 minutes on recumbent stepper to improve her endurance at level 6 to challenge her.    Patient left up in chair with call button in reach.    Assessment:  Claribel Moran is a 84 y.o. female with a medical diagnosis of Debility.  Ms. Moran was able to meet all of her goals, thus two new goals were set today. She will benefit from further PT services and will be safe to discharge home. It is recommended at this time that she continue to utilize a rolling walker for ambulation for safety purposes.    Rehab identified problem list/impairments: weakness, impaired endurance, impaired self care skills, impaired functional mobilty, gait instability, impaired balance, decreased lower extremity function, decreased safety awareness, decreased ROM, orthopedic precautions    Rehab potential is good.    Activity tolerance: Good    Discharge recommendations: home health PT     Barriers to discharge: None    Equipment recommendations: none     GOALS:   Multidisciplinary Problems     Physical Therapy Goals        Problem: Physical Therapy Goal    Goal Priority Disciplines Outcome Goal Variances Interventions   Physical Therapy Goal     PT, PT/OT Ongoing (interventions implemented as appropriate)     Description:  Goals to be met by: 2 weeks (9/8)     Patient will increase functional independence with mobility  by performin. Supine to sit with Stand-by Assistance. Met (2018)  2. Sit to supine with Stand-by Assistance. Met (2018)  3. Sit to stand transfer with contact guard assistance. Met (2018)  4. Bed to chair transfer with Minimal Assistance using Rolling Walker.= met 2018   newGOALS 2018  Bed to chair trf w/ SBA w/ RW met (2018)  5. Gait  x 10 feet with Moderate Assistance using Rolling Walker.= met 2018   NEW GOAL 2018 gait x100 feet w/ RW and SBA. Met 2018  6. Wheelchair propulsion x 200 feet with Stand-by Assistance using bilateral upper extremities. Met (2018)  7. Ascend/Descend 4 inch curb step with Moderate Assistance using Rolling Walker. Met (2018)  8. Stand for 3 minutes with Contact Guard Assistance using Rolling Walker while performing a task. Met (2018)    New Goals:  9. Pt will ambulate without use of an assistive device with stand-by assistance, swing-through gait pattern, for  50 feet, as pt does not usually utilize an AD.  10. Pt will negotiate 4 steps with supervision using unilateral handrail.                          PLAN:    Patient to be seen 5 x/week  to address the above listed problems via gait training, therapeutic activities, therapeutic exercises, neuromuscular re-education, wheelchair management/training  Plan of Care expires: 18  Plan of Care reviewed with: patient    Berenice Khanna, PT  2018

## 2018-09-05 NOTE — PLAN OF CARE
Problem: Physical Therapy Goal  Goal: Physical Therapy Goal  Goals to be met by: 2 weeks ()     Patient will increase functional independence with mobility by performin. Supine to sit with Stand-by Assistance. Met (2018)  2. Sit to supine with Stand-by Assistance. Met (2018)  3. Sit to stand transfer with contact guard assistance. Met (2018)  4. Bed to chair transfer with Minimal Assistance using Rolling Walker.= met 2018   newGOALS 2018  Bed to chair trf w/ SBA w/ RW met (2018)  5. Gait  x 10 feet with Moderate Assistance using Rolling Walker.= met 2018   NEW GOAL 2018 gait x100 feet w/ RW and SBA. Met 2018  6. Wheelchair propulsion x 200 feet with Stand-by Assistance using bilateral upper extremities. Met (2018)  7. Ascend/Descend 4 inch curb step with Moderate Assistance using Rolling Walker. Met (2018)  8. Stand for 3 minutes with Contact Guard Assistance using Rolling Walker while performing a task. Met (2018)    New Goals:  9. Pt will ambulate without use of an assistive device with stand-by assistance, swing-through gait pattern, for  50 feet, as pt does not usually utilize an AD.  10. Pt will negotiate 4 steps with supervision using unilateral handrail.        Outcome: Ongoing (interventions implemented as appropriate)  Met all goals. New goal set.

## 2018-09-06 LAB
ANION GAP SERPL CALC-SCNC: 9 MMOL/L
BASOPHILS # BLD AUTO: 0.03 K/UL
BASOPHILS NFR BLD: 0.3 %
BUN SERPL-MCNC: 19 MG/DL
CALCIUM SERPL-MCNC: 9.5 MG/DL
CHLORIDE SERPL-SCNC: 106 MMOL/L
CO2 SERPL-SCNC: 22 MMOL/L
CREAT SERPL-MCNC: 1 MG/DL
DIFFERENTIAL METHOD: ABNORMAL
EOSINOPHIL # BLD AUTO: 0.3 K/UL
EOSINOPHIL NFR BLD: 3 %
ERYTHROCYTE [DISTWIDTH] IN BLOOD BY AUTOMATED COUNT: 15.4 %
EST. GFR  (AFRICAN AMERICAN): 59.8 ML/MIN/1.73 M^2
EST. GFR  (NON AFRICAN AMERICAN): 51.9 ML/MIN/1.73 M^2
GLUCOSE SERPL-MCNC: 139 MG/DL
HCT VFR BLD AUTO: 39.9 %
HGB BLD-MCNC: 12.6 G/DL
IMM GRANULOCYTES # BLD AUTO: 0.06 K/UL
IMM GRANULOCYTES NFR BLD AUTO: 0.6 %
INR PPP: 2.1
LYMPHOCYTES # BLD AUTO: 1.6 K/UL
LYMPHOCYTES NFR BLD: 16.2 %
MAGNESIUM SERPL-MCNC: 1.6 MG/DL
MCH RBC QN AUTO: 28.9 PG
MCHC RBC AUTO-ENTMCNC: 31.6 G/DL
MCV RBC AUTO: 92 FL
MONOCYTES # BLD AUTO: 0.7 K/UL
MONOCYTES NFR BLD: 7.1 %
NEUTROPHILS # BLD AUTO: 6.9 K/UL
NEUTROPHILS NFR BLD: 72.8 %
NRBC BLD-RTO: 0 /100 WBC
PHOSPHATE SERPL-MCNC: 3 MG/DL
PLATELET # BLD AUTO: 233 K/UL
PMV BLD AUTO: 11.5 FL
POCT GLUCOSE: 136 MG/DL (ref 70–110)
POCT GLUCOSE: 143 MG/DL (ref 70–110)
POCT GLUCOSE: 147 MG/DL (ref 70–110)
POTASSIUM SERPL-SCNC: 4 MMOL/L
PROTHROMBIN TIME: 20.2 SEC
RBC # BLD AUTO: 4.36 M/UL
SODIUM SERPL-SCNC: 137 MMOL/L
WBC # BLD AUTO: 9.55 K/UL

## 2018-09-06 PROCEDURE — 97110 THERAPEUTIC EXERCISES: CPT

## 2018-09-06 PROCEDURE — 94761 N-INVAS EAR/PLS OXIMETRY MLT: CPT

## 2018-09-06 PROCEDURE — 80048 BASIC METABOLIC PNL TOTAL CA: CPT

## 2018-09-06 PROCEDURE — 25000242 PHARM REV CODE 250 ALT 637 W/ HCPCS: Performed by: NURSE PRACTITIONER

## 2018-09-06 PROCEDURE — 11000004 HC SNF PRIVATE

## 2018-09-06 PROCEDURE — 99900035 HC TECH TIME PER 15 MIN (STAT)

## 2018-09-06 PROCEDURE — 85610 PROTHROMBIN TIME: CPT

## 2018-09-06 PROCEDURE — 94640 AIRWAY INHALATION TREATMENT: CPT

## 2018-09-06 PROCEDURE — 25000003 PHARM REV CODE 250: Performed by: NURSE PRACTITIONER

## 2018-09-06 PROCEDURE — 85025 COMPLETE CBC W/AUTO DIFF WBC: CPT

## 2018-09-06 PROCEDURE — 83735 ASSAY OF MAGNESIUM: CPT

## 2018-09-06 PROCEDURE — 25000003 PHARM REV CODE 250: Performed by: STUDENT IN AN ORGANIZED HEALTH CARE EDUCATION/TRAINING PROGRAM

## 2018-09-06 PROCEDURE — 84100 ASSAY OF PHOSPHORUS: CPT

## 2018-09-06 PROCEDURE — 36415 COLL VENOUS BLD VENIPUNCTURE: CPT

## 2018-09-06 PROCEDURE — 25000003 PHARM REV CODE 250: Performed by: INTERNAL MEDICINE

## 2018-09-06 PROCEDURE — 97535 SELF CARE MNGMENT TRAINING: CPT

## 2018-09-06 RX ORDER — LANOLIN ALCOHOL/MO/W.PET/CERES
800 CREAM (GRAM) TOPICAL 2 TIMES DAILY
Status: DISCONTINUED | OUTPATIENT
Start: 2018-09-06 | End: 2018-09-07 | Stop reason: HOSPADM

## 2018-09-06 RX ORDER — ALUMINUM HYDROXIDE, MAGNESIUM HYDROXIDE, AND SIMETHICONE 2400; 240; 2400 MG/30ML; MG/30ML; MG/30ML
30 SUSPENSION ORAL EVERY 6 HOURS PRN
Status: DISCONTINUED | OUTPATIENT
Start: 2018-09-06 | End: 2018-09-07 | Stop reason: HOSPADM

## 2018-09-06 RX ORDER — CALCIUM CARBONATE 200(500)MG
500 TABLET,CHEWABLE ORAL 3 TIMES DAILY PRN
Status: DISCONTINUED | OUTPATIENT
Start: 2018-09-06 | End: 2018-09-07 | Stop reason: HOSPADM

## 2018-09-06 RX ADMIN — GLIPIZIDE 5 MG: 2.5 TABLET, FILM COATED, EXTENDED RELEASE ORAL at 10:09

## 2018-09-06 RX ADMIN — ATORVASTATIN CALCIUM 40 MG: 20 TABLET, FILM COATED ORAL at 10:09

## 2018-09-06 RX ADMIN — IPRATROPIUM BROMIDE AND ALBUTEROL SULFATE 3 ML: .5; 3 SOLUTION RESPIRATORY (INHALATION) at 07:09

## 2018-09-06 RX ADMIN — TRAMADOL HYDROCHLORIDE 50 MG: 50 TABLET, COATED ORAL at 09:09

## 2018-09-06 RX ADMIN — FLUTICASONE FUROATE AND VILANTEROL TRIFENATATE 1 PUFF: 100; 25 POWDER RESPIRATORY (INHALATION) at 10:09

## 2018-09-06 RX ADMIN — WARFARIN SODIUM 5 MG: 2.5 TABLET ORAL at 06:09

## 2018-09-06 RX ADMIN — IPRATROPIUM BROMIDE AND ALBUTEROL SULFATE 3 ML: .5; 3 SOLUTION RESPIRATORY (INHALATION) at 01:09

## 2018-09-06 RX ADMIN — Medication 800 MG: at 12:09

## 2018-09-06 RX ADMIN — METFORMIN HYDROCHLORIDE 500 MG: 500 TABLET, EXTENDED RELEASE ORAL at 06:09

## 2018-09-06 RX ADMIN — DIPHENHYDRAMINE HYDROCHLORIDE 25 MG: 25 CAPSULE ORAL at 09:09

## 2018-09-06 RX ADMIN — ACETAMINOPHEN 650 MG: 325 TABLET ORAL at 09:09

## 2018-09-06 RX ADMIN — ALUMINUM HYDROXIDE, MAGNESIUM HYDROXIDE, AND DIMETHICONE 30 ML: 400; 400; 40 SUSPENSION ORAL at 06:09

## 2018-09-06 RX ADMIN — AMLODIPINE BESYLATE 5 MG: 5 TABLET ORAL at 10:09

## 2018-09-06 RX ADMIN — Medication 800 MG: at 09:09

## 2018-09-06 NOTE — ASSESSMENT & PLAN NOTE
- she is on Amlodipine.  - on 8/19 her BP was 98/62, 500cc boluses was given twice.  - 8/22 held losartan given bump in Cr, monitor  - Cr level improved from 1.5>1.3>1.2

## 2018-09-06 NOTE — PT/OT/SLP PROGRESS
"Physical Therapy  Treatment    Claribel Moran   MRN: [id number]   Admitting Diagnosis: Debility    PT Received On: 09/06/18  Total Time (min): 45+45       Billable Minutes:  Gait Training 30, Therapeutic Activity 30 and Therapeutic Exercise 30    Treatment Type: Treatment  PT/PTA: PT     PTA Visit Number: 0       General Precautions: Standard, aspiration, fall  Orthopedic Precautions: N/A   Braces: N/A    Do you have any cultural, spiritual, Muslim conflicts, given your current situation?: no    Subjective:  Communicated with pt prior to session. Agreeable to PT services. Reports soreness in L knee today, but "it's not bad".    Pain/Comfort  Pain Rating 1: 0/10    Objective:  Patient found seated in wheelchair.         Functional Status:  MDS G  Bed Mobility Functional Status: mod(I) - (I)  Transfer Functional Status: S-SBA  Walk in Room Functional Status: S-SBA  Walk in Corridor Functional Status: S-SBA  Locomotion on Unit Functional Status: S-SBA  Locomotion Off Unit Functional Status: S-SBA  Moving from seated to standing position: Not steady, but able to stabilize without staff assistance  Walking (with assistive device if used): Not steady, but able to stabilize without staff assistance  Turning around and facing the opposite direction while walking: Not steady, but able to stabilize without staff assistance  Moving on and off the toilet: Not steady, but able to stabilize without staff assistance  Surface-to-surface transfer (transfer between bed and chair or wheelchair): Not steady, but able to stabilize without staff assistance          AM-PAC 6 CLICK MOBILITY  Total Score:18    Bed Mobility:  Sit>Supine:SBA  Supine>Sit: SBA    Transfers:  Sit<>Stand: SBA  Stand Pivot Transfer: SBA with UE support on stable surfaces    Gait:  · Amb 120+50 feet with rolling walker with supervision and cues/education on engaging her gluteal muscles via hip extension rather than forward flexion of trunk with use of rolling " walker. With fatigue, she demonstrates forward flexion and shoulder hike. Able to correct with reminders.    · In parallel bars- unilateral UE support- side-stepping, backward ambulation x 2 trials each (9 feet each; 36 feet total)- supervision.    Advanced Gait:  Stairs: 4 steps with unilateral UE support on 1 handrail- step-to gait pattern, SBA      Therex:  Standing: Hip flexion, hip abduction, heel raises, hip extension x 20 reps with UE support  LAQ (x20 reps)  Hip flexion  Ankle pumps  Hip abduction  Heel slides  Quad sets  Hamstring sets  Gluteal sets  SLR  TA isometric + hip adduction isometric    Additional Treatment:  Completed  In AM, 15 minutes on the recumbent stepper to improve her endurance and U/LE strength (level 5).  Completed in PM,  10 minutes on lower body ergometer to improve motion in L knee.    Patient left up in chair with call button in reach.    Assessment:  Claribel Moran is a 84 y.o. female with a medical diagnosis of Debility , s/p L knee effusion (after a fall).  Ms. Moran is working on being able to ambulate without an assistive device via ambulating in the parallel bars with unilateral UE support, and I've brought to her attention that she needs to engage her gluteal muscles while ambulating to improve her balance.  She will benefit from continued PT services in order to succeed in ambulating without an assistive device safely.     Rehab identified problem list/impairments: weakness, impaired endurance, impaired self care skills, impaired functional mobilty, gait instability, impaired balance, decreased lower extremity function, decreased safety awareness, decreased ROM, orthopedic precautions    Rehab potential is good.    Activity tolerance: Good    Discharge recommendations: home health PT     Barriers to discharge: None    Equipment recommendations: none     GOALS:   Multidisciplinary Problems     Physical Therapy Goals        Problem: Physical Therapy Goal    Goal Priority  Disciplines Outcome Goal Variances Interventions   Physical Therapy Goal     PT, PT/OT Ongoing (interventions implemented as appropriate)     Description:  Goals to be met by: 2 weeks ()     Patient will increase functional independence with mobility by performin. Supine to sit with Stand-by Assistance. Met (2018)  2. Sit to supine with Stand-by Assistance. Met (2018)  3. Sit to stand transfer with contact guard assistance. Met (2018)  4. Bed to chair transfer with Minimal Assistance using Rolling Walker.= met 2018   newGOALS 2018  Bed to chair trf w/ SBA w/ RW met (2018)  5. Gait  x 10 feet with Moderate Assistance using Rolling Walker.= met 2018   NEW GOAL 2018 gait x100 feet w/ RW and SBA. Met 2018  6. Wheelchair propulsion x 200 feet with Stand-by Assistance using bilateral upper extremities. Met (2018)  7. Ascend/Descend 4 inch curb step with Moderate Assistance using Rolling Walker. Met (2018)  8. Stand for 3 minutes with Contact Guard Assistance using Rolling Walker while performing a task. Met (2018)    New Goals:  9. Pt will ambulate without use of an assistive device with stand-by assistance, swing-through gait pattern, for  50 feet, as pt does not usually utilize an AD.  10. Pt will negotiate 4 steps with supervision using unilateral handrail.                          PLAN:    Patient to be seen 5 x/week  to address the above listed problems via gait training, therapeutic activities, therapeutic exercises, neuromuscular re-education, wheelchair management/training  Plan of Care expires: 18  Plan of Care reviewed with: patient    Berenice LIZANDRO Khanna, PT  2018

## 2018-09-06 NOTE — DISCHARGE SUMMARY
[facility] Hospital Medicine  Discharge Summary      Patient Name: Claribel Moran  MRN: [id number]  Admission Date: 8/18/2018  Hospital Length of Stay: 6 days  Discharge Date and Time: 8/24/2018  7:30 PM  Attending Physician: No att. providers found   Discharging Provider: Lisbeth Dalal MD  Primary Care Provider: Moises Eduardo MD  [facility] Medicine Team: [facility] HOSP MED 5 Lisbeth Dalal MD    HPI:   83 y.o. female with co-morbidities including: A-Fib, right total knee replacement (2008), left total knee replacement (2011), type 2 DM, HTN, CKD, CAD, and CHF who presents to the ED with a complaint of worsening severe left thigh and knee pain of initial onset 2 days ago. Pt states she fell out of her bed and struck her left knee/hip approximately 2 days ago. She shortly reported to [facility] and received treatment, then was discharged later that day. Pain is 10/10 and begun radiating up and down her leg towards her groin. There are no associated symptoms at this time and no pain at the level below the knee. She is on severe pain at the knee level and above which is very sensitive to touch on examination not relived by 1g PO Tylenol. Pt states she has been unable to stand/ambulate/move comfortably since the injury and has significantly worsened over the 24 hours. She has not taken her coumadin this morning and reports no recent missed medications concerning her coumadin. Denies fever, chills, head trauma, and any LOC. No hx of numbness or tingling sensation. She is on Glipizide but did not took it last month because of insurance issues. Other systemic review was unremarkable.        * No surgery found *      Hospital Course:   8/18- VSS, patient admitted to the 9th floor and started on home anti HTN medications. Tramadol was ordered PRN for severe L knee pain.  8/19- NAEON, patient seen today and she is vitally stable, BP 98/62. Carvedilol was dc'ed, continue on Amlodipine and  Losartan. Ice applied on L knee to reduce pain. F/u with CR level as it was increased from 1.0 to 1.3. PT tomorrow.  8/20- NAEON except of one episode of desating at 88%. Currently she is stable on room air. Patient seen today and she is vitally stable, /62. Cr level is 1.2. She reports that L knee pain has improved. CPAP was ordered at bedtime. enoxaparin 90 mg Q12h was started. F/u INR.  8/21- NAEON, patient seen today and vitally stable. On NC 1L sating at 99%. L knee pain has improved. INR today is 1.6. Plan to transfer to SNF at time of discharge.  8/22- 500 cc bolus given for bump in Cr. Held losartan, will monitor Cr and BP. Will discuss with pt/PT/OT as pt may need SNF.  8/23- NAEON, patient seen today and she is vitally table, /78. Cr level improved 1.5>1.3. Waiting for SNF placement.  8/24- NAEON, patient seen today and vitally stable. mucinex 600 BID was ordered as an expectorant. Warfarin dose changed to 6 mg yesterday. Cr level improved to 1.2. L knee pain is much more better and patient took a shower today morning. Waiting for SNF placement.       Temp:  [98 °F (36.7 °C)-98.1 °F (36.7 °C)] 98 °F (36.7 °C)  Pulse:  [70-93] 70  Resp:  [16-18] 18  SpO2:  [93 %-98 %] 94 %  BP: (112-117)/(58-61) 112/61     Physical Exam   Constitutional: She is oriented to person, place, and time. She appears well-developed and well-nourished. No distress.   HENT:   Head: Normocephalic and atraumatic.   Eyes: EOM are normal. Pupils are equal, round, and reactive to light. Right eye exhibits no discharge. Left eye exhibits no discharge.   Neck: Normal range of motion. Neck supple.   Cardiovascular: Normal rate and normal heart sounds. Exam reveals no gallop.   Irregular pulse was noted.   Pulmonary/Chest: Effort normal and breath sounds normal. No respiratory distress.   Abdominal: Soft. Bowel sounds are normal. She exhibits no mass. There is no tenderness.   Musculoskeletal: Normal range of motion. She exhibits  edema and tenderness. She exhibits no deformity.   Tender L knee pain. Mild L knee swelling compared to the R knee.   Neurological: She is alert and oriented to person, place, and time. No cranial nerve deficit or sensory deficit.   Skin: Skin is warm and dry. Capillary refill takes less than 2 seconds. She is not diaphoretic. No erythema.   Psychiatric: She has a normal mood and affect.         Consults:     * Effusion of knee    - severe L knee pain 2/2 fall from the bed with severe tenderness. Tramadol was ordered PRN.  - Hip, tibia, fibula and ankle X-rays showed no evidence of fractures. L Knee X- ray showed arthroplasty hardware projects in expected position, no evidence of hardware failure, no displaced fracture or subluxation, no suspicious bone lesion and unchanged moderate left knee suprapatellar synovial complex/effusion.  - Ice was applied on L knee to reduce the pain. PT to f/u tomorrow. Will reeavulate for pain and swelling.  - pain has improved. Possible to transfer to SNF.        Sleep apnea    - Diagnosed on (7/2017).  - CPAP titration was performed but she denied using it at home.  - CPAP was ordered to be used at bedtime if needed.  - 6 min walk test might be difficult to perform given pt. Hx of L knee trauma.          TIA (transient ischemic attack)              CAD (coronary artery disease)              Hyperlipidemia    - on Atorvastatin 40 mg.          Hypertension    - she is on Amlodipine.  - on 8/19 her BP was 98/62, 500cc boluses was given twice.  - 8/22 held losartan given bump in Cr, monitor  - Cr level improved from 1.5>1.3>1.2        Diabetes mellitus type II, non insulin dependent    - on isulin aspart 0-5U prn.          Chronic atrial fibrillation    - she is on warfarin 6 mg.  - enoxaparin was added to meet the goal of INR>2.0.          Final Active Diagnoses:    Diagnosis Date Noted POA    PRINCIPAL PROBLEM:  Effusion of knee [M25.469] 08/18/2018 Yes    Sleep apnea [G47.30]  06/13/2017 Yes    TIA (transient ischemic attack) [G45.9] 12/28/2013 Yes    CAD (coronary artery disease) [I25.10] 02/14/2013 Yes    Hypertension [I10] 12/17/2012 Yes    Hyperlipidemia [E78.5] 12/17/2012 Yes    Diabetes mellitus type II, non insulin dependent [E11.9] 12/17/2012 Yes     Chronic    Chronic atrial fibrillation [I48.2] 12/17/2012 Yes     Chronic      Problems Resolved During this Admission:       Discharged Condition: stable    Disposition: Skilled Nursing Facility    Follow Up:  Follow-up Information     Moises Eduardo MD.    Specialty:  Cardiology  Contact information:  [address] [place] [address]  [address]ND FLOOR  [place] [address]  [phone]             Moises Eduardo MD.    Specialty:  Cardiology  Contact information:  [address] [place] [address]  [phone]                 Patient Instructions:   No discharge procedures on file.    Significant Diagnostic Studies: Labs: All labs within the past 24 hours have been reviewed  Microbiology:   Blood Culture   Lab Results   Component Value Date    LABBLOO NO GROWTH AFTER 5 DAYS  - FINAL REPORT. 09/03/2011     Radiology: X-Ray: knee    Pending Diagnostic Studies:     None         Medications:  Reconciled Home Medications:      Medication List      STOP taking these medications    doxycycline 100 MG tablet  Commonly known as:  VIBRA-TABS     rosuvastatin 10 MG tablet  Commonly known as:  CRESTOR        ASK your doctor about these medications    acetaminophen 500 MG tablet  Commonly known as:  TYLENOL  Take 500 mg by mouth daily as needed.     albuterol 1.25 mg/3 mL Nebu  Commonly known as:  ACCUNEB  Take 1.25 mg by nebulization every 6 (six) hours as needed. Rescue     ALPRAZolam 0.25 MG tablet  Commonly known as:  XANAX  Take 0.25 mg by mouth daily as needed for Anxiety.     amLODIPine 5 MG tablet  Commonly known as:  NORVASC  Take 5 mg by mouth once daily.     atorvastatin 40 MG tablet  Commonly known as:  LIPITOR  Take 40 mg by mouth once  daily.     carvedilol 12.5 MG tablet  Commonly known as:  COREG  Take 6.25 mg by mouth 2 (two) times daily.     citalopram 40 MG tablet  Commonly known as:  CELEXA  Take 40 mg by mouth once daily.     ergocalciferol 50,000 unit Cap  Commonly known as:  ERGOCALCIFEROL  Take 50,000 Units by mouth every 30 days.     escitalopram oxalate 10 MG tablet  Commonly known as:  LEXAPRO  Take 10 mg by mouth once daily.     glipiZIDE 2.5 MG Tr24  Commonly known as:  GLUCOTROL  Take 5 mg by mouth daily with breakfast.     guaifenesin-codeine 100-10 mg/5 ml  mg/5 mL syrup  Commonly known as:  TUSSI-ORGANIDIN NR  Take 5 mLs by mouth 3 (three) times daily as needed for Cough.     levothyroxine 50 MCG tablet  Commonly known as:  SYNTHROID  Take 50 mcg by mouth once daily.     losartan 100 MG tablet  Commonly known as:  COZAAR  Take 100 mg by mouth once daily.     ranitidine 75 MG tablet  Commonly known as:  ZANTAC  Take 75 mg by mouth daily as needed for Heartburn.     senna-docusate 8.6-50 mg 8.6-50 mg per tablet  Commonly known as:  PERICOLACE  Take 2 tablets by mouth every evening.     traMADol 50 mg tablet  Commonly known as:  ULTRAM  Take 50 mg by mouth every 6 (six) hours as needed for Pain.     warfarin 4 MG tablet  Commonly known as:  COUMADIN  Takes 1 tablet by mouth daily except 1/2 tablet on Tuesday and Thursday            Indwelling Lines/Drains at time of discharge:   Lines/Drains/Airways          None          Time spent on the discharge of patient: 20 minutes  Patient was seen and examined on the date of discharge and determined to be suitable for discharge.         Lisbeth Dalal MD  Department of Hospital Medicine  [facility]

## 2018-09-06 NOTE — PLAN OF CARE
Problem: Physical Therapy Goal  Goal: Physical Therapy Goal  Goals to be met by: 2 weeks ()     Patient will increase functional independence with mobility by performin. Supine to sit with Stand-by Assistance. Met (2018)  2. Sit to supine with Stand-by Assistance. Met (2018)  3. Sit to stand transfer with contact guard assistance. Met (2018)  4. Bed to chair transfer with Minimal Assistance using Rolling Walker.= met 2018   newGOALS 2018  Bed to chair trf w/ SBA w/ RW met (2018)  5. Gait  x 10 feet with Moderate Assistance using Rolling Walker.= met 2018   NEW GOAL 2018 gait x100 feet w/ RW and SBA. Met 2018  6. Wheelchair propulsion x 200 feet with Stand-by Assistance using bilateral upper extremities. Met (2018)  7. Ascend/Descend 4 inch curb step with Moderate Assistance using Rolling Walker. Met (2018)  8. Stand for 3 minutes with Contact Guard Assistance using Rolling Walker while performing a task. Met (2018)    New Goals:  9. Pt will ambulate without use of an assistive device with stand-by assistance, swing-through gait pattern, for  50 feet, as pt does not usually utilize an AD.  10. Pt will negotiate 4 steps with supervision using unilateral handrail.         Outcome: Ongoing (interventions implemented as appropriate)  Goals remain appropriate.

## 2018-09-06 NOTE — PLAN OF CARE
Problem: Patient Care Overview  Goal: Plan of Care Review  Outcome: Ongoing (interventions implemented as appropriate)   09/06/18 0419   Coping/Psychosocial   Plan Of Care Reviewed With patient       Problem: Fall Risk (Adult)  Goal: Identify Related Risk Factors and Signs and Symptoms  Related risk factors and signs and symptoms are identified upon initiation of Human Response Clinical Practice Guideline (CPG)  Outcome: Ongoing (interventions implemented as appropriate)   09/06/18 0419   Fall Risk   Related Risk Factors (Fall Risk) age-related changes;gait/mobility problems

## 2018-09-06 NOTE — PT/OT/SLP PROGRESS
Occupational Therapy  Treatment    Claribel Moran   MRN: [id number]   Admitting Diagnosis: Debility    OT Date of Treatment: 09/06/18  Total Time (min): 62 min    Billable Minutes:  Self Care/Home Management 47 and Therapeutic Exercise 15    General Precautions: Standard, aspiration, fall  Orthopedic Precautions: N/A  Braces: N/A    Do you have any cultural, spiritual, Shinto conflicts, given your current situation?: none stated    Subjective:  Communicated with nurse prior to session.      Pain/Comfort  Pain Rating 1: 0/10  Pain Rating Post-Intervention 1: 0/10    Objective:  Patient found with: (no lines)    Functional Status:  MDS G  Bed Mobility Functional Status: Mod(I) - (I)(supine to sitting EOB and rolling, scooting.)    Transfer Functional Status: S-SBA(sit to standing from EOB, BSC, W/C and TTB  mod (I) to RW.  SPT with RW  (S) EOB<>W/C, W/C<> BSC and TTB.)    Walk in Room Functional Status: S-SBA(Pt ambulated from EOB to restroom with RW 13 ft.)    Dressing Functional Status:  S-SBA(UBD Mod (I) , LBD (S) when standing to manage pants, breif  over hips. Pt donning pants, socks, slip on shoes and brief.)    Eating Functional Status: Mod(I) -   no assist)    Toilet Use Functional Status: Mod(I) -  (from BSC with RW to stand.)    Personal Hygiene Functional Status: Mod(I) - (grooming seated, (S) standing with RW sinkside to groom.)    Bathing Functional Status: S-SBA(Pt showering from TTB with grab bars to stand.)      Moving from seated to standing position: Not steady, but able to stabilize without staff assistance  Walking (with assistive device if used): Not steady, but able to stabilize without staff assistance  Turning around and facing the opposite direction while walking: Not steady, but able to stabilize without staff assistance  Moving on and off the toilet: Not steady, but able to stabilize without staff assistance  Surface-to-surface transfer (transfer between bed and chair or wheelchair): Not  steady, but able to stabilize without staff assistance           [facility] 6 Click:  [facility] Total Score: 21    OT Exercises: UE Ergometer performed 15 minutes on UE UBE with Mod resistance. UE exercises performed to increase functional endurance and strength.  Strengthening required in order increase independence when performing self care tasks, functional ambulation, W/C propulsion , functional standing activities as well as when performing functional tasks.      Patient left up in chair with call button in reach and nurse notified    ASSESSMENT:  Claribel Moran is a 84 y.o. female with a medical diagnosis of Debility .Pt was agreeable to OT and tolerated Tx without incidence but continues to require (A) to perform functional activities to completion. OT intervention required to address performance deficits affecting performance of self care tasks, functional mobility, functional transfers, functional strength and endurance to perform ADLS.  Pt is making progress but continues to require OT Intervention to perform functional transfers, functional standing activities, and self care tasks with standing component more independently. OT is recommended to further her functional (I)ce and safety. Goals remain appropriate and continued OT is recommended.      Rehab identified problem list/impairments: weakness, impaired endurance, impaired self care skills, impaired functional mobilty, gait instability, impaired balance, decreased lower extremity function, decreased safety awareness, pain, decreased ROM, orthopedic precautions    Rehab potential is good    Activity tolerance: Good    Discharge recommendations: home with home health     Barriers to discharge: None     Equipment recommendations: none     GOALS:   Multidisciplinary Problems     Occupational Therapy Goals        Problem: Occupational Therapy Goal    Goal Priority Disciplines Outcome Interventions   Occupational Therapy Goal     OT, PT/OT Ongoing (interventions  implemented as appropriate)    Description:  Goals to be met by: 14 days     Patient will increase functional independence with ADLs by performing:    Feeding with Modified [place].    Met  UE Dressing with Modified [place].   Met  LE Dressing with Stand-by Assistance. Met  Grooming while standing with Stand-by Assistance with RW to steady.  Met  Toileting from bedside commode with Supervision for hygiene and clothing management and RW to steady.  Met  Bathing from  shower chair/bench with Supervision. Met   Rolling to Bilateral with Modified [place].   Met  Supine to sit with Modified [place].  Met  Stand pivot transfers with Supervision. Met   Upper extremity exercise program x10 reps per set, with supervision.  Pt will tolerate up to 10 minutes of functional standing activity with (S) and RW for safety.                         Plan:  Patient to be seen 5 x/week to address the above listed problems via self-care/home management, therapeutic activities, therapeutic exercises  Plan of Care expires: 09/25/18  Plan of Care reviewed with: patient    Ramos Roca, SHAZIA/JENISE  09/06/2018

## 2018-09-06 NOTE — PLAN OF CARE
Problem: Occupational Therapy Goal  Goal: Occupational Therapy Goal  Goals to be met by: 14 days     Patient will increase functional independence with ADLs by performing:    Feeding with Modified [place].    Met  UE Dressing with Modified [place].   Met  LE Dressing with Stand-by Assistance. Met  Grooming while standing with Stand-by Assistance with RW to steady.  Met  Toileting from bedside commode with Supervision for hygiene and clothing management and RW to steady.  Met  Bathing from  shower chair/bench with Supervision. Met   Rolling to Bilateral with Modified [place].   Met  Supine to sit with Modified [place].  Met  Stand pivot transfers with Supervision. Met   Upper extremity exercise program x10 reps per set, with supervision.  Pt will tolerate up to 10 minutes of functional standing activity with (S) and RW for safety.       Outcome: Ongoing (interventions implemented as appropriate)  SHAZIA Adams/JENISE      9/6/2018

## 2018-09-07 VITALS
RESPIRATION RATE: 18 BRPM | SYSTOLIC BLOOD PRESSURE: 133 MMHG | BODY MASS INDEX: 38.09 KG/M2 | HEIGHT: 61 IN | WEIGHT: 201.75 LBS | OXYGEN SATURATION: 96 % | TEMPERATURE: 98 F | HEART RATE: 85 BPM | DIASTOLIC BLOOD PRESSURE: 63 MMHG

## 2018-09-07 LAB
INR PPP: 2
POCT GLUCOSE: 110 MG/DL (ref 70–110)
POCT GLUCOSE: 130 MG/DL (ref 70–110)
POCT GLUCOSE: 152 MG/DL (ref 70–110)
PROTHROMBIN TIME: 19.9 SEC

## 2018-09-07 PROCEDURE — 25000003 PHARM REV CODE 250: Performed by: NURSE PRACTITIONER

## 2018-09-07 PROCEDURE — 36415 COLL VENOUS BLD VENIPUNCTURE: CPT

## 2018-09-07 PROCEDURE — 97116 GAIT TRAINING THERAPY: CPT

## 2018-09-07 PROCEDURE — 97110 THERAPEUTIC EXERCISES: CPT

## 2018-09-07 PROCEDURE — 97530 THERAPEUTIC ACTIVITIES: CPT

## 2018-09-07 PROCEDURE — 99239 HOSP IP/OBS DSCHRG MGMT >30: CPT | Mod: ,,, | Performed by: HOSPITALIST

## 2018-09-07 PROCEDURE — 97535 SELF CARE MNGMENT TRAINING: CPT

## 2018-09-07 PROCEDURE — 25000003 PHARM REV CODE 250: Performed by: STUDENT IN AN ORGANIZED HEALTH CARE EDUCATION/TRAINING PROGRAM

## 2018-09-07 PROCEDURE — 94640 AIRWAY INHALATION TREATMENT: CPT

## 2018-09-07 PROCEDURE — 25000242 PHARM REV CODE 250 ALT 637 W/ HCPCS: Performed by: NURSE PRACTITIONER

## 2018-09-07 PROCEDURE — 94761 N-INVAS EAR/PLS OXIMETRY MLT: CPT

## 2018-09-07 PROCEDURE — 85610 PROTHROMBIN TIME: CPT

## 2018-09-07 RX ADMIN — ACETAMINOPHEN 650 MG: 325 TABLET ORAL at 10:09

## 2018-09-07 RX ADMIN — GLIPIZIDE 5 MG: 2.5 TABLET, FILM COATED, EXTENDED RELEASE ORAL at 10:09

## 2018-09-07 RX ADMIN — FLUTICASONE FUROATE AND VILANTEROL TRIFENATATE 1 PUFF: 100; 25 POWDER RESPIRATORY (INHALATION) at 10:09

## 2018-09-07 RX ADMIN — AMLODIPINE BESYLATE 5 MG: 5 TABLET ORAL at 10:09

## 2018-09-07 RX ADMIN — Medication 800 MG: at 10:09

## 2018-09-07 RX ADMIN — TRAMADOL HYDROCHLORIDE 50 MG: 50 TABLET, COATED ORAL at 10:09

## 2018-09-07 RX ADMIN — DIPHENHYDRAMINE HYDROCHLORIDE 25 MG: 25 CAPSULE ORAL at 10:09

## 2018-09-07 RX ADMIN — LEVOTHYROXINE SODIUM 50 MCG: 50 TABLET ORAL at 06:09

## 2018-09-07 RX ADMIN — IPRATROPIUM BROMIDE AND ALBUTEROL SULFATE 3 ML: .5; 3 SOLUTION RESPIRATORY (INHALATION) at 07:09

## 2018-09-07 RX ADMIN — ATORVASTATIN CALCIUM 40 MG: 20 TABLET, FILM COATED ORAL at 10:09

## 2018-09-07 NOTE — HOSPITAL COURSE
/27/18  Patient seen at bedside, she reports pain is controlled with prescribed regimen.  She states she is eating, drinking, voiding and having BM without issues.  She states she resides with her daughter and grandchild and plans to return to same.  Discussed her elevated glucose readings, she reports she has not taken her home dosing of glipizide in a month due to getting it paid for by insurance company.  No other issues at this time.       9/4/18  Patient seen at bedside, she has no complaints today.    09/07/18  Patient seen at bedside today, daughter in the room. She has no complaints or needs. Reports ready for discharge.

## 2018-09-07 NOTE — HPI
Patient is a 83 y.o. female who has a past medical history of Acid reflux, Anxiety, Arthritis, Atrial fibrillation with Anticoagulant long-term use, coronary artery disease, CHF, CKD stage 3, Hepatitis B, Hyperlipidemia, Hypertension, Reactive airway disease with wheezing, Sleep apnea, Thyroid disease, Total knee replacement status, Type II diabetes mellitus, and Vitamin D deficiency disease presented with left knee pain after mechanical fall. She presented to ED and was discharged home with pain control medications. She presented back to ED for worsening pain. Xray of knee showed moderate left knee suprapatellar synovial complex/effusion. She was treated conservatively and did not require surgical consultation. Patient has been working with PT/OT who recommend SNF for further balance/mobility training.

## 2018-09-07 NOTE — PT/OT/SLP PROGRESS
Occupational Therapy  Treatment/ Discharge Summary    Claribel Moran   MRN: [id number]   Admitting Diagnosis: Debility    OT Date of Treatment: 09/07/18  Total Time (min): 40 min    Billable Minutes:  Self Care/Home Management 30 and Therapeutic Activity 10    General Precautions: Standard, aspiration, fall  Orthopedic Precautions: N/A  Braces: N/A    Do you have any cultural, spiritual, Latter-day conflicts, given your current situation?: none stated    Subjective:  Communicated with nurse prior to session.      Pain/Comfort  Pain Rating 1: 0/10  Pain Rating Post-Intervention 1: 0/10    Objective:  Patient found with: (no lines)    Functional Status:  MDS G  Bed Mobility Functional Status: Mod(I) - (no assist)    Transfer Functional Status: S-SBA(SPT from W/C <> shower seat with RW.)    Walk in Room Functional Status: S-SBA   (from EOB to restroom with RW to steady.)    Dressing Functional Status: S-SBA   (UBD Mod (I), LBD (S) when standing. )    Eating Functional Status: Mod(I) -  (no assist)    Toilet Use Functional Status: Mod(I) -(with BSC over toilet and grab bar to stand.)    Personal Hygiene Functional Status: Mod(I) -(seated sinkside, (S) if standing with RW to groom.)    Bathing Functional Status: S-SBA((S) from shower seat.)    Moving from seated to standing position: Not steady, but able to stabilize without staff assistance  Walking (with assistive device if used): Not steady, but able to stabilize without staff assistance  Turning around and facing the opposite direction while walking: Not steady, but able to stabilize without staff assistance  Moving on and off the toilet: Not steady, but able to stabilize without staff assistance  Surface-to-surface transfer (transfer between bed and chair or wheelchair): Not steady, but able to stabilize without staff assistance           [facility] 6 Click:  [facility] Total Score: 22    Additional Treatment:  Pt and daughter educated on level of assist needed to safely perform  self care tasks.  - White board updated  - Self care tasks completed-- as noted above   - She performed dynamic sitting activity incorporating reaching in all planes while sitting unsupported EOB and working on self care tasks.    Patient left up in chair with call button in reach and PT present and initiating Tx session.    ASSESSMENT:  Claribel Moran is a 84 y.o. female with a medical diagnosis of Debility . Pt has met established goals but would continue to benefit from OT intervention to increase her functional (I)ce and safety with performance of standing ADLs and her functional endurance when performing  ADLs.    Rehab identified problem list/impairments: weakness, impaired endurance, impaired self care skills, impaired functional mobilty, gait instability, impaired balance, decreased lower extremity function, decreased safety awareness, pain, decreased ROM, orthopedic precautions    Rehab potential is good    Activity tolerance: Good    Discharge recommendations: home with home health     Barriers to discharge: None     Equipment recommendations: none     GOALS:   Multidisciplinary Problems     Occupational Therapy Goals     Not on file          Multidisciplinary Problems (Resolved)        Problem: Occupational Therapy Goal    Goal Priority Disciplines Outcome Interventions   Occupational Therapy Goal   (Resolved)     OT, PT/OT Outcome(s) achieved    Description:  Goals to be met by: 14 days     Patient will increase functional independence with ADLs by performing:    Feeding with Modified [place].    Met  UE Dressing with Modified [place].   Met  LE Dressing with Stand-by Assistance. Met  Grooming while standing with Stand-by Assistance with RW to steady.  Met  Toileting from bedside commode with Supervision for hygiene and clothing management and RW to steady.  Met  Bathing from  shower chair/bench with Supervision. Met   Rolling to Bilateral with Modified [place].   Met  Supine to sit with  Modified [place].  Met  Stand pivot transfers with Supervision. Met   Upper extremity exercise program x10 reps per set, with supervision. Met  Pt will tolerate up to 10 minutes of functional standing activity with (S) and RW for safety.  Met                          Plan:  Patient to D/[place] to home setting from SNF  Plan of Care expires: 09/25/18  Plan of Care reviewed with: patient    Ramos Roca, OTR/JENISE  09/07/2018

## 2018-09-07 NOTE — DISCHARGE SUMMARY
[facility] Hospital Medicine  Discharge Summary      Patient Name: Claribel Moran  MRN: [id number]  Admission Date: 8/24/2018  Hospital Length of Stay: 14 days  Discharge Date and Time: 9/7/2018 12:40 PM  Attending Physician: No att. providers found   Discharging Provider: Vicky Arenas MD  Primary Care Provider: Moises Eduardo MD  [facility] Medicine Team: [facility] MED 5 Lisbeth Dalal MD    HPI:   83 y.o. female with co-morbidities including: A-Fib, right total knee replacement (2008), left total knee replacement (2011), type 2 DM, HTN, CKD, CAD, and CHF who presents to the ED with a complaint of worsening severe left thigh and knee pain of initial onset 2 days ago. Pt states she fell out of her bed and struck her left knee/hip approximately 2 days ago. She shortly reported to [facility] and received treatment, then was discharged later that day. Pain is 10/10 and begun radiating up and down her leg towards her groin. There are no associated symptoms at this time and no pain at the level below the knee. She is on severe pain at the knee level and above which is very sensitive to touch on examination not relived by 1g PO Tylenol. Pt states she has been unable to stand/ambulate/move comfortably since the injury and has significantly worsened over the 24 hours. She has not taken her coumadin this morning and reports no recent missed medications concerning her coumadin. Denies fever, chills, head trauma, and any LOC. No hx of numbness or tingling sensation. She is on Glipizide but did not took it last month because of insurance issues. Other systemic review was unremarkable.           * No surgery found *      Hospital Course:   8/18- VSS, patient admitted to the 9th floor and started on home anti HTN medications. Tramadol was ordered PRN for severe L knee pain.  8/19- NAEON, patient seen today and she is vitally stable, BP 98/62. Carvedilol was dc'ed, continue on Amlodipine and  Losartan. Ice applied on L knee to reduce pain. F/u with CR level as it was increased from 1.0 to 1.3. PT tomorrow.  8/20- NAEON except of one episode of desating at 88%. Currently she is stable on room air. Patient seen today and she is vitally stable, /62. Cr level is 1.2. She reports that L knee pain has improved. CPAP was ordered at bedtime. enoxaparin 90 mg Q12h was started. F/u INR.  8/21- NAEON, patient seen today and vitally stable. On NC 1L sating at 99%. L knee pain has improved. INR today is 1.6. Plan to transfer to SNF at time of discharge.  8/22- 500 cc bolus given for bump in Cr. Held losartan, will monitor Cr and BP. Will discuss with pt/PT/OT as pt may need SNF.  8/23- NAEON, patient seen today and she is vitally table, /78. Cr level improved 1.5>1.3. Waiting for SNF placement.  8/24- NAEON, patient seen today and vitally stable. mucinex 600 BID was ordered as an expectorant. Warfarin dose changed to 6 mg yesterday. Cr level improved to 1.2. L knee pain is much more better and patient took a shower today morning. Waiting for SNF placement.              Temp:  [98.3 °F (36.8 °C)-98.5 °F (36.9 °C)] 98.3 °F (36.8 °C)  Pulse:  [75-94] 85  Resp:  [16-20] 18  SpO2:  [96 %-97 %] 96 %  BP: (118-133)/(53-63) 133/63     Physical Exam   Constitutional: She is oriented to person, place, and time. She appears well-developed and well-nourished. No distress.   HENT:   Head: Normocephalic and atraumatic.   Eyes: EOM are normal. Pupils are equal, round, and reactive to light. Right eye exhibits no discharge. Left eye exhibits no discharge.   Neck: Normal range of motion. Neck supple.   Cardiovascular: Normal rate and normal heart sounds. Exam reveals no gallop.   Irregular pulse was noted.   Pulmonary/Chest: Effort normal and breath sounds normal. No respiratory distress.   Abdominal: Soft. Bowel sounds are normal. She exhibits no mass. There is no tenderness.   Musculoskeletal: Normal range of motion.  She exhibits edema and tenderness. She exhibits no deformity.   Tender L knee pain. Mild L knee swelling compared to the R knee.   Neurological: She is alert and oriented to person, place, and time. No cranial nerve deficit or sensory deficit.   Skin: Skin is warm and dry. Capillary refill takes less than 2 seconds. She is not diaphoretic. No erythema.   Psychiatric: She has a normal mood and affect.         Consults:   Consults (From admission, onward)        Status Ordering Provider     Nutrition Services Referral  Once     Provider:  (Not yet assigned)    PORFIRIO Rain          No new Assessment & Plan notes have been filed under this hospital service since the last note was generated.  Service: Hospital Medicine    Final Active Diagnoses:    Diagnosis Date Noted POA    PRINCIPAL PROBLEM:  Debility [R53.81] 02/20/2013 Yes    Acquired hypothyroidism [E03.9] 08/25/2018 Yes    Effusion of knee [M25.469] 08/18/2018 Yes    Sleep apnea [G47.30] 06/13/2017 Yes    Anticoagulated on Coumadin [Z51.81, Z79.01] 12/29/2013 Not Applicable     Chronic    Reactive airway disease with wheezing [J45.909] 02/19/2013 Yes    CKD (chronic kidney disease) stage 3, GFR 30-59 ml/min [N18.3] 02/14/2013 Yes     Chronic    Chronic atrial fibrillation [I48.2] 12/17/2012 Yes     Chronic    Diabetes mellitus type II, non insulin dependent [E11.9] 12/17/2012 Yes     Chronic    Hypertension [I10] 12/17/2012 Yes    Hyperlipidemia [E78.5] 12/17/2012 Yes    Obesity [E66.9] 12/17/2012 Yes      Problems Resolved During this Admission:       Discharged Condition: stable    Disposition: Home or Self Care    Follow Up:  Follow-up Information     Alesha. Go on 9/7/2018.    Why:  hospital follow up appt at 1pm  Contact information:  [address]  [address] [place]  phone: [phone]  fax: [phone]           [facility].    Specialty:  Home Health Services  Why:  Agency will call pt to schedule a  "home health assessment.  Contact information:  [address]  [place] [address]  [phone]                 Patient Instructions:      HIP KIT FOR HOME USE     Order Specific Question Answer Comments   Height: 5' 1" (1.549 m)    Weight: 91.3 kg (201 lb 4.5 oz)    Does patient have medical equipment at home? 3-in-1 commode    Does patient have medical equipment at home? cane, straight    Does patient have medical equipment at home? walker, rolling    Does patient have medical equipment at home? bath bench    Length of need (1-99 months): 99    Type: [employer] Hip Kit    Vendor: Other (use comments) Pt declined hip kit   Expected Date of Delivery: 9/5/2018      HME - OTHER     Order Specific Question Answer Comments   Type of Equipment: Needs footrest and Leg support (Standard and Heel Loops) for [employer] Wheelchair    Height: 5' 1" (1.549 m)    Weight: 91.3 kg (201 lb 4.5 oz)    Does patient have medical equipment at home? 3-in-1 commode    Does patient have medical equipment at home? cane, straight    Does patient have medical equipment at home? walker, rolling    Does patient have medical equipment at home? bath bench    Vendor: Other (use comments) Outsourced   Expected Date of Delivery: 9/5/2018      Diet Cardiac     Diet diabetic     Notify your health care provider if you experience any of the following:  increased confusion or weakness     Notify your health care provider if you experience any of the following:  persistent dizziness, light-headedness, or visual disturbances     Notify your health care provider if you experience any of the following:  worsening rash     Notify your health care provider if you experience any of the following:  severe persistent headache     Notify your health care provider if you experience any of the following:  difficulty breathing or increased cough     Notify your health care provider if you experience any of the following:  severe uncontrolled pain     Notify " your health care provider if you experience any of the following:  persistent nausea and vomiting or diarrhea     Notify your health care provider if you experience any of the following:  temperature >100.4     Notify your health care provider if you experience any of the following:  persistent nausea and vomiting or diarrhea     Notify your health care provider if you experience any of the following:  temperature >100.4     Notify your health care provider if you experience any of the following:  severe uncontrolled pain     Notify your health care provider if you experience any of the following:  difficulty breathing or increased cough     Notify your health care provider if you experience any of the following:  severe persistent headache     Notify your health care provider if you experience any of the following:  persistent dizziness, light-headedness, or visual disturbances     Notify your health care provider if you experience any of the following:  increased confusion or weakness     Activity as tolerated     Activity as tolerated       Significant Diagnostic Studies: Labs: All labs within the past 24 hours have been reviewed  Microbiology:   Blood Culture   Lab Results   Component Value Date    LABBLOO NO GROWTH AFTER 5 DAYS  - FINAL REPORT. 09/03/2011     Radiology: X-Ray: knee    Pending Diagnostic Studies:     Procedure Component Value Units Date/Time    Basic Metabolic Panel (BMP) [[id number]] Collected:  08/26/18 0639    Order Status:  Sent Lab Status:  In process Updated:  08/26/18 0740    Specimen:  Blood     CBC with Automated Differential [[id number]] Collected:  08/26/18 0639    Order Status:  Sent Lab Status:  In process Updated:  08/26/18 0740    Specimen:  Blood     Magnesium [[id number]] Collected:  08/26/18 0639    Order Status:  Sent Lab Status:  In process Updated:  08/26/18 0740    Specimen:  Blood     Phosphorus [[id number]] Collected:  08/26/18 0639    Order Status:  Sent Lab Status:  In  process Updated:  08/26/18 6617    Specimen:  Blood          Medications:  Reconciled Home Medications:      Medication List      START taking these medications    fluticasone-vilanterol 100-25 mcg/dose diskus inhaler  Commonly known as:  BREO  Inhale 1 puff into the lungs once daily. Controller     guaiFENesin 600 mg 12 hr tablet  Commonly known as:  MUCINEX  Take 1 tablet (600 mg total) by mouth 2 (two) times daily as needed (Expectorant).     metFORMIN 500 MG 24 hr tablet  Commonly known as:  GLUCOPHAGE-XR  Take 1 tablet (500 mg total) by mouth before dinner.        CHANGE how you take these medications    acetaminophen 325 MG tablet  Commonly known as:  TYLENOL  Take 2 tablets (650 mg total) by mouth every 6 (six) hours as needed.  What changed:    · medication strength  · how much to take  · when to take this     albuterol 1.25 mg/3 mL Nebu  Commonly known as:  ACCUNEB  Take 1.25 mg by nebulization every 6 (six) hours as needed. Rescue  What changed:  Another medication with the same name was removed. Continue taking this medication, and follow the directions you see here.     glipiZIDE 5 MG Tr24  Commonly known as:  GLUCOTROL  Take 1 tablet (5 mg total) by mouth daily with breakfast.  What changed:  medication strength     traMADol 50 mg tablet  Commonly known as:  ULTRAM  Take 1 tablet (50 mg total) by mouth every 8 (eight) hours as needed for Pain.  What changed:  when to take this     warfarin 5 MG tablet  Commonly known as:  COUMADIN  Take 1 tablet (5 mg total) by mouth Daily.  What changed:    · medication strength  · how much to take  · how to take this  · when to take this  · additional instructions        CONTINUE taking these medications    amLODIPine 5 MG tablet  Commonly known as:  NORVASC  Take 5 mg by mouth once daily.     atorvastatin 40 MG tablet  Commonly known as:  LIPITOR  Take 40 mg by mouth once daily.     ergocalciferol 50,000 unit Cap  Commonly known as:  ERGOCALCIFEROL  Take 50,000 Units  by mouth every 30 days.     levothyroxine 50 MCG tablet  Commonly known as:  SYNTHROID  Take 50 mcg by mouth once daily.     ranitidine 75 MG tablet  Commonly known as:  ZANTAC  Take 75 mg by mouth daily as needed for Heartburn.     senna-docusate 8.6-50 mg 8.6-50 mg per tablet  Commonly known as:  PERICOLACE  Take 2 tablets by mouth every evening.        STOP taking these medications    ALPRAZolam 0.25 MG tablet  Commonly known as:  XANAX     carvedilol 12.5 MG tablet  Commonly known as:  COREG     citalopram 40 MG tablet  Commonly known as:  CELEXA     escitalopram oxalate 10 MG tablet  Commonly known as:  LEXAPRO     guaifenesin-codeine 100-10 mg/5 ml  mg/5 mL syrup  Commonly known as:  TUSSI-ORGANIDIN NR     losartan 100 MG tablet  Commonly known as:  COZAAR            Indwelling Lines/Drains at time of discharge:   Lines/Drains/Airways          None          Time spent on the discharge of patient: 20 minutes  Patient was seen and examined on the date of discharge and determined to be suitable for discharge.         Vicky Arenas MD  Department of Hospital Medicine  [facility]

## 2018-09-07 NOTE — DISCHARGE SUMMARY
[facility]  Discharge Summary      Patient Name: Claribel Moran  MRN: [id number]  Admission Date: 8/24/2018  Hospital Length of Stay: 14 days  Discharge Date and Time: 9/7/2018 12:40 PM  Attending Physician: No att. providers found   Discharging Provider: Jodie Ward NP  Primary Care Provider: Moises Eduardo MD      HPI:   Patient is a 83 y.o. female who has a past medical history of Acid reflux, Anxiety, Arthritis, Atrial fibrillation with Anticoagulant long-term use, coronary artery disease, CHF, CKD stage 3, Hepatitis B, Hyperlipidemia, Hypertension, Reactive airway disease with wheezing, Sleep apnea, Thyroid disease, Total knee replacement status, Type II diabetes mellitus, and Vitamin D deficiency disease presented with left knee pain after mechanical fall. She presented to ED and was discharged home with pain control medications. She presented back to ED for worsening pain. Xray of knee showed moderate left knee suprapatellar synovial complex/effusion. She was treated conservatively and did not require surgical consultation. Patient has been working with PT/OT who recommend SNF for further balance/mobility training.     * No surgery found *      Hospital Course:   /27/18  Patient seen at bedside, she reports pain is controlled with prescribed regimen.  She states she is eating, drinking, voiding and having BM without issues.  She states she resides with her daughter and grandchild and plans to return to same.  Discussed her elevated glucose readings, she reports she has not taken her home dosing of glipizide in a month due to getting it paid for by insurance company.  No other issues at this time.       9/4/18  Patient seen at bedside, she has no complaints today.    09/07/18  Patient seen at bedside today, daughter in the room. She has no complaints or needs. Reports ready for discharge.      Consults:   Consults (From admission, onward)        Status Ordering  Provider     Nutrition Services Referral  Once     Provider:  (Not yet assigned)    Completed PORFIRIO DOUGLAS          * Debility    Cont with PT/OT for gait training and strengthening and restoration of ADL's   Fall precautions   Cont DVT prophylaxis with coumadin.        Anticoagulants   Medication Route Frequency    warfarin (COUMADIN) tablet 5 mg Oral Daily              Lab Results   Component Value Date     INR 2.1 (H) 09/04/2018     INR 2.2 (H) 09/03/2018     INR 2.0 (H) 09/02/2018 8/27/18  Continue therapy to improve functional goals.  Plans to discharge home with family.  Continue coumadin for dvt ppx.     9/4/18  Participating in therapy, no acute issues.  INR is therapeutic.    09/07/18  Participated in therapies. Discharged with HH and continued PT/OT.        Acquired hypothyroidism    09/07/18  No acute issues. Continue home levothyroxine.         Effusion of knee     Secondary to trauma  Xray with no fracture or hardware failure.   Cont conservative care with cold compress, leg elevation, anti inflammatory and pain control  PT/OT as tolerated  Consider ortho consult if no improvement for arthrocentesis.      8/27/18  Pain well controlled.  Continue use of cool compress, leg elevation, and Tramadol as ordered.     9/4/18  Pain is well controlled.  Continue leg elevation and tramadol as ordered.    09/07/18  Pain is well controlled.  Continue leg elevation and tramadol as ordered.                 Anticoagulated on Coumadin    Plan as above.      8/27/18  Plan as above.     9/4/18  Plan as above    09/07/18  See AFib, continue plan of care as noted there.         CKD (chronic kidney disease) stage 3, GFR 30-59 ml/min      Lab Results   Component Value Date     CREATININE 1.0 09/04/2018      Sr Cr stable  Cont to monitor with biweekly labs   Avoid nephrotoxins.   Holding Losartan   Renally dose all medications      8/27/18  Creating is stable.  Per MD note, losartan on hold, BP is stable with  Norvasc alone.  Continue to follow.     9/4/18  Creatinine is stable.  BP is stable, continue Norvasc as ordered.    09/07/18  Creatinine is stable.  BP is stable, continue Norvasc as ordered.        Hyperlipidemia    09/07/18  Continue statin. No acute issues.         Hypertension         BP Readings from Last 3 Encounters:   09/04/18 (!) 148/78   08/24/18 132/87   08/16/18 121/67      Well controlled  Cont amlodipine to treat  Home Coreg and Losartan discontinued  Cont to monitor and restart if required  Low Na diet     8/27/18  BP is stable.  Continue Norvasc as ordered.  Coreg and Losartan has been discontinued per MD note.     9/4/18      BP Readings from Last 3 Encounters:   09/04/18 (!) 148/78   08/24/18 132/87   08/16/18 121/67      BP is stable.  Continue Norvasc as ordered.    09/07/18  BP is stable.  Continue Norvasc as ordered.                 Diabetes mellitus type II, non insulin dependent    Hold home oral agents  Low dose correction scale   Cont blood glucose monitoring   ADA diet     8/27/18  Fasting glucose high as well as last a1c.          Lab Results   Component Value Date     HGBA1C 8.5 (H) 08/18/2018            POCT Glucose   Date Value Ref Range Status   09/04/2018 141 (H) 70 - 110 mg/dL Final   09/04/2018 143 (H) 70 - 110 mg/dL Final   09/03/2018 154 (H) 70 - 110 mg/dL Final   09/03/2018 238 (H) 70 - 110 mg/dL Final   09/03/2018 162 (H) 70 - 110 mg/dL Final   09/03/2018 133 (H) 70 - 110 mg/dL Final   09/02/2018 178 (H) 70 - 110 mg/dL Final   09/02/2018 206 (H) 70 - 110 mg/dL Final   09/02/2018 184 (H) 70 - 110 mg/dL Final   09/02/2018 104 70 - 110 mg/dL Final   09/01/2018 219 (H) 70 - 110 mg/dL Final   09/01/2018 111 (H) 70 - 110 mg/dL Final      Currently on Novolog low correction scale.  Patient states having difficulty getting glipizide approved with insurance.  Will restart 2.5 mg of glipizide today and follow.     9/4/18  CBG is stable, continue glipizide and metformin as  ordered.    09/07/18  CBG is stable, continue glipizide and metformin as ordered.        Chronic atrial fibrillation     Pulse Readings from Last 3 Encounters:   09/04/18 84   08/24/18 84   08/16/18 89      BB discontinued due to hypotension  Cont to hold and restart if needed  Cont anticoagulation with coumadin  Stop Lovenox as INR therapeutic.      8/27/18  HR is stable and BP is stable.  B-blocker stopped due to hypotension.  Continue Coumadin, INR target is 2-3.  INR is therapeutic.          Lab Results   Component Value Date     INR 2.1 (H) 09/04/2018     INR 2.2 (H) 09/03/2018     INR 2.0 (H) 09/02/2018 9/4/18  HR and BP is stable.      Pulse Readings from Last 3 Encounters:   09/04/18 84   08/24/18 84   08/16/18 89          BP Readings from Last 3 Encounters:   09/04/18 (!) 148/78   08/24/18 132/87   08/16/18 121/67      Continue coumadin at current dosing and follow up with treating provider after discharge.    09/07/18  BP and HR remain stable, continue plan of care as above.             Final Active Diagnoses:    Diagnosis Date Noted POA    PRINCIPAL PROBLEM:  Debility [R53.81] 02/20/2013 Yes    Acquired hypothyroidism [E03.9] 08/25/2018 Yes    Effusion of knee [M25.469] 08/18/2018 Yes    Anticoagulated on Coumadin [Z51.81, Z79.01] 12/29/2013 Not Applicable     Chronic    CKD (chronic kidney disease) stage 3, GFR 30-59 ml/min [N18.3] 02/14/2013 Yes     Chronic    Chronic atrial fibrillation [I48.2] 12/17/2012 Yes     Chronic    Diabetes mellitus type II, non insulin dependent [E11.9] 12/17/2012 Yes     Chronic    Hypertension [I10] 12/17/2012 Yes    Hyperlipidemia [E78.5] 12/17/2012 Yes      Problems Resolved During this Admission:       Discharged Condition: good    Disposition: Home or Self Care    Follow Up:  Follow-up Information     Alesha. Go on 9/7/2018.    Why:  hospital follow up appt at 1pm  Contact information:  [address]  [address] [place]  phone:  [phone]  fax: [phone]           [facility].    Specialty:  Home Health Services  Why:  Agency will call pt to schedule a home health assessment.  Contact information:  Rachel CASILLAS [facility] [place] [address] [phone]                 Patient Instructions:      HIP KIT FOR HOME USE     Order Specific Question Answer Comments   Height: 5' 1" (1.549 m)    Weight: 91.3 kg (201 lb 4.5 oz)    Does patient have medical equipment at home? 3-in-1 commode    Does patient have medical equipment at home? cane, straight    Does patient have medical equipment at home? walker, rolling    Does patient have medical equipment at home? bath bench    Length of need (1-99 months): 99    Type: [employer] Hip Kit    Vendor: Other (use comments) Pt declined hip kit   Expected Date of Delivery: 9/5/2018      HME - OTHER     Order Specific Question Answer Comments   Type of Equipment: Needs footrest and Leg support (Standard and Heel Loops) for [employer] Wheelchair    Height: 5' 1" (1.549 m)    Weight: 91.3 kg (201 lb 4.5 oz)    Does patient have medical equipment at home? 3-in-1 commode    Does patient have medical equipment at home? cane, straight    Does patient have medical equipment at home? walker, rolling    Does patient have medical equipment at home? bath bench    Vendor: Other (use comments) Outsourced   Expected Date of Delivery: 9/5/2018      Diet Cardiac     Diet diabetic     Notify your health care provider if you experience any of the following:  increased confusion or weakness     Notify your health care provider if you experience any of the following:  persistent dizziness, light-headedness, or visual disturbances     Notify your health care provider if you experience any of the following:  worsening rash     Notify your health care provider if you experience any of the following:  severe persistent headache     Notify your health care provider if you experience any of the following:  difficulty " breathing or increased cough     Notify your health care provider if you experience any of the following:  severe uncontrolled pain     Notify your health care provider if you experience any of the following:  persistent nausea and vomiting or diarrhea     Notify your health care provider if you experience any of the following:  temperature >100.4     Notify your health care provider if you experience any of the following:  persistent nausea and vomiting or diarrhea     Notify your health care provider if you experience any of the following:  temperature >100.4     Notify your health care provider if you experience any of the following:  severe uncontrolled pain     Notify your health care provider if you experience any of the following:  difficulty breathing or increased cough     Notify your health care provider if you experience any of the following:  severe persistent headache     Notify your health care provider if you experience any of the following:  persistent dizziness, light-headedness, or visual disturbances     Notify your health care provider if you experience any of the following:  increased confusion or weakness     Activity as tolerated     Activity as tolerated     Review of Systems   Constitutional: Negative for appetite change, fatigue and fever.   Respiratory: Negative for cough and shortness of breath.    Cardiovascular: Negative for chest pain, palpitations and leg swelling.   Gastrointestinal: Negative for abdominal pain, constipation, diarrhea, nausea and vomiting.   Genitourinary: Negative for dysuria.   Musculoskeletal: Positive for arthralgias.   Skin: Negative for rash.     Physical Exam   Constitutional: She is oriented to person, place, and time. She appears well-developed and well-nourished.   Cardiovascular: Normal rate, regular rhythm, normal heart sounds and intact distal pulses.   No murmur heard.  Pulmonary/Chest: Effort normal and breath sounds normal. No respiratory distress.    Abdominal: Soft. Normal appearance and bowel sounds are normal. She exhibits no distension. There is no tenderness.   Musculoskeletal: Normal range of motion. She exhibits no edema.   Neurological: She is alert and oriented to person, place, and time. She has normal strength.   Skin: Skin is warm, dry and intact. Capillary refill takes less than 2 seconds. No erythema.     Significant Diagnostic Studies: Labs:   BMP:   Recent Labs   Lab  09/06/18 0602   GLU  139*   NA  137   K  4.0   CL  106   CO2  22*   BUN  19   CREATININE  1.0   CALCIUM  9.5   MG  1.6   , CBC   Recent Labs   Lab  09/06/18 0602   WBC  9.55   HGB  12.6   HCT  39.9   PLT  233   , INR   Lab Results   Component Value Date    INR 2.0 (H) 09/07/2018    INR 2.1 (H) 09/06/2018    INR 2.0 (H) 09/05/2018    and All labs within the past 24 hours have been reviewed    Pending Diagnostic Studies:     Procedure Component Value Units Date/Time    Basic Metabolic Panel (BMP) [[id number]] Collected:  08/26/18 0639    Order Status:  Sent Lab Status:  In process Updated:  08/26/18 0740    Specimen:  Blood     CBC with Automated Differential [[id number]] Collected:  08/26/18 0639    Order Status:  Sent Lab Status:  In process Updated:  08/26/18 0740    Specimen:  Blood     Magnesium [[id number]] Collected:  08/26/18 0639    Order Status:  Sent Lab Status:  In process Updated:  08/26/18 0740    Specimen:  Blood     Phosphorus [[id number]] Collected:  08/26/18 0639    Order Status:  Sent Lab Status:  In process Updated:  08/26/18 0740    Specimen:  Blood          Medications:  Reconciled Home Medications:      Medication List      START taking these medications    fluticasone-vilanterol 100-25 mcg/dose diskus inhaler  Commonly known as:  BREO  Inhale 1 puff into the lungs once daily. Controller     guaiFENesin 600 mg 12 hr tablet  Commonly known as:  MUCINEX  Take 1 tablet (600 mg total) by mouth 2 (two) times daily as needed (Expectorant).     metFORMIN 500 MG 24 hr  tablet  Commonly known as:  GLUCOPHAGE-XR  Take 1 tablet (500 mg total) by mouth before dinner.        CHANGE how you take these medications    acetaminophen 325 MG tablet  Commonly known as:  TYLENOL  Take 2 tablets (650 mg total) by mouth every 6 (six) hours as needed.  What changed:    · medication strength  · how much to take  · when to take this     albuterol 1.25 mg/3 mL Nebu  Commonly known as:  ACCUNEB  Take 1.25 mg by nebulization every 6 (six) hours as needed. Rescue  What changed:  Another medication with the same name was removed. Continue taking this medication, and follow the directions you see here.     glipiZIDE 5 MG Tr24  Commonly known as:  GLUCOTROL  Take 1 tablet (5 mg total) by mouth daily with breakfast.  What changed:  medication strength     traMADol 50 mg tablet  Commonly known as:  ULTRAM  Take 1 tablet (50 mg total) by mouth every 8 (eight) hours as needed for Pain.  What changed:  when to take this     warfarin 5 MG tablet  Commonly known as:  COUMADIN  Take 1 tablet (5 mg total) by mouth Daily.  What changed:    · medication strength  · how much to take  · how to take this  · when to take this  · additional instructions        CONTINUE taking these medications    amLODIPine 5 MG tablet  Commonly known as:  NORVASC  Take 5 mg by mouth once daily.     atorvastatin 40 MG tablet  Commonly known as:  LIPITOR  Take 40 mg by mouth once daily.     ergocalciferol 50,000 unit Cap  Commonly known as:  ERGOCALCIFEROL  Take 50,000 Units by mouth every 30 days.     levothyroxine 50 MCG tablet  Commonly known as:  SYNTHROID  Take 50 mcg by mouth once daily.     ranitidine 75 MG tablet  Commonly known as:  ZANTAC  Take 75 mg by mouth daily as needed for Heartburn.     senna-docusate 8.6-50 mg 8.6-50 mg per tablet  Commonly known as:  PERICOLACE  Take 2 tablets by mouth every evening.        STOP taking these medications    ALPRAZolam 0.25 MG tablet  Commonly known as:  XANAX     carvedilol 12.5 MG  tablet  Commonly known as:  COREG     citalopram 40 MG tablet  Commonly known as:  CELEXA     escitalopram oxalate 10 MG tablet  Commonly known as:  LEXAPRO     guaifenesin-codeine 100-10 mg/5 ml  mg/5 mL syrup  Commonly known as:  TUSSI-ORGANIDIN NR     losartan 100 MG tablet  Commonly known as:  COZAAR          Indwelling Lines/Drains at time of discharge:   Lines/Drains/Airways          None        Time spent on the discharge of patient: 33 minutes  Patient was seen and examined on the date of discharge and determined to be suitable for discharge.      Jodie Ward, DNP, AG-ACNP, BC  Department of Hospital Medicine  [facility]

## 2018-09-07 NOTE — ASSESSMENT & PLAN NOTE
Lab Results   Component Value Date     CREATININE 1.0 09/04/2018      Sr Cr stable  Cont to monitor with biweekly labs   Avoid nephrotoxins.   Holding Losartan   Renally dose all medications      8/27/18  Creating is stable.  Per MD note, losartan on hold, BP is stable with Norvasc alone.  Continue to follow.     9/4/18  Creatinine is stable.  BP is stable, continue Norvasc as ordered.    09/07/18  Creatinine is stable.  BP is stable, continue Norvasc as ordered.

## 2018-09-07 NOTE — ASSESSMENT & PLAN NOTE
Plan as above.      8/27/18  Plan as above.     9/4/18  Plan as above    09/07/18  See AFib, continue plan of care as noted there.

## 2018-09-07 NOTE — ASSESSMENT & PLAN NOTE
Secondary to trauma  Xray with no fracture or hardware failure.   Cont conservative care with cold compress, leg elevation, anti inflammatory and pain control  PT/OT as tolerated  Consider ortho consult if no improvement for arthrocentesis.      8/27/18  Pain well controlled.  Continue use of cool compress, leg elevation, and Tramadol as ordered.     9/4/18  Pain is well controlled.  Continue leg elevation and tramadol as ordered.    09/07/18  Pain is well controlled.  Continue leg elevation and tramadol as ordered.

## 2018-09-07 NOTE — ASSESSMENT & PLAN NOTE
Hold home oral agents  Low dose correction scale   Cont blood glucose monitoring   ADA diet     8/27/18  Fasting glucose high as well as last a1c.          Lab Results   Component Value Date     HGBA1C 8.5 (H) 08/18/2018            POCT Glucose   Date Value Ref Range Status   09/04/2018 141 (H) 70 - 110 mg/dL Final   09/04/2018 143 (H) 70 - 110 mg/dL Final   09/03/2018 154 (H) 70 - 110 mg/dL Final   09/03/2018 238 (H) 70 - 110 mg/dL Final   09/03/2018 162 (H) 70 - 110 mg/dL Final   09/03/2018 133 (H) 70 - 110 mg/dL Final   09/02/2018 178 (H) 70 - 110 mg/dL Final   09/02/2018 206 (H) 70 - 110 mg/dL Final   09/02/2018 184 (H) 70 - 110 mg/dL Final   09/02/2018 104 70 - 110 mg/dL Final   09/01/2018 219 (H) 70 - 110 mg/dL Final   09/01/2018 111 (H) 70 - 110 mg/dL Final      Currently on Novolog low correction scale.  Patient states having difficulty getting glipizide approved with insurance.  Will restart 2.5 mg of glipizide today and follow.     9/4/18  CBG is stable, continue glipizide and metformin as ordered.    09/07/18  CBG is stable, continue glipizide and metformin as ordered.

## 2018-09-07 NOTE — PROGRESS NOTES
All discharge instructions were given to patient and family. Patient and family stated that they understood all instructions as they were given. Patient left the unit in a wheelchair propelled per one staff member. Zero S/S of or C/O pain or discomfort.

## 2018-09-07 NOTE — PLAN OF CARE
Problem: Occupational Therapy Goal  Goal: Occupational Therapy Goal  Goals to be met by: 14 days     Patient will increase functional independence with ADLs by performing:    Feeding with Modified [place].    Met  UE Dressing with Modified [place].   Met  LE Dressing with Stand-by Assistance. Met  Grooming while standing with Stand-by Assistance with RW to steady.  Met  Toileting from bedside commode with Supervision for hygiene and clothing management and RW to steady.  Met  Bathing from  shower chair/bench with Supervision. Met   Rolling to Bilateral with Modified [place].   Met  Supine to sit with Modified [place].  Met  Stand pivot transfers with Supervision. Met   Upper extremity exercise program x10 reps per set, with supervision. Met  Pt will tolerate up to 10 minutes of functional standing activity with (S) and RW for safety.  Met        Outcome: Outcome(s) achieved Date Met: 09/07/18  Ramos Roca OTR/L      9/7/2018

## 2018-09-07 NOTE — PT/OT/SLP PROGRESS
Physical Therapy  Treatment    Claribel Moran   MRN: [id number]   Admitting Diagnosis: Debility    PT Received On: 09/07/18  Total Time (min): 41       Billable Minutes:  Gait Training 10, Therapeutic Activity 11 and Therapeutic Exercise 20    Treatment Type: Treatment  PT/PTA: PTA     PTA Visit Number: 1       General Precautions: Standard, aspiration, fall  Orthopedic Precautions: N/A   Braces: N/A    Do you have any cultural, spiritual, Latter day conflicts, given your current situation?: no    Subjective:  Communicated with nursing prior to session.  Pt agreed to work with therapy.     Pain/Comfort  Pain Rating 1: 0/10  Pain Rating Post-Intervention 1: 0/10    Objective:  Patient found seated w/c .         Functional Status:  MDS G  Bed Mobility Functional Status: mod(I) - (I)  Transfer Functional Status: S-SBA  Walk in Room Functional Status: S-SBA  Walk in Corridor Functional Status: S-SBA  Locomotion on Unit Functional Status: S-SBA  Locomotion Off Unit Functional Status: S-SBA  Moving from seated to standing position: Not steady, but able to stabilize without staff assistance  Walking (with assistive device if used): Not steady, but able to stabilize without staff assistance  Turning around and facing the opposite direction while walking: Not steady, but able to stabilize without staff assistance  Moving on and off the toilet: Activity did not occur  Surface-to-surface transfer (transfer between bed and chair or wheelchair): Not steady, but able to stabilize without staff assistance          AM-PAC 6 CLICK MOBILITY  Total Score:18    Bed Mobility:  Sit>Supine:Mod I on mat  Supine>Sit: Mod I on mat    Transfers:  Sit<>Stand: to/from w/c, to/from nustep, to/from mat with SBA/close SPV  Stand Pivot Transfer: w/c to nustep with RW and SBA      Gait:  Amb x128 ft with RW and SPV     Advanced Gait:  Stairs: ascend/descend 4 steps with unilateral HR and SBA    Therex:  Seated BLE therex 2x20 reps: AP, LAQ, Hip  Flexion, and GS    Additional Treatment:  -Family training completed on this date. Reviewed/Demonstrated patients current level of functional mobility.  -Seated Stepper x15 min level 4 to improve overall endurance.     Patient left up in chair with call button in reach.    Assessment:  Claribel Moran is a 84 y.o. female with a medical diagnosis of Debility.  Pt tolerated treatment well. Continue with PT POC as indicated.    Rehab identified problem list/impairments: weakness, impaired endurance, impaired self care skills, impaired functional mobilty, gait instability, impaired balance, decreased lower extremity function, decreased safety awareness, decreased ROM, orthopedic precautions    Rehab potential is good.    Activity tolerance: Good    Discharge recommendations: home health PT     Barriers to discharge: None    Equipment recommendations: none     GOALS:   Multidisciplinary Problems     Physical Therapy Goals        Problem: Physical Therapy Goal    Goal Priority Disciplines Outcome Goal Variances Interventions   Physical Therapy Goal     PT, PT/OT Ongoing (interventions implemented as appropriate)     Description:  Goals to be met by: 2 weeks ()     Patient will increase functional independence with mobility by performin. Supine to sit with Stand-by Assistance. Met (2018)  2. Sit to supine with Stand-by Assistance. Met (2018)  3. Sit to stand transfer with contact guard assistance. Met (2018)  4. Bed to chair transfer with Minimal Assistance using Rolling Walker.= met 2018   newGOALS 2018  Bed to chair trf w/ SBA w/ RW met (2018)  5. Gait  x 10 feet with Moderate Assistance using Rolling Walker.= met 2018   NEW GOAL 2018 gait x100 feet w/ RW and SBA. Met 2018  6. Wheelchair propulsion x 200 feet with Stand-by Assistance using bilateral upper extremities. Met (2018)  7. Ascend/Descend 4 inch curb step with Moderate Assistance using Rolling Walker. Met  (8/28/2018)  8. Stand for 3 minutes with Contact Guard Assistance using Rolling Walker while performing a task. Met (9/5/2018)    New Goals:  9. Pt will ambulate without use of an assistive device with stand-by assistance, swing-through gait pattern, for  50 feet, as pt does not usually utilize an AD.  10. Pt will negotiate 4 steps with supervision using unilateral handrail.                          PLAN:    Patient to be seen 5 x/week  to address the above listed problems via gait training, therapeutic activities, therapeutic exercises, neuromuscular re-education, wheelchair management/training  Plan of Care expires: 09/24/18  Plan of Care reviewed with: patient, grandchild(ruddy)    Sada Cardona, PTA  09/07/2018

## 2018-09-07 NOTE — PLAN OF CARE
Problem: Patient Care Overview  Goal: Plan of Care Review  Outcome: Ongoing (interventions implemented as appropriate)   09/06/18 2352   Coping/Psychosocial   Plan Of Care Reviewed With patient       Problem: Fall Risk (Adult)  Goal: Identify Related Risk Factors and Signs and Symptoms  Related risk factors and signs and symptoms are identified upon initiation of Human Response Clinical Practice Guideline (CPG)  Outcome: Ongoing (interventions implemented as appropriate)   09/06/18 6691   Fall Risk   Related Risk Factors (Fall Risk) age-related changes;fear of falling;gait/mobility problems;homeostatic imbalance

## 2018-09-07 NOTE — ASSESSMENT & PLAN NOTE
BP Readings from Last 3 Encounters:   09/04/18 (!) 148/78   08/24/18 132/87   08/16/18 121/67      Well controlled  Cont amlodipine to treat  Home Coreg and Losartan discontinued  Cont to monitor and restart if required  Low Na diet     8/27/18  BP is stable.  Continue Norvasc as ordered.  Coreg and Losartan has been discontinued per MD note.     9/4/18      BP Readings from Last 3 Encounters:   09/04/18 (!) 148/78   08/24/18 132/87   08/16/18 121/67      BP is stable.  Continue Norvasc as ordered.    09/07/18  BP is stable.  Continue Norvasc as ordered.

## 2018-09-07 NOTE — PLAN OF CARE
"09/07/2018  1:18 PM    Patient to discharge home today with assist of family. Patient set up with [facility] per CRISTIAN Acosta.    Patient to follow up with Dr Eduardo on /7/2018 at 1pm. Awaiting discharge summary to fax to Dr Eduardo's office at [phone].     09/07/18 1318   Final Note   Assessment Type Discharge Planning Assessment   Discharge Disposition Home   What phone number can be called within the next 1-3 days to see how you are doing after discharge? [phone]   Hospital Follow Up  Appt(s) scheduled? Yes   Discharge plans and expectations educations in teach back method with documentation complete? Yes     Kimberley Durham RN, [facility]  [id number]    09/07/2018  2:04 PM  Discharge summary completed and faxed.     Your fax has been successfully sent to [phone] at [phone].  ------------------------------------------------------------  From: [id number]  ------------------------------------------------------------  9/7/2018 1:42:09 PM Transmission Record  Sent to [id number] with remote ID ""  Result: (0/339;0/0) Success  Page record: 1 - 16  Elapsed time: 05:16 on channel 20    Kimberley Durham RN, CM Skilled  [id number]        "

## 2018-09-07 NOTE — ASSESSMENT & PLAN NOTE
Cont with PT/OT for gait training and strengthening and restoration of ADL's   Fall precautions   Cont DVT prophylaxis with coumadin.  Anticoagulants   Medication Route Frequency    warfarin (COUMADIN) tablet 5 mg Oral Daily              Lab Results   Component Value Date     INR 2.1 (H) 09/04/2018     INR 2.2 (H) 09/03/2018     INR 2.0 (H) 09/02/2018 8/27/18  Continue therapy to improve functional goals.  Plans to discharge home with family.  Continue coumadin for dvt ppx.     9/4/18  Participating in therapy, no acute issues.  INR is therapeutic.    09/07/18  Participated in therapies. Discharged with HH and continued PT/OT.

## 2018-09-07 NOTE — ASSESSMENT & PLAN NOTE
Pulse Readings from Last 3 Encounters:   09/04/18 84   08/24/18 84   08/16/18 89      BB discontinued due to hypotension  Cont to hold and restart if needed  Cont anticoagulation with coumadin  Stop Lovenox as INR therapeutic.      8/27/18  HR is stable and BP is stable.  B-blocker stopped due to hypotension.  Continue Coumadin, INR target is 2-3.  INR is therapeutic.          Lab Results   Component Value Date     INR 2.1 (H) 09/04/2018     INR 2.2 (H) 09/03/2018     INR 2.0 (H) 09/02/2018 9/4/18  HR and BP is stable.      Pulse Readings from Last 3 Encounters:   09/04/18 84   08/24/18 84   08/16/18 89          BP Readings from Last 3 Encounters:   09/04/18 (!) 148/78   08/24/18 132/87   08/16/18 121/67      Continue coumadin at current dosing and follow up with treating provider after discharge.    09/07/18  BP and HR remain stable, continue plan of care as above.

## 2018-09-07 NOTE — PLAN OF CARE
Problem: Physical Therapy Goal  Goal: Physical Therapy Goal  Goals to be met by: 2 weeks ()     Patient will increase functional independence with mobility by performin. Supine to sit with Stand-by Assistance. Met (2018)  2. Sit to supine with Stand-by Assistance. Met (2018)  3. Sit to stand transfer with contact guard assistance. Met (2018)  4. Bed to chair transfer with Minimal Assistance using Rolling Walker.= met 2018   newGOALS 2018  Bed to chair trf w/ SBA w/ RW met (2018)  5. Gait  x 10 feet with Moderate Assistance using Rolling Walker.= met 2018   NEW GOAL 2018 gait x100 feet w/ RW and SBA. Met 2018  6. Wheelchair propulsion x 200 feet with Stand-by Assistance using bilateral upper extremities. Met (2018)  7. Ascend/Descend 4 inch curb step with Moderate Assistance using Rolling Walker. Met (2018)  8. Stand for 3 minutes with Contact Guard Assistance using Rolling Walker while performing a task. Met (2018)    New Goals:  9. Pt will ambulate without use of an assistive device with stand-by assistance, swing-through gait pattern, for  50 feet, as pt does not usually utilize an AD.  10. Pt will negotiate 4 steps with supervision using unilateral handrail.         Goals remain appropriate. Continue with PT POC as indicated.

## 2018-09-10 ENCOUNTER — PATIENT OUTREACH (OUTPATIENT)
Dept: ADMINISTRATIVE | Facility: CLINIC | Age: 83
End: 2018-09-10

## 2018-10-05 ENCOUNTER — OFFICE VISIT (OUTPATIENT)
Dept: PRIMARY CARE CLINIC | Facility: CLINIC | Age: 83
End: 2018-10-05
Payer: MEDICARE

## 2018-10-05 VITALS
HEIGHT: 60 IN | BODY MASS INDEX: 39.27 KG/M2 | RESPIRATION RATE: 18 BRPM | OXYGEN SATURATION: 96 % | HEART RATE: 93 BPM | WEIGHT: 200 LBS | DIASTOLIC BLOOD PRESSURE: 74 MMHG | SYSTOLIC BLOOD PRESSURE: 127 MMHG

## 2018-10-05 DIAGNOSIS — E78.5 HYPERLIPIDEMIA, UNSPECIFIED HYPERLIPIDEMIA TYPE: ICD-10-CM

## 2018-10-05 DIAGNOSIS — E66.9 OBESITY, UNSPECIFIED CLASSIFICATION, UNSPECIFIED OBESITY TYPE, UNSPECIFIED WHETHER SERIOUS COMORBIDITY PRESENT: ICD-10-CM

## 2018-10-05 DIAGNOSIS — N30.00 ACUTE CYSTITIS WITHOUT HEMATURIA: Primary | ICD-10-CM

## 2018-10-05 DIAGNOSIS — I48.20 CHRONIC ATRIAL FIBRILLATION: Chronic | ICD-10-CM

## 2018-10-05 DIAGNOSIS — I25.10 CORONARY ARTERY DISEASE, ANGINA PRESENCE UNSPECIFIED, UNSPECIFIED VESSEL OR LESION TYPE, UNSPECIFIED WHETHER NATIVE OR TRANSPLANTED HEART: ICD-10-CM

## 2018-10-05 DIAGNOSIS — G25.81 RESTLESS LEGS: ICD-10-CM

## 2018-10-05 DIAGNOSIS — E03.9 ACQUIRED HYPOTHYROIDISM: ICD-10-CM

## 2018-10-05 DIAGNOSIS — I10 ESSENTIAL HYPERTENSION: ICD-10-CM

## 2018-10-05 DIAGNOSIS — G47.33 OBSTRUCTIVE SLEEP APNEA SYNDROME: ICD-10-CM

## 2018-10-05 DIAGNOSIS — E11.9 DIABETES MELLITUS TYPE II, NON INSULIN DEPENDENT: Chronic | ICD-10-CM

## 2018-10-05 DIAGNOSIS — G45.9 TIA (TRANSIENT ISCHEMIC ATTACK): ICD-10-CM

## 2018-10-05 DIAGNOSIS — J40 BRONCHITIS: ICD-10-CM

## 2018-10-05 PROCEDURE — 87086 URINE CULTURE/COLONY COUNT: CPT

## 2018-10-05 PROCEDURE — 99203 OFFICE O/P NEW LOW 30 MIN: CPT | Mod: S$PBB,,, | Performed by: FAMILY MEDICINE

## 2018-10-05 PROCEDURE — 99999 PR PBB SHADOW E&M-EST. PATIENT-LVL III: CPT | Mod: PBBFAC,,, | Performed by: FAMILY MEDICINE

## 2018-10-05 PROCEDURE — 99213 OFFICE O/P EST LOW 20 MIN: CPT | Mod: PBBFAC,PN | Performed by: FAMILY MEDICINE

## 2018-10-05 RX ORDER — LEVOFLOXACIN 500 MG/1
500 TABLET, FILM COATED ORAL DAILY
Qty: 10 TABLET | Refills: 0 | Status: SHIPPED | OUTPATIENT
Start: 2018-10-05 | End: 2018-10-15

## 2018-10-05 RX ORDER — TRAMADOL HYDROCHLORIDE 50 MG/1
50 TABLET ORAL EVERY 6 HOURS PRN
Status: ON HOLD | COMMUNITY
End: 2019-02-04 | Stop reason: HOSPADM

## 2018-10-05 RX ORDER — METFORMIN HYDROCHLORIDE 500 MG/1
500 TABLET, EXTENDED RELEASE ORAL
Qty: 90 TABLET | Refills: 1 | Status: SHIPPED | OUTPATIENT
Start: 2018-10-05 | End: 2019-05-20 | Stop reason: CLARIF

## 2018-10-05 RX ORDER — PHENAZOPYRIDINE HYDROCHLORIDE 200 MG/1
200 TABLET, FILM COATED ORAL 3 TIMES DAILY PRN
Qty: 15 TABLET | Refills: 0 | Status: SHIPPED | OUTPATIENT
Start: 2018-10-05 | End: 2018-10-15

## 2018-10-05 RX ORDER — PROMETHAZINE HYDROCHLORIDE AND CODEINE PHOSPHATE 6.25; 1 MG/5ML; MG/5ML
5 SOLUTION ORAL EVERY 6 HOURS PRN
Qty: 180 ML | Refills: 0 | Status: SHIPPED | OUTPATIENT
Start: 2018-10-05 | End: 2019-02-28

## 2018-10-05 RX ORDER — CARVEDILOL 6.25 MG/1
6.25 TABLET ORAL 2 TIMES DAILY
Refills: 3 | COMMUNITY
Start: 2018-09-17 | End: 2019-05-24 | Stop reason: CLARIF

## 2018-10-05 RX ORDER — ESCITALOPRAM OXALATE 10 MG/1
10 TABLET ORAL DAILY
Refills: 3 | COMMUNITY
Start: 2018-09-17

## 2018-10-05 NOTE — PROGRESS NOTES
Subjective:       Patient ID: Claribel Moran is a 84 y.o. female.    Chief Complaint: Hospital Follow Up and Urinary Tract Infection (fever)    HPI:  84-year-old female in for hospital follow-up--about 1 month ago--secondary to fall---patient fell out of bed--woke up and she hit the floor--has bilateral artificial knees and injured the left knee.  Patient was in the hospital for week and then sent to rehab for 2 weeks.  Came in for possible UTI and sores in the mouth and sore throat.  Temp 101.5° last night--no runny nose, +sore throat, +hacking cough.  No pneumonia asthma tuberculosis.  No smoking        + ??UTI-- no frequency, +urgency at night, no retention, no dysuria.  Daughter states urine looked almost like straight pus.  No blood.  +odor.  History kidney surgery 42 years ago for kidney stones the patient was not able to pass    ROS:  Skin: no psoriasis, eczema, skin cancer  HEENT: No headache, ocular pain, blurred vision, diplopia, epistaxis, hoarseness change in voice, + hypothyroidism  Lung: No pneumonia, asthma, Tb, wheezing, SOB, no smoking +obstructive sleep apnea--has CPAP  Heart: No chest pain, ankle edema, palpitations, MI, adelina murmur+ hypertension,+ hyperlipidemia, +atrial fib +CAD-nose stent +history TIA  Abdomen: No nausea, vomiting, diarrhea, constipation, ulcers, hepatitis, gallbladder disease, melena, hematochezia, hematemesis  :  See history of present illness-told 1 of her kidneys is not functioning properly +CRI stage III  MS: no fractures, O/A, lupus, rheumatoid, gout history restless legs  Neuro: No dizziness, LOC, seizures   +diabetes not checking out of strips, no anemia, + anxiety, + depression   , 3 children 1 passed away, lives with daughter and grandson     Objective:   Physical Exam:  General: Well nourished, well developed, no acute distress +morbid obesity  Skin: No lesions  HEENT: Eyes PERRLA, EOM intact, nose patent, throat 1/4 erythematous ears TMs clear  NECK:  Supple, no bruits, No JVD, no nodes  Lungs: Clear, no rales, rhonchi, wheezing coarse cough  Heart: Regular rate and rhythm, no murmurs, gallops, or rubs  Abdomen: flat, bowel sounds positive, no tenderness, or organomegaly tenderness right flank radiating into the right groin area  MS: Range of motion and muscle strength intact  Neuro: Alert, CN intact, oriented X 3  Extremities: No cyanosis, clubbing, or edema         Assessment:       1. Acute cystitis without hematuria    2. Bronchitis    3. Obstructive sleep apnea syndrome    4. Acquired hypothyroidism    5. Obesity, unspecified classification, unspecified obesity type, unspecified whether serious comorbidity present    6. Diabetes mellitus type II, non insulin dependent    7. Coronary artery disease, angina presence unspecified, unspecified vessel or lesion type, unspecified whether native or transplanted heart    8. Hyperlipidemia, unspecified hyperlipidemia type    9. Essential hypertension    10. Chronic atrial fibrillation    11. Restless legs    12. TIA (transient ischemic attack)        Plan:       Acute cystitis without hematuria    Bronchitis    Obstructive sleep apnea syndrome    Acquired hypothyroidism    Obesity, unspecified classification, unspecified obesity type, unspecified whether serious comorbidity present    Diabetes mellitus type II, non insulin dependent    Coronary artery disease, angina presence unspecified, unspecified vessel or lesion type, unspecified whether native or transplanted heart    Hyperlipidemia, unspecified hyperlipidemia type    Essential hypertension    Chronic atrial fibrillation    Restless legs    TIA (transient ischemic attack)    Other orders  -     metFORMIN (GLUCOPHAGE-XR) 500 MG 24 hr tablet; Take 1 tablet (500 mg total) by mouth before dinner.  Dispense: 90 tablet; Refill: 1      patient with history of possible bad kidney not sure which one, had surgery on kidney in the past due to a kidney stone-urine loaded  with bacteria +blood + WBC--treat with Levaquin 500 q.d. times 10 days to cover URI in UTI peridium 200 t.i.d. for 5 days Phenergan with codeine for cough  CBCs CMP to check white count due to history of temp 101°  Told if fever recurs and patient continues to feel better over the weekend go to the emergency room for possible admit for IV antibiotics  Urine culture and sensitivity midstream clean-catch now due to history of renal problem  Patient may need additional workup in the future for kidneys in for her routine screening

## 2018-10-06 LAB
BACTERIA UR CULT: NORMAL
BACTERIA UR CULT: NORMAL

## 2019-01-30 PROBLEM — S32.000A LUMBAR COMPRESSION FRACTURE, CLOSED, INITIAL ENCOUNTER: Status: ACTIVE | Noted: 2019-01-30

## 2019-02-28 ENCOUNTER — OFFICE VISIT (OUTPATIENT)
Dept: PRIMARY CARE CLINIC | Facility: CLINIC | Age: 84
End: 2019-02-28
Payer: MEDICARE

## 2019-02-28 VITALS
DIASTOLIC BLOOD PRESSURE: 79 MMHG | HEIGHT: 61 IN | WEIGHT: 200 LBS | BODY MASS INDEX: 37.76 KG/M2 | HEART RATE: 61 BPM | SYSTOLIC BLOOD PRESSURE: 127 MMHG | RESPIRATION RATE: 18 BRPM | OXYGEN SATURATION: 97 %

## 2019-02-28 DIAGNOSIS — S32.000A LUMBAR COMPRESSION FRACTURE, CLOSED, INITIAL ENCOUNTER: Primary | ICD-10-CM

## 2019-02-28 DIAGNOSIS — E78.5 HYPERLIPIDEMIA, UNSPECIFIED HYPERLIPIDEMIA TYPE: ICD-10-CM

## 2019-02-28 DIAGNOSIS — I48.20 CHRONIC ATRIAL FIBRILLATION: Chronic | ICD-10-CM

## 2019-02-28 DIAGNOSIS — G47.33 OBSTRUCTIVE SLEEP APNEA SYNDROME: ICD-10-CM

## 2019-02-28 DIAGNOSIS — I10 ESSENTIAL HYPERTENSION: ICD-10-CM

## 2019-02-28 DIAGNOSIS — Z79.01 ANTICOAGULATED ON COUMADIN: Chronic | ICD-10-CM

## 2019-02-28 DIAGNOSIS — E66.9 OBESITY, UNSPECIFIED CLASSIFICATION, UNSPECIFIED OBESITY TYPE, UNSPECIFIED WHETHER SERIOUS COMORBIDITY PRESENT: ICD-10-CM

## 2019-02-28 DIAGNOSIS — J45.20 MILD INTERMITTENT REACTIVE AIRWAY DISEASE WITH WHEEZING WITHOUT COMPLICATION: ICD-10-CM

## 2019-02-28 DIAGNOSIS — G45.9 TIA (TRANSIENT ISCHEMIC ATTACK): ICD-10-CM

## 2019-02-28 DIAGNOSIS — I25.10 CORONARY ARTERY DISEASE, ANGINA PRESENCE UNSPECIFIED, UNSPECIFIED VESSEL OR LESION TYPE, UNSPECIFIED WHETHER NATIVE OR TRANSPLANTED HEART: ICD-10-CM

## 2019-02-28 DIAGNOSIS — E03.9 ACQUIRED HYPOTHYROIDISM: ICD-10-CM

## 2019-02-28 DIAGNOSIS — G25.81 RESTLESS LEGS: ICD-10-CM

## 2019-02-28 PROCEDURE — 99999 PR PBB SHADOW E&M-EST. PATIENT-LVL IV: ICD-10-PCS | Mod: PBBFAC,,, | Performed by: FAMILY MEDICINE

## 2019-02-28 PROCEDURE — 99999 PR PBB SHADOW E&M-EST. PATIENT-LVL IV: CPT | Mod: PBBFAC,,, | Performed by: FAMILY MEDICINE

## 2019-02-28 PROCEDURE — 99214 OFFICE O/P EST MOD 30 MIN: CPT | Mod: S$PBB,,, | Performed by: FAMILY MEDICINE

## 2019-02-28 PROCEDURE — 99214 PR OFFICE/OUTPT VISIT, EST, LEVL IV, 30-39 MIN: ICD-10-PCS | Mod: S$PBB,,, | Performed by: FAMILY MEDICINE

## 2019-02-28 PROCEDURE — 99214 OFFICE O/P EST MOD 30 MIN: CPT | Mod: PBBFAC,PN | Performed by: FAMILY MEDICINE

## 2019-02-28 RX ORDER — HYDROCODONE BITARTRATE AND ACETAMINOPHEN 5; 325 MG/1; MG/1
1 TABLET ORAL EVERY 6 HOURS PRN
Qty: 30 TABLET | Refills: 0 | Status: SHIPPED | OUTPATIENT
Start: 2019-02-28 | End: 2019-04-11 | Stop reason: SDUPTHER

## 2019-02-28 NOTE — PROGRESS NOTES
Subjective:       Patient ID: Claribel Moran is a 84 y.o. female.    Chief Complaint: Hospital Follow Up (fall)    HPI:  84-year-old female -- patient fell--history of a compression fracture of the lumbar vertebrae--patient was in the hospital [facility] for 5 days and subsequently sent to [place] rehab x 2 weeks.  Now has home health--was physical therapy. Therapist doing exercising, walking with walker. Pt fell due losing balance. Has fallen quite a few times. Has hurt knees, hurt hips in fall before this one.     ROS:  Skin: no psoriasis, eczema, skin cancer  HEENT: No headache, ocular pain, blurred vision, diplopia, epistaxis, hoarseness change in voice, + hypothyroidism  Lung: No pneumonia, asthma, Tb, wheezing, SOB, no smoking +obstructive sleep apnea--has CPAP  Heart: No chest pain, ankle edema, palpitations, MI, adelina murmur+ hypertension,+ hyperlipidemia, +atrial fib +CAD-- stent +history TIA  Abdomen: No nausea, vomiting, diarrhea, constipation, ulcers, hepatitis, gallbladder disease, melena, hematochezia, hematemesis  :  -told 1 of her kidneys is not functioning properly +CRI stage III  MS: no  O/A, lupus, rheumatoid, gout history restless legs fx lumbar vertebrae see HPI, fx ribs past left   Neuro: No dizziness, LOC, seizures   +diabetes up and down occas off diet , no anemia, + anxiety, + depression --out of meds   , 3 children 1 passed away, lives with daughter and grandson     Objective:   Physical Exam:  General: Well nourished, well developed, no acute distress +morbid obesity  Skin: No lesions  HEENT: Eyes PERRLA, EOM intact, nose patent, throat 1/4 erythematous ears TMs clear  NECK: Supple, no bruits, No JVD, no nodes  Lungs: Clear, no rales, rhonchi, wheezing coarse cough  Heart: Regular rate and rhythm, no murmurs, gallops, or rubs  Abdomen: flat, bowel sounds positive, no tenderness, or organomegaly tenderness right flank radiating into the right groin area  MS:  Range of motion and muscle strength intact  Neuro: Alert, CN intact, oriented X 3  Extremities: No cyanosis, clubbing, or edema         Assessment:       1. Lumbar compression fracture, closed, initial encounter    2. Obstructive sleep apnea syndrome    3. Obesity, unspecified classification, unspecified obesity type, unspecified whether serious comorbidity present    4. Acquired hypothyroidism    5. Anticoagulated on Coumadin    6. Chronic atrial fibrillation    7. Essential hypertension    8. Hyperlipidemia, unspecified hyperlipidemia type    9. Coronary artery disease, angina presence unspecified, unspecified vessel or lesion type, unspecified whether native or transplanted heart    10. Mild intermittent reactive airway disease with wheezing without complication    11. Restless legs    12. TIA (transient ischemic attack)        Plan:       Lumbar compression fracture, closed, initial encounter    Obstructive sleep apnea syndrome    Obesity, unspecified classification, unspecified obesity type, unspecified whether serious comorbidity present    Acquired hypothyroidism    Anticoagulated on Coumadin    Chronic atrial fibrillation    Essential hypertension    Hyperlipidemia, unspecified hyperlipidemia type    Coronary artery disease, angina presence unspecified, unspecified vessel or lesion type, unspecified whether native or transplanted heart    Mild intermittent reactive airway disease with wheezing without complication    Restless legs    TIA (transient ischemic attack)        OK norco 5 mg 1 po bid prn pain   No NSIAD's due coumadin on it due TIA aqnd atrial fib  Recent fx lumbar vertrebrae   Patient needs routine lab CBCs CMP lipids T4 TSH hemoglobin A1c stool guaiac UA just had chest x-ray and EKG done  Patient needs PT INR monthly while on Coumadin  Family was told continue Lexapro for anxiety and depression they were asking for Xanax told cannot take barbiturates and opioids together   Return 6 weeks for  recheck history of T9 mild compression fracture and history of lumbar compression fracture-recent

## 2019-04-08 NOTE — PLAN OF CARE
Miriam Harrell Patient Age: 82 year old  MESSAGE:   [place] in [facility] requesting Bumetanide 2mg, take two tablets by mouth daily     Next and Last Visit with Provider and Department  Last visit with this provider: 4/7/2019  Last visit with this department: 4/7/2019    Next visit with this provider: Visit date not found  Next visit with this department: Visit date not found    WEIGHT AND HEIGHT:   Wt Readings from Last 1 Encounters:   03/25/19 77.1 kg (170 lb)     Ht Readings from Last 1 Encounters:   11/12/18 5' 4\" (1.626 m)     BMI Readings from Last 1 Encounters:   03/25/19 29.18 kg/m²       ALLERGIES:  Furosemide; Amlodipine; Atorvastatin; Atorvastatin calcium; Demerol; Exenatide; Meperidine; Pravastatin; and Sulfa antibiotics  Current Outpatient Medications   Medication   • carvedilol (COREG) 3.125 MG tablet   • LANTUS SOLOSTAR 100 UNIT/ML pen-injector   • levothyroxine (SYNTHROID, LEVOTHROID) 100 MCG tablet   • dicyclomine (BENTYL) 20 MG tablet   • sucralfate (CARAFATE) 1 GM/10ML suspension   • cephALEXin (KEFLEX) 500 MG capsule   • loratadine (CLARITIN) 10 MG tablet   • B complex-vitamin C-folic acid (NEPHRO-RAJNI) 0.8 MG Tab   • Lactobacillus (FLORANEX) Tab   • acetaminophen (TYLENOL) 500 MG tablet   • B Complex-C-Folic Acid (RIOS CAPS) 1 MG Cap   • gabapentin (NEURONTIN) 100 MG capsule   • hydrOXYzine (ATARAX) 25 MG tablet   • isosorbide mononitrate (IMDUR) 30 MG 24 hr tablet   • MAGNESIUM PO   • methocarbamol (ROBAXIN) 500 MG tablet   • pantoprazole (PROTONIX) 40 MG tablet   • blood glucose test strip   • blood glucose (FREESTYLE LITE) test strip   • Lancets (FREESTYLE) Misc     No current facility-administered medications for this visit.      PHARMACY to use: [facility]          Pharmacy preference(s) on file:   [facility] - [place], [place] - [id number] [facility] [address]  Phone: [phone] Fax: [phone]    [facility] -  Problem: Patient Care Overview  Goal: Plan of Care Review  Outcome: Ongoing (interventions implemented as appropriate)  Ms Moran received 50 mg of Tramadol for c/o lt knee paint rated at 9 on a pain scale of 0-10. Safety measures maintained. Call light is within reach, and personal items are also within reach. Will continue with plan of care.        [place], [place] - [address] [place] [id number]  Phone: [phone] Fax: [phone]      CALL BACK INFO: Ok to leave response (including medical information) on answering machine  ROUTING: Patient's physician/staff        PCP: Mili Angel MD         INS: Payor: MEDICARE / Plan: PARTA AND B / Product Type: MEDICARE   PATIENT ADDRESS:  [address] [place] [address]

## 2019-04-11 ENCOUNTER — OFFICE VISIT (OUTPATIENT)
Dept: PRIMARY CARE CLINIC | Facility: CLINIC | Age: 84
End: 2019-04-11
Payer: MEDICARE

## 2019-04-11 VITALS
BODY MASS INDEX: 36.25 KG/M2 | RESPIRATION RATE: 18 BRPM | OXYGEN SATURATION: 97 % | HEART RATE: 82 BPM | DIASTOLIC BLOOD PRESSURE: 80 MMHG | HEIGHT: 61 IN | SYSTOLIC BLOOD PRESSURE: 164 MMHG | WEIGHT: 192 LBS

## 2019-04-11 DIAGNOSIS — E66.9 OBESITY, UNSPECIFIED CLASSIFICATION, UNSPECIFIED OBESITY TYPE, UNSPECIFIED WHETHER SERIOUS COMORBIDITY PRESENT: ICD-10-CM

## 2019-04-11 DIAGNOSIS — I48.20 CHRONIC ATRIAL FIBRILLATION: Chronic | ICD-10-CM

## 2019-04-11 DIAGNOSIS — I10 ESSENTIAL HYPERTENSION: ICD-10-CM

## 2019-04-11 DIAGNOSIS — S32.000A LUMBAR COMPRESSION FRACTURE, CLOSED, INITIAL ENCOUNTER: Primary | ICD-10-CM

## 2019-04-11 DIAGNOSIS — J45.20 MILD INTERMITTENT REACTIVE AIRWAY DISEASE WITH WHEEZING WITHOUT COMPLICATION: ICD-10-CM

## 2019-04-11 DIAGNOSIS — K44.9 HIATAL HERNIA: ICD-10-CM

## 2019-04-11 DIAGNOSIS — E03.9 ACQUIRED HYPOTHYROIDISM: ICD-10-CM

## 2019-04-11 DIAGNOSIS — K57.90 DIVERTICULOSIS OF INTESTINE WITHOUT BLEEDING, UNSPECIFIED INTESTINAL TRACT LOCATION: ICD-10-CM

## 2019-04-11 DIAGNOSIS — Z96.653 HISTORY OF BILATERAL KNEE REPLACEMENT: ICD-10-CM

## 2019-04-11 DIAGNOSIS — Z87.448 HISTORY OF SIMPLE RENAL CYST: ICD-10-CM

## 2019-04-11 DIAGNOSIS — E11.9 DIABETES MELLITUS TYPE II, NON INSULIN DEPENDENT: Chronic | ICD-10-CM

## 2019-04-11 DIAGNOSIS — Z79.01 ANTICOAGULATED ON COUMADIN: Chronic | ICD-10-CM

## 2019-04-11 DIAGNOSIS — E78.5 HYPERLIPIDEMIA, UNSPECIFIED HYPERLIPIDEMIA TYPE: ICD-10-CM

## 2019-04-11 DIAGNOSIS — Z87.442 HISTORY OF NEPHROLITHIASIS: ICD-10-CM

## 2019-04-11 PROCEDURE — 99999 PR PBB SHADOW E&M-EST. PATIENT-LVL IV: ICD-10-PCS | Mod: PBBFAC,,, | Performed by: FAMILY MEDICINE

## 2019-04-11 PROCEDURE — 99999 PR PBB SHADOW E&M-EST. PATIENT-LVL IV: CPT | Mod: PBBFAC,,, | Performed by: FAMILY MEDICINE

## 2019-04-11 PROCEDURE — 99214 PR OFFICE/OUTPT VISIT, EST, LEVL IV, 30-39 MIN: ICD-10-PCS | Mod: S$PBB,,, | Performed by: FAMILY MEDICINE

## 2019-04-11 PROCEDURE — 99214 OFFICE O/P EST MOD 30 MIN: CPT | Mod: PBBFAC,PN | Performed by: FAMILY MEDICINE

## 2019-04-11 PROCEDURE — 99214 OFFICE O/P EST MOD 30 MIN: CPT | Mod: S$PBB,,, | Performed by: FAMILY MEDICINE

## 2019-04-11 RX ORDER — HYDROCODONE BITARTRATE AND ACETAMINOPHEN 5; 325 MG/1; MG/1
1 TABLET ORAL EVERY 6 HOURS PRN
Qty: 30 TABLET | Refills: 0 | Status: ON HOLD | OUTPATIENT
Start: 2019-04-11 | End: 2019-05-29 | Stop reason: HOSPADM

## 2019-04-11 NOTE — PROGRESS NOTES
Subjective:       Patient ID: Claribel Moran is a 84 y.o. female.    Chief Complaint: Follow-up (6 weeks )    HPI:  84-year-old female in for hospital --patient fell out of bed January 30, 2019--has bilateral knee replacement--fell in injured her back.  Patient uses a wheelchair to travel long distances--is up with a walker uses it as much as possible to try and maintain her mobility.  Patient states had a fracture 1 of the vertebra in her back.-any emergency room patient had CT scan of the abdomen and pelvis showing a compression fracture of L1 with a wedge shaped deformity, diverticulosis, bilateral renal calculi, and a large hiatal hernia       Patient still with difficulty with her back patient was on hydrocodone for back has not taken in last couple of days.  Patient is on Coumadin so cannot take NSAIDs    ROS:  Skin: no psoriasis, eczema, skin cancer  HEENT: No headache, ocular pain, blurred vision, diplopia, epistaxis, hoarseness change in voice, + hypothyroidism  Lung: No pneumonia, asthma, Tb, wheezing, SOB, no smoking +obstructive sleep apnea--has CPAP   Heart: No chest pain, ankle edema, palpitations, MI, adelina murmur+ hypertension /80 but patient did not take her medication today was fasting because needed blood test done by Dr. Eduardo ,+ hyperlipidemia, +atrial fib +CAD-nose stent +history TIA  Abdomen: No nausea, vomiting, diarrhea, constipation, ulcers, hepatitis, gallbladder disease, melena, hematochezia, hematemesis history diverticulosis and large hiatal hernia  :  See history of present illness-told 1 of her kidneys is not functioning properly +CRI stage III history of bilateral nephrolithiasis and bilateral renal cyst  MS: no fractures, O/A, lupus, rheumatoid, gout history restless legs history compression fracture L1  Neuro: No dizziness, LOC, seizures   +diabetes not checking out of strips, no anemia, + anxiety, + depression   , 3 children 1 passed away, lives with daughter  and grandson     Objective:   Physical Exam:  General: Well nourished, well developed, no acute distress +morbid obesity  Skin: No lesions  HEENT: Eyes PERRLA, EOM intact, nose patent, throat nonerythematous ears TMs clear  NECK: Supple, no bruits, No JVD, no nodes  Lungs: Clear, no rales, rhonchi, wheezing coarse cough  Heart: Regular rate and rhythm, no murmurs, gallops, or rubs  Abdomen: flat, bowel sounds positive, no tenderness, or organomegaly tenderness right flank radiating into the right groin area  MS: Range of motion and muscle strength intact  Neuro: Alert, CN intact, oriented X 3  Extremities: No cyanosis, clubbing, or edema         Assessment:       1. Lumbar compression fracture, closed, initial encounter    2. History of bilateral knee replacement    3. Essential hypertension    4. Hyperlipidemia, unspecified hyperlipidemia type    5. Chronic atrial fibrillation    6. Mild intermittent reactive airway disease with wheezing without complication    7. Anticoagulated on Coumadin    8. Diabetes mellitus type II, non insulin dependent    9. Obesity, unspecified classification, unspecified obesity type, unspecified whether serious comorbidity present    10. Acquired hypothyroidism    11. Diverticulosis of intestine without bleeding, unspecified intestinal tract location    12. Hiatal hernia    13. History of nephrolithiasis    14. History of simple renal cyst        Plan:       Lumbar compression fracture, closed, initial encounter  -     X-Ray Lumbar Spine Complete 5 View; Future; Expected date: 04/11/2019  -     MRI Lumbar Spine Without Contrast; Future; Expected date: 04/11/2019    History of bilateral knee replacement    Essential hypertension    Hyperlipidemia, unspecified hyperlipidemia type    Chronic atrial fibrillation    Mild intermittent reactive airway disease with wheezing without complication    Anticoagulated on Coumadin    Diabetes mellitus type II, non insulin dependent    Obesity,  unspecified classification, unspecified obesity type, unspecified whether serious comorbidity present    Acquired hypothyroidism    Diverticulosis of intestine without bleeding, unspecified intestinal tract location    Hiatal hernia    History of nephrolithiasis    History of simple renal cyst    Other orders  -     HYDROcodone-acetaminophen (NORCO) 5-325 mg per tablet; Take 1 tablet by mouth every 6 (six) hours as needed for Pain.  Dispense: 30 tablet; Refill: 0        Patient with chronic back pain--states pain radiates from the back around to the lower quadrants bilaterally or groin areas--CT scan showed a wedge compression fracture of L1  Needs x-ray lumbar spine and MRI lumbar spine as long as no metal in her body --if indicated patient may need to see neurosurgeon--[facility] for possible epidural steroid injections if pain persists  Patient with history of renal problems--has had renal surgery in the past--CT scan shows multiple nephrolithiasis and renal cyst history of chronic renal insufficiency -- needs see urologist   Sees Dr. Eduardo--had lab it is office today needs to get a copy for our file  Patient with blood pressure 164/80 but did not take her blood pressure medicines today should come back in 2 weeks get blood pressure recheck  CT scan abdomen also showed large hiatal hernia and diverticulosis  Norco 5 mg half p.o. b.i.d. patient unable take NSAIDs due to Coumadin

## 2019-04-17 ENCOUNTER — TELEPHONE (OUTPATIENT)
Dept: PRIMARY CARE CLINIC | Facility: CLINIC | Age: 84
End: 2019-04-17

## 2019-04-17 ENCOUNTER — CLINICAL SUPPORT (OUTPATIENT)
Dept: PRIMARY CARE CLINIC | Facility: CLINIC | Age: 84
End: 2019-04-17
Payer: MEDICARE

## 2019-04-17 VITALS — DIASTOLIC BLOOD PRESSURE: 72 MMHG | SYSTOLIC BLOOD PRESSURE: 118 MMHG | OXYGEN SATURATION: 96 % | HEART RATE: 59 BPM

## 2019-04-17 PROCEDURE — 99999 PR PBB SHADOW E&M-EST. PATIENT-LVL II: ICD-10-PCS | Mod: PBBFAC,,,

## 2019-04-17 PROCEDURE — 99999 PR PBB SHADOW E&M-EST. PATIENT-LVL II: CPT | Mod: PBBFAC,,,

## 2019-04-17 PROCEDURE — 99212 OFFICE O/P EST SF 10 MIN: CPT | Mod: PBBFAC,PN

## 2019-04-17 NOTE — PROGRESS NOTES
Verified pt ID using name and . Obtained bp, p, and sp02. Notified physician of results. Instructed pt to continue current medications and follow up as needed. Pt verbalized understanding

## 2019-04-17 NOTE — TELEPHONE ENCOUNTER
----- Message from Wayne Singh MD sent at 4/14/2019 12:42 AM CDT -----  Call tell Xray LS spine Generalized osteopenia , significant compression L1 vertebral body increased compression since last test . Slight compression L4 superior endplate Needs MRI LS spine and if has not had one recently needs bone scan

## 2019-04-18 ENCOUNTER — TELEPHONE (OUTPATIENT)
Dept: PRIMARY CARE CLINIC | Facility: CLINIC | Age: 84
End: 2019-04-18

## 2019-04-18 DIAGNOSIS — S32.000A LUMBAR COMPRESSION FRACTURE, CLOSED, INITIAL ENCOUNTER: Primary | ICD-10-CM

## 2019-04-18 NOTE — TELEPHONE ENCOUNTER
PENDED MRI. PLEASE ADVISE         ----- Message from Wayne Singh MD sent at 4/14/2019 12:42 AM CDT -----  Call tell Xray LS spine Generalized osteopenia , significant compression L1 vertebral body increased compression since last test . Slight compression L4 superior endplate Needs MRI LS spine and if has not had one recently needs bone scan

## 2019-04-18 NOTE — TELEPHONE ENCOUNTER
PENDED DXA. PLEASE ADVISE    ----- Message from Wayne Singh MD sent at 4/14/2019 12:42 AM CDT -----  Call tell Xray LS spine Generalized osteopenia , significant compression L1 vertebral body increased compression since last test . Slight compression L4 superior endplate Needs MRI LS spine and if has not had one recently needs bone scan

## 2019-04-26 ENCOUNTER — TELEPHONE (OUTPATIENT)
Dept: PRIMARY CARE CLINIC | Facility: CLINIC | Age: 84
End: 2019-04-26

## 2019-04-26 NOTE — TELEPHONE ENCOUNTER
PENDED MRI PLEASE ADVISE    ----- Message from Wayne Singh MD sent at 4/14/2019 12:42 AM CDT -----  Call tell Xray LS spine Generalized osteopenia , significant compression L1 vertebral body increased compression since last test . Slight compression L4 superior endplate Needs MRI LS spine and if has not had one recently needs bone scan

## 2019-04-30 ENCOUNTER — TELEPHONE (OUTPATIENT)
Dept: PRIMARY CARE CLINIC | Facility: CLINIC | Age: 84
End: 2019-04-30

## 2019-04-30 DIAGNOSIS — G45.9 TIA (TRANSIENT ISCHEMIC ATTACK): Primary | ICD-10-CM

## 2019-04-30 DIAGNOSIS — G25.81 RESTLESS LEG: ICD-10-CM

## 2019-04-30 NOTE — TELEPHONE ENCOUNTER
seee lab results     ----- Message from Frank Valdes sent at 4/30/2019  2:58 PM CDT -----  Contact: self   Patient want to speak with a nurse regarding test results please call back at [phone] (home)

## 2019-05-28 PROBLEM — I49.5 SICK SINUS SYNDROME: Status: ACTIVE | Noted: 2019-05-28

## 2019-05-29 PROBLEM — Z95.0 STATUS POST PLACEMENT OF OTHER CARDIAC PACEMAKER: Status: ACTIVE | Noted: 2019-05-29

## 2019-11-19 ENCOUNTER — LAB VISIT (OUTPATIENT)
Dept: LAB | Facility: HOSPITAL | Age: 84
End: 2019-11-19
Attending: INTERNAL MEDICINE
Payer: MEDICARE

## 2019-11-19 DIAGNOSIS — N18.6 TYPE 2 DIABETES MELLITUS WITH END-STAGE RENAL DISEASE: Primary | ICD-10-CM

## 2019-11-19 DIAGNOSIS — E11.22 TYPE 2 DIABETES MELLITUS WITH END-STAGE RENAL DISEASE: Primary | ICD-10-CM

## 2019-11-19 PROCEDURE — 36415 COLL VENOUS BLD VENIPUNCTURE: CPT

## 2019-11-19 PROCEDURE — 83036 HEMOGLOBIN GLYCOSYLATED A1C: CPT

## 2019-11-20 LAB
ESTIMATED AVG GLUCOSE: 143 MG/DL (ref 68–131)
HBA1C MFR BLD HPLC: 6.6 % (ref 4.5–6.2)

## 2019-12-19 ENCOUNTER — OFFICE VISIT (OUTPATIENT)
Dept: PRIMARY CARE CLINIC | Facility: CLINIC | Age: 84
End: 2019-12-19
Payer: MEDICARE

## 2019-12-19 VITALS
TEMPERATURE: 98 F | BODY MASS INDEX: 31.99 KG/M2 | HEART RATE: 60 BPM | WEIGHT: 192 LBS | DIASTOLIC BLOOD PRESSURE: 68 MMHG | RESPIRATION RATE: 20 BRPM | HEIGHT: 65 IN | OXYGEN SATURATION: 99 % | SYSTOLIC BLOOD PRESSURE: 155 MMHG

## 2019-12-19 DIAGNOSIS — R68.89 FLU-LIKE SYMPTOMS: ICD-10-CM

## 2019-12-19 DIAGNOSIS — J10.1 INFLUENZA B: Primary | ICD-10-CM

## 2019-12-19 LAB
CTP QC/QA: YES
POC MOLECULAR INFLUENZA A AGN: NEGATIVE
POC MOLECULAR INFLUENZA B AGN: POSITIVE

## 2019-12-19 PROCEDURE — 1159F PR MEDICATION LIST DOCUMENTED IN MEDICAL RECORD: ICD-10-PCS | Mod: ,,, | Performed by: FAMILY MEDICINE

## 2019-12-19 PROCEDURE — 99214 PR OFFICE/OUTPT VISIT, EST, LEVL IV, 30-39 MIN: ICD-10-PCS | Mod: S$PBB,,, | Performed by: FAMILY MEDICINE

## 2019-12-19 PROCEDURE — 99999 PR PBB SHADOW E&M-EST. PATIENT-LVL IV: CPT | Mod: PBBFAC,,, | Performed by: FAMILY MEDICINE

## 2019-12-19 PROCEDURE — 1126F AMNT PAIN NOTED NONE PRSNT: CPT | Mod: ,,, | Performed by: FAMILY MEDICINE

## 2019-12-19 PROCEDURE — 87502 INFLUENZA DNA AMP PROBE: CPT | Mod: PBBFAC,PN | Performed by: FAMILY MEDICINE

## 2019-12-19 PROCEDURE — 99999 PR PBB SHADOW E&M-EST. PATIENT-LVL IV: ICD-10-PCS | Mod: PBBFAC,,, | Performed by: FAMILY MEDICINE

## 2019-12-19 PROCEDURE — 99214 OFFICE O/P EST MOD 30 MIN: CPT | Mod: PBBFAC,PN | Performed by: FAMILY MEDICINE

## 2019-12-19 PROCEDURE — 1126F PR PAIN SEVERITY QUANTIFIED, NO PAIN PRESENT: ICD-10-PCS | Mod: ,,, | Performed by: FAMILY MEDICINE

## 2019-12-19 PROCEDURE — 99214 OFFICE O/P EST MOD 30 MIN: CPT | Mod: S$PBB,,, | Performed by: FAMILY MEDICINE

## 2019-12-19 PROCEDURE — 1159F MED LIST DOCD IN RCRD: CPT | Mod: ,,, | Performed by: FAMILY MEDICINE

## 2019-12-19 NOTE — PROGRESS NOTES
"Subjective:       Patient ID: Claribel Moran is a 85 y.o. female.    Chief Complaint: Cough (for past week has had a cough that makes her very short of breath)    85 yr old with hx of CHF, Afib, DM type 2 here for urgent visit patient of Dr. Singh seen for first time by provider today.  Complains of bothersome cough non productive for the past week. Denies any fevers or chills. Some congestion. No sore throat. Hard to catch her breath after coughing fits otherwise just feels bad. No CP, dizziness. For the past 24 hours she has had no appetite. Denies N/V or diarrhea.     Complains of right sided back.flank pain but states this is chronic.     Review of Systems   Constitutional: Negative for activity change, chills and fever.   HENT: Positive for congestion.    Respiratory: Negative for cough, chest tightness, shortness of breath and wheezing.    Cardiovascular: Negative for chest pain, palpitations and leg swelling.   Gastrointestinal: Negative for abdominal distention, abdominal pain, constipation, diarrhea, nausea and vomiting.   Genitourinary: Positive for flank pain. Negative for difficulty urinating and dysuria.   Skin: Negative for rash.   Neurological: Negative for weakness.   Hematological: Does not bruise/bleed easily.   Psychiatric/Behavioral: Negative for agitation. The patient is not nervous/anxious.        Objective:      Vitals:    12/19/19 1337   BP: 98/62   BP Location: Left arm   Patient Position: Sitting   BP Method: Large (Manual)   Pulse: 60   Resp: 20   Temp: 98 °F (36.7 °C)   TempSrc: Oral   SpO2: 99%   Weight: 87.1 kg (192 lb)   Height: 5' 5" (1.651 m)     Physical Exam   Constitutional: She appears well-developed and well-nourished.   HENT:   Head: Normocephalic and atraumatic.   Nose: Nose normal.   Mouth/Throat: Oropharynx is clear and moist.   Eyes: Conjunctivae and EOM are normal.   Cardiovascular: Normal rate and normal heart sounds. An irregularly irregular rhythm present. "   Pulmonary/Chest: Effort normal and breath sounds normal. No accessory muscle usage. Tachypnea noted. No respiratory distress. She has no wheezes. She has no rales.   Mild expiratory wheezes    Abdominal: Soft. She exhibits no distension. There is no tenderness.   Musculoskeletal:   No costovertebral angle tenderness   Lymphadenopathy:     She has no cervical adenopathy.   Neurological: She is alert. No cranial nerve deficit.   Psychiatric: She has a normal mood and affect.   Nursing note and vitals reviewed.          Lab Results   Component Value Date     10/23/2019    K 3.9 10/23/2019     10/23/2019    CO2 26 10/23/2019    BUN 20 10/23/2019    CREATININE 1.0 10/23/2019    ANIONGAP 11 10/23/2019     Lab Results   Component Value Date    HGBA1C 6.6 (H) 11/19/2019     Lab Results   Component Value Date     (H) 10/23/2019     (H) 06/26/2016     (H) 01/02/2014       Lab Results   Component Value Date    WBC 9.70 10/23/2019    HGB 13.2 10/23/2019    HCT 41.4 10/23/2019     10/23/2019    GRAN 7.1 10/23/2019    GRAN 73.0 10/23/2019     Lab Results   Component Value Date    CHOL 156 12/06/2017    HDL 46 12/06/2017    LDLCALC 82 12/06/2017    TRIG 138 12/06/2017          Current Outpatient Medications:     acetaminophen (TYLENOL) 325 MG tablet, Take 2 tablets (650 mg total) by mouth every 6 (six) hours as needed., Disp: , Rfl: 0    amlodipine (NORVASC) 5 MG tablet, Take 5 mg by mouth once daily., Disp: , Rfl:     atorvastatin (LIPITOR) 40 MG tablet, Take 40 mg by mouth once daily., Disp: , Rfl:     ergocalciferol (VITAMIN D2) 50,000 unit Cap, Take 50,000 Units by mouth every 30 days., Disp: , Rfl:     escitalopram oxalate (LEXAPRO) 10 MG tablet, Take 10 mg by mouth once daily., Disp: , Rfl: 3    fluticasone-vilanterol (BREO) 100-25 mcg/dose diskus inhaler, Inhale 1 puff into the lungs once daily. Controller, Disp: 30 each, Rfl: 2    glipiZIDE (GLUCOTROL) 2.5 MG TR24, Take 2.5  mg by mouth daily with breakfast., Disp: , Rfl:     levothyroxine (SYNTHROID) 50 MCG tablet, Take 50 mcg by mouth once daily., Disp: , Rfl:     metFORMIN (GLUCOPHAGE) 500 MG tablet, Take 500 mg by mouth 2 (two) times daily with meals., Disp: , Rfl:     traMADol (ULTRAM) 50 mg tablet, Take 50 mg by mouth every 8 (eight) hours as needed for Pain., Disp: , Rfl:     warfarin (COUMADIN) 4 MG tablet, Take 4 mg by mouth. 4mg Sunday, Monday, Wednesday, Friday, Saturday. 2mg on Tuesday and Thursday, Disp: , Rfl:         Assessment:       1. Influenza B    2. Flu-like symptoms           Plan:       Influenza B  Mildly tachypneic, 85 yr old with influenza, afib, and low bp which resolved. Worsening dyspnea and cough through the week. Discussed with ER for further eval. And workup. Influenza type B possitive.    Flu-like symptoms  -     POCT Influenza A/B Molecular

## 2020-02-04 ENCOUNTER — TELEPHONE (OUTPATIENT)
Dept: PRIMARY CARE CLINIC | Facility: CLINIC | Age: 85
End: 2020-02-04

## 2020-02-04 NOTE — TELEPHONE ENCOUNTER
----- Message from Donita France sent at 2/4/2020  2:00 PM CST -----  Contact: Lizz with [facility] [facility] phone [phone]  Lizz with [employer] phone [phone], Patient is complaining of muscle peering and sore throat. She advise patient to go to Urgent Care.

## 2020-02-04 NOTE — TELEPHONE ENCOUNTER
"Per crystal pt has bilateral crackles, sore throat, fluid in ear, and low-grade fever. Per Crystal patient was advised to go to urgent care. Patient stated "I will try to go tomorrow because she doesn't have transportation today"    Spoke to patient. Pt stated she will go to urgent care to be seen tomorrow. Instructed patient and stressed the importance to either come here to be seen or go to urgent care to be seen either today or tomorrow. Stated understanding. Call was ended with no further issues addressed.  "

## 2020-06-09 ENCOUNTER — LAB VISIT (OUTPATIENT)
Dept: LAB | Facility: HOSPITAL | Age: 85
End: 2020-06-09
Attending: INTERNAL MEDICINE
Payer: MEDICARE

## 2020-06-09 DIAGNOSIS — I11.9 MALIGNANT HYPERTENSIVE HEART DISEASE WITHOUT HEART FAILURE: ICD-10-CM

## 2020-06-09 DIAGNOSIS — E11.8 DIABETIC COMPLICATION: Primary | ICD-10-CM

## 2020-06-09 DIAGNOSIS — D64.9 ANEMIA, UNSPECIFIED: ICD-10-CM

## 2020-06-09 LAB
ALBUMIN SERPL BCP-MCNC: 3.4 G/DL (ref 3.5–5.2)
ALP SERPL-CCNC: 70 U/L (ref 55–135)
ALT SERPL W/O P-5'-P-CCNC: 12 U/L (ref 10–44)
ANION GAP SERPL CALC-SCNC: 13 MMOL/L (ref 8–16)
AST SERPL-CCNC: 19 U/L (ref 10–40)
BASOPHILS # BLD AUTO: 0.06 K/UL (ref 0–0.2)
BASOPHILS NFR BLD: 0.6 % (ref 0–1.9)
BILIRUB SERPL-MCNC: 0.8 MG/DL (ref 0.1–1)
BUN SERPL-MCNC: 21 MG/DL (ref 8–23)
CALCIUM SERPL-MCNC: 9.1 MG/DL (ref 8.7–10.5)
CHLORIDE SERPL-SCNC: 102 MMOL/L (ref 95–110)
CHOLEST SERPL-MCNC: 147 MG/DL (ref 120–199)
CHOLEST/HDLC SERPL: 2.9 {RATIO} (ref 2–5)
CO2 SERPL-SCNC: 23 MMOL/L (ref 23–29)
CREAT SERPL-MCNC: 0.9 MG/DL (ref 0.5–1.4)
DIFFERENTIAL METHOD: ABNORMAL
EOSINOPHIL # BLD AUTO: 0.4 K/UL (ref 0–0.5)
EOSINOPHIL NFR BLD: 4.3 % (ref 0–8)
ERYTHROCYTE [DISTWIDTH] IN BLOOD BY AUTOMATED COUNT: 15.9 % (ref 11.5–14.5)
EST. GFR  (AFRICAN AMERICAN): >60 ML/MIN/1.73 M^2
EST. GFR  (NON AFRICAN AMERICAN): 58.5 ML/MIN/1.73 M^2
GLUCOSE SERPL-MCNC: 118 MG/DL (ref 70–110)
HCT VFR BLD AUTO: 41.5 % (ref 37–48.5)
HDLC SERPL-MCNC: 50 MG/DL (ref 40–75)
HDLC SERPL: 34 % (ref 20–50)
HGB BLD-MCNC: 12.8 G/DL (ref 12–16)
IMM GRANULOCYTES # BLD AUTO: 0.05 K/UL (ref 0–0.04)
IMM GRANULOCYTES NFR BLD AUTO: 0.5 % (ref 0–0.5)
LDLC SERPL CALC-MCNC: 62.8 MG/DL (ref 63–159)
LYMPHOCYTES # BLD AUTO: 2.2 K/UL (ref 1–4.8)
LYMPHOCYTES NFR BLD: 23.7 % (ref 18–48)
MCH RBC QN AUTO: 27.2 PG (ref 27–31)
MCHC RBC AUTO-ENTMCNC: 30.8 G/DL (ref 32–36)
MCV RBC AUTO: 88 FL (ref 82–98)
MONOCYTES # BLD AUTO: 0.6 K/UL (ref 0.3–1)
MONOCYTES NFR BLD: 6.5 % (ref 4–15)
NEUTROPHILS # BLD AUTO: 6 K/UL (ref 1.8–7.7)
NEUTROPHILS NFR BLD: 64.4 % (ref 38–73)
NONHDLC SERPL-MCNC: 97 MG/DL
NRBC BLD-RTO: 0 /100 WBC
PLATELET # BLD AUTO: 235 K/UL (ref 150–350)
PMV BLD AUTO: 11.5 FL (ref 9.2–12.9)
POTASSIUM SERPL-SCNC: 4.5 MMOL/L (ref 3.5–5.1)
PROT SERPL-MCNC: 7.1 G/DL (ref 6–8.4)
RBC # BLD AUTO: 4.71 M/UL (ref 4–5.4)
SODIUM SERPL-SCNC: 138 MMOL/L (ref 136–145)
TRIGL SERPL-MCNC: 171 MG/DL (ref 30–150)
TSH SERPL DL<=0.005 MIU/L-ACNC: 2.14 UIU/ML (ref 0.34–5.6)
WBC # BLD AUTO: 9.26 K/UL (ref 3.9–12.7)

## 2020-06-09 PROCEDURE — 85025 COMPLETE CBC W/AUTO DIFF WBC: CPT

## 2020-06-09 PROCEDURE — 80053 COMPREHEN METABOLIC PANEL: CPT

## 2020-06-09 PROCEDURE — 84443 ASSAY THYROID STIM HORMONE: CPT

## 2020-06-09 PROCEDURE — 83036 HEMOGLOBIN GLYCOSYLATED A1C: CPT

## 2020-06-09 PROCEDURE — 36415 COLL VENOUS BLD VENIPUNCTURE: CPT

## 2020-06-09 PROCEDURE — 80061 LIPID PANEL: CPT

## 2020-06-10 LAB
ESTIMATED AVG GLUCOSE: 160 MG/DL (ref 68–131)
HBA1C MFR BLD HPLC: 7.2 % (ref 4.5–6.2)

## 2020-12-26 ENCOUNTER — HOSPITAL ENCOUNTER (EMERGENCY)
Facility: HOSPITAL | Age: 85
Discharge: HOME | End: 2020-12-26
Attending: EMERGENCY MEDICINE
Payer: MEDICARE

## 2020-12-26 VITALS
HEART RATE: 66 BPM | TEMPERATURE: 99 F | RESPIRATION RATE: 27 BRPM | HEIGHT: 63 IN | DIASTOLIC BLOOD PRESSURE: 70 MMHG | OXYGEN SATURATION: 95 % | WEIGHT: 200 LBS | SYSTOLIC BLOOD PRESSURE: 151 MMHG | BODY MASS INDEX: 35.44 KG/M2

## 2020-12-26 DIAGNOSIS — F41.9 ANXIETY: ICD-10-CM

## 2020-12-26 DIAGNOSIS — R53.1 WEAK: ICD-10-CM

## 2020-12-26 DIAGNOSIS — G25.81 RESTLESS LEGS SYNDROME: Primary | ICD-10-CM

## 2020-12-26 LAB
ALBUMIN SERPL BCP-MCNC: 3.9 G/DL (ref 3.5–5.2)
ALP SERPL-CCNC: 103 U/L (ref 55–135)
ALT SERPL W/O P-5'-P-CCNC: 11 U/L (ref 10–44)
ANION GAP SERPL CALC-SCNC: 11 MMOL/L (ref 8–16)
APTT PPP: 53.7 SEC (ref 23.6–33.3)
AST SERPL-CCNC: 20 U/L (ref 10–40)
BACTERIA #/AREA URNS HPF: NEGATIVE /HPF
BASOPHILS # BLD AUTO: 0.02 K/UL (ref 0–0.2)
BASOPHILS NFR BLD: 0.2 % (ref 0–1.9)
BILIRUB SERPL-MCNC: 0.7 MG/DL (ref 0.1–1)
BILIRUB UR QL STRIP: NEGATIVE
BNP SERPL-MCNC: 175 PG/ML (ref 0–99)
BUN SERPL-MCNC: 20 MG/DL (ref 8–23)
CALCIUM SERPL-MCNC: 9.1 MG/DL (ref 8.7–10.5)
CHLORIDE SERPL-SCNC: 100 MMOL/L (ref 95–110)
CLARITY UR: CLEAR
CO2 SERPL-SCNC: 27 MMOL/L (ref 23–29)
COLOR UR: YELLOW
CREAT SERPL-MCNC: 1.2 MG/DL (ref 0.5–1.4)
DIFFERENTIAL METHOD: ABNORMAL
EOSINOPHIL # BLD AUTO: 0.2 K/UL (ref 0–0.5)
EOSINOPHIL NFR BLD: 1.7 % (ref 0–8)
ERYTHROCYTE [DISTWIDTH] IN BLOOD BY AUTOMATED COUNT: 15.6 % (ref 11.5–14.5)
EST. GFR  (AFRICAN AMERICAN): 47.3 ML/MIN/1.73 M^2
EST. GFR  (NON AFRICAN AMERICAN): 41 ML/MIN/1.73 M^2
GLUCOSE SERPL-MCNC: 142 MG/DL (ref 70–110)
GLUCOSE UR QL STRIP: NEGATIVE
HCT VFR BLD AUTO: 35.4 % (ref 37–48.5)
HGB BLD-MCNC: 10.5 G/DL (ref 12–16)
HGB UR QL STRIP: NEGATIVE
HYALINE CASTS #/AREA URNS LPF: 5 /LPF
IMM GRANULOCYTES # BLD AUTO: 0.04 K/UL (ref 0–0.04)
IMM GRANULOCYTES NFR BLD AUTO: 0.4 % (ref 0–0.5)
INR PPP: 2
KETONES UR QL STRIP: NEGATIVE
LEUKOCYTE ESTERASE UR QL STRIP: NEGATIVE
LYMPHOCYTES # BLD AUTO: 2 K/UL (ref 1–4.8)
LYMPHOCYTES NFR BLD: 21.1 % (ref 18–48)
MCH RBC QN AUTO: 25 PG (ref 27–31)
MCHC RBC AUTO-ENTMCNC: 29.7 G/DL (ref 32–36)
MCV RBC AUTO: 84 FL (ref 82–98)
MICROSCOPIC COMMENT: ABNORMAL
MONOCYTES # BLD AUTO: 0.7 K/UL (ref 0.3–1)
MONOCYTES NFR BLD: 6.9 % (ref 4–15)
NEUTROPHILS # BLD AUTO: 6.6 K/UL (ref 1.8–7.7)
NEUTROPHILS NFR BLD: 69.7 % (ref 38–73)
NITRITE UR QL STRIP: NEGATIVE
NRBC BLD-RTO: 0 /100 WBC
PH UR STRIP: 6 [PH] (ref 5–8)
PLATELET # BLD AUTO: 259 K/UL (ref 150–350)
PMV BLD AUTO: 10.2 FL (ref 9.2–12.9)
POTASSIUM SERPL-SCNC: 3.4 MMOL/L (ref 3.5–5.1)
PROT SERPL-MCNC: 7.8 G/DL (ref 6–8.4)
PROT UR QL STRIP: ABNORMAL
PROTHROMBIN TIME: 21.7 SEC (ref 10.6–14.8)
RBC # BLD AUTO: 4.2 M/UL (ref 4–5.4)
RBC #/AREA URNS HPF: 0 /HPF (ref 0–4)
SODIUM SERPL-SCNC: 138 MMOL/L (ref 136–145)
SP GR UR STRIP: 1.01 (ref 1–1.03)
SQUAMOUS #/AREA URNS HPF: 6 /HPF
TROPONIN I SERPL DL<=0.01 NG/ML-MCNC: <0.03 NG/ML
TSH SERPL DL<=0.005 MIU/L-ACNC: 1.55 UIU/ML (ref 0.34–5.6)
URN SPEC COLLECT METH UR: ABNORMAL
UROBILINOGEN UR STRIP-ACNC: NEGATIVE EU/DL
WBC # BLD AUTO: 9.53 K/UL (ref 3.9–12.7)
WBC #/AREA URNS HPF: 4 /HPF (ref 0–5)

## 2020-12-26 PROCEDURE — 83880 ASSAY OF NATRIURETIC PEPTIDE: CPT

## 2020-12-26 PROCEDURE — 96376 TX/PRO/DX INJ SAME DRUG ADON: CPT

## 2020-12-26 PROCEDURE — 93010 EKG 12-LEAD: ICD-10-PCS | Mod: ,,, | Performed by: INTERNAL MEDICINE

## 2020-12-26 PROCEDURE — 36415 COLL VENOUS BLD VENIPUNCTURE: CPT

## 2020-12-26 PROCEDURE — 99285 EMERGENCY DEPT VISIT HI MDM: CPT | Mod: 25

## 2020-12-26 PROCEDURE — 85025 COMPLETE CBC W/AUTO DIFF WBC: CPT

## 2020-12-26 PROCEDURE — 84443 ASSAY THYROID STIM HORMONE: CPT

## 2020-12-26 PROCEDURE — 93010 ELECTROCARDIOGRAM REPORT: CPT | Mod: ,,, | Performed by: INTERNAL MEDICINE

## 2020-12-26 PROCEDURE — 85730 THROMBOPLASTIN TIME PARTIAL: CPT

## 2020-12-26 PROCEDURE — 84484 ASSAY OF TROPONIN QUANT: CPT

## 2020-12-26 PROCEDURE — 81001 URINALYSIS AUTO W/SCOPE: CPT

## 2020-12-26 PROCEDURE — 80053 COMPREHEN METABOLIC PANEL: CPT

## 2020-12-26 PROCEDURE — 96374 THER/PROPH/DIAG INJ IV PUSH: CPT

## 2020-12-26 PROCEDURE — 63600175 PHARM REV CODE 636 W HCPCS: Performed by: EMERGENCY MEDICINE

## 2020-12-26 PROCEDURE — 85610 PROTHROMBIN TIME: CPT

## 2020-12-26 PROCEDURE — 93005 ELECTROCARDIOGRAM TRACING: CPT | Performed by: INTERNAL MEDICINE

## 2020-12-26 RX ORDER — LORAZEPAM 2 MG/ML
1 INJECTION INTRAMUSCULAR
Status: COMPLETED | OUTPATIENT
Start: 2020-12-26 | End: 2020-12-26

## 2020-12-26 RX ORDER — LORAZEPAM 2 MG/ML
0.5 INJECTION INTRAMUSCULAR
Status: COMPLETED | OUTPATIENT
Start: 2020-12-26 | End: 2020-12-26

## 2020-12-26 RX ORDER — GABAPENTIN 100 MG/1
100 CAPSULE ORAL 3 TIMES DAILY
Qty: 90 CAPSULE | Refills: 11 | Status: SHIPPED | OUTPATIENT
Start: 2020-12-26 | End: 2022-02-23

## 2020-12-26 RX ADMIN — LORAZEPAM 1 MG: 2 INJECTION INTRAMUSCULAR; INTRAVENOUS at 03:12

## 2020-12-26 RX ADMIN — LORAZEPAM 0.5 MG: 2 INJECTION INTRAMUSCULAR; INTRAVENOUS at 02:12

## 2020-12-26 NOTE — ED PROVIDER NOTES
Encounter Date: 2020       History     Chief Complaint   Patient presents with    ANORMAL LABS     INR?    FLUTTERING     86-year-old female presented emergency department with complaints of anxiety and feeling of fluttering all over her body like feeling shaky.  Patient states she has restless legs and her legs keep moving.  Patient denies any pain.  Denies any chest pain or shortness of breath or abdominal pain or weakness or numbness.  Patient said this has been going on for about 3 weeks and had 2 ER visits in the past for same thing and her INR was high in the previous visit and wanted it checked.  Patient denies any other complaints at this time.  Patient states she is feeling anxious.        Review of patient's allergies indicates:  No Known Allergies  Past Medical History:   Diagnosis Date    Acid reflux 2013    Anticoagulant long-term use     Anxiety 2012    Arthritis     Atrial fibrillation     Blood transfusion     Bradycardia     CAD (coronary artery disease) 2013    [facility]  RCA 50% occlusion    CHF (congestive heart failure)     CKD (chronic kidney disease) stage 3, GFR 30-59 ml/min 2013    Dry mouth     Encounter for blood transfusion     Hepatitis B 2013    Hiatal hernia 2019    Hyperlipidemia 2012    Hypertension 2012    Kidney stones     Reactive airway disease with wheezing 2013    Sleep apnea     Thyroid disease     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    Type II diabetes mellitus 2012    Vitamin D deficiency disease 2012     Past Surgical History:   Procedure Laterality Date    ABDOMINAL SURGERY      ANGIOGRAM, CORONARY, WITH LEFT HEART CATHETERIZATION      RCA 50% occlusion    ANGIOGRAM, CORONARY, WITH LEFT HEART CATHETERIZATION  2017    CATARACT EXTRACTION Right      SECTION, CLASSIC  1957, 1960    x 2    FRACTURE SURGERY      ankles bilateral, broken ribs    HYSTERECTOMY       INSERTION OF PACEMAKER Left 2019    Procedure: INSERTION, CARDIAC PACEMAKER single lead [employer];  Surgeon: Moises Eduardo MD;  Location: [facility] CATH LAB;  Service: Cardiology;  Laterality: Left;    KIDNEY STONE SURGERY      TOTAL KNEE ARTHROPLASTY Right     Right knee    TOTAL KNEE ARTHROPLASTY Left 02/15/2013     Family History   Problem Relation Age of Onset    Stroke Mother     Arthritis Mother     Cancer Mother         foot    Heart disease Mother     Hypertension Mother     Heart disease Father     Hypertension Brother     Drug abuse Daughter     Hypertension Daughter     Depression Son     Diabetes Son     Hypertension Son      Social History     Tobacco Use    Smoking status: Former Smoker     Types: Cigarettes     Quit date: 1954     Years since quittin.0    Smokeless tobacco: Never Used    Tobacco comment: 1 pack for month only for a few years, but second hand smoke from  for decades.    Substance Use Topics    Alcohol use: No    Drug use: No     Review of Systems   Constitutional: Negative.    HENT: Negative.    Eyes: Negative.    Respiratory: Negative.    Cardiovascular: Negative.  Negative for chest pain.   Gastrointestinal: Negative.    Endocrine: Negative.    Genitourinary: Negative.    Musculoskeletal: Negative.    Skin: Negative.    Allergic/Immunologic: Negative.    Neurological: Negative.    Hematological: Negative.    Psychiatric/Behavioral: The patient is nervous/anxious.    All other systems reviewed and are negative.      Physical Exam     Initial Vitals [20 1239]   BP Pulse Resp Temp SpO2   (!) 174/86 62 18 98.8 °F (37.1 °C) 98 %      MAP       --         Physical Exam    Nursing note and vitals reviewed.  Constitutional: She appears well-developed and well-nourished.   HENT:   Head: Normocephalic and atraumatic.   Nose: Nose normal.   Mouth/Throat: Oropharynx is clear and moist.   Eyes: Conjunctivae and EOM are normal. Pupils are equal,  round, and reactive to light.   Neck: Normal range of motion. Neck supple. No thyromegaly present. No tracheal deviation present. No JVD present.   Cardiovascular: Normal rate, regular rhythm, normal heart sounds and intact distal pulses.   No murmur heard.  Pulmonary/Chest: Breath sounds normal. No stridor. No respiratory distress. She has no wheezes. She has no rales.   Abdominal: Soft. Bowel sounds are normal.   Musculoskeletal: Normal range of motion. No edema.   Neurological: She is alert and oriented to person, place, and time. She has normal strength. No cranial nerve deficit or sensory deficit. GCS score is 15. GCS eye subscore is 4. GCS verbal subscore is 5. GCS motor subscore is 6.   Skin: Skin is warm. Capillary refill takes less than 2 seconds.   Psychiatric: She has a normal mood and affect. Thought content normal.   Anxious         ED Course   Procedures  Labs Reviewed   CBC W/ AUTO DIFFERENTIAL - Abnormal; Notable for the following components:       Result Value    Hemoglobin 10.5 (*)     Hematocrit 35.4 (*)     MCH 25.0 (*)     MCHC 29.7 (*)     RDW 15.6 (*)     All other components within normal limits   COMPREHENSIVE METABOLIC PANEL - Abnormal; Notable for the following components:    Potassium 3.4 (*)     Glucose 142 (*)     eGFR if  47.3 (*)     eGFR if non  41.0 (*)     All other components within normal limits   B-TYPE NATRIURETIC PEPTIDE - Abnormal; Notable for the following components:     (*)     All other components within normal limits   PROTIME-INR - Abnormal; Notable for the following components:    PT 21.7 (*)     All other components within normal limits   APTT - Abnormal; Notable for the following components:    aPTT 53.7 (*)     All other components within normal limits   URINALYSIS - Abnormal; Notable for the following components:    Protein, UA 1+ (*)     All other components within normal limits   URINALYSIS MICROSCOPIC - Abnormal; Notable  for the following components:    Hyaline Casts, UA 5 (*)     All other components within normal limits   TROPONIN I   APTT   PROTIME-INR   TSH   TSH     EKG Readings: (Independently Interpreted)   Initial Reading: No STEMI. Rhythm: Atrial Fibrillation. Ectopy: No Ectopy. Conduction: Normal.   Atrial fibrillation with slow ventricular response     ECG Results          EKG 12-lead (Final result)  Result time 12/26/20 14:03:48    Final result by Interface, Lab In [facility] (12/26/20 14:03:48)                 Narrative:    Test Reason : R07.9,    Vent. Rate : 061 BPM     Atrial Rate : 234 BPM     P-R Int : 000 ms          QRS Dur : 086 ms      QT Int : 434 ms       P-R-T Axes : 000 -24 216 degrees     QTc Int : 436 ms    Afib with slow ventricular rate  Ventricular pacemaker with appropriate pacing    Anteroseptal infarct ,age undetermined  ST and T wave abnormality, consider lateral ischemia  Abnormal ECG  When compared with ECG of 23-DEC-2020 12:43,  Current undetermined rhythm precludes rhythm comparison, needs review  Confirmed by Destiny Gomez MD ([id number]) on 12/26/2020 2:03:35 PM    Referred By: [email or web address]   SELF           Confirmed By:Destiny Gomez MD                            Imaging Results          X-Ray Chest PA And Lateral (Final result)  Result time 12/26/20 13:11:00    Final result by Mirza Kramer MD (12/26/20 13:11:00)                 Narrative:    XR CHEST PA AND LATERAL    CLINICAL HISTORY:  86 years Female Chest Pain    COMPARISON: December 23, 2020    FINDINGS: Cardiac silhouette size is enlarged. Atherosclerotic  calcification of the aorta. Pacing lead is in stable position. No  confluent airspace disease. No pleural effusion. No pneumothorax.  Compression fracture involving L1 vertebral body noted on the lateral  view.    IMPRESSION:    No acute pulmonary process.    Atherosclerosis and cardiomegaly.    L1 compression fracture.    Electronically Signed by Mirza RUBIN on  12/26/2020 1:19 PM                               Medical Decision Making:   Differential Diagnosis:   86-year-old female with the anxiety and restless legs.  Patient said her legs keep shaking.  Patient said she could not stop them from shaking even when she is sleeping.  Patient requesting medication for that.  Patient started on gabapentin.  Patient's symptoms improved with treatment with Ativan in the emergency department and anxiety improved.  Screening labs reviewed and are unremarkable.  INR improved to 2.0.  Patient noted to be hypokalemic and potassium supplements given.  Discharged with instructions and follow-up.  Patient advised to eat bananas at times to help with hypokalemia.  Patient to follow-up with primary care provider.  Return precautions given.  Clinical Tests:   Lab Tests: Reviewed  Radiological Study: Reviewed  Medical Tests: Reviewed                             Clinical Impression:       ICD-10-CM ICD-9-CM   1. Restless legs syndrome  G25.81 333.94   2. Weak  R53.1 780.79   3. Anxiety  F41.9 300.00                          ED Disposition Condition    Discharge Stable        ED Prescriptions     Medication Sig Dispense Start Date End Date Auth. Provider    gabapentin (NEURONTIN) 100 MG capsule Take 1 capsule (100 mg total) by mouth 3 (three) times daily. 90 capsule 12/26/2020 12/26/2021 Le Gatica MD        Follow-up Information     Follow up With Specialties Details Why Contact Info    Moises Eduardo MD Cardiology, INTERVENTIONAL CARDIOLOGY In 2 days  [address]  [address]  [place] [address]  [phone]                                         Le Gatica MD  12/26/20 [id number]

## 2020-12-26 NOTE — FIRST PROVIDER EVALUATION
"Medical screening exam completed.  I have conducted a focused provider triage encounter, findings are as follows:    Brief history of present illness:  Fluttering in chest     Vitals:    12/26/20 1239   BP: (!) 174/86   BP Location: Left arm   Patient Position: Sitting   Pulse: 62   Resp: 18   Temp: 98.8 °F (37.1 °C)   TempSrc: Oral   SpO2: 98%   Weight: 90.7 kg (200 lb)   Height: 5' 3" (1.6 m)       Pertinent physical exam:  86-year-old female presents emergency department with her daughter patient has multiple medical history she was seen at [facility] on 12 9 secondary to a fall she returned to the same ER on 12/23 for bilateral leg swelling, and continual complains of her fall she her visit was reviewed she had ultrasounds performed of her bilateral lower extremities that were normal negative for DVT INR was elevated at 3.3 patient has no new complaints today    Brief workup plan:  Labs cxr    Preliminary workup initiated; this workup will be continued and followed by the physician or advanced practice provider that is assigned to the patient when roomed.  "

## 2020-12-26 NOTE — ED NOTES
"First contact with patient. She is here today because she had abnormal lab values from blood work done 12/23/20. She was called today and told to come to the emergency room. It was reported that her INR was elevated. She has had her blood work drawn here and her PT/INR is elevated. At this time RN is awaiting further orders. She has been medicated per MD order for ativan. She says she "can't keep my legs still." She has been connected to the bedside cardiac monitor and we will continue to monitor her here in the ED. Pt denies pain at this time.  "

## 2021-01-19 ENCOUNTER — IMMUNIZATION (OUTPATIENT)
Dept: PRIMARY CARE CLINIC | Facility: CLINIC | Age: 86
End: 2021-01-19
Payer: MEDICARE

## 2021-01-19 DIAGNOSIS — Z23 NEED FOR VACCINATION: Primary | ICD-10-CM

## 2021-01-19 PROCEDURE — 91300 COVID-19, MRNA, LNP-S, PF, 30 MCG/0.3 ML DOSE VACCINE: CPT | Mod: PBBFAC | Performed by: EMERGENCY MEDICINE

## 2021-01-29 ENCOUNTER — OFFICE VISIT (OUTPATIENT)
Dept: PRIMARY CARE CLINIC | Facility: CLINIC | Age: 86
End: 2021-01-29
Payer: MEDICARE

## 2021-01-29 VITALS
RESPIRATION RATE: 19 BRPM | OXYGEN SATURATION: 93 % | WEIGHT: 202 LBS | DIASTOLIC BLOOD PRESSURE: 86 MMHG | HEIGHT: 63 IN | BODY MASS INDEX: 35.79 KG/M2 | SYSTOLIC BLOOD PRESSURE: 148 MMHG | HEART RATE: 61 BPM

## 2021-01-29 DIAGNOSIS — E87.6 HYPOKALEMIA: ICD-10-CM

## 2021-01-29 DIAGNOSIS — I25.10 CORONARY ARTERY DISEASE, ANGINA PRESENCE UNSPECIFIED, UNSPECIFIED VESSEL OR LESION TYPE, UNSPECIFIED WHETHER NATIVE OR TRANSPLANTED HEART: ICD-10-CM

## 2021-01-29 DIAGNOSIS — E03.9 ACQUIRED HYPOTHYROIDISM: ICD-10-CM

## 2021-01-29 DIAGNOSIS — R06.01 ORTHOPNEA: Primary | ICD-10-CM

## 2021-01-29 DIAGNOSIS — J45.20 MILD INTERMITTENT REACTIVE AIRWAY DISEASE WITH WHEEZING WITHOUT COMPLICATION: ICD-10-CM

## 2021-01-29 DIAGNOSIS — Z79.01 ANTICOAGULATED ON COUMADIN: ICD-10-CM

## 2021-01-29 DIAGNOSIS — I48.20 CHRONIC ATRIAL FIBRILLATION: ICD-10-CM

## 2021-01-29 DIAGNOSIS — R06.02 SHORTNESS OF BREATH: ICD-10-CM

## 2021-01-29 DIAGNOSIS — E66.9 OBESITY, UNSPECIFIED CLASSIFICATION, UNSPECIFIED OBESITY TYPE, UNSPECIFIED WHETHER SERIOUS COMORBIDITY PRESENT: ICD-10-CM

## 2021-01-29 DIAGNOSIS — R60.0 BILATERAL LOWER EXTREMITY EDEMA: ICD-10-CM

## 2021-01-29 DIAGNOSIS — E11.9 DIABETES MELLITUS TYPE II, NON INSULIN DEPENDENT: ICD-10-CM

## 2021-01-29 PROCEDURE — 99214 PR OFFICE/OUTPT VISIT, EST, LEVL IV, 30-39 MIN: ICD-10-PCS | Mod: S$PBB,,, | Performed by: STUDENT IN AN ORGANIZED HEALTH CARE EDUCATION/TRAINING PROGRAM

## 2021-01-29 PROCEDURE — 99999 PR PBB SHADOW E&M-EST. PATIENT-LVL IV: CPT | Mod: PBBFAC,,, | Performed by: STUDENT IN AN ORGANIZED HEALTH CARE EDUCATION/TRAINING PROGRAM

## 2021-01-29 PROCEDURE — 99214 OFFICE O/P EST MOD 30 MIN: CPT | Mod: PBBFAC,PN | Performed by: STUDENT IN AN ORGANIZED HEALTH CARE EDUCATION/TRAINING PROGRAM

## 2021-01-29 PROCEDURE — 99214 OFFICE O/P EST MOD 30 MIN: CPT | Mod: S$PBB,,, | Performed by: STUDENT IN AN ORGANIZED HEALTH CARE EDUCATION/TRAINING PROGRAM

## 2021-01-29 PROCEDURE — 99999 PR PBB SHADOW E&M-EST. PATIENT-LVL IV: ICD-10-PCS | Mod: PBBFAC,,, | Performed by: STUDENT IN AN ORGANIZED HEALTH CARE EDUCATION/TRAINING PROGRAM

## 2021-01-29 RX ORDER — METOPROLOL SUCCINATE 25 MG/1
25 TABLET, EXTENDED RELEASE ORAL 2 TIMES DAILY
COMMUNITY
Start: 2020-12-21

## 2021-01-29 RX ORDER — ALBUTEROL SULFATE 0.83 MG/ML
2.5 SOLUTION RESPIRATORY (INHALATION) EVERY 6 HOURS PRN
COMMUNITY
End: 2021-05-25 | Stop reason: SDUPTHER

## 2021-01-29 RX ORDER — UMECLIDINIUM BROMIDE AND VILANTEROL TRIFENATATE 62.5; 25 UG/1; UG/1
1 POWDER RESPIRATORY (INHALATION) DAILY
COMMUNITY
End: 2021-05-25

## 2021-01-29 RX ORDER — LORAZEPAM 0.5 MG/1
0.5 TABLET ORAL DAILY PRN
COMMUNITY
Start: 2021-01-13 | End: 2021-05-25

## 2021-01-29 RX ORDER — GLIPIZIDE 5 MG/1
5 TABLET, FILM COATED, EXTENDED RELEASE ORAL
COMMUNITY
Start: 2020-12-21 | End: 2021-07-15

## 2021-01-30 ENCOUNTER — HOSPITAL ENCOUNTER (EMERGENCY)
Facility: HOSPITAL | Age: 86
Discharge: HOME | End: 2021-01-30
Attending: EMERGENCY MEDICINE
Payer: MEDICARE

## 2021-01-30 VITALS
TEMPERATURE: 98 F | BODY MASS INDEX: 35.44 KG/M2 | RESPIRATION RATE: 18 BRPM | DIASTOLIC BLOOD PRESSURE: 74 MMHG | WEIGHT: 200 LBS | HEIGHT: 63 IN | HEART RATE: 73 BPM | SYSTOLIC BLOOD PRESSURE: 161 MMHG | OXYGEN SATURATION: 95 %

## 2021-01-30 DIAGNOSIS — R06.02 SOB (SHORTNESS OF BREATH): ICD-10-CM

## 2021-01-30 DIAGNOSIS — R06.02 SHORTNESS OF BREATH: ICD-10-CM

## 2021-01-30 DIAGNOSIS — I50.9 CHRONIC CONGESTIVE HEART FAILURE, UNSPECIFIED HEART FAILURE TYPE: Primary | ICD-10-CM

## 2021-01-30 LAB
ALBUMIN SERPL BCP-MCNC: 3.7 G/DL (ref 3.5–5.2)
ALP SERPL-CCNC: 106 U/L (ref 55–135)
ALT SERPL W/O P-5'-P-CCNC: 14 U/L (ref 10–44)
ANION GAP SERPL CALC-SCNC: 13 MMOL/L (ref 8–16)
AST SERPL-CCNC: 20 U/L (ref 10–40)
BASOPHILS # BLD AUTO: 0.04 K/UL (ref 0–0.2)
BASOPHILS NFR BLD: 0.4 % (ref 0–1.9)
BILIRUB SERPL-MCNC: 0.5 MG/DL (ref 0.1–1)
BNP SERPL-MCNC: 256 PG/ML (ref 0–99)
BUN SERPL-MCNC: 14 MG/DL (ref 8–23)
CALCIUM SERPL-MCNC: 9.2 MG/DL (ref 8.7–10.5)
CHLORIDE SERPL-SCNC: 105 MMOL/L (ref 95–110)
CO2 SERPL-SCNC: 24 MMOL/L (ref 23–29)
CREAT SERPL-MCNC: 1 MG/DL (ref 0.5–1.4)
DIFFERENTIAL METHOD: ABNORMAL
EOSINOPHIL # BLD AUTO: 0.2 K/UL (ref 0–0.5)
EOSINOPHIL NFR BLD: 1.8 % (ref 0–8)
ERYTHROCYTE [DISTWIDTH] IN BLOOD BY AUTOMATED COUNT: 16.7 % (ref 11.5–14.5)
EST. GFR  (AFRICAN AMERICAN): 59 ML/MIN/1.73 M^2
EST. GFR  (NON AFRICAN AMERICAN): 51 ML/MIN/1.73 M^2
GLUCOSE SERPL-MCNC: 126 MG/DL (ref 70–110)
HCT VFR BLD AUTO: 35.8 % (ref 37–48.5)
HGB BLD-MCNC: 10.5 G/DL (ref 12–16)
IMM GRANULOCYTES # BLD AUTO: 0.05 K/UL (ref 0–0.04)
IMM GRANULOCYTES NFR BLD AUTO: 0.6 % (ref 0–0.5)
INR PPP: 2.5 (ref 0.8–1.2)
LYMPHOCYTES # BLD AUTO: 1.9 K/UL (ref 1–4.8)
LYMPHOCYTES NFR BLD: 21.3 % (ref 18–48)
MCH RBC QN AUTO: 24.3 PG (ref 27–31)
MCHC RBC AUTO-ENTMCNC: 29.3 G/DL (ref 32–36)
MCV RBC AUTO: 83 FL (ref 82–98)
MONOCYTES # BLD AUTO: 0.7 K/UL (ref 0.3–1)
MONOCYTES NFR BLD: 8.1 % (ref 4–15)
NEUTROPHILS # BLD AUTO: 6.1 K/UL (ref 1.8–7.7)
NEUTROPHILS NFR BLD: 67.8 % (ref 38–73)
NRBC BLD-RTO: 0 /100 WBC
PLATELET # BLD AUTO: 308 K/UL (ref 150–350)
PMV BLD AUTO: 10.3 FL (ref 9.2–12.9)
POTASSIUM SERPL-SCNC: 3.2 MMOL/L (ref 3.5–5.1)
PROT SERPL-MCNC: 7.8 G/DL (ref 6–8.4)
PROTHROMBIN TIME: 24.5 SEC (ref 9–12.5)
RBC # BLD AUTO: 4.32 M/UL (ref 4–5.4)
SARS-COV-2 RDRP RESP QL NAA+PROBE: NEGATIVE
SODIUM SERPL-SCNC: 142 MMOL/L (ref 136–145)
TROPONIN I SERPL DL<=0.01 NG/ML-MCNC: 0.01 NG/ML (ref 0–0.03)
WBC # BLD AUTO: 8.98 K/UL (ref 3.9–12.7)

## 2021-01-30 PROCEDURE — 85025 COMPLETE CBC W/AUTO DIFF WBC: CPT

## 2021-01-30 PROCEDURE — 84484 ASSAY OF TROPONIN QUANT: CPT

## 2021-01-30 PROCEDURE — 93010 ELECTROCARDIOGRAM REPORT: CPT | Mod: ,,, | Performed by: INTERNAL MEDICINE

## 2021-01-30 PROCEDURE — 80053 COMPREHEN METABOLIC PANEL: CPT

## 2021-01-30 PROCEDURE — U0002 COVID-19 LAB TEST NON-CDC: HCPCS

## 2021-01-30 PROCEDURE — 99285 EMERGENCY DEPT VISIT HI MDM: CPT | Mod: 25

## 2021-01-30 PROCEDURE — 93005 ELECTROCARDIOGRAM TRACING: CPT

## 2021-01-30 PROCEDURE — 93010 EKG 12-LEAD: ICD-10-PCS | Mod: ,,, | Performed by: INTERNAL MEDICINE

## 2021-01-30 PROCEDURE — 85610 PROTHROMBIN TIME: CPT

## 2021-01-30 PROCEDURE — 83880 ASSAY OF NATRIURETIC PEPTIDE: CPT

## 2021-01-30 PROCEDURE — 36415 COLL VENOUS BLD VENIPUNCTURE: CPT

## 2021-01-30 RX ORDER — FUROSEMIDE 40 MG/1
40 TABLET ORAL
Status: DISCONTINUED | OUTPATIENT
Start: 2021-01-30 | End: 2021-01-30 | Stop reason: HOSPADM

## 2021-01-30 RX ORDER — FUROSEMIDE 10 MG/ML
20 INJECTION INTRAMUSCULAR; INTRAVENOUS
Status: DISCONTINUED | OUTPATIENT
Start: 2021-01-30 | End: 2021-01-30

## 2021-01-30 RX ORDER — POTASSIUM CHLORIDE 20 MEQ/1
20 TABLET, EXTENDED RELEASE ORAL
Status: DISCONTINUED | OUTPATIENT
Start: 2021-01-30 | End: 2021-01-30 | Stop reason: HOSPADM

## 2021-02-06 ENCOUNTER — NURSE TRIAGE (OUTPATIENT)
Dept: ADMINISTRATIVE | Facility: CLINIC | Age: 86
End: 2021-02-06

## 2021-02-06 ENCOUNTER — HOSPITAL ENCOUNTER (EMERGENCY)
Facility: HOSPITAL | Age: 86
Discharge: HOME | End: 2021-02-06
Attending: EMERGENCY MEDICINE
Payer: MEDICARE

## 2021-02-06 VITALS
HEART RATE: 60 BPM | DIASTOLIC BLOOD PRESSURE: 74 MMHG | RESPIRATION RATE: 20 BRPM | OXYGEN SATURATION: 96 % | TEMPERATURE: 97 F | SYSTOLIC BLOOD PRESSURE: 169 MMHG

## 2021-02-06 DIAGNOSIS — G47.00 INSOMNIA, UNSPECIFIED TYPE: Primary | ICD-10-CM

## 2021-02-06 PROCEDURE — 93005 ELECTROCARDIOGRAM TRACING: CPT

## 2021-02-06 PROCEDURE — 99283 EMERGENCY DEPT VISIT LOW MDM: CPT | Mod: ,,, | Performed by: EMERGENCY MEDICINE

## 2021-02-06 PROCEDURE — 99283 PR EMERGENCY DEPT VISIT,LEVEL III: ICD-10-PCS | Mod: ,,, | Performed by: EMERGENCY MEDICINE

## 2021-02-06 PROCEDURE — 99283 EMERGENCY DEPT VISIT LOW MDM: CPT | Mod: 25

## 2021-02-06 PROCEDURE — 93010 EKG 12-LEAD: ICD-10-PCS | Mod: ,,, | Performed by: INTERNAL MEDICINE

## 2021-02-06 PROCEDURE — 93010 ELECTROCARDIOGRAM REPORT: CPT | Mod: ,,, | Performed by: INTERNAL MEDICINE

## 2021-02-08 ENCOUNTER — OFFICE VISIT (OUTPATIENT)
Dept: PRIMARY CARE CLINIC | Facility: CLINIC | Age: 86
End: 2021-02-08
Payer: MEDICARE

## 2021-02-08 DIAGNOSIS — G47.00 INSOMNIA, UNSPECIFIED TYPE: ICD-10-CM

## 2021-02-08 DIAGNOSIS — F41.9 ANXIETY: Primary | ICD-10-CM

## 2021-02-08 PROCEDURE — 99999 PR PBB SHADOW E&M-EST. PATIENT-LVL I: CPT | Mod: PBBFAC,,, | Performed by: STUDENT IN AN ORGANIZED HEALTH CARE EDUCATION/TRAINING PROGRAM

## 2021-02-08 PROCEDURE — 99999 PR PBB SHADOW E&M-EST. PATIENT-LVL I: ICD-10-PCS | Mod: PBBFAC,,, | Performed by: STUDENT IN AN ORGANIZED HEALTH CARE EDUCATION/TRAINING PROGRAM

## 2021-02-08 PROCEDURE — 99211 OFF/OP EST MAY X REQ PHY/QHP: CPT | Mod: PBBFAC,PN | Performed by: STUDENT IN AN ORGANIZED HEALTH CARE EDUCATION/TRAINING PROGRAM

## 2021-02-08 PROCEDURE — 99213 OFFICE O/P EST LOW 20 MIN: CPT | Mod: S$PBB,,, | Performed by: STUDENT IN AN ORGANIZED HEALTH CARE EDUCATION/TRAINING PROGRAM

## 2021-02-08 PROCEDURE — 99213 PR OFFICE/OUTPT VISIT, EST, LEVL III, 20-29 MIN: ICD-10-PCS | Mod: S$PBB,,, | Performed by: STUDENT IN AN ORGANIZED HEALTH CARE EDUCATION/TRAINING PROGRAM

## 2021-02-10 ENCOUNTER — IMMUNIZATION (OUTPATIENT)
Dept: PRIMARY CARE CLINIC | Facility: CLINIC | Age: 86
End: 2021-02-10
Payer: MEDICARE

## 2021-02-10 DIAGNOSIS — Z23 NEED FOR VACCINATION: Primary | ICD-10-CM

## 2021-02-10 PROCEDURE — 0002A COVID-19, MRNA, LNP-S, PF, 30 MCG/0.3 ML DOSE VACCINE: CPT | Mod: PBBFAC | Performed by: EMERGENCY MEDICINE

## 2021-02-10 PROCEDURE — 91300 COVID-19, MRNA, LNP-S, PF, 30 MCG/0.3 ML DOSE VACCINE: CPT | Mod: PBBFAC | Performed by: EMERGENCY MEDICINE

## 2021-02-15 ENCOUNTER — OUTPATIENT CASE MANAGEMENT (OUTPATIENT)
Dept: ADMINISTRATIVE | Facility: OTHER | Age: 86
End: 2021-02-15

## 2021-05-13 ENCOUNTER — PES CALL (OUTPATIENT)
Dept: ADMINISTRATIVE | Facility: CLINIC | Age: 86
End: 2021-05-13

## 2021-05-18 LAB
A1C: 7
CHOL/HDLC RATIO: 2.4
CHOLESTEROL, TOTAL: 106
HDLC SERPL-MCNC: 44 MG/DL
LDLC SERPL CALC-MCNC: 42 MG/DL
NON HDL CHOL (CALC): 62
TRIGL SERPL-MCNC: 116 MG/DL

## 2021-05-25 ENCOUNTER — OFFICE VISIT (OUTPATIENT)
Dept: PRIMARY CARE CLINIC | Facility: CLINIC | Age: 86
End: 2021-05-25
Payer: MEDICARE

## 2021-05-25 ENCOUNTER — PATIENT OUTREACH (OUTPATIENT)
Dept: ADMINISTRATIVE | Facility: HOSPITAL | Age: 86
End: 2021-05-25

## 2021-05-25 VITALS
HEART RATE: 78 BPM | WEIGHT: 202 LBS | HEIGHT: 60 IN | TEMPERATURE: 98 F | BODY MASS INDEX: 39.66 KG/M2 | DIASTOLIC BLOOD PRESSURE: 72 MMHG | OXYGEN SATURATION: 94 % | SYSTOLIC BLOOD PRESSURE: 116 MMHG | RESPIRATION RATE: 18 BRPM

## 2021-05-25 DIAGNOSIS — J20.9 ACUTE BRONCHITIS, UNSPECIFIED ORGANISM: ICD-10-CM

## 2021-05-25 DIAGNOSIS — G44.209 TENSION HEADACHE: ICD-10-CM

## 2021-05-25 DIAGNOSIS — R06.02 SOB (SHORTNESS OF BREATH): Primary | ICD-10-CM

## 2021-05-25 PROCEDURE — 99999 PR PBB SHADOW E&M-EST. PATIENT-LVL IV: CPT | Mod: PBBFAC,,, | Performed by: STUDENT IN AN ORGANIZED HEALTH CARE EDUCATION/TRAINING PROGRAM

## 2021-05-25 PROCEDURE — 99214 OFFICE O/P EST MOD 30 MIN: CPT | Mod: PBBFAC,PN | Performed by: STUDENT IN AN ORGANIZED HEALTH CARE EDUCATION/TRAINING PROGRAM

## 2021-05-25 PROCEDURE — 99999 PR PBB SHADOW E&M-EST. PATIENT-LVL IV: ICD-10-PCS | Mod: PBBFAC,,, | Performed by: STUDENT IN AN ORGANIZED HEALTH CARE EDUCATION/TRAINING PROGRAM

## 2021-05-25 PROCEDURE — 99214 OFFICE O/P EST MOD 30 MIN: CPT | Mod: S$PBB,,, | Performed by: STUDENT IN AN ORGANIZED HEALTH CARE EDUCATION/TRAINING PROGRAM

## 2021-05-25 PROCEDURE — 99214 PR OFFICE/OUTPT VISIT, EST, LEVL IV, 30-39 MIN: ICD-10-PCS | Mod: S$PBB,,, | Performed by: STUDENT IN AN ORGANIZED HEALTH CARE EDUCATION/TRAINING PROGRAM

## 2021-05-25 RX ORDER — ALOGLIPTIN 12.5 MG/1
1 TABLET, FILM COATED ORAL DAILY
COMMUNITY
Start: 2020-12-09 | End: 2022-05-25

## 2021-05-25 RX ORDER — ALPRAZOLAM 0.25 MG/1
0.25 TABLET ORAL NIGHTLY PRN
COMMUNITY
Start: 2021-04-26 | End: 2023-08-08

## 2021-05-25 RX ORDER — APIXABAN 5 MG/1
5 TABLET, FILM COATED ORAL 2 TIMES DAILY
COMMUNITY
Start: 2021-05-13

## 2021-05-25 RX ORDER — ALBUTEROL SULFATE 90 UG/1
2 AEROSOL, METERED RESPIRATORY (INHALATION) EVERY 4 HOURS PRN
Qty: 18 G | Refills: 1 | Status: SHIPPED | OUTPATIENT
Start: 2021-05-25 | End: 2021-12-22 | Stop reason: SDUPTHER

## 2021-05-25 RX ORDER — ALBUTEROL SULFATE 0.83 MG/ML
2.5 SOLUTION RESPIRATORY (INHALATION) EVERY 6 HOURS PRN
Qty: 1 BOX | Refills: 1 | Status: SHIPPED | OUTPATIENT
Start: 2021-05-25

## 2021-07-15 ENCOUNTER — HOSPITAL ENCOUNTER (EMERGENCY)
Facility: HOSPITAL | Age: 86
Discharge: HOME | End: 2021-07-15
Attending: EMERGENCY MEDICINE
Payer: MEDICARE

## 2021-07-15 ENCOUNTER — OFFICE VISIT (OUTPATIENT)
Dept: PRIMARY CARE CLINIC | Facility: CLINIC | Age: 86
End: 2021-07-15
Payer: MEDICARE

## 2021-07-15 VITALS
DIASTOLIC BLOOD PRESSURE: 78 MMHG | RESPIRATION RATE: 18 BRPM | HEIGHT: 60 IN | BODY MASS INDEX: 39.45 KG/M2 | OXYGEN SATURATION: 92 % | HEART RATE: 50 BPM | SYSTOLIC BLOOD PRESSURE: 132 MMHG

## 2021-07-15 VITALS
SYSTOLIC BLOOD PRESSURE: 133 MMHG | DIASTOLIC BLOOD PRESSURE: 61 MMHG | RESPIRATION RATE: 16 BRPM | OXYGEN SATURATION: 98 % | HEART RATE: 66 BPM | TEMPERATURE: 99 F

## 2021-07-15 DIAGNOSIS — J44.9 CHRONIC OBSTRUCTIVE PULMONARY DISEASE, UNSPECIFIED COPD TYPE: ICD-10-CM

## 2021-07-15 DIAGNOSIS — I10 ESSENTIAL HYPERTENSION: ICD-10-CM

## 2021-07-15 DIAGNOSIS — E03.9 ACQUIRED HYPOTHYROIDISM: ICD-10-CM

## 2021-07-15 DIAGNOSIS — I49.5 SICK SINUS SYNDROME: ICD-10-CM

## 2021-07-15 DIAGNOSIS — I51.9 DIASTOLIC DYSFUNCTION, LEFT VENTRICLE: ICD-10-CM

## 2021-07-15 DIAGNOSIS — E03.9 HYPOTHYROIDISM, UNSPECIFIED TYPE: ICD-10-CM

## 2021-07-15 DIAGNOSIS — E78.5 HYPERLIPIDEMIA, UNSPECIFIED HYPERLIPIDEMIA TYPE: ICD-10-CM

## 2021-07-15 DIAGNOSIS — K57.90 DIVERTICULOSIS OF INTESTINE WITHOUT BLEEDING, UNSPECIFIED INTESTINAL TRACT LOCATION: ICD-10-CM

## 2021-07-15 DIAGNOSIS — S32.000A LUMBAR COMPRESSION FRACTURE, CLOSED, INITIAL ENCOUNTER: ICD-10-CM

## 2021-07-15 DIAGNOSIS — T14.90XA TRAUMA: ICD-10-CM
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
## 2021-07-15 DIAGNOSIS — S61.210A LACERATION OF RIGHT INDEX FINGER WITHOUT FOREIGN BODY WITHOUT DAMAGE TO NAIL, INITIAL ENCOUNTER: ICD-10-CM

## 2021-07-15 DIAGNOSIS — G47.33 OBSTRUCTIVE SLEEP APNEA: ICD-10-CM

## 2021-07-15 DIAGNOSIS — Z95.0 STATUS POST PLACEMENT OF OTHER CARDIAC PACEMAKER: ICD-10-CM

## 2021-07-15 DIAGNOSIS — E11.9 DIABETES MELLITUS TYPE II, NON INSULIN DEPENDENT: Chronic | ICD-10-CM

## 2021-07-15 DIAGNOSIS — E66.01 MORBID OBESITY: ICD-10-CM

## 2021-07-15 DIAGNOSIS — I48.20 CHRONIC ATRIAL FIBRILLATION: Chronic | ICD-10-CM

## 2021-07-15 DIAGNOSIS — K44.9 HIATAL HERNIA: ICD-10-CM

## 2021-07-15 DIAGNOSIS — Z87.442 HISTORY OF NEPHROLITHIASIS: ICD-10-CM

## 2021-07-15 DIAGNOSIS — E55.9 VITAMIN D DEFICIENCY: ICD-10-CM

## 2021-07-15 DIAGNOSIS — L29.9 PRURITUS: Primary | ICD-10-CM

## 2021-07-15 PROCEDURE — 99214 PR OFFICE/OUTPT VISIT, EST, LEVL IV, 30-39 MIN: ICD-10-PCS | Mod: S$PBB,,, | Performed by: FAMILY MEDICINE

## 2021-07-15 PROCEDURE — 12001 RPR S/N/AX/GEN/TRNK 2.5CM/<: CPT | Mod: GC,,, | Performed by: EMERGENCY MEDICINE

## 2021-07-15 PROCEDURE — 99999 PR PBB SHADOW E&M-EST. PATIENT-LVL IV: CPT | Mod: PBBFAC,,, | Performed by: FAMILY MEDICINE

## 2021-07-15 PROCEDURE — 99999 PR PBB SHADOW E&M-EST. PATIENT-LVL IV: ICD-10-PCS | Mod: PBBFAC,,, | Performed by: FAMILY MEDICINE

## 2021-07-15 PROCEDURE — 25000003 PHARM REV CODE 250: Performed by: STUDENT IN AN ORGANIZED HEALTH CARE EDUCATION/TRAINING PROGRAM

## 2021-07-15 PROCEDURE — 12001 PR RESUPERF WND BODY <2.5CM: ICD-10-PCS | Mod: GC,,, | Performed by: EMERGENCY MEDICINE

## 2021-07-15 PROCEDURE — 12001 RPR S/N/AX/GEN/TRNK 2.5CM/<: CPT

## 2021-07-15 PROCEDURE — 99214 OFFICE O/P EST MOD 30 MIN: CPT | Mod: S$PBB,,, | Performed by: FAMILY MEDICINE

## 2021-07-15 PROCEDURE — 99214 OFFICE O/P EST MOD 30 MIN: CPT | Mod: PBBFAC,27,25 | Performed by: FAMILY MEDICINE

## 2021-07-15 PROCEDURE — 99284 EMERGENCY DEPT VISIT MOD MDM: CPT | Mod: 25

## 2021-07-15 PROCEDURE — 99284 EMERGENCY DEPT VISIT MOD MDM: CPT | Mod: 25,GC,, | Performed by: EMERGENCY MEDICINE

## 2021-07-15 PROCEDURE — 99284 PR EMERGENCY DEPT VISIT,LEVEL IV: ICD-10-PCS | Mod: 25,GC,, | Performed by: EMERGENCY MEDICINE

## 2021-07-15 RX ORDER — GLIPIZIDE 5 MG/1
5 TABLET ORAL
Qty: 60 TABLET | Refills: 5 | Status: SHIPPED | OUTPATIENT
Start: 2021-07-15 | End: 2022-05-25

## 2021-07-15 RX ORDER — BETAMETHASONE VALERATE 1.2 MG/G
CREAM TOPICAL 2 TIMES DAILY
Qty: 60 G | Refills: 2 | Status: SHIPPED | OUTPATIENT
Start: 2021-07-15 | End: 2023-05-16

## 2021-07-15 RX ORDER — ACETAMINOPHEN 500 MG
1000 TABLET ORAL
Status: COMPLETED | OUTPATIENT
Start: 2021-07-15 | End: 2021-07-15

## 2021-07-15 RX ADMIN — ACETAMINOPHEN 1000 MG: 500 TABLET ORAL at 08:07

## 2021-08-26 ENCOUNTER — OFFICE VISIT (OUTPATIENT)
Dept: PRIMARY CARE CLINIC | Facility: CLINIC | Age: 86
End: 2021-08-26
Payer: MEDICARE

## 2021-08-26 VITALS
DIASTOLIC BLOOD PRESSURE: 84 MMHG | SYSTOLIC BLOOD PRESSURE: 136 MMHG | RESPIRATION RATE: 20 BRPM | HEART RATE: 62 BPM | WEIGHT: 208 LBS | TEMPERATURE: 99 F | HEIGHT: 60 IN | OXYGEN SATURATION: 100 % | BODY MASS INDEX: 40.84 KG/M2

## 2021-08-26 DIAGNOSIS — J44.9 CHRONIC OBSTRUCTIVE PULMONARY DISEASE, UNSPECIFIED COPD TYPE: Primary | ICD-10-CM

## 2021-08-26 DIAGNOSIS — D50.9 MICROCYTIC ANEMIA: ICD-10-CM

## 2021-08-26 DIAGNOSIS — Z23 NEED FOR VACCINATION: ICD-10-CM

## 2021-08-26 DIAGNOSIS — E66.01 MORBID OBESITY: ICD-10-CM

## 2021-08-26 DIAGNOSIS — N18.9 CHRONIC KIDNEY DISEASE, UNSPECIFIED CKD STAGE: ICD-10-CM

## 2021-08-26 DIAGNOSIS — L29.9 PRURITUS: ICD-10-CM

## 2021-08-26 DIAGNOSIS — E11.9 DIABETES MELLITUS TYPE II, NON INSULIN DEPENDENT: ICD-10-CM

## 2021-08-26 DIAGNOSIS — G47.33 OBSTRUCTIVE SLEEP APNEA: ICD-10-CM

## 2021-08-26 PROCEDURE — 99999 PR PBB SHADOW E&M-EST. PATIENT-LVL V: CPT | Mod: PBBFAC,,, | Performed by: STUDENT IN AN ORGANIZED HEALTH CARE EDUCATION/TRAINING PROGRAM

## 2021-08-26 PROCEDURE — 90732 PPSV23 VACC 2 YRS+ SUBQ/IM: CPT | Mod: PBBFAC,PN

## 2021-08-26 PROCEDURE — 99214 OFFICE O/P EST MOD 30 MIN: CPT | Mod: S$PBB,,, | Performed by: STUDENT IN AN ORGANIZED HEALTH CARE EDUCATION/TRAINING PROGRAM

## 2021-08-26 PROCEDURE — G0009 ADMIN PNEUMOCOCCAL VACCINE: HCPCS | Mod: PBBFAC,PN

## 2021-08-26 PROCEDURE — 99214 PR OFFICE/OUTPT VISIT, EST, LEVL IV, 30-39 MIN: ICD-10-PCS | Mod: S$PBB,,, | Performed by: STUDENT IN AN ORGANIZED HEALTH CARE EDUCATION/TRAINING PROGRAM

## 2021-08-26 PROCEDURE — 99999 PR PBB SHADOW E&M-EST. PATIENT-LVL V: ICD-10-PCS | Mod: PBBFAC,,, | Performed by: STUDENT IN AN ORGANIZED HEALTH CARE EDUCATION/TRAINING PROGRAM

## 2021-08-26 PROCEDURE — 99215 OFFICE O/P EST HI 40 MIN: CPT | Mod: PBBFAC,PN,25 | Performed by: STUDENT IN AN ORGANIZED HEALTH CARE EDUCATION/TRAINING PROGRAM

## 2021-10-06 ENCOUNTER — TELEPHONE (OUTPATIENT)
Dept: PRIMARY CARE CLINIC | Facility: CLINIC | Age: 86
End: 2021-10-06

## 2021-10-06 ENCOUNTER — NURSE TRIAGE (OUTPATIENT)
Dept: ADMINISTRATIVE | Facility: CLINIC | Age: 86
End: 2021-10-06

## 2021-10-07 ENCOUNTER — OFFICE VISIT (OUTPATIENT)
Dept: PRIMARY CARE CLINIC | Facility: CLINIC | Age: 86
End: 2021-10-07
Payer: MEDICARE

## 2021-10-07 DIAGNOSIS — L29.9 PRURITUS: Primary | ICD-10-CM

## 2021-10-07 PROCEDURE — 99442 PR PHYSICIAN TELEPHONE EVALUATION 11-20 MIN: CPT | Mod: 95,,, | Performed by: STUDENT IN AN ORGANIZED HEALTH CARE EDUCATION/TRAINING PROGRAM

## 2021-10-07 PROCEDURE — 99442 PR PHYSICIAN TELEPHONE EVALUATION 11-20 MIN: ICD-10-PCS | Mod: 95,,, | Performed by: STUDENT IN AN ORGANIZED HEALTH CARE EDUCATION/TRAINING PROGRAM

## 2021-10-07 RX ORDER — HYDROCORTISONE 25 MG/G
CREAM TOPICAL 2 TIMES DAILY
Qty: 60 G | Refills: 0 | Status: SHIPPED | OUTPATIENT
Start: 2021-10-07 | End: 2023-05-16

## 2021-10-07 RX ORDER — CETIRIZINE HYDROCHLORIDE 10 MG/1
10 TABLET ORAL DAILY
Qty: 30 TABLET | Refills: 1 | Status: SHIPPED | OUTPATIENT
Start: 2021-10-07

## 2021-12-22 DIAGNOSIS — R06.02 SOB (SHORTNESS OF BREATH): ICD-10-CM

## 2021-12-22 DIAGNOSIS — J20.9 ACUTE BRONCHITIS, UNSPECIFIED ORGANISM: ICD-10-CM

## 2021-12-22 RX ORDER — ALBUTEROL SULFATE 90 UG/1
2 AEROSOL, METERED RESPIRATORY (INHALATION) EVERY 4 HOURS PRN
Qty: 18 G | Refills: 1 | Status: SHIPPED | OUTPATIENT
Start: 2021-12-22 | End: 2022-11-07

## 2022-01-07 ENCOUNTER — IMMUNIZATION (OUTPATIENT)
Dept: PRIMARY CARE CLINIC | Facility: CLINIC | Age: 87
End: 2022-01-07
Payer: MEDICARE

## 2022-01-07 DIAGNOSIS — Z23 NEED FOR VACCINATION: Primary | ICD-10-CM

## 2022-01-07 PROCEDURE — 0003A COVID-19, MRNA, LNP-S, PF, 30 MCG/0.3 ML DOSE VACCINE: CPT | Mod: PBBFAC,PN

## 2022-02-23 ENCOUNTER — OFFICE VISIT (OUTPATIENT)
Dept: PRIMARY CARE CLINIC | Facility: CLINIC | Age: 87
End: 2022-02-23
Payer: MEDICARE

## 2022-02-23 VITALS
DIASTOLIC BLOOD PRESSURE: 62 MMHG | BODY MASS INDEX: 35.09 KG/M2 | TEMPERATURE: 98 F | SYSTOLIC BLOOD PRESSURE: 114 MMHG | HEART RATE: 71 BPM | HEIGHT: 61 IN | RESPIRATION RATE: 18 BRPM | OXYGEN SATURATION: 100 % | WEIGHT: 185.88 LBS

## 2022-02-23 DIAGNOSIS — D64.9 ANEMIA, UNSPECIFIED TYPE: ICD-10-CM

## 2022-02-23 DIAGNOSIS — E55.9 VITAMIN D DEFICIENCY: ICD-10-CM

## 2022-02-23 DIAGNOSIS — I10 PRIMARY HYPERTENSION: ICD-10-CM

## 2022-02-23 DIAGNOSIS — N18.9 CHRONIC KIDNEY DISEASE, UNSPECIFIED CKD STAGE: ICD-10-CM

## 2022-02-23 DIAGNOSIS — R53.83 FATIGUE, UNSPECIFIED TYPE: ICD-10-CM

## 2022-02-23 DIAGNOSIS — D50.9 IRON DEFICIENCY ANEMIA, UNSPECIFIED IRON DEFICIENCY ANEMIA TYPE: ICD-10-CM

## 2022-02-23 DIAGNOSIS — F41.9 ANXIETY: Primary | ICD-10-CM

## 2022-02-23 DIAGNOSIS — E03.9 HYPOTHYROIDISM, UNSPECIFIED TYPE: ICD-10-CM

## 2022-02-23 PROCEDURE — 99999 PR PBB SHADOW E&M-EST. PATIENT-LVL V: CPT | Mod: PBBFAC,,, | Performed by: STUDENT IN AN ORGANIZED HEALTH CARE EDUCATION/TRAINING PROGRAM

## 2022-02-23 PROCEDURE — 99214 OFFICE O/P EST MOD 30 MIN: CPT | Mod: S$PBB,,, | Performed by: STUDENT IN AN ORGANIZED HEALTH CARE EDUCATION/TRAINING PROGRAM

## 2022-02-23 PROCEDURE — 99215 OFFICE O/P EST HI 40 MIN: CPT | Mod: PBBFAC,PN | Performed by: STUDENT IN AN ORGANIZED HEALTH CARE EDUCATION/TRAINING PROGRAM

## 2022-02-23 PROCEDURE — 99999 PR PBB SHADOW E&M-EST. PATIENT-LVL V: ICD-10-PCS | Mod: PBBFAC,,, | Performed by: STUDENT IN AN ORGANIZED HEALTH CARE EDUCATION/TRAINING PROGRAM

## 2022-02-23 PROCEDURE — 99214 PR OFFICE/OUTPT VISIT, EST, LEVL IV, 30-39 MIN: ICD-10-PCS | Mod: S$PBB,,, | Performed by: STUDENT IN AN ORGANIZED HEALTH CARE EDUCATION/TRAINING PROGRAM

## 2022-02-23 RX ORDER — ERGOCALCIFEROL 1.25 MG/1
50000 CAPSULE ORAL
Qty: 12 CAPSULE | Refills: 0 | Status: SHIPPED | OUTPATIENT
Start: 2022-02-23 | End: 2023-05-16

## 2022-02-23 RX ORDER — FERROUS SULFATE 325(65) MG
325 TABLET, DELAYED RELEASE (ENTERIC COATED) ORAL 2 TIMES DAILY
Qty: 60 TABLET | Refills: 5 | Status: SHIPPED | OUTPATIENT
Start: 2022-02-23 | End: 2022-10-27

## 2022-02-23 RX ORDER — LANOLIN ALCOHOL/MO/W.PET/CERES
1000 CREAM (GRAM) TOPICAL DAILY
Qty: 30 TABLET | Refills: 5 | Status: SHIPPED | OUTPATIENT
Start: 2022-02-23 | End: 2022-05-25

## 2022-02-23 NOTE — PATIENT INSTRUCTIONS
Right knee pain:   - patient had fall and hit knee.  ED worked up and was found to be stable.   - knee looks good on exam today.   - has been using tylenol for pain, which is fine.     Iron Def Anemia:   - patient was advise to take Ferrous sulfate due to significant anemia, would take 2 times daily if tolerating.     - this can cause constipation, so would advise use of stool softener (colace) with this iron use.   - can recheck in a few months.   - notify clinic if getting worse symptoms.     Hypothyroid:   - takes Synthroid 50mcg daily.   - checking TSH today.       Depression:   - states she lays in bed most days all day.   - encouraged to get out, but lives 6 ft up and their elevator broke, so that is a limiting factor for getting out.   - encouraged to use her deck and get sunlight.   - refilled vitD and starting on B12 1000mg daily.    - would continue with Escitalopram 10mg daily.    - continue to interact with family as able, would try to get back to [place] [facility] meals if possible.

## 2022-02-23 NOTE — PROGRESS NOTES
Subjective:           Patient ID: Claribel Moran is a 87 y.o. female who presents today with a chief complaint of depression/anxiety and cloudy vision.    Chief Complaint:   Depression and Anxiety      History of Present Illness:    88yo female presenting for f/u on depression/anxiety and cloudy vision.     Vision: states that her vision has been clouds but is not sure what the cause of that is.  Has been an issue for a few years.    States it looks like she is looking through a cloud, can see figures but hazy.  Had cateract removed on the right eye, but needed to have the left done but never went.  States that her last visit was quite a while ago.     The hazy is usually there, can get mildly better at times, but never fully better.    Falls:  Has been falling more.  Has bilateral knee replacements.  Had a fall and hit the right knee on the floor when she was up at night he other night.   Has been having some muscle weakness, cannot get around on her own at this time - needs to use the wheelchair at this time or a walker.    Chart review shows an ER visit for fall and knee pain that patient did not initially discuss.     Has done therapy for strengthening before.   States she has also had some loss of balance a times, does not feel dizzy or lightheaded.      Denies urinary incontinence.  Did see her nephrologist and told she was going well.     Itchy:  States she is itching/scrathing at her skin.    Depression/Anxiety:  Patient states she had been put on Xanax for anxiety from Dr. Eduardo, but not sure if anything for depression.  States had insurance change and affecter her med fills.     Has Lexapro (escitalopram) 10mg.  Has been stuck in the house for days and weeks at a time.  It's hard for her to get out.  States that she is not strong enough to go with the [place] on aging bus.  Used to go daily.  They do deliver her meals.         Review of Systems   Constitutional: Negative for activity change, fatigue,  "fever and unexpected weight change.   HENT: Negative for congestion, nosebleeds, sinus pressure and sneezing.    Respiratory: Negative for cough, shortness of breath and wheezing.    Cardiovascular: Negative for chest pain, palpitations and leg swelling.   Gastrointestinal: Negative for abdominal distention, constipation, diarrhea and nausea.   Genitourinary: Negative for difficulty urinating and dysuria.   Musculoskeletal: Positive for joint swelling. Negative for back pain and gait problem.   Skin: Positive for rash. Negative for pallor.   Neurological: Negative for weakness, numbness and headaches.   Psychiatric/Behavioral: Positive for decreased concentration, dysphoric mood and sleep disturbance. Negative for agitation. The patient is nervous/anxious.            Objective:        Vitals:    02/23/22 0920   BP: 114/62   BP Location: Right arm   Patient Position: Sitting   BP Method: Medium (Manual)   Pulse: 71   Resp: 18   Temp: 98.4 °F (36.9 °C)   TempSrc: Oral   SpO2: 100%   Weight: 84.3 kg (185 lb 13.6 oz)   Height: 5' 1" (1.549 m)       Body mass index is 35.12 kg/m².      Physical Exam  Vitals reviewed.   Constitutional:       General: She is not in acute distress.     Appearance: Normal appearance. She is obese. She is ill-appearing.   HENT:      Head: Normocephalic and atraumatic.      Right Ear: Tympanic membrane and external ear normal.      Left Ear: Tympanic membrane and external ear normal.      Nose: No rhinorrhea.      Mouth/Throat:      Mouth: Mucous membranes are moist.      Pharynx: No posterior oropharyngeal erythema.   Eyes:      Extraocular Movements: Extraocular movements intact.      Conjunctiva/sclera: Conjunctivae normal.   Cardiovascular:      Rate and Rhythm: Normal rate and regular rhythm.      Heart sounds: No murmur heard.  Pulmonary:      Effort: No respiratory distress.      Breath sounds: No wheezing.      Comments: Lungs clear to exam, but does have some upper airway wheeze.  "   Chest:      Chest wall: No tenderness.   Abdominal:      Palpations: Abdomen is soft.   Musculoskeletal:      Right lower leg: Edema present.      Left lower leg: Edema present.      Comments: 2+ pitting edema bilaterally.    Skin:     Capillary Refill: Capillary refill takes 2 to 3 seconds.      Coloration: Skin is not jaundiced.      Findings: No bruising or rash.      Comments: Skin examined and not lesions seen in fingers spaces or in linear patters that would suggest parisite.   Neurological:      General: No focal deficit present.      Mental Status: She is alert and oriented to person, place, and time.      Motor: No weakness.      Gait: Gait normal.   Psychiatric:         Mood and Affect: Mood normal.             Lab Results   Component Value Date     02/19/2022    K 3.9 02/19/2022     02/19/2022    CO2 21 (L) 02/19/2022    BUN 14 02/19/2022    CREATININE 1.1 02/19/2022    ANIONGAP 14 02/19/2022     Lab Results   Component Value Date    HGBA1C 7.2 (H) 06/09/2020     Lab Results   Component Value Date     (H) 05/22/2021     (H) 01/30/2021     (H) 01/22/2021       Lab Results   Component Value Date    WBC 9.41 02/19/2022    HGB 7.6 (L) 02/19/2022    HCT 28.5 (L) 02/19/2022     02/19/2022    GRAN 7.4 02/19/2022    GRAN 78.6 (H) 02/19/2022     Lab Results   Component Value Date    CHOL 147 06/09/2020    HDL 44 05/18/2021    LDLCALC 42 05/18/2021    TRIG 116 05/18/2021          Current Outpatient Medications:     albuterol (PROVENTIL) 2.5 mg /3 mL (0.083 %) nebulizer solution, Take 3 mLs (2.5 mg total) by nebulization every 6 (six) hours as needed for Wheezing or Shortness of Breath. Rescue, Disp: 1 Box, Rfl: 1    albuterol (PROVENTIL/VENTOLIN HFA) 90 mcg/actuation inhaler, Inhale 2 puffs into the lungs every 4 (four) hours as needed for Wheezing or Shortness of Breath. Rescue, Disp: 18 g, Rfl: 1    alogliptin (NESINA) 12.5 mg Tab, Take 1 tablet by mouth once daily.,  Disp: , Rfl:     ALPRAZolam (XANAX) 0.25 MG tablet, Take 0.25 mg by mouth nightly as needed., Disp: , Rfl:     amlodipine (NORVASC) 5 MG tablet, Take 5 mg by mouth once daily., Disp: , Rfl:     atorvastatin (LIPITOR) 40 MG tablet, Take 40 mg by mouth once daily., Disp: , Rfl:     betamethasone valerate 0.1% (VALISONE) 0.1 % Crea, Apply topically 2 (two) times daily., Disp: 60 g, Rfl: 2    cetirizine (ZYRTEC) 10 MG tablet, Take 1 tablet (10 mg total) by mouth once daily., Disp: 30 tablet, Rfl: 1    ELIQUIS 5 mg Tab, Take 5 mg by mouth 2 (two) times daily., Disp: , Rfl:     escitalopram oxalate (LEXAPRO) 10 MG tablet, Take 10 mg by mouth once daily., Disp: , Rfl: 3    fluticasone-umeclidin-vilanter (TRELEGY ELLIPTA) 100-62.5-25 mcg DsDv, Inhale 1 puff into the lungs once daily., Disp: , Rfl:     glipiZIDE (GLUCOTROL) 5 MG tablet, Take 1 tablet (5 mg total) by mouth 2 (two) times daily before meals., Disp: 60 tablet, Rfl: 5    hydrocortisone 2.5 % cream, Apply topically 2 (two) times daily., Disp: 60 g, Rfl: 0    levothyroxine (SYNTHROID) 50 MCG tablet, Take 50 mcg by mouth once daily., Disp: , Rfl:     metoprolol succinate (TOPROL-XL) 25 MG 24 hr tablet, Take 25 mg by mouth 2 (two) times daily. , Disp: , Rfl:     traMADol (ULTRAM) 50 mg tablet, Take 50 mg by mouth every 8 (eight) hours as needed for Pain., Disp: , Rfl:     cyanocobalamin (VITAMIN B-12) 1000 MCG tablet, Take 1 tablet (1,000 mcg total) by mouth once daily., Disp: 30 tablet, Rfl: 5    ergocalciferol (ERGOCALCIFEROL) 50,000 unit Cap, Take 1 capsule (50,000 Units total) by mouth every 30 days., Disp: 12 capsule, Rfl: 0    ferrous sulfate 325 (65 FE) MG EC tablet, Take 1 tablet (325 mg total) by mouth 2 (two) times daily., Disp: 60 tablet, Rfl: 5     Outpatient Encounter Medications as of 2/23/2022   Medication Sig Dispense Refill    albuterol (PROVENTIL) 2.5 mg /3 mL (0.083 %) nebulizer solution Take 3 mLs (2.5 mg total) by nebulization  every 6 (six) hours as needed for Wheezing or Shortness of Breath. Rescue 1 Box 1    albuterol (PROVENTIL/VENTOLIN HFA) 90 mcg/actuation inhaler Inhale 2 puffs into the lungs every 4 (four) hours as needed for Wheezing or Shortness of Breath. Rescue 18 g 1    alogliptin (NESINA) 12.5 mg Tab Take 1 tablet by mouth once daily.      ALPRAZolam (XANAX) 0.25 MG tablet Take 0.25 mg by mouth nightly as needed.      amlodipine (NORVASC) 5 MG tablet Take 5 mg by mouth once daily.      atorvastatin (LIPITOR) 40 MG tablet Take 40 mg by mouth once daily.      betamethasone valerate 0.1% (VALISONE) 0.1 % Crea Apply topically 2 (two) times daily. 60 g 2    cetirizine (ZYRTEC) 10 MG tablet Take 1 tablet (10 mg total) by mouth once daily. 30 tablet 1    ELIQUIS 5 mg Tab Take 5 mg by mouth 2 (two) times daily.      escitalopram oxalate (LEXAPRO) 10 MG tablet Take 10 mg by mouth once daily.  3    fluticasone-umeclidin-vilanter (TRELEGY ELLIPTA) 100-62.5-25 mcg DsDv Inhale 1 puff into the lungs once daily.      glipiZIDE (GLUCOTROL) 5 MG tablet Take 1 tablet (5 mg total) by mouth 2 (two) times daily before meals. 60 tablet 5    hydrocortisone 2.5 % cream Apply topically 2 (two) times daily. 60 g 0    levothyroxine (SYNTHROID) 50 MCG tablet Take 50 mcg by mouth once daily.      metoprolol succinate (TOPROL-XL) 25 MG 24 hr tablet Take 25 mg by mouth 2 (two) times daily.       traMADol (ULTRAM) 50 mg tablet Take 50 mg by mouth every 8 (eight) hours as needed for Pain.      [DISCONTINUED] ergocalciferol (ERGOCALCIFEROL) 50,000 unit Cap Take 50,000 Units by mouth every 30 days.      [DISCONTINUED] ferrous sulfate 325 (65 FE) MG EC tablet Take 1 tablet (325 mg total) by mouth 3 (three) times daily with meals. 30 tablet 2    cyanocobalamin (VITAMIN B-12) 1000 MCG tablet Take 1 tablet (1,000 mcg total) by mouth once daily. 30 tablet 5    ergocalciferol (ERGOCALCIFEROL) 50,000 unit Cap Take 1 capsule (50,000 Units total) by  mouth every 30 days. 12 capsule 0    ferrous sulfate 325 (65 FE) MG EC tablet Take 1 tablet (325 mg total) by mouth 2 (two) times daily. 60 tablet 5    [DISCONTINUED] gabapentin (NEURONTIN) 100 MG capsule Take 1 capsule (100 mg total) by mouth 3 (three) times daily. 90 capsule 11     No facility-administered encounter medications on file as of 2/23/2022.          Assessment:       1. Anxiety    2. Anemia, unspecified type    3. Primary hypertension    4. Fatigue, unspecified type    5. Vitamin D deficiency    6. Chronic kidney disease, unspecified CKD stage    7. Hypothyroidism, unspecified type    8. Iron deficiency anemia, unspecified iron deficiency anemia type           Plan:       Anxiety  -     cyanocobalamin (VITAMIN B-12) 1000 MCG tablet; Take 1 tablet (1,000 mcg total) by mouth once daily.  Dispense: 30 tablet; Refill: 5    Anemia, unspecified type    Primary hypertension    Fatigue, unspecified type  -     cyanocobalamin (VITAMIN B-12) 1000 MCG tablet; Take 1 tablet (1,000 mcg total) by mouth once daily.  Dispense: 30 tablet; Refill: 5  -     TSH; Future; Expected date: 02/23/2022    Vitamin D deficiency  -     ergocalciferol (ERGOCALCIFEROL) 50,000 unit Cap; Take 1 capsule (50,000 Units total) by mouth every 30 days.  Dispense: 12 capsule; Refill: 0    Chronic kidney disease, unspecified CKD stage  -     TSH; Future; Expected date: 02/23/2022    Hypothyroidism, unspecified type    Iron deficiency anemia, unspecified iron deficiency anemia type  -     ferrous sulfate 325 (65 FE) MG EC tablet; Take 1 tablet (325 mg total) by mouth 2 (two) times daily.  Dispense: 60 tablet; Refill: 5      Right knee pain:   - patient had fall and hit knee.  ED worked up and was found to be stable.   - knee looks good on exam today.   - has been using tylenol for pain, which is fine.     Iron Def Anemia:   - patient was advise to take Ferrous sulfate due to significant anemia, would take 2 times daily if tolerating.     -  this can cause constipation, so would advise use of stool softener (colace) with this iron use.   - can recheck in a few months.   - notify clinic if getting worse symptoms.     Hypothyroid:   - takes Synthroid 50mcg daily.   - checking TSH today.       Depression:   - states she lays in bed most days all day.   - encouraged to get out, but lives 6 ft up and their elevator broke, so that is a limiting factor for getting out.   - encouraged to use her deck and get sunlight.   - refilled vitD and starting on B12 1000mg daily.    - would continue with Escitalopram 10mg daily.    - continue to interact with family as able, would try to get back to [place] [facility]ging meals if possible.

## 2022-04-18 ENCOUNTER — TELEPHONE (OUTPATIENT)
Dept: PRIMARY CARE CLINIC | Facility: CLINIC | Age: 87
End: 2022-04-18
Payer: MEDICARE

## 2022-04-18 DIAGNOSIS — D50.9 IRON DEFICIENCY ANEMIA, UNSPECIFIED IRON DEFICIENCY ANEMIA TYPE: Primary | ICD-10-CM

## 2022-04-18 NOTE — TELEPHONE ENCOUNTER
Left voicemail for Yolis with case management at [facility] to give verbal order for cbc before next appt.

## 2022-04-18 NOTE — TELEPHONE ENCOUNTER
----- Message from John Singh MD sent at 2/23/2022 10:20 AM CST -----  Regarding: anemia  Order patient a CBC in 3 months before her f/u appt.

## 2022-04-18 NOTE — TELEPHONE ENCOUNTER
Patient due for repeat CBC.    Was seen in Feb, H/H chronically low.  Microcytic anemia.    Advise getting CBC in 3 months, which would be in May.

## 2022-05-03 ENCOUNTER — PATIENT MESSAGE (OUTPATIENT)
Dept: PRIMARY CARE CLINIC | Facility: CLINIC | Age: 87
End: 2022-05-03
Payer: MEDICARE

## 2022-05-23 ENCOUNTER — TELEPHONE (OUTPATIENT)
Dept: PRIMARY CARE CLINIC | Facility: CLINIC | Age: 87
End: 2022-05-23
Payer: MEDICARE

## 2022-05-23 NOTE — TELEPHONE ENCOUNTER
----- Message from Natalie Baez sent at 5/23/2022  2:27 PM CDT -----  Contact: [phone]  Sena is calling with [facility] the diabetic supply [employer] is calling to  follow up on prescription that was sent over for the pt please advise and give return call

## 2022-05-23 NOTE — TELEPHONE ENCOUNTER
Per Dr. Singh patient does not require blood glucose supplies.  She does not check her blood sugar at home.    Called the company to have her taken off of there list.

## 2022-05-25 ENCOUNTER — OFFICE VISIT (OUTPATIENT)
Dept: PRIMARY CARE CLINIC | Facility: CLINIC | Age: 87
End: 2022-05-25
Payer: MEDICARE

## 2022-05-25 VITALS
SYSTOLIC BLOOD PRESSURE: 114 MMHG | DIASTOLIC BLOOD PRESSURE: 60 MMHG | HEART RATE: 60 BPM | TEMPERATURE: 98 F | OXYGEN SATURATION: 97 % | HEIGHT: 61 IN | BODY MASS INDEX: 35.12 KG/M2 | RESPIRATION RATE: 16 BRPM

## 2022-05-25 DIAGNOSIS — R53.83 FATIGUE, UNSPECIFIED TYPE: Primary | ICD-10-CM

## 2022-05-25 DIAGNOSIS — E11.9 DIABETES MELLITUS TYPE II, NON INSULIN DEPENDENT: ICD-10-CM

## 2022-05-25 DIAGNOSIS — E66.01 MORBID OBESITY: ICD-10-CM

## 2022-05-25 DIAGNOSIS — D50.9 IRON DEFICIENCY ANEMIA, UNSPECIFIED IRON DEFICIENCY ANEMIA TYPE: ICD-10-CM

## 2022-05-25 PROCEDURE — 99214 OFFICE O/P EST MOD 30 MIN: CPT | Mod: S$PBB,,, | Performed by: STUDENT IN AN ORGANIZED HEALTH CARE EDUCATION/TRAINING PROGRAM

## 2022-05-25 PROCEDURE — 99214 PR OFFICE/OUTPT VISIT, EST, LEVL IV, 30-39 MIN: ICD-10-PCS | Mod: S$PBB,,, | Performed by: STUDENT IN AN ORGANIZED HEALTH CARE EDUCATION/TRAINING PROGRAM

## 2022-05-25 PROCEDURE — 99999 PR PBB SHADOW E&M-EST. PATIENT-LVL IV: CPT | Mod: PBBFAC,,, | Performed by: STUDENT IN AN ORGANIZED HEALTH CARE EDUCATION/TRAINING PROGRAM

## 2022-05-25 PROCEDURE — 99999 PR PBB SHADOW E&M-EST. PATIENT-LVL IV: ICD-10-PCS | Mod: PBBFAC,,, | Performed by: STUDENT IN AN ORGANIZED HEALTH CARE EDUCATION/TRAINING PROGRAM

## 2022-05-25 PROCEDURE — 99214 OFFICE O/P EST MOD 30 MIN: CPT | Mod: PBBFAC,PN | Performed by: STUDENT IN AN ORGANIZED HEALTH CARE EDUCATION/TRAINING PROGRAM

## 2022-05-25 RX ORDER — CYANOCOBALAMIN 1000 UG/ML
1000 INJECTION, SOLUTION INTRAMUSCULAR; SUBCUTANEOUS SEE ADMIN INSTRUCTIONS
Qty: 10 ML | Refills: 0 | Status: SHIPPED | OUTPATIENT
Start: 2022-05-25 | End: 2022-08-18

## 2022-05-25 RX ORDER — SYRINGE W-NEEDLE,DISPOSAB,3 ML 25GX5/8"
1 SYRINGE, EMPTY DISPOSABLE MISCELLANEOUS SEE ADMIN INSTRUCTIONS
COMMUNITY
Start: 2022-05-25

## 2022-05-25 RX ORDER — HYDROXYZINE HYDROCHLORIDE 10 MG/1
10 TABLET, FILM COATED ORAL DAILY PRN
COMMUNITY
Start: 2022-05-23 | End: 2023-05-16

## 2022-05-25 RX ORDER — DAPAGLIFLOZIN 5 MG/1
5 TABLET, FILM COATED ORAL DAILY
COMMUNITY
Start: 2022-05-20

## 2022-05-25 RX ORDER — LANCETS
EACH MISCELLANEOUS
Qty: 60 EACH | Refills: 0 | Status: SHIPPED | OUTPATIENT
Start: 2022-05-25

## 2022-05-25 RX ORDER — GABAPENTIN 100 MG/1
100 CAPSULE ORAL 3 TIMES DAILY PRN
COMMUNITY
Start: 2022-04-19 | End: 2023-05-16

## 2022-05-25 RX ORDER — GLIPIZIDE 10 MG/1
10 TABLET, FILM COATED, EXTENDED RELEASE ORAL DAILY
COMMUNITY
Start: 2022-03-30 | End: 2023-05-16

## 2022-05-25 NOTE — PATIENT INSTRUCTIONS
Hypertension, anticoagulation:   - would discuss with Cardiology if patient is able to cut back on some these medications.  She is 87 years old in taking 2 blood pressure medicines to coagulation/cholesterol medicines.    Fatigue/depression:   - patient requesting to transition from B12 oral to injected, will have her do injections once a week for 10 weeks, then moved to once a month.   - patient states her home health comes on Tuesdays in can administer this medication.   - otherwise would not start patient on 2nd SSRI, despite her request.  Feel this would be too sedating and have adverse side effects in older individual.    Diabetes type 2:   - can continue taking her glipizide 10 mg once a day, as well as her Farxiga.   - rechecking A1c, would hopefully be able to reduce her to 1 medication to reduce complications and costs.    Pain/neuropathy:   - patient getting gabapentin 100 mg 2 to 3 times a day from Cardiology for general pain and anxiety.

## 2022-05-25 NOTE — PROGRESS NOTES
Subjective:           Patient ID: Claribel Moran is a 87 y.o. female who presents today with a chief complaint of f/u anemia.    Chief Complaint:   Follow-up (anemia)      History of Present Illness:    88yo female who is presenting to discuss several issues today.    DM:  Has been taking Glipizide 10mg day, but had rx for 10mg once daily and 5mg twice daily both on the chart and picked up a 90 day supply for both this month.    Also taking Dapagliflozin/Farxiga 5mg daily     Generalized pain/anxiety:  States she was on Gabapentin 100mg TID.    Was with out this med recently and her anxiety peaked.      Taking Lexapro/Escitalopram 10mg daily.   Also has rx for Xanax 0.25mg that patient does take daily.     HTN:   - taking amlodipine 5 mg, metoprolol succinate 25 mg twice a day    Thyroid:   - patient taking Synthroid 50 mcg daily.    Fatigue:  Is asking about getting B12 shots once weekly.  Says that she would like to do that to help with her fatigue.     Anemia:   Has been taking iron supplement, taking FeSo4 BID starting this week.       Review of Systems   Constitutional: Positive for fatigue. Negative for activity change, fever and unexpected weight change.   HENT: Negative for congestion, nosebleeds, sinus pressure and sneezing.    Respiratory: Negative for cough, shortness of breath and wheezing.    Cardiovascular: Negative for chest pain, palpitations and leg swelling.   Gastrointestinal: Negative for abdominal distention, constipation, diarrhea and nausea.   Genitourinary: Negative for difficulty urinating and dysuria.   Musculoskeletal: Positive for joint swelling. Negative for back pain and gait problem.   Skin: Positive for rash. Negative for pallor.   Neurological: Negative for weakness, numbness and headaches.   Psychiatric/Behavioral: Positive for decreased concentration, dysphoric mood and sleep disturbance. Negative for agitation. The patient is nervous/anxious.            Objective:        Vitals:  "   05/25/22 1059   BP: 114/60   BP Location: Right arm   Patient Position: Sitting   BP Method: Medium (Manual)   Pulse: 60   Resp: 16   Temp: 98.4 °F (36.9 °C)   TempSrc: Oral   SpO2: 97%   Height: 5' 1" (1.549 m)       Body mass index is 35.12 kg/m².      Physical Exam  Vitals reviewed.   Constitutional:       General: She is not in acute distress.     Appearance: Normal appearance. She is obese. She is ill-appearing.   HENT:      Head: Normocephalic and atraumatic.      Right Ear: Tympanic membrane and external ear normal.      Left Ear: Tympanic membrane and external ear normal.      Nose: No rhinorrhea.      Mouth/Throat:      Mouth: Mucous membranes are moist.      Pharynx: No posterior oropharyngeal erythema.   Eyes:      Extraocular Movements: Extraocular movements intact.      Conjunctiva/sclera: Conjunctivae normal.   Cardiovascular:      Rate and Rhythm: Normal rate and regular rhythm.      Heart sounds: No murmur heard.  Pulmonary:      Effort: No respiratory distress.      Breath sounds: No wheezing.      Comments: Lungs clear to exam.  Chest:      Chest wall: No tenderness.   Abdominal:      Palpations: Abdomen is soft.   Musculoskeletal:      Right lower leg: Edema present.      Left lower leg: Edema present.      Comments: 2+ pitting edema bilaterally.    Skin:     Capillary Refill: Capillary refill takes 2 to 3 seconds.      Coloration: Skin is not jaundiced.      Findings: No bruising or rash.      Comments: Skin examined and not lesions seen in fingers spaces or in linear patters that would suggest parisite.   Neurological:      General: No focal deficit present.      Mental Status: She is alert and oriented to person, place, and time.      Motor: No weakness.      Gait: Gait normal.   Psychiatric:         Mood and Affect: Mood normal.             Lab Results   Component Value Date     02/19/2022    K 3.9 02/19/2022     02/19/2022    CO2 21 (L) 02/19/2022    BUN 14 02/19/2022    " CREATININE 1.1 02/19/2022    ANIONGAP 14 02/19/2022     Lab Results   Component Value Date    HGBA1C 7.2 (H) 06/09/2020     Lab Results   Component Value Date     (H) 05/22/2021     (H) 01/30/2021     (H) 01/22/2021       Lab Results   Component Value Date    WBC 9.41 02/19/2022    HGB 7.6 (L) 02/19/2022    HCT 28.5 (L) 02/19/2022     02/19/2022    GRAN 7.4 02/19/2022    GRAN 78.6 (H) 02/19/2022     Lab Results   Component Value Date    CHOL 147 06/09/2020    HDL 44 05/18/2021    LDLCALC 42 05/18/2021    TRIG 116 05/18/2021          Current Outpatient Medications:     albuterol (PROVENTIL) 2.5 mg /3 mL (0.083 %) nebulizer solution, Take 3 mLs (2.5 mg total) by nebulization every 6 (six) hours as needed for Wheezing or Shortness of Breath. Rescue, Disp: 1 Box, Rfl: 1    albuterol (PROVENTIL/VENTOLIN HFA) 90 mcg/actuation inhaler, Inhale 2 puffs into the lungs every 4 (four) hours as needed for Wheezing or Shortness of Breath. Rescue, Disp: 18 g, Rfl: 1    ALPRAZolam (XANAX) 0.25 MG tablet, Take 0.25 mg by mouth nightly as needed., Disp: , Rfl:     amlodipine (NORVASC) 5 MG tablet, Take 5 mg by mouth once daily., Disp: , Rfl:     atorvastatin (LIPITOR) 40 MG tablet, Take 40 mg by mouth once daily., Disp: , Rfl:     betamethasone valerate 0.1% (VALISONE) 0.1 % Crea, Apply topically 2 (two) times daily., Disp: 60 g, Rfl: 2    cetirizine (ZYRTEC) 10 MG tablet, Take 1 tablet (10 mg total) by mouth once daily., Disp: 30 tablet, Rfl: 1    ELIQUIS 5 mg Tab, Take 5 mg by mouth 2 (two) times daily., Disp: , Rfl:     ergocalciferol (ERGOCALCIFEROL) 50,000 unit Cap, Take 1 capsule (50,000 Units total) by mouth every 30 days., Disp: 12 capsule, Rfl: 0    escitalopram oxalate (LEXAPRO) 10 MG tablet, Take 10 mg by mouth once daily., Disp: , Rfl: 3    FARXIGA 5 mg Tab tablet, Take 5 mg by mouth once daily., Disp: , Rfl:     ferrous sulfate 325 (65 FE) MG EC tablet, Take 1 tablet (325 mg total)  "by mouth 2 (two) times daily., Disp: 60 tablet, Rfl: 5    fluticasone-umeclidin-vilanter (TRELEGY ELLIPTA) 100-62.5-25 mcg DsDv, Inhale 1 puff into the lungs once daily., Disp: , Rfl:     gabapentin (NEURONTIN) 100 MG capsule, Take 100 mg by mouth 3 (three) times daily as needed., Disp: , Rfl:     glipiZIDE (GLUCOTROL) 10 MG TR24, Take 10 mg by mouth once daily., Disp: , Rfl:     hydrocortisone 2.5 % cream, Apply topically 2 (two) times daily., Disp: 60 g, Rfl: 0    hydrOXYzine HCL (ATARAX) 10 MG Tab, Take 10 mg by mouth daily as needed., Disp: , Rfl:     levothyroxine (SYNTHROID) 50 MCG tablet, Take 50 mcg by mouth once daily., Disp: , Rfl:     metoprolol succinate (TOPROL-XL) 25 MG 24 hr tablet, Take 25 mg by mouth 2 (two) times daily. , Disp: , Rfl:     blood sugar diagnostic Strp, Blood glucose up to 1 time daily.  Or when feeling ill., Disp: 60 strip, Rfl: 0    cyanocobalamin 1,000 mcg/mL injection, Inject 1 mL (1,000 mcg total) into the muscle As instructed (1ml weekly x 10, then every 3-4 weeks.)., Disp: 10 mL, Rfl: 0    lancets Misc, Blood glucose up to 1 time daily.  Or when feeling ill., Disp: 60 each, Rfl: 0    needle, disp, 25 gauge 25 gauge x 3/4" Ndle, 1 Units by Misc.(Non-Drug; Combo Route) route As instructed (use with syringe.)., Disp: 10 each, Rfl: 0    syringe with needle (SYRINGE 3CC/25GX1") 3 mL 25 gauge x 1" Syrg, 1 Syringe by Misc.(Non-Drug; Combo Route) route As instructed (1ml to Deltoid Muscle or shoulder weekly x10 then monthly after that.)., Disp: , Rfl:      Outpatient Encounter Medications as of 5/25/2022   Medication Sig Dispense Refill    albuterol (PROVENTIL) 2.5 mg /3 mL (0.083 %) nebulizer solution Take 3 mLs (2.5 mg total) by nebulization every 6 (six) hours as needed for Wheezing or Shortness of Breath. Rescue 1 Box 1    albuterol (PROVENTIL/VENTOLIN HFA) 90 mcg/actuation inhaler Inhale 2 puffs into the lungs every 4 (four) hours as needed for Wheezing or Shortness " of Breath. Rescue 18 g 1    ALPRAZolam (XANAX) 0.25 MG tablet Take 0.25 mg by mouth nightly as needed.      amlodipine (NORVASC) 5 MG tablet Take 5 mg by mouth once daily.      atorvastatin (LIPITOR) 40 MG tablet Take 40 mg by mouth once daily.      betamethasone valerate 0.1% (VALISONE) 0.1 % Crea Apply topically 2 (two) times daily. 60 g 2    cetirizine (ZYRTEC) 10 MG tablet Take 1 tablet (10 mg total) by mouth once daily. 30 tablet 1    ELIQUIS 5 mg Tab Take 5 mg by mouth 2 (two) times daily.      ergocalciferol (ERGOCALCIFEROL) 50,000 unit Cap Take 1 capsule (50,000 Units total) by mouth every 30 days. 12 capsule 0    escitalopram oxalate (LEXAPRO) 10 MG tablet Take 10 mg by mouth once daily.  3    FARXIGA 5 mg Tab tablet Take 5 mg by mouth once daily.      ferrous sulfate 325 (65 FE) MG EC tablet Take 1 tablet (325 mg total) by mouth 2 (two) times daily. 60 tablet 5    fluticasone-umeclidin-vilanter (TRELEGY ELLIPTA) 100-62.5-25 mcg DsDv Inhale 1 puff into the lungs once daily.      gabapentin (NEURONTIN) 100 MG capsule Take 100 mg by mouth 3 (three) times daily as needed.      glipiZIDE (GLUCOTROL) 10 MG TR24 Take 10 mg by mouth once daily.      hydrocortisone 2.5 % cream Apply topically 2 (two) times daily. 60 g 0    hydrOXYzine HCL (ATARAX) 10 MG Tab Take 10 mg by mouth daily as needed.      levothyroxine (SYNTHROID) 50 MCG tablet Take 50 mcg by mouth once daily.      metoprolol succinate (TOPROL-XL) 25 MG 24 hr tablet Take 25 mg by mouth 2 (two) times daily.       [DISCONTINUED] cyanocobalamin (VITAMIN B-12) 1000 MCG tablet Take 1 tablet (1,000 mcg total) by mouth once daily. 30 tablet 5    [DISCONTINUED] glipiZIDE (GLUCOTROL) 5 MG tablet Take 1 tablet (5 mg total) by mouth 2 (two) times daily before meals. 60 tablet 5    blood sugar diagnostic Strp Blood glucose up to 1 time daily.  Or when feeling ill. 60 strip 0    cyanocobalamin 1,000 mcg/mL injection Inject 1 mL (1,000 mcg total)  "into the muscle As instructed (1ml weekly x 10, then every 3-4 weeks.). 10 mL 0    lancets Misc Blood glucose up to 1 time daily.  Or when feeling ill. 60 each 0    needle, disp, 25 gauge 25 gauge x 3/4" Ndle 1 Units by Misc.(Non-Drug; Combo Route) route As instructed (use with syringe.). 10 each 0    syringe with needle (SYRINGE 3CC/25GX1") 3 mL 25 gauge x 1" Syrg 1 Syringe by Misc.(Non-Drug; Combo Route) route As instructed (1ml to Deltoid Muscle or shoulder weekly x10 then monthly after that.).      [DISCONTINUED] alogliptin (NESINA) 12.5 mg Tab Take 1 tablet by mouth once daily.      [DISCONTINUED] traMADol (ULTRAM) 50 mg tablet Take 50 mg by mouth every 8 (eight) hours as needed for Pain.       No facility-administered encounter medications on file as of 5/25/2022.          Assessment:       1. Fatigue, unspecified type    2. Iron deficiency anemia, unspecified iron deficiency anemia type    3. Morbid obesity    4. Diabetes mellitus type II, non insulin dependent           Plan:       Fatigue, unspecified type  -     syringe with needle (SYRINGE 3CC/25GX1") 3 mL 25 gauge x 1" Syrg; 1 Syringe by Misc.(Non-Drug; Combo Route) route As instructed (1ml to Deltoid Muscle or shoulder weekly x10 then monthly after that.).  -     needle, disp, 25 gauge 25 gauge x 3/4" Ndle; 1 Units by Misc.(Non-Drug; Combo Route) route As instructed (use with syringe.).  Dispense: 10 each; Refill: 0  -     cyanocobalamin 1,000 mcg/mL injection; Inject 1 mL (1,000 mcg total) into the muscle As instructed (1ml weekly x 10, then every 3-4 weeks.).  Dispense: 10 mL; Refill: 0    Iron deficiency anemia, unspecified iron deficiency anemia type    Morbid obesity    Diabetes mellitus type II, non insulin dependent  -     Hemoglobin A1C; Future; Expected date: 05/25/2022  -     Microalbumin/Creatinine Ratio, Urine; Future; Expected date: 05/25/2022  -     lancets Misc; Blood glucose up to 1 time daily.  Or when feeling ill.  Dispense: 60 " each; Refill: 0  -     blood sugar diagnostic Strp; Blood glucose up to 1 time daily.  Or when feeling ill.  Dispense: 60 strip; Refill: 0       Hypertension, anticoagulation:   - would discuss with Cardiology if patient is able to cut back on some these medications.  She is 87 years old in taking 2 blood pressure medicines to coagulation/cholesterol medicines.    Fatigue/depression:   - patient requesting to transition from B12 oral to injected, will have her do injections once a week for 10 weeks, then moved to once a month.   - patient states her home health comes on Tuesdays in can administer this medication.   - otherwise would not start patient on 2nd SSRI, despite her request.  Feel this would be too sedating and have adverse side effects in older individual.    Diabetes type 2:   - can continue taking her glipizide 10 mg once a day, as well as her Farxiga.   - rechecking A1c, would hopefully be able to reduce her to 1 medication to reduce complications and costs.    Pain/neuropathy:   - patient getting gabapentin 100 mg 2 to 3 times a day from Cardiology for general pain and anxiety.    Hypothyroid:   - appears cardiology is managing.  Taking Synthroid 50 mcg daily.

## 2022-07-01 ENCOUNTER — DOCUMENT SCAN (OUTPATIENT)
Dept: HOME HEALTH SERVICES | Facility: HOSPITAL | Age: 87
End: 2022-07-01
Payer: MEDICARE

## 2022-08-21 ENCOUNTER — PATIENT MESSAGE (OUTPATIENT)
Dept: ADMINISTRATIVE | Facility: HOSPITAL | Age: 87
End: 2022-08-21
Payer: MEDICARE

## 2022-10-04 ENCOUNTER — PATIENT MESSAGE (OUTPATIENT)
Dept: PRIMARY CARE CLINIC | Facility: CLINIC | Age: 87
End: 2022-10-04
Payer: MEDICARE

## 2022-10-24 RX ORDER — BETAMETHASONE VALERATE 1.2 MG/G
CREAM TOPICAL 2 TIMES DAILY
Qty: 60 G | Refills: 2 | OUTPATIENT
Start: 2022-10-24

## 2022-10-24 NOTE — TELEPHONE ENCOUNTER
----- Message from Natalie Baez sent at 10/24/2022 12:57 PM CDT -----  Contact: [phone]  Requesting an RX refill or new RX.  Is this a refill or new RX: refill  RX name and strength (copy/paste from chart):  betamethasone valerate 0.1% (VALISONE) 0.1 % Crea  Is this a 30 day or 90 day RX: 30  Pharmacy name and phone # (copy/paste from chart):    [facility] - [address] [facility]  [address] [facility] [address]  Phone: [phone] Fax: [phone]     The doctors have asked that we provide their patients with the following 2 reminders -- prescription refills can take up to 72 hours, and a friendly reminder that in the future you can use your [facility] account to request refills: pharm

## 2022-10-24 NOTE — TELEPHONE ENCOUNTER
No new care gaps identified.  [facility] Embedded Care Gaps. Reference number: [id number]. 10/24/2022   1:19:07 PM CDT

## 2022-11-07 DIAGNOSIS — U07.1 COVID-19 VIRUS DETECTED: ICD-10-CM

## 2022-11-23 ENCOUNTER — OFFICE VISIT (OUTPATIENT)
Dept: INTERNAL MEDICINE | Facility: CLINIC | Age: 87
End: 2022-11-23
Payer: MEDICARE

## 2022-11-23 VITALS
SYSTOLIC BLOOD PRESSURE: 144 MMHG | DIASTOLIC BLOOD PRESSURE: 96 MMHG | HEART RATE: 77 BPM | WEIGHT: 195.13 LBS | BODY MASS INDEX: 38.31 KG/M2 | TEMPERATURE: 98 F | OXYGEN SATURATION: 95 % | HEIGHT: 60 IN

## 2022-11-23 DIAGNOSIS — J01.90 ACUTE BACTERIAL SINUSITIS: Primary | ICD-10-CM

## 2022-11-23 DIAGNOSIS — Z86.16 HISTORY OF COVID-19: ICD-10-CM

## 2022-11-23 DIAGNOSIS — B96.89 ACUTE BACTERIAL SINUSITIS: Primary | ICD-10-CM

## 2022-11-23 PROCEDURE — 99999 PR PBB SHADOW E&M-EST. PATIENT-LVL III: CPT | Mod: PBBFAC,,, | Performed by: INTERNAL MEDICINE

## 2022-11-23 PROCEDURE — 99213 PR OFFICE/OUTPT VISIT, EST, LEVL III, 20-29 MIN: ICD-10-PCS | Mod: S$PBB,,, | Performed by: INTERNAL MEDICINE

## 2022-11-23 PROCEDURE — 99999 PR PBB SHADOW E&M-EST. PATIENT-LVL III: ICD-10-PCS | Mod: PBBFAC,,, | Performed by: INTERNAL MEDICINE

## 2022-11-23 PROCEDURE — 99213 OFFICE O/P EST LOW 20 MIN: CPT | Mod: S$PBB,,, | Performed by: INTERNAL MEDICINE

## 2022-11-23 PROCEDURE — 99213 OFFICE O/P EST LOW 20 MIN: CPT | Mod: PBBFAC | Performed by: INTERNAL MEDICINE

## 2022-11-23 RX ORDER — PROMETHAZINE HYDROCHLORIDE AND DEXTROMETHORPHAN HYDROBROMIDE 6.25; 15 MG/5ML; MG/5ML
5 SYRUP ORAL NIGHTLY PRN
Qty: 50 ML | Refills: 0 | Status: SHIPPED | OUTPATIENT
Start: 2022-11-23 | End: 2022-12-03

## 2022-11-23 RX ORDER — AMOXICILLIN 875 MG/1
875 TABLET, FILM COATED ORAL 2 TIMES DAILY
Qty: 10 TABLET | Refills: 0 | Status: SHIPPED | OUTPATIENT
Start: 2022-11-23 | End: 2022-11-28

## 2022-11-23 RX ORDER — BENZONATATE 200 MG/1
200 CAPSULE ORAL 3 TIMES DAILY PRN
Qty: 30 CAPSULE | Refills: 0 | Status: SHIPPED | OUTPATIENT
Start: 2022-11-23 | End: 2022-12-03

## 2022-11-23 NOTE — PATIENT INSTRUCTIONS
Start taking Flonase nasal spray and antihistamine such as Zyrtec or Allegra for next 7 to 10 days. Both available over the counter.

## 2022-11-23 NOTE — PROGRESS NOTES
Subjective:      Patient ID: Claribel Moran is a 88 y.o. female.    Chief Complaint:   Chief Complaint   Patient presents with    Sinus Problem    Headache       89yo F with chronic AF on AC, COPD, T2DM here for UC visit. PCP listed as John Singh MD.     Tested positive for Covid-19 infection 11/7/2022. Evaluated in ER. Given steroid injection and breathing treatment. Overall improved.     Now with new cough, chest congestion and headache. Associated sinus pain and pressure. Tolerating PO intake. Adherent to Trelegy daily. Taking albuterol once nightly. Has been taking Tylenol, Mucinex.     Review of Systems   Constitutional:  Negative for chills and fever.   HENT:  Positive for congestion and sinus pain. Negative for sore throat.         Post nasal drip   Respiratory:  Positive for cough and wheezing. Negative for shortness of breath.    Cardiovascular:  Negative for chest pain.   Gastrointestinal:  Negative for constipation, nausea and vomiting.   Genitourinary:  Negative for hematuria and urgency.   Musculoskeletal:  Negative for falls.   Skin:  Negative for rash.   Neurological:  Positive for headaches. Negative for dizziness and loss of consciousness.     Objective:     BP (!) 144/96 (BP Location: Right arm, Patient Position: Sitting, BP Method: Medium (Manual))   Pulse 77   Temp 98 °F (36.7 °C)   Ht 5' (1.524 m)   Wt 88.5 kg (195 lb 1.7 oz)   LMP  (LMP Unknown)   SpO2 95%   BMI 38.10 kg/m²     Physical Exam  Vitals reviewed: Sitting in wheelchair.   Constitutional:       Appearance: She is well-developed. She is obese.   HENT:      Head: Normocephalic.      Right Ear: External ear normal.      Left Ear: External ear normal.      Nose: Congestion and rhinorrhea present.      Right Sinus: Frontal sinus tenderness present.      Left Sinus: Frontal sinus tenderness present.   Eyes:      Pupils: Pupils are equal, round, and reactive to light.   Cardiovascular:      Rate and Rhythm: Rhythm  irregular.      Heart sounds: Normal heart sounds.   Pulmonary:      Effort: Pulmonary effort is normal. No respiratory distress.      Breath sounds: Normal breath sounds. No wheezing.   Chest:      Chest wall: No tenderness.   Abdominal:      General: Bowel sounds are normal.      Palpations: Abdomen is soft. There is no mass.      Tenderness: There is no abdominal tenderness.      Hernia: No hernia is present.   Musculoskeletal:         General: Normal range of motion.   Lymphadenopathy:      Cervical: No cervical adenopathy.   Skin:     Findings: No rash.   Neurological:      Mental Status: She is alert and oriented to person, place, and time.       Assessment/Plan     Acute bacterial sinusitis  Advised to start Flonase and daily anti-histamine in addition to prescribed medications  -     promethazine-dextromethorphan (PROMETHAZINE-DM) 6.25-15 mg/5 mL Syrp; Take 5 mLs by mouth nightly as needed (cough). May cause sedation.  Dispense: 50 mL; Refill: 0  -     benzonatate (TESSALON) 200 MG capsule; Take 1 capsule (200 mg total) by mouth 3 (three) times daily as needed for Cough.  Dispense: 30 capsule; Refill: 0  -     amoxicillin (AMOXIL) 875 MG tablet; Take 1 tablet (875 mg total) by mouth 2 (two) times daily. for 5 days  Dispense: 10 tablet; Refill: 0    History of COVID-19  Persistent cough; management as above    Follow-up PRN.    Essence Mcfarland MD  Department of Internal Medicine - [facility]  11/23/2022

## 2023-02-07 ENCOUNTER — PATIENT MESSAGE (OUTPATIENT)
Dept: PRIMARY CARE CLINIC | Facility: CLINIC | Age: 88
End: 2023-02-07
Payer: MEDICARE

## 2023-04-19 ENCOUNTER — TELEPHONE (OUTPATIENT)
Dept: PRIMARY CARE CLINIC | Facility: CLINIC | Age: 88
End: 2023-04-19
Payer: MEDICARE

## 2023-04-19 DIAGNOSIS — F43.20 BEREAVEMENT REACTION: Primary | ICD-10-CM

## 2023-04-19 DIAGNOSIS — Z63.4 BEREAVEMENT REACTION: Primary | ICD-10-CM

## 2023-04-19 SDOH — SOCIAL DETERMINANTS OF HEALTH (SDOH): DISSAPEARANCE AND DEATH OF FAMILY MEMBER: Z63.4

## 2023-04-19 NOTE — TELEPHONE ENCOUNTER
----- Message from Yarelis Fountain MA sent at 4/19/2023  2:49 PM CDT -----  Contact: Lizzy [facility] [phone]    ----- Message -----  From: Cintia Vega  Sent: 4/19/2023   2:44 PM CDT  To: Samantha Lopez Staff    [email or web address]     Patient is calling for Medical Advice regarding:increased depression/loose stool    How long has patient had these symptoms:about 2 weeks    Pharmacy name and phone#:  [facility] - [place], [place] - [email or web address]  [address] [place] [address]  Phone: [phone] Fax: [phone]    Would like response via [employer]:  Call back    Comments: Patient is having increased depression since her daughter passed away about 1 month ago.  Patient does have RX ALPRAZolam (XANAX) 0.25 MG tablet but patient says they are too strong for her.  Patient is also reporting that she has loose stool more than normal.      Please call and advise.    Thank You

## 2023-04-20 ENCOUNTER — TELEPHONE (OUTPATIENT)
Dept: PRIMARY CARE CLINIC | Facility: CLINIC | Age: 88
End: 2023-04-20
Payer: MEDICARE

## 2023-04-20 NOTE — TELEPHONE ENCOUNTER
----- Message from Donita France sent at 4/20/2023  8:52 AM CDT -----  Contact: Patient, [phone]  Patient is returning a phone call.  Who left a message for the patient: Yarelis   Does patient know what this is regarding:  Advise  Would you like a call back, or a response through your [facility] portal?:   Call back  Comments:  Missed your call, please call her back. Thanks.

## 2023-04-20 NOTE — TELEPHONE ENCOUNTER
Spoke to Lizzy with [facility].  Gave her Dr. Singh's recommendations.  Also left a message for patient to call me back about Psych referral

## 2023-04-27 ENCOUNTER — PATIENT MESSAGE (OUTPATIENT)
Dept: PSYCHOLOGY | Facility: CLINIC | Age: 88
End: 2023-04-27
Payer: MEDICARE

## 2023-05-08 DIAGNOSIS — D50.9 IRON DEFICIENCY ANEMIA, UNSPECIFIED IRON DEFICIENCY ANEMIA TYPE: ICD-10-CM

## 2023-05-08 RX ORDER — FERROUS SULFATE TAB 325 MG (65 MG ELEMENTAL FE) 325 (65 FE) MG
TAB ORAL
Qty: 60 TABLET | Refills: 5 | Status: SHIPPED | OUTPATIENT
Start: 2023-05-08 | End: 2024-04-01

## 2023-05-16 ENCOUNTER — OFFICE VISIT (OUTPATIENT)
Dept: PRIMARY CARE CLINIC | Facility: CLINIC | Age: 88
End: 2023-05-16
Payer: MEDICARE

## 2023-05-16 VITALS
WEIGHT: 185.75 LBS | BODY MASS INDEX: 36.47 KG/M2 | TEMPERATURE: 98 F | DIASTOLIC BLOOD PRESSURE: 84 MMHG | SYSTOLIC BLOOD PRESSURE: 132 MMHG | OXYGEN SATURATION: 96 % | HEIGHT: 60 IN | HEART RATE: 60 BPM | RESPIRATION RATE: 16 BRPM

## 2023-05-16 DIAGNOSIS — E66.01 MORBID OBESITY: ICD-10-CM

## 2023-05-16 DIAGNOSIS — D50.9 IRON DEFICIENCY ANEMIA, UNSPECIFIED IRON DEFICIENCY ANEMIA TYPE: ICD-10-CM

## 2023-05-16 DIAGNOSIS — I25.10 CORONARY ARTERY DISEASE, UNSPECIFIED VESSEL OR LESION TYPE, UNSPECIFIED WHETHER ANGINA PRESENT, UNSPECIFIED WHETHER NATIVE OR TRANSPLANTED HEART: ICD-10-CM

## 2023-05-16 DIAGNOSIS — E11.9 DIABETES MELLITUS TYPE II, NON INSULIN DEPENDENT: ICD-10-CM

## 2023-05-16 DIAGNOSIS — B37.9 YEAST INFECTION: ICD-10-CM

## 2023-05-16 DIAGNOSIS — N18.32 CHRONIC KIDNEY DISEASE, STAGE 3B: ICD-10-CM

## 2023-05-16 DIAGNOSIS — Z00.00 ANNUAL PHYSICAL EXAM: Primary | ICD-10-CM

## 2023-05-16 DIAGNOSIS — I48.20 CHRONIC ATRIAL FIBRILLATION: Chronic | ICD-10-CM

## 2023-05-16 DIAGNOSIS — I11.0 HYPERTENSIVE HEART DISEASE WITH HEART FAILURE: ICD-10-CM

## 2023-05-16 DIAGNOSIS — E78.5 HYPERLIPIDEMIA, UNSPECIFIED HYPERLIPIDEMIA TYPE: ICD-10-CM

## 2023-05-16 DIAGNOSIS — R53.83 FATIGUE, UNSPECIFIED TYPE: ICD-10-CM

## 2023-05-16 DIAGNOSIS — E03.9 HYPOTHYROIDISM, UNSPECIFIED TYPE: ICD-10-CM

## 2023-05-16 DIAGNOSIS — J44.9 CHRONIC OBSTRUCTIVE PULMONARY DISEASE, UNSPECIFIED COPD TYPE: ICD-10-CM

## 2023-05-16 DIAGNOSIS — I70.0 AORTIC ATHEROSCLEROSIS: ICD-10-CM

## 2023-05-16 DIAGNOSIS — I10 PRIMARY HYPERTENSION: ICD-10-CM

## 2023-05-16 PROCEDURE — 99214 OFFICE O/P EST MOD 30 MIN: CPT | Mod: S$PBB,,, | Performed by: STUDENT IN AN ORGANIZED HEALTH CARE EDUCATION/TRAINING PROGRAM

## 2023-05-16 PROCEDURE — 99999 PR PBB SHADOW E&M-EST. PATIENT-LVL V: CPT | Mod: PBBFAC,,, | Performed by: STUDENT IN AN ORGANIZED HEALTH CARE EDUCATION/TRAINING PROGRAM

## 2023-05-16 PROCEDURE — 99215 OFFICE O/P EST HI 40 MIN: CPT | Mod: PBBFAC,PN | Performed by: STUDENT IN AN ORGANIZED HEALTH CARE EDUCATION/TRAINING PROGRAM

## 2023-05-16 PROCEDURE — 99999 PR PBB SHADOW E&M-EST. PATIENT-LVL V: ICD-10-PCS | Mod: PBBFAC,,, | Performed by: STUDENT IN AN ORGANIZED HEALTH CARE EDUCATION/TRAINING PROGRAM

## 2023-05-16 PROCEDURE — 99214 PR OFFICE/OUTPT VISIT, EST, LEVL IV, 30-39 MIN: ICD-10-PCS | Mod: S$PBB,,, | Performed by: STUDENT IN AN ORGANIZED HEALTH CARE EDUCATION/TRAINING PROGRAM

## 2023-05-16 RX ORDER — SEMAGLUTIDE 0.68 MG/ML
INJECTION, SOLUTION SUBCUTANEOUS
Qty: 6 ML | Refills: 0 | Status: SHIPPED | OUTPATIENT
Start: 2023-05-16 | End: 2023-08-01

## 2023-05-16 RX ORDER — FLUCONAZOLE 150 MG/1
TABLET ORAL
Qty: 4 TABLET | Refills: 0 | Status: SHIPPED | OUTPATIENT
Start: 2023-05-16 | End: 2023-08-08

## 2023-05-16 NOTE — PROGRESS NOTES
Subjective:           Patient ID: Claribel Moran is a 88 y.o. female who presents today with a chief complaint of discuss DM, diarrhea and back pain.    Chief Complaint:   Follow-up (DM), Diarrhea (watery), and Back Pain (Continuous )      History of Present Illness:    88 y.o. female who presents today with a chief complaint of discuss DM, diarrhea and back pain.    Has been having diarrhea most of the time.    Has been an issue for a few years.  States is dark at times, which she attributes to her iron.     States that every time she voids she also passes some stool.   Not painful with BM, does feel a bit irritated or raw.    No fever or chills.     Had gotten an rx for Xanax 1mg which made her very tired; had been tolerating the 0.25 dose from Dr. Khalil before.     Review of Systems   Constitutional:  Positive for fatigue. Negative for activity change, fever and unexpected weight change.   HENT:  Negative for congestion, nosebleeds, sinus pressure and sneezing.    Respiratory:  Negative for cough, shortness of breath and wheezing.    Cardiovascular:  Negative for chest pain, palpitations and leg swelling.   Gastrointestinal:  Positive for diarrhea. Negative for abdominal distention, constipation and nausea.   Genitourinary:  Negative for difficulty urinating and dysuria.   Musculoskeletal:  Positive for joint swelling. Negative for back pain and gait problem.   Skin:  Positive for rash. Negative for pallor.   Neurological:  Negative for weakness, numbness and headaches.   Psychiatric/Behavioral:  Positive for decreased concentration, dysphoric mood and sleep disturbance. Negative for agitation. The patient is nervous/anxious.          Objective:        Vitals:    05/16/23 1558   BP: 132/84   BP Location: Right arm   Patient Position: Sitting   BP Method: Medium (Manual)   Pulse: 60   Resp: 16   Temp: 97.6 °F (36.4 °C)   TempSrc: Temporal   SpO2: 96%   Weight: 84.2 kg (185 lb 11.8 oz)   Height: 5' (1.524 m)        Body mass index is 36.27 kg/m².      Physical Exam  Constitutional:       General: She is not in acute distress.     Appearance: Normal appearance. She is obese.   HENT:      Head: Normocephalic and atraumatic.      Right Ear: External ear normal.      Left Ear: External ear normal.      Nose: No rhinorrhea.      Mouth/Throat:      Mouth: Mucous membranes are moist.   Eyes:      Extraocular Movements: Extraocular movements intact.      Conjunctiva/sclera: Conjunctivae normal.   Cardiovascular:      Rate and Rhythm: Normal rate.   Pulmonary:      Effort: Pulmonary effort is normal. No respiratory distress.   Musculoskeletal:      Right lower leg: Edema (trace) present.      Left lower leg: Edema (trace) present.   Skin:     Capillary Refill: Capillary refill takes less than 2 seconds.      Coloration: Skin is not jaundiced.      Findings: No bruising.   Neurological:      General: No focal deficit present.      Mental Status: She is alert and oriented to person, place, and time.      Motor: No weakness.      Gait: Gait normal.   Psychiatric:         Mood and Affect: Mood normal.           Lab Results   Component Value Date     11/07/2022    K 3.7 11/07/2022     11/07/2022    CO2 23 11/07/2022    BUN 14 11/07/2022    CREATININE 1.2 11/07/2022    ANIONGAP 10 11/07/2022     Lab Results   Component Value Date    HGBA1C 7.2 (H) 06/09/2020     Lab Results   Component Value Date     (H) 05/22/2021     (H) 01/30/2021     (H) 01/22/2021       Lab Results   Component Value Date    WBC 7.63 11/07/2022    HGB 13.6 11/07/2022    HCT 41.3 11/07/2022     11/07/2022    GRAN 6.0 11/07/2022    GRAN 78.6 (H) 11/07/2022     Lab Results   Component Value Date    CHOL 147 06/09/2020    HDL 44 05/18/2021    LDLCALC 42 05/18/2021    TRIG 116 05/18/2021          Current Outpatient Medications:     albuterol (PROVENTIL) 2.5 mg /3 mL (0.083 %) nebulizer solution, Take 3 mLs (2.5 mg total) by  "nebulization every 6 (six) hours as needed for Wheezing or Shortness of Breath. Rescue, Disp: 1 Box, Rfl: 1    albuterol (PROVENTIL/VENTOLIN HFA) 90 mcg/actuation inhaler, INHALE 2 PUFFS INTO THE LUNGS EVERY 4 HOURS AS NEEDED FOR WHEEZING OR SHORTNESS OF BREATH. RESCUE, Disp: 54 g, Rfl: 1    amlodipine (NORVASC) 5 MG tablet, Take 5 mg by mouth once daily., Disp: , Rfl:     atorvastatin (LIPITOR) 40 MG tablet, Take 40 mg by mouth once daily., Disp: , Rfl:     blood sugar diagnostic Strp, Blood glucose up to 1 time daily.  Or when feeling ill., Disp: 60 strip, Rfl: 0    cetirizine (ZYRTEC) 10 MG tablet, Take 1 tablet (10 mg total) by mouth once daily., Disp: 30 tablet, Rfl: 1    cyanocobalamin 1,000 mcg/mL injection, INJECT 1 ML INTO THE MUSCLE AS INSTRUCTED WEEKLY X 10 WEEKS, THEN 1 ML EVERY 3-4 WEEKS, Disp: 2 mL, Rfl: 0    ELIQUIS 5 mg Tab, Take 5 mg by mouth 2 (two) times daily., Disp: , Rfl:     escitalopram oxalate (LEXAPRO) 10 MG tablet, Take 10 mg by mouth once daily., Disp: , Rfl: 3    FARXIGA 5 mg Tab tablet, Take 5 mg by mouth once daily., Disp: , Rfl:     FEROSUL 325 mg (65 mg iron) Tab tablet, TAKE 1 TABLET BY MOUTH 2 TIMES DAILY., Disp: 60 tablet, Rfl: 5    lancets Misc, Blood glucose up to 1 time daily.  Or when feeling ill., Disp: 60 each, Rfl: 0    levothyroxine (SYNTHROID) 50 MCG tablet, Take 50 mcg by mouth once daily., Disp: , Rfl:     metoprolol succinate (TOPROL-XL) 25 MG 24 hr tablet, Take 25 mg by mouth 2 (two) times daily. , Disp: , Rfl:     needle, disp, 25 gauge 25 gauge x 3/4" Ndle, 1 Units by Misc.(Non-Drug; Combo Route) route As instructed (use with syringe.)., Disp: 10 each, Rfl: 0    syringe with needle (SYRINGE 3CC/25GX1") 3 mL 25 gauge x 1" Syrg, 1 Syringe by Misc.(Non-Drug; Combo Route) route As instructed (1ml to Deltoid Muscle or shoulder weekly x10 then monthly after that.)., Disp: , Rfl:     ALPRAZolam (XANAX) 0.25 MG tablet, Take 0.25 mg by mouth nightly as needed., Disp: , Rfl: " "    fluconazole (DIFLUCAN) 150 MG Tab, Take 1 tab for yeast infection, if not better take another tab after 3 days., Disp: 4 tablet, Rfl: 0    semaglutide (OZEMPIC) 0.25 mg or 0.5 mg (2 mg/3 mL) pen injector, Inject 0.25 mg once a week for 28 days, then increase to 0.5 mg once weekly after that., Disp: 6 mL, Rfl: 0     Outpatient Encounter Medications as of 5/16/2023   Medication Sig Dispense Refill    albuterol (PROVENTIL) 2.5 mg /3 mL (0.083 %) nebulizer solution Take 3 mLs (2.5 mg total) by nebulization every 6 (six) hours as needed for Wheezing or Shortness of Breath. Rescue 1 Box 1    albuterol (PROVENTIL/VENTOLIN HFA) 90 mcg/actuation inhaler INHALE 2 PUFFS INTO THE LUNGS EVERY 4 HOURS AS NEEDED FOR WHEEZING OR SHORTNESS OF BREATH. RESCUE 54 g 1    amlodipine (NORVASC) 5 MG tablet Take 5 mg by mouth once daily.      atorvastatin (LIPITOR) 40 MG tablet Take 40 mg by mouth once daily.      blood sugar diagnostic Strp Blood glucose up to 1 time daily.  Or when feeling ill. 60 strip 0    cetirizine (ZYRTEC) 10 MG tablet Take 1 tablet (10 mg total) by mouth once daily. 30 tablet 1    cyanocobalamin 1,000 mcg/mL injection INJECT 1 ML INTO THE MUSCLE AS INSTRUCTED WEEKLY X 10 WEEKS, THEN 1 ML EVERY 3-4 WEEKS 2 mL 0    ELIQUIS 5 mg Tab Take 5 mg by mouth 2 (two) times daily.      escitalopram oxalate (LEXAPRO) 10 MG tablet Take 10 mg by mouth once daily.  3    FARXIGA 5 mg Tab tablet Take 5 mg by mouth once daily.      FEROSUL 325 mg (65 mg iron) Tab tablet TAKE 1 TABLET BY MOUTH 2 TIMES DAILY. 60 tablet 5    lancets Misc Blood glucose up to 1 time daily.  Or when feeling ill. 60 each 0    levothyroxine (SYNTHROID) 50 MCG tablet Take 50 mcg by mouth once daily.      metoprolol succinate (TOPROL-XL) 25 MG 24 hr tablet Take 25 mg by mouth 2 (two) times daily.       needle, disp, 25 gauge 25 gauge x 3/4" Ndle 1 Units by Misc.(Non-Drug; Combo Route) route As instructed (use with syringe.). 10 each 0    syringe with needle " "(SYRINGE 3CC/25GX1") 3 mL 25 gauge x 1" Syrg 1 Syringe by Misc.(Non-Drug; Combo Route) route As instructed (1ml to Deltoid Muscle or shoulder weekly x10 then monthly after that.).      [DISCONTINUED] ergocalciferol (ERGOCALCIFEROL) 50,000 unit Cap Take 1 capsule (50,000 Units total) by mouth every 30 days. 12 capsule 0    [DISCONTINUED] fluticasone-umeclidin-vilanter (TRELEGY ELLIPTA) 100-62.5-25 mcg DsDv Inhale 1 puff into the lungs once daily.      [DISCONTINUED] glipiZIDE (GLUCOTROL) 10 MG TR24 Take 10 mg by mouth once daily.      ALPRAZolam (XANAX) 0.25 MG tablet Take 0.25 mg by mouth nightly as needed.      fluconazole (DIFLUCAN) 150 MG Tab Take 1 tab for yeast infection, if not better take another tab after 3 days. 4 tablet 0    semaglutide (OZEMPIC) 0.25 mg or 0.5 mg (2 mg/3 mL) pen injector Inject 0.25 mg once a week for 28 days, then increase to 0.5 mg once weekly after that. 6 mL 0    [DISCONTINUED] betamethasone valerate 0.1% (VALISONE) 0.1 % Crea Apply topically 2 (two) times daily. 60 g 2    [DISCONTINUED] FEROSUL 325 mg (65 mg iron) Tab tablet TAKE 1 TABLET BY MOUTH 2 TIMES DAILY. 60 tablet 5    [DISCONTINUED] gabapentin (NEURONTIN) 100 MG capsule Take 100 mg by mouth 3 (three) times daily as needed.      [DISCONTINUED] hydrocortisone 2.5 % cream Apply topically 2 (two) times daily. 60 g 0    [DISCONTINUED] hydrOXYzine HCL (ATARAX) 10 MG Tab Take 10 mg by mouth daily as needed.       No facility-administered encounter medications on file as of 5/16/2023.          Assessment:       1. Annual physical exam    2. Iron deficiency anemia, unspecified iron deficiency anemia type    3. Chronic atrial fibrillation    4. Diabetes mellitus type II, non insulin dependent    5. Primary hypertension    6. Hyperlipidemia, unspecified hyperlipidemia type    7. Coronary artery disease, unspecified vessel or lesion type, unspecified whether angina present, unspecified whether native or transplanted heart    8. Fatigue, " unspecified type    9. Yeast infection    10. Hypothyroidism, unspecified type    11. Hypertensive heart disease with heart failure    12. Aortic atherosclerosis    13. Chronic obstructive pulmonary disease, unspecified COPD type    14. Morbid obesity    15. Chronic kidney disease, stage 3b           Plan:       Annual physical exam  -     Hemoglobin A1C; Future; Expected date: 05/16/2023  -     TSH; Future; Expected date: 05/16/2023    Iron deficiency anemia, unspecified iron deficiency anemia type    Chronic atrial fibrillation    Diabetes mellitus type II, non insulin dependent  -     semaglutide (OZEMPIC) 0.25 mg or 0.5 mg (2 mg/3 mL) pen injector; Inject 0.25 mg once a week for 28 days, then increase to 0.5 mg once weekly after that.  Dispense: 6 mL; Refill: 0  -     Hemoglobin A1C; Future; Expected date: 05/16/2023  -     TSH; Future; Expected date: 05/16/2023    Primary hypertension    Hyperlipidemia, unspecified hyperlipidemia type    Coronary artery disease, unspecified vessel or lesion type, unspecified whether angina present, unspecified whether native or transplanted heart    Fatigue, unspecified type    Yeast infection  -     fluconazole (DIFLUCAN) 150 MG Tab; Take 1 tab for yeast infection, if not better take another tab after 3 days.  Dispense: 4 tablet; Refill: 0    Hypothyroidism, unspecified type  -     TSH; Future; Expected date: 05/16/2023    Hypertensive heart disease with heart failure    Aortic atherosclerosis    Chronic obstructive pulmonary disease, unspecified COPD type    Morbid obesity    Chronic kidney disease, stage 3b                   Current meds for patient:       Regular Meds:   Lexapro/Escitalopram 10mg daily for depression.   Elequis 5mg twice daily for Afib and clotting.   Iron 325mg will only take once daily, not twice.   Synthroid 50mcg once daily for now, need to recheck lab and may adjust.   Metoprolol 25mg twice daily for heart rate/pressure.   Amlodipine/Norvasc 5mg daily  for blood pressure.   Atorvastatin/Lipitor 40mg daily for cholesterol       Starting new:   Ozempic 0.25mg weekly.        As needed meds:   Albuterol inhaler as needed for shortness of breath.   Zyrtec only as needed when having congestion.       Stopping or not taking:   Gabapentin, Xanax, Farxiga, VitaminD, Glipzide.        Atrial Fib:   - taking Elequis 5mg BID for hx.   - monitor.  No nose bleeds, bruising.  Dark stools, but suspect from iron supplement.     - discuss with Cards as appt on June 8th.    HTN:   - continue with amlodipine 5, metoprolol 25, twice daily.  Lipitor 40.   - discussed with Cardiology possibility of stopping statin at this age.    Diabetes type 2:   - patient has history of type 2 diabetes and obesity.  Has been taking glipizide and Farxiga, having likely side effects to both.  Will stop taking these medications as last A1c was 7% patient is 88 years old.   - will attempt starting on Ozempic to help control sugars and reduce weight while not causing UTI or yeast infections.    - take Ozempic 0.25mg once weekly for 28 days, then increase to 0.5 weekly after that.    Yeast infections:   - Fluconazole 150mg once for yeast infection, if symptoms persist take 2nd dose at 3 days.    Polypharmacy:   - stopping Trelegy and gabapentin as was not using them.   - stopping Farxiga due to urinary frequency and recurrent yeast infections, stopping Glipizide for poor drug choice.  Will switch to Ozempic.   - medications outlined above.

## 2023-05-16 NOTE — PATIENT INSTRUCTIONS
Current meds for patient:       Regular Meds:   Lexapro/Escitalopram 10mg daily for depression.   Elequis 5mg twice daily for Afib and clotting.   Iron 325mg will only take once daily, not twice.   Synthroid 50mcg once daily for now, need to recheck lab and may adjust.   Metoprolol 25mg twice daily for heart rate/pressure.   Amlodipine/Norvasc 5mg daily for blood pressure.   Atorvastatin/Lipitor 40mg daily for cholesterol       Starting new:   Ozempic 0.25mg weekly.        As needed meds:   Albuterol inhaler as needed for shortness of breath.   Zyrtec only as needed when having congestion.       Stopping or not taking:   Gabapentin, Xanax, Farxiga, VitaminD, Glipzide.        Atrial Fib:   - taking Elequis 5mg BID for hx.   - monitor.  No nose bleeds, bruising.  Dark stools, but suspect from iron supplement.     - discuss with Cards as appt on June 8th.    HTN:   - continue with amlodipine 5, metoprolol 25, twice daily.  Lipitor 40.   - discussed with Cardiology possibility of stopping statin at this age.    Diabetes type 2:   - patient has history of type 2 diabetes and obesity.  Has been taking glipizide and Farxiga, having likely side effects to both.  Will stop taking these medications as last A1c was 7% patient is 88 years old.   - will attempt starting on Ozempic to help control sugars and reduce weight while not causing UTI or yeast infections.    - take Ozempic 0.25mg once weekly for 28 days, then increase to 0.5 weekly after that.    Yeast infections:   - Fluconazole 150mg once for yeast infection, if symptoms persist take 2nd dose at 3 days.    Polypharmacy:   - stopping Trelegy and gabapentin as was not using them.   - stopping Farxiga due to urinary frequency and recurrent yeast infections, stopping Glipizide for poor drug choice.  Will switch to Ozempic.   - medications outlined above.

## 2023-05-29 DIAGNOSIS — R53.83 FATIGUE, UNSPECIFIED TYPE: ICD-10-CM

## 2023-05-29 RX ORDER — CYANOCOBALAMIN 1000 UG/ML
INJECTION, SOLUTION INTRAMUSCULAR; SUBCUTANEOUS
Qty: 2 ML | Refills: 0 | Status: SHIPPED | OUTPATIENT
Start: 2023-05-29 | End: 2023-11-14

## 2023-06-19 ENCOUNTER — DOCUMENT SCAN (OUTPATIENT)
Dept: HOME HEALTH SERVICES | Facility: HOSPITAL | Age: 88
End: 2023-06-19
Payer: MEDICARE

## 2023-06-23 ENCOUNTER — TELEPHONE (OUTPATIENT)
Dept: PRIMARY CARE CLINIC | Facility: CLINIC | Age: 88
End: 2023-06-23
Payer: MEDICARE

## 2023-06-23 NOTE — TELEPHONE ENCOUNTER
----- Message from Melly Goodman sent at 6/23/2023  9:46 AM CDT -----  Contact: Ms. Morrison Phone# [phone]  Ms. Morrison from [facility] said that you have put orders in Epic for the patient and she would like for you to sign the orders as soon as you can.

## 2023-08-01 DIAGNOSIS — E11.9 DIABETES MELLITUS TYPE II, NON INSULIN DEPENDENT: ICD-10-CM

## 2023-08-01 RX ORDER — SEMAGLUTIDE 0.68 MG/ML
0.5 INJECTION, SOLUTION SUBCUTANEOUS
Qty: 3 ML | Refills: 2 | Status: SHIPPED | OUTPATIENT
Start: 2023-08-01 | End: 2023-10-25

## 2023-08-01 NOTE — TELEPHONE ENCOUNTER
Refill Routing Note   Medication(s) are not appropriate for processing by [facility] for the following reason(s):      Required labs outdated  New or recently adjusted medication    [facility] action(s):  Defer Care Due:  Labs due            Appointments  past 12m or future 3m with PCP    Date Provider   Last Visit   5/16/2023 John Singh MD   Next Visit   Visit date not found John Singh MD   ED visits in past 90 days: 0        Note composed:1:45 PM 08/01/2023

## 2023-08-01 NOTE — TELEPHONE ENCOUNTER
Care Due:                  Date            Visit Type   Department     Provider  --------------------------------------------------------------------------------                                EP -                              PR[facility]      [facility]  Last Visit: 05-      CARE ([facility])   PRIMARY CARE   John Singh  Next Visit: None Scheduled  None         None Found                                                            Last  Test          Frequency    Reason                     Performed    Due Date  --------------------------------------------------------------------------------    HBA1C.......  6 months...  semaglutide..............  Not Found    Overdue    [employer] Embedded Care Due Messages. Reference number: [id number].   8/01/2023 11:10:04 AM CDT

## 2023-08-08 ENCOUNTER — OFFICE VISIT (OUTPATIENT)
Dept: PRIMARY CARE CLINIC | Facility: CLINIC | Age: 88
End: 2023-08-08
Payer: MEDICARE

## 2023-08-08 VITALS
SYSTOLIC BLOOD PRESSURE: 134 MMHG | HEART RATE: 97 BPM | BODY MASS INDEX: 36.79 KG/M2 | RESPIRATION RATE: 18 BRPM | HEIGHT: 60 IN | TEMPERATURE: 98 F | OXYGEN SATURATION: 96 % | DIASTOLIC BLOOD PRESSURE: 80 MMHG | WEIGHT: 187.38 LBS

## 2023-08-08 DIAGNOSIS — N17.9 ACUTE RENAL FAILURE SUPERIMPOSED ON STAGE 3B CHRONIC KIDNEY DISEASE, UNSPECIFIED ACUTE RENAL FAILURE TYPE: ICD-10-CM

## 2023-08-08 DIAGNOSIS — N13.9 ACUTE UNILATERAL OBSTRUCTIVE UROPATHY: Primary | ICD-10-CM

## 2023-08-08 DIAGNOSIS — N13.30 HYDROURETERONEPHROSIS: ICD-10-CM

## 2023-08-08 DIAGNOSIS — N18.32 ACUTE RENAL FAILURE SUPERIMPOSED ON STAGE 3B CHRONIC KIDNEY DISEASE, UNSPECIFIED ACUTE RENAL FAILURE TYPE: ICD-10-CM

## 2023-08-08 DIAGNOSIS — R10.9 ACUTE LEFT FLANK PAIN: ICD-10-CM

## 2023-08-08 LAB
BILIRUB SERPL-MCNC: NEGATIVE MG/DL
BLOOD URINE, POC: NORMAL
COLOR, POC UA: NORMAL
GLUCOSE UR QL STRIP: >500
KETONES UR QL STRIP: NEGATIVE
LEUKOCYTE ESTERASE URINE, POC: NEGATIVE
NITRITE, POC UA: NEGATIVE
PH, POC UA: 5
PROTEIN, POC: 30
SPECIFIC GRAVITY, POC UA: 1.02
UROBILINOGEN, POC UA: NORMAL

## 2023-08-08 PROCEDURE — 99214 PR OFFICE/OUTPT VISIT, EST, LEVL IV, 30-39 MIN: ICD-10-PCS | Mod: S$PBB,,, | Performed by: FAMILY MEDICINE

## 2023-08-08 PROCEDURE — 99999PBSHW POCT URINALYSIS, DIPSTICK OR TABLET REAGENT, AUTOMATED, WITH MICROSCOP: Mod: PBBFAC,,,

## 2023-08-08 PROCEDURE — 81001 URINALYSIS AUTO W/SCOPE: CPT | Mod: PBBFAC,PN | Performed by: FAMILY MEDICINE

## 2023-08-08 PROCEDURE — 99999 PR PBB SHADOW E&M-EST. PATIENT-LVL IV: ICD-10-PCS | Mod: PBBFAC,,, | Performed by: FAMILY MEDICINE

## 2023-08-08 PROCEDURE — 99214 OFFICE O/P EST MOD 30 MIN: CPT | Mod: PBBFAC,PN | Performed by: FAMILY MEDICINE

## 2023-08-08 PROCEDURE — 99214 OFFICE O/P EST MOD 30 MIN: CPT | Mod: S$PBB,,, | Performed by: FAMILY MEDICINE

## 2023-08-08 PROCEDURE — 99999 PR PBB SHADOW E&M-EST. PATIENT-LVL IV: CPT | Mod: PBBFAC,,, | Performed by: FAMILY MEDICINE

## 2023-08-08 PROCEDURE — 99999PBSHW POCT URINALYSIS, DIPSTICK OR TABLET REAGENT, AUTOMATED, WITH MICROSCOP: ICD-10-PCS | Mod: PBBFAC,,,

## 2023-08-08 RX ORDER — ESCITALOPRAM OXALATE 5 MG/1
5 TABLET ORAL
COMMUNITY
Start: 2023-07-11 | End: 2023-08-08

## 2023-08-08 NOTE — PROGRESS NOTES
Subjective:       Patient ID: Claribel Moran is a 88 y.o. female.    Chief Complaint: Abdominal Pain (Started on 8/2)    Abdominal Pain  This is a new problem. The current episode started in the past 7 days. The onset quality is sudden. The problem occurs constantly. The problem has been gradually worsening. The pain is located in the LLQ and LUQ. The pain is severe. The quality of the pain is aching. The abdominal pain radiates to the back. Associated symptoms include constipation, flatus, nausea and vomiting (once). Pertinent negatives include no anorexia, diarrhea, dysuria, fever, hematochezia, hematuria or melena. Nothing aggravates the pain. The pain is relieved by Bowel movements. Treatments tried: Senokot. The treatment provided mild relief. There is no history of colon cancer, Crohn's disease, PUD or ulcerative colitis. Patient's medical history includes kidney stones.     Review of Systems   Constitutional:  Negative for fever.   Gastrointestinal:  Positive for abdominal pain, constipation, flatus, nausea and vomiting (once). Negative for anorexia, diarrhea, hematochezia and melena.   Genitourinary:  Negative for dysuria and hematuria.       Objective:      Vitals:    08/08/23 1024   BP: 134/80   BP Location: Right arm   Patient Position: Sitting   BP Method: Medium (Manual)   Pulse: 97   Resp: 18   Temp: 97.9 °F (36.6 °C)   TempSrc: Temporal   SpO2: 96%   Weight: 85 kg (187 lb 6.3 oz)   Height: 5' (1.524 m)     BP Readings from Last 5 Encounters:   08/08/23 134/80   05/16/23 132/84   11/23/22 (!) 144/96   11/08/22 (!) 156/94   05/25/22 114/60     Wt Readings from Last 5 Encounters:   08/08/23 85 kg (187 lb 6.3 oz)   05/16/23 84.2 kg (185 lb 11.8 oz)   11/23/22 88.5 kg (195 lb 1.7 oz)   11/07/22 88.5 kg (195 lb)   02/23/22 84.3 kg (185 lb 13.6 oz)     Physical Exam  Vitals and nursing note reviewed.   Constitutional:       General: She is not in acute distress.     Appearance: Normal appearance. She is  well-developed.   HENT:      Head: Normocephalic and atraumatic.   Cardiovascular:      Rate and Rhythm: Normal rate and regular rhythm.      Heart sounds: Normal heart sounds.   Pulmonary:      Effort: Pulmonary effort is normal.      Breath sounds: Normal breath sounds.   Abdominal:      General: Bowel sounds are normal. There is no distension.      Tenderness: There is abdominal tenderness (left flank). There is no right CVA tenderness, left CVA tenderness or guarding.   Musculoskeletal:      Right lower leg: No edema.      Left lower leg: No edema.   Skin:     General: Skin is warm and dry.   Neurological:      Mental Status: She is alert and oriented to person, place, and time.   Psychiatric:         Mood and Affect: Mood normal.         Behavior: Behavior normal.         Lab Results   Component Value Date    WBC 12.24 08/08/2023    HGB 14.8 08/08/2023    HCT 45.8 08/08/2023     08/08/2023    CHOL 147 06/09/2020    TRIG 116 05/18/2021    HDL 44 05/18/2021    ALT 15 08/08/2023    AST 18 08/08/2023     08/08/2023    K 4.3 08/08/2023     08/08/2023    CREATININE 2.1 (H) 08/08/2023    BUN 23 08/08/2023    CO2 24 08/08/2023    TSH 0.993 02/23/2022    INR 2.5 (H) 01/30/2021    HGBA1C 7.2 (H) 06/09/2020    CT Renal Stone Study ABD Pelvis WO  Order: [id number]  Status: Final result     Visible to patient: Yes (not seen)     Next appt: None     Dx: Acute left flank pain     0 Result Notes  Details    Reading Physician Reading Date Result Priority   Walker Sauceda MD  [phone] 8/8/2023 STAT     Narrative & Impression  EXAMINATION:  CT RENAL STONE STUDY ABD PELVIS WO     CLINICAL HISTORY:  Flank pain, kidney stone suspected; Unspecified abdominal pain     TECHNIQUE:  Low dose axial images, sagittal and coronal reformations were obtained from the lung bases to the pubic symphysis.  Contrast was not administered.     COMPARISON:  CT abdomen and pelvis 01/30/2019, right upper quadrant ultrasound  03/23/2018, abdominal ultrasound 12/31/2013, renal ultrasound 04/27/2012     FINDINGS:  Imaged lung bases show minimal dependent atelectasis with scattered areas of platelike scarring versus atelectasis, and a similar 2-3 mm solid nodule within the left lower lobe, likely benign given the stability greater than 2 years.     Base of the heart is normal in size noting trace volume nonspecific pericardial fluid slightly increased from prior.  Multi-vessel coronary arterial calcifications, aortic and mitral annular calcifications, and calcifications of the aortic and mitral valvular leaflets.     Moderate to large-sized hiatal hernia.     Stomach and duodenum are otherwise within normal limits.     Bilateral kidneys are stable in overall size, shape and location.  There are bilateral renal calculi.  No right hydronephrosis.  Minimal right perinephric stranding similar to prior.  Newly developed mild left-sided hydroureteronephrosis secondary to a 4-5 mm calculus at the left ureterovesicular junction (UVJ).  There is worsening asymmetric left perinephric and periureteral fat stranding presumably related to obstructive uropathy.  Solitary simple appearing exophytic cyst at each kidney both of which slightly larger since 2019.  Bilateral renal vascular calcifications noted.  Right ureter is normal in course and caliber.  Urinary bladder is suboptimally distended with circumferential wall thickening and perivesicular stranding increased from prior.  Pelvic phleboliths noted.  Uterus not identified and likely surgically absent.  No adnexal mass.     Appendix not identified; however, no pericecal inflammatory change.  Terminal ileum is within normal limits.  Numerous scattered colonic diverticula without convincing evidence of acute diverticulitis.  No bowel obstruction or acute inflammation.  No pneumatosis or portal venous gas.     No ascites, free air or lymphadenopathy by CT criteria.     Scattered advanced calcific  atherosclerosis of the aorta extending into its visceral and iliac branches.  Aorta is tortuous but not aneurysmal.     Small fat containing umbilical hernia.     Generalized osteopenia.  Interval progression of chronic appearing severe compression deformity of L1 vertebral body with now more exaggerated kyphosis at the thoracolumbar junction.  There is also progression of retropulsed fragments posteriorly resulting in at least mild acquired canal stenosis at this level.  Age-related degenerative change elsewhere.  No acute or destructive osseous process seen.     Lack of IV contrast limits evaluation of soft tissue and vascular structures.     Impression:     1. Newly developed left-sided hydroureteronephrosis secondary to a 4-5 mm calculus at the left UVJ.  Asymmetric left perinephric and periureteral fat stranding presumably related to obstructive uropathy, with pyeloureteronephritis not excluded.  There is also new urinary bladder wall thickening with adjacent inflammatory change concerning for a superimposed cystitis.  Clinical correlation and with urinalysis advised.  2. Bilateral nephrolithiasis and bilateral renal cysts.  3. Suspected gallbladder sludge.  4. Diverticulosis coli.  5. Moderate to large hiatal hernia.  6. Advanced coronary and systemic atherosclerosis.  7. Interval progression of severe compression fracture of L1 vertebral body, as above.  8. Additional findings as above.  This report was flagged in Epic as abnormal.        Electronically signed by: Walker Sauceda MD  Date:                                            08/08/2023  Time:                                           13:13     Assessment:       1. Acute unilateral obstructive uropathy    2. Acute left flank pain    3. Acute renal failure superimposed on stage 3b chronic kidney disease, unspecified acute renal failure type    4. Hydroureteronephrosis        Plan:       Acute unilateral obstructive uropathy  -     Refer to Emergency  "Dept.  Needs urgent urology eval, IV fluids, lab monitoring, etc..  Acute left flank pain  -     POCT urinalysis, dipstick or tablet reag  -     CT Renal Stone Study ABD Pelvis WO; Future; Expected date: 08/08/2023  -     CBC Auto Differential; Future; Expected date: 08/08/2023  -     Comprehensive Metabolic Panel; Future; Expected date: 08/08/2023    Acute renal failure superimposed on stage 3b chronic kidney disease, unspecified acute renal failure type    Hydroureteronephrosis      Medication List with Changes/Refills   Current Medications    ALBUTEROL (PROVENTIL) 2.5 MG /3 ML (0.083 %) NEBULIZER SOLUTION    Take 3 mLs (2.5 mg total) by nebulization every 6 (six) hours as needed for Wheezing or Shortness of Breath. Rescue    ALBUTEROL (PROVENTIL/VENTOLIN HFA) 90 MCG/ACTUATION INHALER    INHALE 2 PUFFS INTO THE LUNGS EVERY 4 HOURS AS NEEDED FOR WHEEZING OR SHORTNESS OF BREATH. RESCUE    AMLODIPINE (NORVASC) 5 MG TABLET    Take 5 mg by mouth once daily.    ATORVASTATIN (LIPITOR) 40 MG TABLET    Take 40 mg by mouth once daily.    BLOOD SUGAR DIAGNOSTIC STRP    Blood glucose up to 1 time daily.  Or when feeling ill.    CETIRIZINE (ZYRTEC) 10 MG TABLET    Take 1 tablet (10 mg total) by mouth once daily.    CYANOCOBALAMIN 1,000 MCG/ML INJECTION    INJECT 1 ML INTO THE MUSCLE WEEKLY FOR 10 WEEKS, THEN 1 ML EVERY 3-4 WEEKS    ELIQUIS 5 MG TAB    Take 5 mg by mouth 2 (two) times daily.    ESCITALOPRAM OXALATE (LEXAPRO) 10 MG TABLET    Take 10 mg by mouth once daily.    FARXIGA 5 MG TAB TABLET    Take 5 mg by mouth once daily.    FEROSUL 325 MG (65 MG IRON) TAB TABLET    TAKE 1 TABLET BY MOUTH 2 TIMES DAILY.    LANCETS MISC    Blood glucose up to 1 time daily.  Or when feeling ill.    LEVOTHYROXINE (SYNTHROID) 50 MCG TABLET    Take 50 mcg by mouth once daily.    METOPROLOL SUCCINATE (TOPROL-XL) 25 MG 24 HR TABLET    Take 25 mg by mouth 2 (two) times daily.     NEEDLE, DISP, 25 GAUGE 25 GAUGE X 3/4" NDLE    1 Units by " "Misc.(Non-Drug; Combo Route) route As instructed (use with syringe.).    SEMAGLUTIDE (OZEMPIC) 0.25 MG OR 0.5 MG (2 MG/3 ML) PEN INJECTOR    Inject 0.5 mg into the skin every 7 days.    SYRINGE WITH NEEDLE (SYRINGE 3CC/25GX1") 3 ML 25 GAUGE X 1" SYRG    1 Syringe by Misc.(Non-Drug; Combo Route) route As instructed (1ml to Deltoid Muscle or shoulder weekly x10 then monthly after that.).   Discontinued Medications    ALPRAZOLAM (XANAX) 0.25 MG TABLET    Take 0.25 mg by mouth nightly as needed.    ESCITALOPRAM OXALATE (LEXAPRO) 5 MG TAB    Take 5 mg by mouth.    FLUCONAZOLE (DIFLUCAN) 150 MG TAB    Take 1 tab for yeast infection, if not better take another tab after 3 days.           "

## 2023-08-09 ENCOUNTER — TELEPHONE (OUTPATIENT)
Dept: PRIMARY CARE CLINIC | Facility: CLINIC | Age: 88
End: 2023-08-09
Payer: MEDICARE

## 2023-08-09 NOTE — TELEPHONE ENCOUNTER
Pt home health nurse states that he will stop with the 1 mg dose and start back in two weeks with 0.5 mg dose.   Stated that they should wait a week and go get correct dose/ pen.

## 2023-08-09 NOTE — TELEPHONE ENCOUNTER
----- Message from Cintia Vega sent at 8/9/2023 12:28 PM CDT -----  Contact: Agustina/[facility] [phone]  Home Health Nurse saw the patient today Ozempimc  1MG weekly ... Patient was given 1mg today when records show that she should be .5mg .    Please call and advise.    Thank You

## 2023-08-10 ENCOUNTER — TELEPHONE (OUTPATIENT)
Dept: PRIMARY CARE CLINIC | Facility: CLINIC | Age: 88
End: 2023-08-10
Payer: MEDICARE

## 2023-08-10 NOTE — TELEPHONE ENCOUNTER
----- Message from Jeffery Rdz sent at 8/10/2023  2:36 PM CDT -----  Contact: Pt [phone]  Patient is returning a phone call.  Who left a message for the patient: Not sure   Does patient know what this is regarding: yes  Would you like a call back, or a response through your [facility] portal?:   call  Comments:

## 2023-08-11 ENCOUNTER — TELEPHONE (OUTPATIENT)
Dept: PRIMARY CARE CLINIC | Facility: CLINIC | Age: 88
End: 2023-08-11
Payer: MEDICARE

## 2023-08-11 NOTE — TELEPHONE ENCOUNTER
----- Message from Radhika Winkler sent at 8/11/2023  1:55 PM CDT -----  Contact: [phone]  Pt's grandson  called requesting a call back from the nurse or provider to the pt. Please Advise

## 2023-08-11 NOTE — TELEPHONE ENCOUNTER
----- Message from Aman Franklin MA sent at 8/10/2023  3:45 PM CDT -----  Contact: [phone] @ patient    ----- Message -----  From: Tenzin Felton  Sent: 8/10/2023  12:26 PM CDT  To: [facility] Staff    Good afternoon patient would like a call back to talk to the doc about want she needs to do. Please give patient a call back [phone]

## 2023-08-17 ENCOUNTER — OFFICE VISIT (OUTPATIENT)
Dept: PRIMARY CARE CLINIC | Facility: CLINIC | Age: 88
End: 2023-08-17
Payer: MEDICARE

## 2023-08-17 VITALS
BODY MASS INDEX: 35.26 KG/M2 | DIASTOLIC BLOOD PRESSURE: 70 MMHG | HEIGHT: 61 IN | RESPIRATION RATE: 16 BRPM | HEART RATE: 70 BPM | SYSTOLIC BLOOD PRESSURE: 110 MMHG | WEIGHT: 186.75 LBS | TEMPERATURE: 97 F | OXYGEN SATURATION: 95 %

## 2023-08-17 DIAGNOSIS — Z23 NEED FOR DIPHTHERIA-TETANUS-PERTUSSIS (TDAP) VACCINE: ICD-10-CM

## 2023-08-17 DIAGNOSIS — N20.2 CALCULUS OF KIDNEY WITH CALCULUS OF URETER: Primary | ICD-10-CM

## 2023-08-17 DIAGNOSIS — Z23 NEED FOR VACCINATION: ICD-10-CM

## 2023-08-17 DIAGNOSIS — E03.9 ACQUIRED HYPOTHYROIDISM: ICD-10-CM

## 2023-08-17 DIAGNOSIS — Z00.00 HEALTH CARE MAINTENANCE: ICD-10-CM

## 2023-08-17 DIAGNOSIS — Z23 NEED FOR SHINGLES VACCINE: ICD-10-CM

## 2023-08-17 DIAGNOSIS — R10.9 FLANK PAIN WITH HISTORY OF UROLITHIASIS: ICD-10-CM

## 2023-08-17 DIAGNOSIS — Z87.442 FLANK PAIN WITH HISTORY OF UROLITHIASIS: ICD-10-CM

## 2023-08-17 DIAGNOSIS — E11.9 DIABETES MELLITUS TYPE II, NON INSULIN DEPENDENT: ICD-10-CM

## 2023-08-17 LAB
BACTERIA #/AREA URNS HPF: ABNORMAL /HPF
BILIRUB UR QL STRIP: NEGATIVE
CLARITY UR: ABNORMAL
COLOR UR: YELLOW
GLUCOSE UR QL STRIP: ABNORMAL
HGB UR QL STRIP: NEGATIVE
HYALINE CASTS #/AREA URNS LPF: 18 /LPF
KETONES UR QL STRIP: NEGATIVE
LEUKOCYTE ESTERASE UR QL STRIP: ABNORMAL
MICROSCOPIC COMMENT: ABNORMAL
NITRITE UR QL STRIP: NEGATIVE
PH UR STRIP: 6 [PH] (ref 5–8)
PROT UR QL STRIP: ABNORMAL
RBC #/AREA URNS HPF: 2 /HPF (ref 0–4)
SP GR UR STRIP: 1.02 (ref 1–1.03)
SQUAMOUS #/AREA URNS HPF: 18 /HPF
URN SPEC COLLECT METH UR: ABNORMAL
UROBILINOGEN UR STRIP-ACNC: NEGATIVE EU/DL
WBC #/AREA URNS HPF: 7 /HPF (ref 0–5)
YEAST URNS QL MICRO: ABNORMAL

## 2023-08-17 PROCEDURE — 99215 OFFICE O/P EST HI 40 MIN: CPT | Mod: PBBFAC,PN,25 | Performed by: STUDENT IN AN ORGANIZED HEALTH CARE EDUCATION/TRAINING PROGRAM

## 2023-08-17 PROCEDURE — 99999PBSHW PNEUMOCOCCAL CONJUGATE VACCINE 20-VALENT: Mod: PBBFAC,,,

## 2023-08-17 PROCEDURE — 99999 PR PBB SHADOW E&M-EST. PATIENT-LVL V: ICD-10-PCS | Mod: PBBFAC,,, | Performed by: STUDENT IN AN ORGANIZED HEALTH CARE EDUCATION/TRAINING PROGRAM

## 2023-08-17 PROCEDURE — 99214 OFFICE O/P EST MOD 30 MIN: CPT | Mod: S$PBB,,, | Performed by: STUDENT IN AN ORGANIZED HEALTH CARE EDUCATION/TRAINING PROGRAM

## 2023-08-17 PROCEDURE — 99999 PR PBB SHADOW E&M-EST. PATIENT-LVL V: CPT | Mod: PBBFAC,,, | Performed by: STUDENT IN AN ORGANIZED HEALTH CARE EDUCATION/TRAINING PROGRAM

## 2023-08-17 PROCEDURE — 99214 PR OFFICE/OUTPT VISIT, EST, LEVL IV, 30-39 MIN: ICD-10-PCS | Mod: S$PBB,,, | Performed by: STUDENT IN AN ORGANIZED HEALTH CARE EDUCATION/TRAINING PROGRAM

## 2023-08-17 PROCEDURE — 90677 PCV20 VACCINE IM: CPT | Mod: PBBFAC,PN

## 2023-08-17 PROCEDURE — 99999PBSHW PNEUMOCOCCAL CONJUGATE VACCINE 20-VALENT: ICD-10-PCS | Mod: PBBFAC,,,

## 2023-08-17 NOTE — PATIENT INSTRUCTIONS
Hx Kidney stones:   - was seen on 8/8/23 for apparent kidney stone.  Had treatment and over last 4 days pain has been improved/resolved.   - has been having urinary frequency and increased volume. Getting up about 4 times a night.    - will check urinalysis.    DM:   - last A1c on record was 7.0% in 2021.     - is open to getting new A1c and urine microalbumin and says that home health nurse will usually college those tests.    - recommend checking microalbumin in a few weeks after all UTI and stone issues cleared.     Hypothyroid:   - getting TSH.   - keep on current synthroid dose.      Vaccines:   - patient due for tetanus and pneumococcal.   - giving PCV 20 today.   - chart saying Tetanus needs to come from pharmacy.

## 2023-08-17 NOTE — PROGRESS NOTES
Verified pt ID using name and . NKDA. Administered Prevnar 20 in right deltoid per physician order using aseptic technique. Aspirated and no blood return noted. Pt tolerated well with no adverse reactions noted.

## 2023-08-17 NOTE — PROGRESS NOTES
Primary Care Visit  Family [facility] at [facility]        DATE   08/17/2023 9:39 AM    REASON FOR VISIT LISTED IN EPIC   Follow-up (ED for kidney stones)    HISTORY OF PRESENTING ILLNESS   Claribel Moran, 88 y.o., presents today due to following up on kidney stone found on 8/8/23. The last appointment with primary care was 8/8/2023.     She states she is feeling better today. Onset was sudden. Symptoms are mild. Duration is 10 days. Symptoms are  improving. She denies any exacerbating factors. She denies any alleviating factors. She denies any associated symptoms. Before this episode, there have been prior episodes.    Current Medications:  Current Outpatient Medications   Medication Sig    albuterol (PROVENTIL) 2.5 mg /3 mL (0.083 %) nebulizer solution Take 3 mLs (2.5 mg total) by nebulization every 6 (six) hours as needed for Wheezing or Shortness of Breath. Rescue    albuterol (PROVENTIL/VENTOLIN HFA) 90 mcg/actuation inhaler INHALE 2 PUFFS INTO THE LUNGS EVERY 4 HOURS AS NEEDED FOR WHEEZING OR SHORTNESS OF BREATH. RESCUE    amlodipine (NORVASC) 5 MG tablet Take 5 mg by mouth once daily.    atorvastatin (LIPITOR) 40 MG tablet Take 40 mg by mouth once daily.    blood sugar diagnostic Strp Blood glucose up to 1 time daily.  Or when feeling ill.    cetirizine (ZYRTEC) 10 MG tablet Take 1 tablet (10 mg total) by mouth once daily.    cyanocobalamin 1,000 mcg/mL injection INJECT 1 ML INTO THE MUSCLE WEEKLY FOR 10 WEEKS, THEN 1 ML EVERY 3-4 WEEKS    ELIQUIS 5 mg Tab Take 5 mg by mouth 2 (two) times daily.    escitalopram oxalate (LEXAPRO) 10 MG tablet Take 10 mg by mouth once daily.    FARXIGA 5 mg Tab tablet Take 5 mg by mouth once daily.    FEROSUL 325 mg (65 mg iron) Tab tablet TAKE 1 TABLET BY MOUTH 2 TIMES DAILY.    lancets Misc Blood glucose up to 1 time daily.  Or when feeling ill.    levothyroxine (SYNTHROID) 50 MCG tablet Take 50 mcg by mouth once daily.    metoprolol succinate (TOPROL-XL) 25 MG 24 hr tablet Take  "25 mg by mouth 2 (two) times daily.     needle, disp, 25 gauge 25 gauge x 3/4" Ndle 1 Units by Misc.(Non-Drug; Combo Route) route As instructed (use with syringe.).    oxyCODONE-acetaminophen (PERCOCET) 5-325 mg per tablet Take 1 tablet by mouth every 6 (six) hours as needed for Pain.    semaglutide (OZEMPIC) 0.25 mg or 0.5 mg (2 mg/3 mL) pen injector Inject 0.5 mg into the skin every 7 days.    syringe with needle (SYRINGE 3CC/25GX1") 3 mL 25 gauge x 1" Syrg 1 Syringe by Misc.(Non-Drug; Combo Route) route As instructed (1ml to Deltoid Muscle or shoulder weekly x10 then monthly after that.).    tamsulosin (FLOMAX) 0.4 mg Cap Take 1 capsule (0.4 mg total) by mouth once daily. until the kidney stone is passed, or complete resolution of pain     Allergies:  Review of patient's allergies indicates:  No Known Allergies    SUBJECTIVE   Review of Systems:  Review of Systems   Constitutional:  Negative for activity change, appetite change and fever.   HENT:  Negative for postnasal drip, sinus pressure, sinus pain, sneezing and sore throat.    Respiratory:  Negative for cough, chest tightness, shortness of breath and wheezing.    Cardiovascular:  Negative for chest pain and palpitations.   Gastrointestinal:  Positive for nausea and vomiting. Negative for abdominal distention, abdominal pain, constipation and diarrhea.   Endocrine: Positive for polyuria.   Genitourinary:  Positive for dysuria, flank pain, frequency and urgency. Negative for difficulty urinating.   Musculoskeletal:  Positive for arthralgias (Attributes to age) and myalgias (Attributes to age). Negative for joint swelling and neck stiffness.   Skin:  Negative for pallor and rash.   Neurological:  Positive for light-headedness (Orthostatic) and headaches (Infrequent). Negative for dizziness and syncope.   Psychiatric/Behavioral:  Negative for confusion, hallucinations and sleep disturbance. The patient is not nervous/anxious.       OBJECTIVE   Vital " "Signs:  Vitals:    08/17/23 0935   BP: 110/70   BP Location: Right arm   Patient Position: Sitting   BP Method: Medium (Manual)   Pulse: 70   Resp: 16   Temp: 97.1 °F (36.2 °C)   TempSrc: Temporal   SpO2: 95%   Weight: 84.7 kg (186 lb 11.7 oz)   Height: 5' 1" (1.549 m)    Body mass index is 35.28 kg/m².    CT RENAL STONE August 8, 2023 Findings:  1. Newly developed left-sided hydroureteronephrosis secondary to a 4-5 mm calculus at the left UVJ.  Asymmetric left perinephric and periureteral fat stranding presumably related to obstructive uropathy, with pyeloureteronephritis not excluded.  There is also new urinary bladder wall thickening with adjacent inflammatory change concerning for a superimposed cystitis.  Clinical correlation and with urinalysis advised.  2. Bilateral nephrolithiasis and bilateral renal cysts.  3. Suspected gallbladder sludge.  4. Diverticulosis coli.  5. Moderate to large hiatal hernia.  6. Advanced coronary and systemic atherosclerosis.  7. Interval progression of severe compression fracture of L1 vertebral body, as above.    Physical Exam:  Physical Exam  Constitutional:       Appearance: Normal appearance. She is obese. She is not ill-appearing.   HENT:      Mouth/Throat:      Mouth: Mucous membranes are moist.      Pharynx: Oropharynx is clear.   Eyes:      Extraocular Movements: Extraocular movements intact.      Conjunctiva/sclera: Conjunctivae normal.      Pupils: Pupils are equal, round, and reactive to light.   Cardiovascular:      Rate and Rhythm: Normal rate and regular rhythm.      Pulses: Normal pulses.      Heart sounds: Normal heart sounds.   Pulmonary:      Effort: Pulmonary effort is normal.      Breath sounds: Normal breath sounds.   Abdominal:      General: Abdomen is flat. There is no distension.      Palpations: Abdomen is soft.      Tenderness: There is left CVA tenderness (Mild soreness that is resolving). There is no right CVA tenderness. Rebound: Mild.  Skin:     " General: Skin is warm and dry.      Capillary Refill: Capillary refill takes less than 2 seconds.   Neurological:      General: No focal deficit present.      Mental Status: She is oriented to person, place, and time.      Motor: Weakness present.      Gait: Gait abnormal.      Comments: In wheelchair   Psychiatric:         Mood and Affect: Mood normal.         Behavior: Behavior normal.         Thought Content: Thought content normal.       Laboratory Results:  Lab Results   Component Value Date/Time    HGB 13.6 08/08/2023 02:22 PM    HCT 42.2 08/08/2023 02:22 PM    WBC 10.92 08/08/2023 02:22 PM     08/08/2023 02:22 PM     (H) 08/08/2023 02:22 PM    TSH 0.993 02/23/2022 10:42 AM    CHOL 147 06/09/2020 04:30 PM    HDL 44 05/18/2021 12:00 AM    TRIG 116 05/18/2021 12:00 AM    ALT 15 08/08/2023 02:22 PM    AST 18 08/08/2023 02:22 PM    K 3.7 08/08/2023 02:22 PM     08/08/2023 02:22 PM     08/08/2023 02:22 PM    BUN 24 (H) 08/08/2023 02:22 PM      Cardiac Risk Screening:  The ASCVD Risk score (Ej STYLES, et al., 2019) failed to calculate for the following reasons:    The 2019 ASCVD risk score is only valid for ages 40 to 79    Previous Weight:  Wt Readings from Last 3 Encounters:   08/17/23 84.7 kg (186 lb 11.7 oz)   08/08/23 84.8 kg (187 lb)   08/08/23 85 kg (187 lb 6.3 oz)      Diabetes Screening:  Hemoglobin A1C   Date Value Ref Range Status   06/09/2020 7.2 (H) 4.5 - 6.2 % Final     Comment:     According to ADA guidelines, hemoglobin A1C <7.0% represents  optimal control in non-pregnant diabetic patients.  Different  metrics may apply to specific populations.   Standards of Medical Care in Diabetes - 2016.  For the purpose of screening for the presence of diabetes:  <5.7%     Consistent with the absence of diabetes  5.7-6.4%  Consistent with increasing risk for diabetes   (prediabetes)  >or=6.5%  Consistent with diabetes  Currently no consensus exists for use of hemoglobin A1C  for  "diagnosis of diabetes for children.     11/19/2019 6.6 (H) 4.5 - 6.2 % Final     Comment:     According to ADA guidelines, hemoglobin A1C <7.0% represents  optimal control in non-pregnant diabetic patients.  Different  metrics may apply to specific populations.   Standards of Medical Care in Diabetes - 2016.  For the purpose of screening for the presence of diabetes:  <5.7%     Consistent with the absence of diabetes  5.7-6.4%  Consistent with increasing risk for diabetes   (prediabetes)  >or=6.5%  Consistent with diabetes  Currently no consensus exists for use of hemoglobin A1C  for diagnosis of diabetes for children.        Diabetes Management Status  Statin: Taking  ACE/ARB: Not taking  Screening or Prevention Patient's value Goal Complete/  Controlled?   HgA1C Testing and Control   Lab Results   Component Value Date    HGBA1C 7.2 (H) 06/09/2020      Annually    Less than 8% No   Lipid profile : 09/06/2022 Annually No   LDL control Lab Results   Component Value Date    LDLCALC 42 05/18/2021    Annually    Less than 100 mg/dl  No   Nephropathy screening No results found for: "LABMICR"  Lab Results   Component Value Date    PROTEINUA 1+ (A) 08/08/2023     No results found for: "UTPCR"   Annually Yes   Blood pressure BP Readings from Last 1 Encounters:   08/17/23 110/70    Less than 140/90 No   Dilated retinal exam : 10/03/2013 Annually No   Foot exam   Most Recent Foot Exam Date: Not Found Annually No     HEALTH MAINTENANCE PLANNING     Health Maintenance         Date Due Completion Date    Diabetes Urine Screening Never done ---    TETANUS VACCINE Never done ---    Shingles Vaccine (1 of 2) Never done ---    Eye Exam 10/03/2014 10/3/2013    COVID-19 Vaccine (4 - Pfizer risk series) 03/04/2022 1/7/2022    Pneumococcal Vaccines (Age 65+) (2 - PCV) 08/26/2022 8/26/2021    Lipid Panel 09/06/2023 9/6/2022    Hemoglobin A1c 09/14/2023 3/14/2023    Influenza Vaccine (1) 09/01/2023 ---          PERSONAL AND FAMILY HISTORY "   Past medical, surgical, social, and family history: Reviewed and updated in EMR.     She has a past medical history of Acid reflux, Anticoagulant long-term use, Anxiety, Arthritis, Atrial fibrillation, Blood transfusion, Bradycardia, CAD (coronary artery disease), CHF (congestive heart failure), CKD (chronic kidney disease) stage 3, GFR 30-59 ml/min, Depression, Dry mouth, Encounter for blood transfusion, Hepatitis B, Hiatal hernia, Hyperlipidemia, Hypertension, Kidney stones, Reactive airway disease with wheezing, Sleep apnea, Thyroid disease, Total knee replacement status, Type II diabetes mellitus, and Vitamin D deficiency disease.    She has a past surgical history that includes  section, classic (, ); Abdominal surgery; Total knee arthroplasty (Right); Total knee arthroplasty (Left, 02/15/2013); Kidney stone surgery; Fracture surgery; ANGIOGRAM, CORONARY, WITH LEFT HEART CATHETERIZATION (); Hysterectomy (); ANGIOGRAM, CORONARY, WITH LEFT HEART CATHETERIZATION (2017); Cataract extraction (Right); and Insertion of pacemaker (Left, 2019).    Her family history includes Arthritis in her mother; Cancer in her mother; Depression in her son; Diabetes in her son; Drug abuse in her daughter; Heart disease in her father and mother; Hypertension in her brother, daughter, mother, and son; Stroke in her mother.    She reports that she quit smoking about 68 years ago. Her smoking use included cigarettes. She has never used smokeless tobacco. She reports that she does not drink alcohol and does not use drugs.    ASSESSMENT and PLAN     Claribel Moran should follow up in the future on an as-needed basis, or at least annually for a wellness visit.    Calculus of kidney with calculus of ureter    Flank pain with history of urolithiasis  -     URINALYSIS  -     TSH; Future; Expected date: 2023  -     Lipid Panel; Future; Expected date: 2023    Need for diphtheria-tetanus-pertussis  (Tdap) vaccine    Need for shingles vaccine    Diabetes mellitus type II, non insulin dependent  -     Diabetic Eye Screening Photo; Future  -     Microalbumin/Creatinine Ratio, Urine; Future; Expected date: 11/17/2023  -     Hemoglobin A1C; Future; Expected date: 08/17/2023    Acquired hypothyroidism  -     TSH; Future; Expected date: 08/17/2023  -     Lipid Panel; Future; Expected date: 08/17/2023    Health care maintenance  -     TSH; Future; Expected date: 08/17/2023  -     Lipid Panel; Future; Expected date: 08/17/2023    Need for vaccination  -     Pneumococcal Conjugate Vaccine (20 Valent) (IM)       Patient was counseled today on:  - Diet:  Patient has been advised to follow a diet emphasizing vegetables, fruits, whole grains, low-fat dairy products, fish, legumes and nuts. Patient has been advised to limit sodium, sweets and red meat. Benefit of 15-20 minute post-prandial walk/stroll was discussed.  - Exercise: Patient has been advised that regular physical activity positively impacts health. Patients who are able should engage in mild-to-moderate-intensity aerobic for a minimum of 20-30 minutes 4-5 times a week.      The above asssessment and plan was discussed with Dr. Singh.    --  Barbie Jaffe, [facility]  Year 4 Medical Student  [employer] [facility]          Physician Attestation:    Reviewed student note and agree with history, assessment and plan except as stated below:      Hx Kidney stones:   - was seen on 8/8/23 for apparent kidney stone.  Had treatment and over last 4 days pain has been improved/resolved.   - has been having urinary frequency and increased volume. Getting up about 4 times a night.    - will check urinalysis.    DM:   - last A1c on record was 7.0% in 2021.     - is open to getting new A1c and urine microalbumin and says that home health nurse will usually college those tests.    - recommend checking microalbumin in a few weeks after all UTI  and stone issues cleared.     Hypothyroid:   - getting TSH.   - keep on current synthroid dose.      Vaccines:   - patient due for tetanus and pneumococcal.   - giving PCV 20 today.   - chart saying Tetanus needs to come from pharmacy.         John Singh MD

## 2023-08-18 LAB
ALBUMIN CREATININE RATIO: 183 MG/G
ALBUMIN, URINE: 13.7 MG/L
CHOL/HDLC RATIO: 2.6
CHOLESTEROL, TOTAL: 117
CREATININE, URINE: 75 MG/DL
HBA1C MFR BLD HPLC: 6.1 %
HDLC SERPL-MCNC: 45 MG/DL
LDLC SERPL CALC-MCNC: 47 MG/DL
NON HDL CHOL (CALC): 72
TRIGL SERPL-MCNC: 172 MG/DL

## 2023-08-25 ENCOUNTER — DOCUMENT SCAN (OUTPATIENT)
Dept: HOME HEALTH SERVICES | Facility: HOSPITAL | Age: 88
End: 2023-08-25
Payer: MEDICARE

## 2023-09-18 ENCOUNTER — PATIENT MESSAGE (OUTPATIENT)
Dept: PRIMARY CARE CLINIC | Facility: CLINIC | Age: 88
End: 2023-09-18
Payer: MEDICARE

## 2023-09-27 ENCOUNTER — PATIENT OUTREACH (OUTPATIENT)
Dept: ADMINISTRATIVE | Facility: HOSPITAL | Age: 88
End: 2023-09-27
Payer: MEDICARE

## 2023-09-27 NOTE — PROGRESS NOTES
Health Maintenance Due   Topic Date Due    TETANUS VACCINE  Never done    Shingles Vaccine (1 of 2) Never done    Eye Exam  10/03/2014    COVID-19 Vaccine (4 - Pfizer risk series) 03/04/2022    Influenza Vaccine (1) Never done     Immunizations - reviewed and updated   Care Everywhere - triggered   Care Teams - updated   Outreach - Received diabetic labs done with [facility] , 8/18/2023, uploaded to .  updated.

## 2023-10-03 LAB
CHOL/HDLC RATIO: 2.6
CHOLESTEROL, TOTAL: 120
HBA1C MFR BLD HPLC: 5.9 %
HDLC SERPL-MCNC: 46 MG/DL
LDLC SERPL CALC-MCNC: 50 MG/DL
NON HDL CHOL (CALC): 74
TRIGL SERPL-MCNC: 166 MG/DL

## 2023-10-18 ENCOUNTER — PATIENT MESSAGE (OUTPATIENT)
Dept: CARDIOLOGY | Facility: CLINIC | Age: 88
End: 2023-10-18
Payer: MEDICARE

## 2023-10-24 DIAGNOSIS — E11.9 DIABETES MELLITUS TYPE II, NON INSULIN DEPENDENT: ICD-10-CM

## 2023-10-24 NOTE — TELEPHONE ENCOUNTER
No care due was identified.  Health [facility] Embedded Care Due Messages. Reference number: [id number].   10/24/2023 11:09:28 AM CDT

## 2023-10-25 RX ORDER — SEMAGLUTIDE 0.68 MG/ML
0.5 INJECTION, SOLUTION SUBCUTANEOUS
Qty: 9 ML | Refills: 3 | Status: SHIPPED | OUTPATIENT
Start: 2023-10-25

## 2023-10-25 NOTE — TELEPHONE ENCOUNTER
Refill Routing Note   Medication(s) are not appropriate for processing by [facility] for the following reason(s):      New or recently adjusted medication    [facility] action(s):  Defer Care Due:  None identified   Medication Therapy Plan: Recent dose increase from 0.25 mg to 0.5 mg; defer      Appointments  past 12m or future 3m with PCP    Date Provider   Last Visit   8/17/2023 John Singh MD   Next Visit   2/19/2024 John Singh MD   ED visits in past 90 days: 1        Note composed:1:37 AM 10/25/2023

## 2023-11-14 DIAGNOSIS — R53.83 FATIGUE, UNSPECIFIED TYPE: ICD-10-CM

## 2023-11-14 RX ORDER — CYANOCOBALAMIN 1000 UG/ML
INJECTION, SOLUTION INTRAMUSCULAR; SUBCUTANEOUS
Qty: 1 ML | Refills: 0 | Status: SHIPPED | OUTPATIENT
Start: 2023-11-14 | End: 2023-12-13

## 2023-11-28 NOTE — TELEPHONE ENCOUNTER
Left voicemail on patients phone.  Spoke to patients daughter on alternate number.  She will give the message to her mother when she gets home.   Problem: Pain  Goal: Verbalizes/displays adequate comfort level or baseline comfort level  Outcome: Progressing     Problem: Discharge Planning  Goal: Discharge to home or other facility with appropriate resources  Outcome: Progressing     Problem: Safety - Adult  Goal: Free from fall injury  Outcome: Progressing     Problem: ABCDS Injury Assessment  Goal: Absence of physical injury  Outcome: Progressing

## 2023-12-13 DIAGNOSIS — R53.83 FATIGUE, UNSPECIFIED TYPE: ICD-10-CM

## 2023-12-13 RX ORDER — CYANOCOBALAMIN 1000 UG/ML
1000 INJECTION, SOLUTION INTRAMUSCULAR; SUBCUTANEOUS
Qty: 10 ML | Refills: 0 | Status: SHIPPED | OUTPATIENT
Start: 2023-12-13

## 2024-01-11 DIAGNOSIS — Z00.00 ENCOUNTER FOR MEDICARE ANNUAL WELLNESS EXAM: ICD-10-CM

## 2024-02-19 ENCOUNTER — PATIENT OUTREACH (OUTPATIENT)
Dept: ADMINISTRATIVE | Facility: HOSPITAL | Age: 89
End: 2024-02-19
Payer: MEDICARE

## 2024-02-19 ENCOUNTER — OFFICE VISIT (OUTPATIENT)
Dept: PRIMARY CARE CLINIC | Facility: CLINIC | Age: 89
End: 2024-02-19
Payer: MEDICARE

## 2024-02-19 VITALS
OXYGEN SATURATION: 96 % | WEIGHT: 186 LBS | DIASTOLIC BLOOD PRESSURE: 86 MMHG | HEART RATE: 60 BPM | SYSTOLIC BLOOD PRESSURE: 130 MMHG | BODY MASS INDEX: 35.12 KG/M2 | HEIGHT: 61 IN | RESPIRATION RATE: 19 BRPM

## 2024-02-19 DIAGNOSIS — E78.5 HYPERLIPIDEMIA, UNSPECIFIED HYPERLIPIDEMIA TYPE: ICD-10-CM

## 2024-02-19 DIAGNOSIS — Z00.00 ANNUAL PHYSICAL EXAM: Primary | ICD-10-CM

## 2024-02-19 DIAGNOSIS — Z63.4 BEREAVEMENT REACTION: ICD-10-CM

## 2024-02-19 DIAGNOSIS — J44.9 CHRONIC OBSTRUCTIVE PULMONARY DISEASE, UNSPECIFIED COPD TYPE: ICD-10-CM

## 2024-02-19 DIAGNOSIS — E66.01 MORBID OBESITY: ICD-10-CM

## 2024-02-19 DIAGNOSIS — F43.20 BEREAVEMENT REACTION: ICD-10-CM

## 2024-02-19 DIAGNOSIS — N76.1 CHRONIC VAGINITIS: ICD-10-CM

## 2024-02-19 DIAGNOSIS — G47.00 INSOMNIA, UNSPECIFIED TYPE: ICD-10-CM

## 2024-02-19 DIAGNOSIS — I11.0 HYPERTENSIVE HEART DISEASE WITH HEART FAILURE: ICD-10-CM

## 2024-02-19 DIAGNOSIS — E03.9 HYPOTHYROIDISM, UNSPECIFIED TYPE: ICD-10-CM

## 2024-02-19 DIAGNOSIS — E11.9 DIABETES MELLITUS TYPE II, NON INSULIN DEPENDENT: ICD-10-CM

## 2024-02-19 DIAGNOSIS — N18.4 CKD (CHRONIC KIDNEY DISEASE) STAGE 4, GFR 15-29 ML/MIN: ICD-10-CM

## 2024-02-19 PROBLEM — I49.5 SICK SINUS SYNDROME: Status: RESOLVED | Noted: 2019-05-28 | Resolved: 2024-02-19

## 2024-02-19 PROBLEM — I70.0 AORTIC ATHEROSCLEROSIS: Status: RESOLVED | Noted: 2023-05-16 | Resolved: 2024-02-19

## 2024-02-19 PROCEDURE — 99214 OFFICE O/P EST MOD 30 MIN: CPT | Mod: S$PBB,,, | Performed by: STUDENT IN AN ORGANIZED HEALTH CARE EDUCATION/TRAINING PROGRAM

## 2024-02-19 PROCEDURE — 99215 OFFICE O/P EST HI 40 MIN: CPT | Mod: PBBFAC,PN | Performed by: STUDENT IN AN ORGANIZED HEALTH CARE EDUCATION/TRAINING PROGRAM

## 2024-02-19 PROCEDURE — 99999 PR PBB SHADOW E&M-EST. PATIENT-LVL V: CPT | Mod: PBBFAC,,, | Performed by: STUDENT IN AN ORGANIZED HEALTH CARE EDUCATION/TRAINING PROGRAM

## 2024-02-19 RX ORDER — MELATONIN 5 MG
1 CAPSULE ORAL NIGHTLY
Qty: 30 EACH | Refills: 5 | Status: SHIPPED | OUTPATIENT
Start: 2024-02-19

## 2024-02-19 RX ORDER — ALPRAZOLAM 0.25 MG/1
0.25 TABLET ORAL NIGHTLY PRN
COMMUNITY
End: 2024-02-19

## 2024-02-19 RX ORDER — TRAZODONE HYDROCHLORIDE 50 MG/1
50 TABLET ORAL NIGHTLY
Qty: 30 TABLET | Refills: 11 | Status: SHIPPED | OUTPATIENT
Start: 2024-02-19 | End: 2025-02-18

## 2024-02-19 RX ORDER — FLUCONAZOLE 100 MG/1
100 TABLET ORAL
Qty: 3 TABLET | Refills: 0 | Status: SHIPPED | OUTPATIENT
Start: 2024-02-19 | End: 2024-02-25

## 2024-02-19 SDOH — SOCIAL DETERMINANTS OF HEALTH (SDOH): DISSAPEARANCE AND DEATH OF FAMILY MEMBER: Z63.4

## 2024-02-19 NOTE — PATIENT INSTRUCTIONS
Annual physical exam  -  CBC, CMP, TSH, Lipids and A1c.   - 89-year-old female presents in wheelchair with her grandson.  - states she has been having issues with sleep quality, would like to use opiates to help with sleep.  Additionally has been having increased anxiety since her daughter and son have passed unless years.  States she has some depression decreased mood related to their passing.    Insomnia, unspecified type  -     melatonin 5 mg Cap; Take 1 capsule (5 mg total) by mouth every evening.  Dispense: 30 each; Refill: 5  -     traZODone (DESYREL) 50 MG tablet; Take 1 tablet (50 mg total) by mouth every evening.  Dispense: 30 tablet; Refill: 11   - patient relates that she takes frequent naps during the day.  She has not get the sleep well, then is up much of the night.  Use oxycodone insulin well on that medication.  Had initially been provided for kidney stones.   - patient states she goes to bed with the TV on.  Has racing thoughts.   - advised patient to avoid daytime naps, advised to be active during the day.  Said she could try melatonin or trazodone.   - inform patient that oxycodone or hydrocodone or not sleep medications would not be appropriate to try to use to help improve her sleep quality.    Bereavement reaction  -  CBC, CMP, TSH, Lipids and A1c.   - discussed option of seeing a counselor for bereavement, but patient states that she does not feel the need to see a counselor for this issue.    Hyperlipidemia, unspecified hyperlipidemia type  -  CBC, CMP, TSH, Lipids and A1c.   -     Hypothyroidism, unspecified type  -  CBC, CMP, TSH, Lipids and A1c.    - checking thyroid.    CKD (chronic kidney disease) stage 4, GFR 15-29 ml/min  -  CBC, CMP, TSH, Lipids and A1c.   - checking getting function again, last time was under 25.  Recommended decrease dosing on her Diflucan was being started today.    Diabetes mellitus type II, non insulin dependent  -  CBC, CMP, TSH, Lipids and A1c.   - diabetes  well controlled on last check.  Continue current cares.    Chronic vaginitis  -     fluconazole (DIFLUCAN) 100 MG tablet; Take 1 tablet (100 mg total) by mouth every 48 hours. for 6 days  Dispense: 3 tablet; Refill: 0  - patient having vaginal irritation and itching.  States has had yeast infection before interested in treatment.  Giving Diflucan 100 mg every other day for 3 doses.    SLEEP HYGIENE-   After 3 pm - Avoid caffeine (coffee, tea, soft drinks ,energy drinks)   After 6 pm - Avoid liquids (so you're not waking up to urinate)  After 8 pm - Avoid screens that emit light & stimulate your brain. Don't look at phone, computer or TV  It's ok to listen to background noise machine (waves, rain, audio books where voice is low)  Keep your room dark & use an alarm clock that isn't bright.   Do not take a nap  Exercise your body for 20 minutes EVERY DAY , so it's exhausted to sleep    Try to stay in your bed for 7-8 hours per night, instead of getting out of bed (which awakens your body when you're standing upright &  wakes up your brain, affecting your normal circadian rhythm)  ================    We are no longer prescribing sleep medications - Ambien, Lunesta, Sonata, Restoril -- because of studies showing a possible increased risk of death.     You can try these safe over the counter sleep aids-   Tranquil Sleep by Sleep Relax (5HTP, Suntheanine & Melatonin),  Prosom, Tylenol PM, Melatonin. Aerobic exercise helps to exhaust the body & relax into deeper sleep.     Please make an appointment if you'd like to discuss a prescription medications that could also be helpful such as Trazodone . It is a low dose of serotonin medication (similar to Lexapro, Celexa, Zoloft, Prozac) , mainly helping to calm our thoughts & worries & lists of things going through our head that keep us from either going to sleep or sleeping restfully

## 2024-02-19 NOTE — PROGRESS NOTES
Health Maintenance Due   Topic Date Due    TETANUS VACCINE  Never done    Shingles Vaccine (1 of 2) Never done    RSV Vaccine (Age 60+ and Pregnant patients) (1 - 1-dose 60+ series) Never done    Influenza Vaccine (1) Never done    COVID-19 Vaccine ( season) 2023      Population Health Chart Review & Patient Outreach Details      Further Action Needed If Patient Returns Outreach:      [facility] reviewed, labs done for Dr. Eduardo, 10/3/2023, uploaded.  updated           Updates Requested / Reviewed:     [x]  Care Everywhere    []     [x]  External Sources ([facility], [facility], [facility], etc.)    [x] [facility]   [x] [facility]   [] Other:    []  Care Team Updated   []  Removed  or Duplicate Orders   [x]  Immunization Reconciliation Completed / Queried    [x] [place]   [] [place]   [] [place]   [] [place]      Health Maintenance Topics Addressed and Outreach Outcomes / Actions Taken:             Breast Cancer Screening []  Mammogram Order Placed    []  Mammogram Screening Scheduled    []  External Records Requested & Care Team Updated if Applicable    []  External Records Uploaded & Care Team Updated if Applicable    []  Pt Declined Scheduling Mammogram    []  Pt Will Schedule with External Provider / Order Routed & Care Team Updated if Applicable              Cervical Cancer Screening []  Pap Smear Scheduled in Primary Care or OBGYN    []  External Records Requested & Care Team Updated if Applicable       []  External Records Uploaded, Care Team Updated, & History Updated if Applicable    []  Patient Declined Scheduling Pap Smear    []  Patient Will Schedule with External Provider & Care Team Updated if Applicable                  Colorectal Cancer Screening []  Colonoscopy Case Request / Referral / Home Test Order Placed    []  External Records Requested & Care Team Updated if Applicable    []  External Records Uploaded, Care Team Updated, & History Updated if Applicable    []  Patient  Declined Completing Colon Cancer Screening    []  Patient Will Schedule with External Provider & Care Team Updated if Applicable    []  Fit Kit Mailed (add the SmartPhrase under additional notes)    []  Reminded Patient to Complete Home Test                Diabetic Eye Exam []  Eye Exam Screening Order Placed    []  Eye Camera Scheduled or Optometry/Ophthalmology Referral Placed    []  External Records Requested & Care Team Updated if Applicable    []  External Records Uploaded, Care Team Updated, & History Updated if Applicable    []  Patient Declined Scheduling Eye Exam    []  Patient Will Schedule with External Provider & Care Team Updated if Applicable             Blood Pressure Control []  Primary Care Follow Up Visit Scheduled     []  Remote Blood Pressure Reading Captured    []  Patient Declined Remote Reading or Scheduling Appt - Escalated to PCP    []  Patient Will Call Back or Send Portal Message with Reading                 HbA1c & Other Labs []  Overdue Lab(s) Ordered    []  Overdue Lab(s) Scheduled    [x]  External Records Uploaded & Care Team Updated if Applicable    []  Primary Care Follow Up Visit Scheduled     []  Reminded Patient to Complete A1c Home Test    []  Patient Declined Scheduling Labs or Will Call Back to Schedule    []  Patient Will Schedule with External Provider / Order Routed, & Care Team Updated if Applicable           Primary Care Appointment []  Primary Care Appt Scheduled    []  Patient Declined Scheduling or Will Call Back to Schedule    []  Pt Established with External Provider, Updated Care Team, & Informed Pt to Notify Payor if Applicable           Medication Adherence /    Statin Use []  Primary Care Appointment Scheduled    []  Patient Reminded to  Prescription    []  Patient Declined, Provider Notified if Needed    []  Sent Provider Message to Review to Evaluate Pt for Statin, Add Exclusion Dx Codes, Document   Exclusion in Problem List, Change Statin Intensity Level  to Moderate or High Intensity if Applicable                Osteoporosis Screening []  Dexa Order Placed    []  Dexa Appointment Scheduled    []  External Records Requested & Care Team Updated    []  External Records Uploaded, Care Team Updated, & History Updated if Applicable    []  Patient Declined Scheduling Dexa or Will Call Back to Schedule    []  Patient Will Schedule with External Provider / Order Routed & Care Team Updated if Applicable       Additional Notes:

## 2024-02-19 NOTE — PROGRESS NOTES
Subjective:           Patient ID: Claribel Moran   Age:  89 y.o.  Sex: female     Chief Complaint:   Follow-up, Vaginal Itching, and sleeping problems       History of Present Illness:    Claribel Moran is a 89 y.o. female who presents today with a chief complaint of Follow-up, Vaginal Itching, and sleeping problems   .    89yo female presenting for f/u appt w/ concern of sleep issues and some vaginal itching.   Having some sore throat.    States would like to get some oxycodone as helps with getting to sleep.  States she would benefit form getting this to help with sleep.    States she takes a few naps during the day.  Does not get to sleep well in the evening.     States she has a lot on her mind in the evening.  Has the TV on and doesn't hear it from all her thoughts going.     Has been taking Alprazolam.  Using as she gets very depressed at times, related to the loss of son (to endocarditis) and daughter (cancer,  in under 4 months from dx) over last few years.  Just wants to sit back and cry.  Was taking Alprazolam/Xanax 0.25mg from Cardiology and then nephrology, but last fill was in 3/24/23.     Was seen in August for kidney stones in ED and given Oxycodone for those and thinks that helped with sleep.     Has not talked to a counselor.   Depends on her grandson for anything.    Takes Escitalopram  5mg daily.      BP taking Amlodipine, Metoprololol, Eleqius,     Has used Monostat to vaginal itching without relief at this time.       Review of Systems   Constitutional:  Negative for activity change, appetite change and fever.   HENT:  Negative for postnasal drip, sinus pressure, sinus pain, sneezing and sore throat.    Respiratory:  Negative for cough, chest tightness, shortness of breath and wheezing.    Cardiovascular:  Negative for chest pain and palpitations.   Gastrointestinal:  Negative for abdominal distention, abdominal pain, constipation, diarrhea and vomiting.   Endocrine: Negative for polyuria.  "  Genitourinary:  Positive for flank pain and urgency. Negative for difficulty urinating, dysuria and frequency.   Musculoskeletal:  Positive for arthralgias (Attributes to age) and myalgias (Attributes to age). Negative for joint swelling and neck stiffness.   Skin:  Negative for pallor and rash.   Neurological:  Positive for light-headedness (Orthostatic) and headaches (Infrequent). Negative for dizziness and syncope.   Psychiatric/Behavioral:  Positive for decreased concentration and sleep disturbance. Negative for confusion and hallucinations. The patient is nervous/anxious.            Objective:        Vitals:    02/19/24 1118 02/19/24 1139   BP: (!) 157/81 130/86   BP Location: Left arm Right arm   Patient Position: Sitting Sitting   BP Method: Medium (Automatic) Medium (Manual)   Pulse: 60    Resp: 19    SpO2: 96%    Weight: 84.4 kg (186 lb)    Height: 5' 1" (1.549 m)        Body mass index is 35.14 kg/m².      Physical Exam  Constitutional:       Appearance: Normal appearance. She is obese. She is not ill-appearing.   HENT:      Mouth/Throat:      Mouth: Mucous membranes are moist.      Pharynx: Oropharynx is clear.   Eyes:      Extraocular Movements: Extraocular movements intact.      Conjunctiva/sclera: Conjunctivae normal.      Pupils: Pupils are equal, round, and reactive to light.   Cardiovascular:      Rate and Rhythm: Normal rate and regular rhythm.      Pulses: Normal pulses.      Heart sounds: Normal heart sounds.   Pulmonary:      Effort: Pulmonary effort is normal.      Breath sounds: Normal breath sounds.   Abdominal:      General: Abdomen is flat. There is no distension.      Palpations: Abdomen is soft.      Tenderness: There is left CVA tenderness (Mild soreness that is resolving). There is no right CVA tenderness. Rebound: Mild.  Skin:     General: Skin is warm and dry.      Capillary Refill: Capillary refill takes less than 2 seconds.   Neurological:      General: No focal deficit present. "      Mental Status: She is oriented to person, place, and time.      Motor: Weakness present.      Gait: Gait abnormal.      Comments: In wheelchair   Psychiatric:         Mood and Affect: Mood normal.         Behavior: Behavior normal.         Thought Content: Thought content normal.           Past Medical History:   Diagnosis Date    Acid reflux 2/14/2013    Anticoagulant long-term use     Anxiety 12/17/2012    Arthritis     Atrial fibrillation     Blood transfusion     Bradycardia     CAD (coronary artery disease) 2/14/2013    [facility] 1998 RCA 50% occlusion    CHF (congestive heart failure)     CKD (chronic kidney disease) stage 3, GFR 30-59 ml/min 2/14/2013    Depression     Dry mouth     Encounter for blood transfusion     Hepatitis B 2/14/2013    Hiatal hernia 4/11/2019    Hyperlipidemia 12/17/2012    Hypertension 12/17/2012    Kidney stones     Reactive airway disease with wheezing 2/19/2013    Sleep apnea     Thyroid disease     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    Type II diabetes mellitus 12/17/2012    Vitamin D deficiency disease 12/17/2012       Lab Results   Component Value Date     08/08/2023    K 3.7 08/08/2023     08/08/2023    CO2 24 08/08/2023    BUN 24 (H) 08/08/2023    CREATININE 2.0 (H) 08/08/2023    ANIONGAP 10 08/08/2023     Lab Results   Component Value Date    HGBA1C 5.9 10/03/2023    HGBA1C 7.2 (H) 06/09/2020     Lab Results   Component Value Date     (H) 05/22/2021     (H) 01/30/2021     (H) 01/22/2021       Lab Results   Component Value Date    WBC 10.92 08/08/2023    HGB 13.6 08/08/2023    HCT 42.2 08/08/2023     08/08/2023    GRAN 7.9 (H) 08/08/2023    GRAN 72.1 08/08/2023     Lab Results   Component Value Date    CHOL 147 06/09/2020    HDL 46 10/03/2023    LDLCALC 50 10/03/2023    TRIG 166 10/03/2023        Outpatient Encounter Medications as of 2/19/2024   Medication Sig Dispense Refill    albuterol (PROVENTIL) 2.5 mg /3 mL (0.083 %) nebulizer  "solution Take 3 mLs (2.5 mg total) by nebulization every 6 (six) hours as needed for Wheezing or Shortness of Breath. Rescue 1 Box 1    albuterol (PROVENTIL/VENTOLIN HFA) 90 mcg/actuation inhaler INHALE 2 PUFFS INTO THE LUNGS EVERY 4 HOURS AS NEEDED FOR WHEEZING OR SHORTNESS OF BREATH. RESCUE 54 g 1    amlodipine (NORVASC) 5 MG tablet Take 5 mg by mouth once daily.      atorvastatin (LIPITOR) 40 MG tablet Take 40 mg by mouth once daily.      blood sugar diagnostic Strp Blood glucose up to 1 time daily.  Or when feeling ill. 60 strip 0    cetirizine (ZYRTEC) 10 MG tablet Take 1 tablet (10 mg total) by mouth once daily. 30 tablet 1    cyanocobalamin 1,000 mcg/mL injection Inject 1 mL (1,000 mcg total) into the skin every 30 days. 10 mL 0    ELIQUIS 5 mg Tab Take 5 mg by mouth 2 (two) times daily.      escitalopram oxalate (LEXAPRO) 10 MG tablet Take 10 mg by mouth once daily.  3    FARXIGA 5 mg Tab tablet Take 5 mg by mouth once daily.      FEROSUL 325 mg (65 mg iron) Tab tablet TAKE 1 TABLET BY MOUTH 2 TIMES DAILY. (Patient taking differently: Take 325 mg by mouth once daily.) 60 tablet 5    lancets Misc Blood glucose up to 1 time daily.  Or when feeling ill. 60 each 0    levothyroxine (SYNTHROID) 50 MCG tablet Take 50 mcg by mouth once daily.      metoprolol succinate (TOPROL-XL) 25 MG 24 hr tablet Take 25 mg by mouth 2 (two) times daily.       needle, disp, 25 gauge 25 gauge x 3/4" Ndle 1 Units by Misc.(Non-Drug; Combo Route) route As instructed (use with syringe.). 10 each 0    semaglutide (OZEMPIC) 0.25 mg or 0.5 mg (2 mg/3 mL) pen injector INJECT 0.5 MG INTO THE SKIN EVERY 7 DAYS. 9 mL 3    syringe with needle (SYRINGE 3CC/25GX1") 3 mL 25 gauge x 1" Syrg 1 Syringe by Misc.(Non-Drug; Combo Route) route As instructed (1ml to Deltoid Muscle or shoulder weekly x10 then monthly after that.).      tamsulosin (FLOMAX) 0.4 mg Cap Take 1 capsule (0.4 mg total) by mouth once daily. until the kidney stone is passed, or " complete resolution of pain 7 capsule 0    [DISCONTINUED] oxyCODONE-acetaminophen (PERCOCET) 5-325 mg per tablet Take 1 tablet by mouth every 6 (six) hours as needed for Pain. 12 tablet 0    fluconazole (DIFLUCAN) 100 MG tablet Take 1 tablet (100 mg total) by mouth every 48 hours. for 6 days 3 tablet 0    melatonin 5 mg Cap Take 1 capsule (5 mg total) by mouth every evening. 30 each 5    traZODone (DESYREL) 50 MG tablet Take 1 tablet (50 mg total) by mouth every evening. 30 tablet 11    [DISCONTINUED] ALPRAZolam (XANAX) 0.25 MG tablet Take 0.25 mg by mouth nightly as needed for Anxiety.       No facility-administered encounter medications on file as of 2/19/2024.          Assessment:       1. Annual physical exam    2. Insomnia, unspecified type    3. Bereavement reaction    4. Hyperlipidemia, unspecified hyperlipidemia type    5. Hypothyroidism, unspecified type    6. CKD (chronic kidney disease) stage 4, GFR 15-29 ml/min    7. Diabetes mellitus type II, non insulin dependent    8. Chronic vaginitis    9. Chronic obstructive pulmonary disease, unspecified COPD type    10. Morbid obesity    11. Hypertensive heart disease with heart failure           Plan:           Annual physical exam  -  CBC, CMP, TSH, Lipids and A1c.   - 89-year-old female presents in wheelchair with her grandson.  - states she has been having issues with sleep quality, would like to use opiates to help with sleep.  Additionally has been having increased anxiety since her daughter and son have passed unless years.  States she has some depression decreased mood related to their passing.    Insomnia, unspecified type  -     melatonin 5 mg Cap; Take 1 capsule (5 mg total) by mouth every evening.  Dispense: 30 each; Refill: 5  -     traZODone (DESYREL) 50 MG tablet; Take 1 tablet (50 mg total) by mouth every evening.  Dispense: 30 tablet; Refill: 11   - patient relates that she takes frequent naps during the day.  She has not get the sleep well, then  is up much of the night.  Use oxycodone insulin well on that medication.  Had initially been provided for kidney stones.   - patient states she goes to bed with the TV on.  Has racing thoughts.   - advised patient to avoid daytime naps, advised to be active during the day.  Said she could try melatonin or trazodone.   - inform patient that oxycodone or hydrocodone or not sleep medications would not be appropriate to try to use to help improve her sleep quality.    Bereavement reaction  -  CBC, CMP, TSH, Lipids and A1c.   - discussed option of seeing a counselor for bereavement, but patient states that she does not feel the need to see a counselor for this issue.    Hyperlipidemia, unspecified hyperlipidemia type  -  CBC, CMP, TSH, Lipids and A1c.   -     Hypothyroidism, unspecified type  -  CBC, CMP, TSH, Lipids and A1c.    - checking thyroid.    CKD (chronic kidney disease) stage 4, GFR 15-29 ml/min  -  CBC, CMP, TSH, Lipids and A1c.   - checking getting function again, last time was under 25.  Recommended decrease dosing on her Diflucan was being started today.    Diabetes mellitus type II, non insulin dependent  -  CBC, CMP, TSH, Lipids and A1c.   - diabetes well controlled on last check.  Continue current cares.    Chronic vaginitis  -     fluconazole (DIFLUCAN) 100 MG tablet; Take 1 tablet (100 mg total) by mouth every 48 hours. for 6 days  Dispense: 3 tablet; Refill: 0  - patient having vaginal irritation and itching.  States has had yeast infection before interested in treatment.  Giving Diflucan 100 mg every other day for 3 doses.    SLEEP HYGIENE-   After 3 pm - Avoid caffeine (coffee, tea, soft drinks ,energy drinks)   After 6 pm - Avoid liquids (so you're not waking up to urinate)  After 8 pm - Avoid screens that emit light & stimulate your brain. Don't look at phone, computer or TV  It's ok to listen to background noise machine (waves, rain, audio books where voice is low)  Keep your room dark & use an  alarm clock that isn't bright.   Do not take a nap  Exercise your body for 20 minutes EVERY DAY , so it's exhausted to sleep    Try to stay in your bed for 7-8 hours per night, instead of getting out of bed (which awakens your body when you're standing upright &  wakes up your brain, affecting your normal circadian rhythm)  ================    We are no longer prescribing sleep medications - Ambien, Lunesta, Sonata, Restoril -- because of studies showing a possible increased risk of death.     You can try these safe over the counter sleep aids-   Tranquil Sleep by Sleep Relax (5HTP, Suntheanine & Melatonin),  Prosom, Tylenol PM, Melatonin. Aerobic exercise helps to exhaust the body & relax into deeper sleep.     Please make an appointment if you'd like to discuss a prescription medications that could also be helpful such as Trazodone . It is a low dose of serotonin medication (similar to Lexapro, Celexa, Zoloft, Prozac) , mainly helping to calm our thoughts & worries & lists of things going through our head that keep us from either going to sleep or sleeping restfully

## 2024-03-31 DIAGNOSIS — D50.9 IRON DEFICIENCY ANEMIA, UNSPECIFIED IRON DEFICIENCY ANEMIA TYPE: ICD-10-CM

## 2024-04-01 RX ORDER — FERROUS SULFATE TAB 325 MG (65 MG ELEMENTAL FE) 325 (65 FE) MG
TAB ORAL 2 TIMES DAILY
Qty: 180 TABLET | Refills: 2 | Status: SHIPPED | OUTPATIENT
Start: 2024-04-01

## 2024-04-01 NOTE — TELEPHONE ENCOUNTER
Refill Routing Note   Medication(s) are not appropriate for processing by [facility] for the following reason(s):        Outside of protocol    [facility] action(s):  Route               Appointments  past 12m or future 3m with PCP    Date Provider   Last Visit   2/19/2024 John Singh MD   Next Visit   5/21/2024 John Singh MD   ED visits in past 90 days: 0        Note composed:4:24 PM 04/01/2024

## 2024-05-20 ENCOUNTER — HOSPITAL ENCOUNTER (EMERGENCY)
Facility: HOSPITAL | Age: 89
Discharge: HOME | End: 2024-05-20
Attending: EMERGENCY MEDICINE
Payer: MEDICARE

## 2024-05-20 VITALS
WEIGHT: 170 LBS | HEIGHT: 61 IN | RESPIRATION RATE: 18 BRPM | SYSTOLIC BLOOD PRESSURE: 168 MMHG | OXYGEN SATURATION: 95 % | HEART RATE: 60 BPM | DIASTOLIC BLOOD PRESSURE: 70 MMHG | BODY MASS INDEX: 32.1 KG/M2 | TEMPERATURE: 99 F

## 2024-05-20 DIAGNOSIS — S22.010A COMPRESSION FRACTURE OF T1 VERTEBRA, INITIAL ENCOUNTER: Primary | ICD-10-CM

## 2024-05-20 DIAGNOSIS — R52 PAIN: ICD-10-CM

## 2024-05-20 LAB
ALBUMIN SERPL BCP-MCNC: 4.3 G/DL (ref 3.5–5.2)
ALP SERPL-CCNC: 72 U/L (ref 55–135)
ALT SERPL W/O P-5'-P-CCNC: 20 U/L (ref 10–44)
AMPHET+METHAMPHET UR QL: NEGATIVE
ANION GAP SERPL CALC-SCNC: 8 MMOL/L (ref 8–16)
APTT PPP: 41.7 SEC (ref 21–32)
AST SERPL-CCNC: 26 U/L (ref 10–40)
BACTERIA #/AREA URNS HPF: NORMAL /HPF
BARBITURATES UR QL SCN>200 NG/ML: NEGATIVE
BASOPHILS # BLD AUTO: 0.04 K/UL (ref 0–0.2)
BASOPHILS NFR BLD: 0.3 % (ref 0–1.9)
BENZODIAZ UR QL SCN>200 NG/ML: NEGATIVE
BILIRUB SERPL-MCNC: 0.6 MG/DL (ref 0.1–1)
BILIRUB UR QL STRIP: NEGATIVE
BUN SERPL-MCNC: 24 MG/DL (ref 8–23)
BZE UR QL SCN: NEGATIVE
CALCIUM SERPL-MCNC: 9.3 MG/DL (ref 8.7–10.5)
CANNABINOIDS UR QL SCN: NEGATIVE
CHLORIDE SERPL-SCNC: 101 MMOL/L (ref 95–110)
CLARITY UR: CLEAR
CO2 SERPL-SCNC: 29 MMOL/L (ref 23–29)
COLOR UR: YELLOW
CREAT SERPL-MCNC: 1.1 MG/DL (ref 0.5–1.4)
CREAT SERPL-MCNC: 1.2 MG/DL (ref 0.5–1.4)
CREAT UR-MCNC: 45.5 MG/DL (ref 15–325)
DIFFERENTIAL METHOD BLD: ABNORMAL
EOSINOPHIL # BLD AUTO: 0.3 K/UL (ref 0–0.5)
EOSINOPHIL NFR BLD: 2.2 % (ref 0–8)
ERYTHROCYTE [DISTWIDTH] IN BLOOD BY AUTOMATED COUNT: 14.3 % (ref 11.5–14.5)
EST. GFR  (NO RACE VARIABLE): 48 ML/MIN/1.73 M^2
GLUCOSE SERPL-MCNC: 118 MG/DL (ref 70–110)
GLUCOSE UR QL STRIP: ABNORMAL
HCT VFR BLD AUTO: 45.9 % (ref 37–48.5)
HGB BLD-MCNC: 14.6 G/DL (ref 12–16)
HGB UR QL STRIP: NEGATIVE
IMM GRANULOCYTES # BLD AUTO: 0.09 K/UL (ref 0–0.04)
IMM GRANULOCYTES NFR BLD AUTO: 0.7 % (ref 0–0.5)
INR PPP: 1 (ref 0.8–1.2)
KETONES UR QL STRIP: NEGATIVE
LEUKOCYTE ESTERASE UR QL STRIP: NEGATIVE
LYMPHOCYTES # BLD AUTO: 1.8 K/UL (ref 1–4.8)
LYMPHOCYTES NFR BLD: 15 % (ref 18–48)
MCH RBC QN AUTO: 31.1 PG (ref 27–31)
MCHC RBC AUTO-ENTMCNC: 31.8 G/DL (ref 32–36)
MCV RBC AUTO: 98 FL (ref 82–98)
MICROSCOPIC COMMENT: NORMAL
MONOCYTES # BLD AUTO: 0.9 K/UL (ref 0.3–1)
MONOCYTES NFR BLD: 7.5 % (ref 4–15)
NEUTROPHILS # BLD AUTO: 9.1 K/UL (ref 1.8–7.7)
NEUTROPHILS NFR BLD: 74.3 % (ref 38–73)
NITRITE UR QL STRIP: NEGATIVE
NRBC BLD-RTO: 0 /100 WBC
OPIATES UR QL SCN: NEGATIVE
PCP UR QL SCN>25 NG/ML: NEGATIVE
PH UR STRIP: 7 [PH] (ref 5–8)
PLATELET # BLD AUTO: 177 K/UL (ref 150–450)
PMV BLD AUTO: 12 FL (ref 9.2–12.9)
POTASSIUM SERPL-SCNC: 4.2 MMOL/L (ref 3.5–5.1)
PROT SERPL-MCNC: 7.9 G/DL (ref 6–8.4)
PROT UR QL STRIP: ABNORMAL
PROTHROMBIN TIME: 11.2 SEC (ref 9–12.5)
RBC # BLD AUTO: 4.7 M/UL (ref 4–5.4)
RBC #/AREA URNS HPF: 0 /HPF (ref 0–4)
SAMPLE: NORMAL
SODIUM SERPL-SCNC: 138 MMOL/L (ref 136–145)
SP GR UR STRIP: 1.01 (ref 1–1.03)
SQUAMOUS #/AREA URNS HPF: 2 /HPF
TOXICOLOGY INFORMATION: NORMAL
URN SPEC COLLECT METH UR: ABNORMAL
UROBILINOGEN UR STRIP-ACNC: NEGATIVE EU/DL
WBC # BLD AUTO: 12.24 K/UL (ref 3.9–12.7)
WBC #/AREA URNS HPF: 2 /HPF (ref 0–5)
YEAST URNS QL MICRO: NORMAL

## 2024-05-20 PROCEDURE — 85025 COMPLETE CBC W/AUTO DIFF WBC: CPT | Performed by: EMERGENCY MEDICINE

## 2024-05-20 PROCEDURE — 85610 PROTHROMBIN TIME: CPT | Performed by: EMERGENCY MEDICINE

## 2024-05-20 PROCEDURE — 99285 EMERGENCY DEPT VISIT HI MDM: CPT | Mod: 25

## 2024-05-20 PROCEDURE — 85730 THROMBOPLASTIN TIME PARTIAL: CPT | Performed by: EMERGENCY MEDICINE

## 2024-05-20 PROCEDURE — 81001 URINALYSIS AUTO W/SCOPE: CPT | Performed by: EMERGENCY MEDICINE

## 2024-05-20 PROCEDURE — 96375 TX/PRO/DX INJ NEW DRUG ADDON: CPT

## 2024-05-20 PROCEDURE — 80307 DRUG TEST PRSMV CHEM ANLYZR: CPT | Performed by: EMERGENCY MEDICINE

## 2024-05-20 PROCEDURE — 80053 COMPREHEN METABOLIC PANEL: CPT | Performed by: EMERGENCY MEDICINE

## 2024-05-20 PROCEDURE — 25500020 PHARM REV CODE 255: Performed by: EMERGENCY MEDICINE

## 2024-05-20 PROCEDURE — 96374 THER/PROPH/DIAG INJ IV PUSH: CPT

## 2024-05-20 PROCEDURE — 82565 ASSAY OF CREATININE: CPT

## 2024-05-20 PROCEDURE — 63600175 PHARM REV CODE 636 W HCPCS: Performed by: EMERGENCY MEDICINE

## 2024-05-20 RX ORDER — ONDANSETRON HYDROCHLORIDE 2 MG/ML
4 INJECTION, SOLUTION INTRAVENOUS
Status: COMPLETED | OUTPATIENT
Start: 2024-05-20 | End: 2024-05-20

## 2024-05-20 RX ORDER — HYDROCODONE BITARTRATE AND ACETAMINOPHEN 5; 325 MG/1; MG/1
1 TABLET ORAL EVERY 8 HOURS PRN
Qty: 8 TABLET | Refills: 0 | Status: SHIPPED | OUTPATIENT
Start: 2024-05-20 | End: 2024-05-30 | Stop reason: SDUPTHER

## 2024-05-20 RX ORDER — MORPHINE SULFATE 2 MG/ML
2 INJECTION, SOLUTION INTRAMUSCULAR; INTRAVENOUS
Status: COMPLETED | OUTPATIENT
Start: 2024-05-20 | End: 2024-05-20

## 2024-05-20 RX ADMIN — MORPHINE SULFATE 2 MG: 2 INJECTION, SOLUTION INTRAMUSCULAR; INTRAVENOUS at 09:05

## 2024-05-20 RX ADMIN — IOHEXOL 100 ML: 350 INJECTION, SOLUTION INTRAVENOUS at 10:05

## 2024-05-20 RX ADMIN — ONDANSETRON 4 MG: 2 INJECTION INTRAMUSCULAR; INTRAVENOUS at 09:05

## 2024-05-20 NOTE — ED PROVIDER NOTES
Encounter Date: 2024       History     Chief Complaint   Patient presents with    Fall     TRIP AND FALL OVER LAUNDRY BASKET EARLY THIS AM    BILAT RIB PAIN    Elbow Pain     L     89-year-old female presents emergency department with her son past medical history includes AFib currently on Xarelto, diabetes, hyperlipidemia, hypertension presents emergency department after a fall.  According to the patient's son he states that she was ambulating in the hallway that does not have a light on and fell onto a basket patient is complaining of diffuse tenderness to her chest and abdomen.  She denies striking her head however this fall was not witnessed by the son he is does state though that she did trip and fall she has no obvious signs of trauma noted.         Review of patient's allergies indicates:  No Known Allergies  Past Medical History:   Diagnosis Date    Acid reflux 2013    Anticoagulant long-term use     Anxiety 2012    Arthritis     Atrial fibrillation     Blood transfusion     Bradycardia     CAD (coronary artery disease) 2013    [facility]  RCA 50% occlusion    CHF (congestive heart failure)     CKD (chronic kidney disease) stage 3, GFR 30-59 ml/min 2013    Depression     Dry mouth     Encounter for blood transfusion     Hepatitis B 2013    Hiatal hernia 2019    Hyperlipidemia 2012    Hypertension 2012    Kidney stones     Reactive airway disease with wheezing 2013    Sleep apnea     Thyroid disease     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    Type II diabetes mellitus 2012    Vitamin D deficiency disease 2012     Past Surgical History:   Procedure Laterality Date    ABDOMINAL SURGERY      ANGIOGRAM, CORONARY, WITH LEFT HEART CATHETERIZATION      RCA 50% occlusion    ANGIOGRAM, CORONARY, WITH LEFT HEART CATHETERIZATION  2017    CATARACT EXTRACTION Right      SECTION, CLASSIC  1957, 1960    x 2    FRACTURE SURGERY      ankles  bilateral, broken ribs    HYSTERECTOMY      INSERTION OF PACEMAKER Left 2019    Procedure: INSERTION, CARDIAC PACEMAKER single lead [employer];  Surgeon: Moises Eduardo MD;  Location: [facility] CATH LAB;  Service: Cardiology;  Laterality: Left;    KIDNEY STONE SURGERY      TOTAL KNEE ARTHROPLASTY Right     Right knee    TOTAL KNEE ARTHROPLASTY Left 02/15/2013     Family History   Problem Relation Name Age of Onset    Stroke Mother antvivienne bravo     Arthritis Mother serena bravo     Cancer Mother serena bravo         foot    Heart disease Mother serena bravo     Hypertension Mother serena bravo     Heart disease Father humza     Hypertension Brother humza jr     Drug abuse Daughter josie briceno     Hypertension Daughter josie briceno     Depression Son nita meneses     Diabetes Son nita meneses     Hypertension Son nita meneses      Social History     Tobacco Use    Smoking status: Former     Current packs/day: 0.00     Types: Cigarettes     Quit date: 1954     Years since quittin.4    Smokeless tobacco: Never    Tobacco comments:     1 pack for month only for a few years, but second hand smoke from  for decades.    Substance Use Topics    Alcohol use: No    Drug use: No     Review of Systems   Constitutional: Negative.  Negative for fever.   HENT: Negative.     Respiratory:  Negative for shortness of breath.    Cardiovascular:  Negative for chest pain and palpitations.        Patient is complaining of diffuse chest tenderness with breathing, movement, touching her chest denies any cardiac chest pain complaining more of rib tenderness   Gastrointestinal:  Positive for abdominal pain.        Complains of upper abdominal tenderness   All other systems reviewed and are negative.      Physical Exam     Initial Vitals [24 0747]   BP Pulse Resp Temp SpO2   (!) 199/88 63 20 98.9 °F (37.2 °C) 98 %      MAP       --         Physical Exam    Nursing note and vitals  reviewed.  Constitutional: She appears well-developed and well-nourished.   HENT:   Head: Normocephalic and atraumatic.   Eyes: EOM are normal. Pupils are equal, round, and reactive to light.   Cardiovascular:  Normal rate.           Pulmonary/Chest: Breath sounds normal. She is in respiratory distress. She exhibits tenderness.   Diffuse chest wall tenderness on physical exam, no crepitus, no obvious signs of trauma noted   Abdominal: Abdomen is soft. There is abdominal tenderness.   Diffuse abdominal tenderness to the upper abdomen on physical exam, tenderness with palpation, no obvious signs of trauma noted     Neurological: She is alert.   Skin: Skin is warm.   Psychiatric: She has a normal mood and affect.         ED Course   Procedures  Labs Reviewed   CBC W/ AUTO DIFFERENTIAL - Abnormal; Notable for the following components:       Result Value    MCH 31.1 (*)     MCHC 31.8 (*)     Immature Granulocytes 0.7 (*)     Gran # (ANC) 9.1 (*)     Immature Grans (Abs) 0.09 (*)     Gran % 74.3 (*)     Lymph % 15.0 (*)     All other components within normal limits   COMPREHENSIVE METABOLIC PANEL - Abnormal; Notable for the following components:    Glucose 118 (*)     BUN 24 (*)     eGFR 48.0 (*)     All other components within normal limits   APTT - Abnormal; Notable for the following components:    aPTT 41.7 (*)     All other components within normal limits   URINALYSIS, REFLEX TO URINE CULTURE - Abnormal; Notable for the following components:    Protein, UA Trace (*)     Glucose, UA 4+ (*)     All other components within normal limits    Narrative:     Specimen Source->Urine   PROTIME-INR   DRUG SCREEN PANEL, URINE EMERGENCY    Narrative:     Specimen Source->Urine   URINALYSIS MICROSCOPIC    Narrative:     Specimen Source->Urine   ISTAT CREATININE   POCT CREATININE          Imaging Results              CT Chest Abdomen Pelvis With IV Contrast (XPD) NO Oral Contrast (Final result)  Result time 05/20/24 10:58:49       Final result by Gerson Sanchez MD (05/20/24 10:58:49)                   Impression:      1.  No acute displaced fracture.    2.  No finding to suggest acute traumatic aortic injury or solid visceral organ injury.    3.  Numerous additional, and incidental findings as above, similar to the previous CT.      Electronically signed by: Gerson Sanchez  Date:    05/20/2024  Time:    10:58               Narrative:      CMS MANDATED QUALITY DATA - CT RADIATION - 436    All CT scans at this facility utilize dose modulation, iterative reconstruction, and/or weight based dosing when appropriate to reduce radiation dose to as low as reasonably achievable.    CLINICAL HISTORY:  ([id number])89 y/o  (9/1/1934) F    Trauma;    TECHNIQUE:  (A#[id number], exam time 5/20/2024 10:39)    CT CHEST ABDOMEN PELVIS WITH IV CONTRAST (XPD) [id number]    Axial CT images of the chest, abdomen and pelvis were obtained from the thoracic inlet to the proximal thigh.    COMPARISON:  CT most recently from 08/08/2023.    FINDINGS:  CT Chest:    Visualized neck: normal    Lungs: The lungs show mild scattered reticular interstitial lung markings of the lung bases suggestive of minimal atelectasis.  There is a 5 mm calcified granuloma at the right lung apex.  No concerning pulmonary nodules/masses identified.    Airway: The trachea and central bronchial tree appear normal.    Pleura: There is no pleural effusion. There is no pneumothorax.    Cardiovascular: There is moderate cardiomegaly. There is no pericardial effusion. The aorta and great vessels are normal in course and caliber  noting atherosclerotic calcifications in the aorta and coronary arteries.  There is a loop recorder.    Mediastinum: There is no supraclavicular  axillary  mediastinal  or hilar lymphadenopathy.    Soft tissues: normal    Musculoskeletal: Moderate degenerative changes seen throughout the thoracic spine.  No acute displaced rib fracture is identified.  There is diffusely  decreased osseous mineralization (suggesting osteoporosis/osteopenia).    Esophagus: There is a moderate to large size hiatal hernia.    CT Abdomen:    Liver: Relative diffuse low-attenuation liver consistent with hepatic steatosis. The liver is normal in size measuring 14 cm (sagittal right lobe).    Gallbladder: Normal.    Biliary Tree: No intra or extrahepatic ductal dilation.    Spleen: Normal.    Pancreas: The pancreas is normal.    Adrenal Glands: There is an ill-defined indeterminate left adrenal nodule measuring 10 mm in diameter (image 129), unchanged from the previous exam.  The right adrenal gland is grossly unremarkable.    Kidneys: The kidneys are normal in imaging appearance without hydronephrosis or hydroureter.  Bilateral simple renal cortical cysts are unchanged.    Vasculature: There are scattered atheromatous mural plaques in the aorta and its major branch vessels with no aneurysm seen.    Lymph nodes: No abdominal lymphadenopathy is seen.    Intraperitoneal structures: There is no ascites.    Bowel: Marked sigmoid diverticulosis without focal diverticulitis.  No finding of bowel obstruction, intra-abdominal free air or abscess.    Abdominal wall: The abdominal wall and musculature are normal.    Musculoskeletal: There is an age-indeterminate compression fracture deformity at L1, with approximately 90% height loss of the anterior common 50% height loss of the middle column, with 6 mm of retropulsion of the posterior vertebral artery contributing to mild-to-moderate spinal canal stenosis and moderate to severe bilateral neural foraminal stenosis at L5-S1, this is similar to the previous CT.    CT Pelvis:    Bladder: Normal.    Reproductive Organs: The uterus is not visualized, surgically absent.    Pelvic Lymph nodes: No pelvic lymphadenopathy or pelvic mass is identified.                                       CT Cervical Spine Without Contrast (Final result)  Result time 05/20/24 10:41:50       Final result by Gerson Sanchez MD (05/20/24 10:41:50)                   Impression:      No acute fracture or traumatic malalignment in the cervical spine.      Electronically signed by: Gerson Sanchez  Date:    05/20/2024  Time:    10:41               Narrative:    CLINICAL HISTORY:  ([id number])89 y/o  (9/1/1934) F    Trauma;    TECHNIQUE:  (A#[id number], exam time 5/20/2024 10:38)    CT CERVICAL SPINE WITHOUT CONTRAST [id number]    Contiguous thin section axial images were obtained of the cervical spine. Sagittal and coronal reformatted images were generated. Note that this exam is suboptimal for evaluation of disc disease (better evaluated on MRI) and does not assess for ligamentous injury or stability.    CMS MANDATED QUALITY DATA - CT RADIATION - 436    All CT scans at this facility utilize dose modulation, iterative reconstruction, and/or weight based dosing when appropriate to reduce radiation dose to as low as reasonably achievable.    COMPARISON:  Most recent from 07/15/2021.    FINDINGS:  No acute fracture of the cervical spine. Straightening of the cervical spine curvature, likely due to a combination of positioning, degenerative change and/or muscle spasm..  No acute intraspinal abnormality. No perched, jumped or locked facets seen.    Vertebral body heights are maintained.  There is moderate to severe disc height loss throughout the cervical spine with moderate to severe bilateral facet arthropathy present throughout.  There is calcification along the posterior aspect the dens suggesting pannus, possibly from rheumatoid arthritis causing minimal spinal canal stenosis at this level.    Visualized brain is unremarkable. Visualized lung apices and prevertebral tissues are unremarkable.  Calcified plaques are present throughout the carotid bulbs and proximal internal carotid arteries bilaterally.                                       CT Head Without Contrast (Final result)  Result time 05/20/24 10:39:59       Final result by Gerson Sanchez MD (05/20/24 10:39:59)                   Impression:      No acute intracranial process      Electronically signed by: Gerson Sanchez  Date:    05/20/2024  Time:    10:39               Narrative:    CLINICAL HISTORY:  ([id number])89 y/o  (9/1/1934) F    Trauma;    TECHNIQUE:  (A#[id number], exam time 5/20/2024 10:38)    CT HEAD WITHOUT CONTRAST [id number]    Axial CT of the brain without contrast using soft tissue and bone algorithm. Please note in the acute setting if there is a clinical concern for an acute stroke MRI would be more sensitive/specific for evaluation of ischemia.    CMS MANDATED QUALITY DATA - CT RADIATION - 436    All CT scans at this facility utilize dose modulation, iterative reconstruction, and/or weight based dosing when appropriate to reduce radiation dose to as low as reasonably achievable.    COMPARISON:  CT from 07/15/2021.    FINDINGS:  No acute intracranial hemorrhage, edema or mass effect, and no acute parenchymal abnormality. There is no hydrocephalus, herniation or midline shift, and the basal and suprasellar cisterns are within normal limits. The osseous structures show no acute skull fracture.    Moderate diffuse cerebral and cerebellar atrophy for age with moderate periventricular deep cerebral white matter low attenuation  nonspecific findings which can be seen in any diffuse white matter process but most commonly associated with chronic microvascular ischemic disease. There are scattered atheromatous calcifications in the intracranial internal carotid arteries.  Tiny rounded defects are present in the basal ganglia in keeping with prior lacunar infarcts (image 24)    The orbits appear within normal limits noting likely right lateral lens replacement. External auditory canals are unremarkable. The visualized paranasal sinuses and mastoid air cells are essentially clear.                                       X-Ray Pelvis Routine AP (Final result)   Result time 05/20/24 09:53:25      Final result by Kathy Geiger MD (05/20/24 09:53:25)                   Impression:      No acute osseous abnormality      Electronically signed by: Kathy Geiger  Date:    05/20/2024  Time:    09:53               Narrative:    CLINICAL HISTORY:  (MRN [id number])88 y/o  (9/1/1934) F    r/o bleeding or hemorrhage;    TECHNIQUE:  (A# [id number], Exam time 5/20/2024 9:52)    [id number] XR PELVIS ROUTINE AP  view(s) obtained.    COMPARISON:  None available.    FINDINGS:  There are no fractures, dislocations or acute osseous abnormalities.  Hip joints and SI joints are normal.  The visualized bowel gas pattern is normal.  There is tubing overlying the pelvis.                                       X-Ray Chest AP Portable (Final result)  Result time 05/20/24 10:01:42   Procedure changed from X-Ray Chest 1 View     Final result by Alfonso Pop MD (05/20/24 10:01:42)                   Impression:      No evidence of active cardiopulmonary disease.      Electronically signed by: Alfonso Pop  Date:    05/20/2024  Time:    10:01               Narrative:    EXAMINATION:  XR CHEST AP PORTABLE    CLINICAL HISTORY:  r/o bleeding or hemorrhage;    FINDINGS:  Portable chest radiograph at 09:32 hours compared to prior exams shows single lead left subclavian CCD unchanged in position, with stable enlarged cardiac silhouette and normal pulmonary vascularity.  There are aortic vascular calcifications.    The lungs are normally expanded, with no consolidation, large pleural effusion, or evidence of pulmonary edema. No pneumothorax. There are no acute fractures.                                       Medications   morphine injection 2 mg (2 mg Intravenous Given 5/20/24 0934)   ondansetron injection 4 mg (4 mg Intravenous Given 5/20/24 0933)   iohexoL (OMNIPAQUE 350) injection 100 mL (100 mLs Intravenous Given 5/20/24 1038)     Medical Decision Making  89-year-old female presents emergency department  with her son past medical history includes AFib currently on Xarelto, diabetes, hyperlipidemia, hypertension presents emergency department after a fall.  According to the patient's son he states that she was ambulating in the hallway that does not have a light on and fell onto a basket patient is complaining of diffuse tenderness to her chest and abdomen.  She denies striking her head however this fall was not witnessed by the son he is does state though that she did trip and fall she has no obvious signs of trauma noted.       Considerations include but not limited to, electrolyte abnormalities, chest wall contusion, rib fracture, blunt abdominal trauma, internal injuries, closed head injury, skull fracture, intracranial hemorrhage, cervical fracture, cervical sprain    89-year-old female presents emergency department status post mechanical fall states that she tripped in the hallway and landed onto a basket she has no obvious signs of trauma noted head to toe however she is complaining of pain to the anterior chest wall, and abdomen.  Patient does take blood thinners secondary to AFib therefore further evaluation performed with labs which were unremarkable, CT imaging of the head and neck reveal no acute traumatic findings.  CT imaging of the chest abdomen pelvis reveal age indeterminate T1 compression fracture no other acute findings..  Patient will be discharged home with Norco, instructed follow up with the primary care provider further further outpatient testing including bone scan and referral for possible kyphoplasty if patient has a candidate.  Patient and son at the bedside given return precautions    Amount and/or Complexity of Data Reviewed  Independent Historian: parent and guardian     Details: son  External Data Reviewed: labs, radiology and notes.  Labs: ordered. Decision-making details documented in ED Course.  Radiology: ordered.  ECG/medicine tests:  Decision-making details documented in ED  Course.    Risk  Prescription drug management.                                      Clinical Impression:  Final diagnoses:  [R52] Pain  [S22.010A] Compression fracture of T1 vertebra, initial encounter (Primary)          ED Disposition Condition    Discharge Stable          ED Prescriptions       Medication Sig Dispense Start Date End Date Auth. Provider    HYDROcodone-acetaminophen (NORCO) 5-325 mg per tablet Take 1 tablet by mouth every 8 (eight) hours as needed for Pain. 8 tablet 5/20/2024 -- Radha Lion FNP          Follow-up Information       Follow up With Specialties Details Why Contact Info    John Singh MD Family Medicine Schedule an appointment as soon as possible for a visit in 3 days  [address] [facility] [address]  [address] [place] [id number]  [phone]               Radha Lion FNP  05/20/24 6627

## 2024-05-20 NOTE — DISCHARGE INSTRUCTIONS
Tylenol if needed for mild pain  Norco as directed if needed for severe pain  Please follow-up with your primary care provider rider for further evaluation of your compression fracture   Return if condition becomes worse for any concerns, you may need further outpatient bone scans to see if this is new old, may need referral to Neurosurgery of candidate for kyphoplasty

## 2024-05-21 ENCOUNTER — TELEPHONE (OUTPATIENT)
Dept: PRIMARY CARE CLINIC | Facility: CLINIC | Age: 89
End: 2024-05-21
Payer: MEDICARE

## 2024-05-21 ENCOUNTER — PATIENT OUTREACH (OUTPATIENT)
Dept: EMERGENCY MEDICINE | Facility: HOSPITAL | Age: 89
End: 2024-05-21

## 2024-05-21 NOTE — TELEPHONE ENCOUNTER
----- Message from Rachele Mclean sent at 5/21/2024 11:06 AM CDT -----  Contact: [phone]  Pt had a fall on Sunday night and went to the ED. Per pt, she had an appt for today but it was rescheduled to 07/11.     Pt is requesting a callback to be seen sooner for an ED f/u. Please call.           Thank you

## 2024-05-29 ENCOUNTER — PATIENT OUTREACH (OUTPATIENT)
Dept: EMERGENCY MEDICINE | Facility: HOSPITAL | Age: 89
End: 2024-05-29

## 2024-05-30 ENCOUNTER — TELEPHONE (OUTPATIENT)
Dept: PRIMARY CARE CLINIC | Facility: CLINIC | Age: 89
End: 2024-05-30

## 2024-05-30 ENCOUNTER — OFFICE VISIT (OUTPATIENT)
Dept: PRIMARY CARE CLINIC | Facility: CLINIC | Age: 89
End: 2024-05-30
Payer: MEDICARE

## 2024-05-30 VITALS
DIASTOLIC BLOOD PRESSURE: 60 MMHG | OXYGEN SATURATION: 96 % | SYSTOLIC BLOOD PRESSURE: 102 MMHG | TEMPERATURE: 98 F | WEIGHT: 170 LBS | RESPIRATION RATE: 16 BRPM | HEIGHT: 61 IN | HEART RATE: 61 BPM | BODY MASS INDEX: 32.1 KG/M2

## 2024-05-30 DIAGNOSIS — S22.010A COMPRESSION FRACTURE OF T1 VERTEBRA, INITIAL ENCOUNTER: ICD-10-CM

## 2024-05-30 DIAGNOSIS — Z09 HOSPITAL DISCHARGE FOLLOW-UP: Primary | ICD-10-CM

## 2024-05-30 DIAGNOSIS — R07.81 RIB PAIN ON RIGHT SIDE: ICD-10-CM

## 2024-05-30 DIAGNOSIS — Z91.81 STATUS POST FALL: ICD-10-CM

## 2024-05-30 DIAGNOSIS — J44.9 CHRONIC OBSTRUCTIVE PULMONARY DISEASE, UNSPECIFIED COPD TYPE: ICD-10-CM

## 2024-05-30 DIAGNOSIS — E66.01 MORBID OBESITY: ICD-10-CM

## 2024-05-30 PROCEDURE — 99214 OFFICE O/P EST MOD 30 MIN: CPT | Mod: S$PBB,,, | Performed by: STUDENT IN AN ORGANIZED HEALTH CARE EDUCATION/TRAINING PROGRAM

## 2024-05-30 PROCEDURE — 99215 OFFICE O/P EST HI 40 MIN: CPT | Mod: PBBFAC,PN | Performed by: STUDENT IN AN ORGANIZED HEALTH CARE EDUCATION/TRAINING PROGRAM

## 2024-05-30 PROCEDURE — 99999 PR PBB SHADOW E&M-EST. PATIENT-LVL V: CPT | Mod: PBBFAC,,, | Performed by: STUDENT IN AN ORGANIZED HEALTH CARE EDUCATION/TRAINING PROGRAM

## 2024-05-30 RX ORDER — HYDROCODONE BITARTRATE AND ACETAMINOPHEN 5; 325 MG/1; MG/1
1 TABLET ORAL EVERY 12 HOURS PRN
Qty: 20 TABLET | Refills: 0 | Status: SHIPPED | OUTPATIENT
Start: 2024-05-30

## 2024-05-30 NOTE — TELEPHONE ENCOUNTER
----- Message from Elaina Antunez sent at 5/30/2024 10:38 AM CDT -----  Contact: Jovanny Camejo [phone]  Pharmacy is calling to clarify an RX.  RX name:  HYDROcodone-acetaminophen (NORCO) 5-325 mg per tablet  What do they need to clarify:  change qty to 7 day supply  Comments:

## 2024-05-30 NOTE — PATIENT INSTRUCTIONS
Hospital discharge follow-up   -patient was seen in hospital after a fall several weeks ago.  Having continued right-sided chest pain with deep inspiration.   - tenderness to palpation on exam.  Primarily under left breast.  Lung sounds normal.  Vitals are stable including SpO2 of 96%.   - advised patient that getting x-ray of ribs on right side as advised.   - due to possible fracture, will give refill on Norco 5/325 mg to be used up to 2 times per day, patient has been using 1 time per day for the last week.    Rib pain on right side  -     X-Ray Ribs 3 Views Bilateral; Future; Expected date: 05/30/2024    Compression fracture of T1 vertebra, initial encounter  -     HYDROcodone-acetaminophen (NORCO) 5-325 mg per tablet; Take 1 tablet by mouth every 12 (twelve) hours as needed for Pain.  Dispense: 20 tablet; Refill: 0   -     Status post fall  -     X-Ray Ribs 3 Views Bilateral; Future; Expected date: 05/30/2024    Morbid obesity   - continue to focus on weight control and diet.    Chronic obstructive pulmonary disease, unspecified COPD type   - can use inhalers as needed for respiratory concerns.

## 2024-05-30 NOTE — PROGRESS NOTES
"Subjective:           Patient ID: Claribel Moran   Age:  89 y.o.  Sex: female     Chief Complaint:   Follow-up (ED For a fall x2weeks ago) and Rib Injury (Right side)      History of Present Illness:    Claribel Moran is a 89 y.o. female who presents today with a chief complaint of Follow-up (ED For a fall x2weeks ago) and Rib Injury (Right side)  .      89yo woman presenting for ED f/u appt.    Has tripped and fell over night when trying to walk down to the bathroom.  States had her walker, but stumbled over something and got "all banged up".     Pt presents emergency department with her son past medical history includes AFib currently on Xarelto, diabetes, hyperlipidemia, hypertension .    BP was up to 199/88 in the ED.  States that her BP has been running a bit high recently at home as well when home health checks it.     CT showed compression fracture of L1, may have been pre-existing.     ED plan on 5/20/24: "  Medical Decision Making  89-year-old female presents emergency department with her son past medical history includes AFib currently on Xarelto, diabetes, hyperlipidemia, hypertension presents emergency department after a fall.  According to the patient's son he states that she was ambulating in the hallway that does not have a light on and fell onto a basket patient is complaining of diffuse tenderness to her chest and abdomen.  She denies striking her head however this fall was not witnessed by the son he is does state though that she did trip and fall she has no obvious signs of trauma noted.         Considerations include but not limited to, electrolyte abnormalities, chest wall contusion, rib fracture, blunt abdominal trauma, internal injuries, closed head injury, skull fracture, intracranial hemorrhage, cervical fracture, cervical sprain     89-year-old female presents emergency department status post mechanical fall states that she tripped in the hallway and landed onto a basket she has no " "obvious signs of trauma noted head to toe however she is complaining of pain to the anterior chest wall, and abdomen.  Patient does take blood thinners secondary to AFib therefore further evaluation performed with labs which were unremarkable, CT imaging of the head and neck reveal no acute traumatic findings.  CT imaging of the chest abdomen pelvis reveal age indeterminate T1 compression fracture no other acute findings..  Patient will be discharged home with Norco, instructed follow up with the primary care provider further further outpatient testing including bone scan and referral for possible kyphoplasty if patient has a candidate.  Patient and son at the bedside given return precautions     Amount and/or Complexity of Data Reviewed  Independent Historian: parent and guardian     Details: son  External Data Reviewed: labs, radiology and notes.  Labs: ordered. Decision-making details documented in ED Course.  Radiology: ordered.  ECG/medicine tests:  Decision-making details documented in ED Course.  "    States she has bruising and lumps to the right side and right thigh.   Using walker at home.    Getting pain with deep breath, mynor on the right upper abdomen     Has been having pain to the margins of her rib cage.   For pain is using some pain pills. Was given Hydrocodone/acetimonophen 5/325mg x 8 tabs on 5/20 used the last tab this AM.   Has used tylenol during the day, while the percocet most nights.      Is bracing the right rib when breathing.      Review of Systems   Constitutional:  Negative for activity change, appetite change, fatigue, fever and unexpected weight change.   HENT:  Negative for congestion, nosebleeds, postnasal drip, sinus pressure, sinus pain, sneezing and sore throat.    Respiratory:  Negative for cough, shortness of breath and wheezing.    Cardiovascular:  Positive for chest pain. Negative for palpitations and leg swelling.   Gastrointestinal:  Positive for abdominal pain. Negative for " "abdominal distention, constipation, diarrhea, nausea and vomiting.   Endocrine: Negative for polyuria.   Genitourinary:  Positive for flank pain and urgency. Negative for difficulty urinating, dysuria and frequency.   Musculoskeletal:  Positive for arthralgias (Attributes to age) and myalgias (Attributes to age). Negative for back pain, gait problem, joint swelling and neck stiffness.   Skin:  Negative for pallor and rash.   Neurological:  Positive for weakness. Negative for syncope, numbness and headaches.   Psychiatric/Behavioral:  Positive for decreased concentration and sleep disturbance. Negative for agitation, confusion and hallucinations. The patient is not nervous/anxious.            Objective:        Vitals:    05/30/24 0929   BP: 102/60   BP Location: Right arm   Patient Position: Sitting   BP Method: Medium (Manual)   Pulse: 61   Resp: 16   Temp: 97.8 °F (36.6 °C)   TempSrc: Oral   SpO2: 96%   Weight: 77.1 kg (170 lb)   Height: 5' 1" (1.549 m)       Body mass index is 32.12 kg/m².      Physical Exam  Constitutional:       General: She is not in acute distress.     Appearance: Normal appearance.   HENT:      Head: Normocephalic and atraumatic.      Right Ear: External ear normal.      Left Ear: External ear normal.      Nose: No rhinorrhea.      Mouth/Throat:      Mouth: Mucous membranes are moist.   Eyes:      Extraocular Movements: Extraocular movements intact.      Conjunctiva/sclera: Conjunctivae normal.   Cardiovascular:      Rate and Rhythm: Normal rate.   Pulmonary:      Effort: Pulmonary effort is normal. No respiratory distress.   Musculoskeletal:      Right lower leg: No edema.      Left lower leg: No edema.   Skin:     Capillary Refill: Capillary refill takes less than 2 seconds.      Coloration: Skin is jaundiced.      Findings: Bruising present.   Neurological:      General: No focal deficit present.      Mental Status: She is alert and oriented to person, place, and time.      Motor: No " weakness.      Gait: Gait normal.   Psychiatric:         Mood and Affect: Mood normal.           Past Medical History:   Diagnosis Date    Acid reflux 2/14/2013    Anticoagulant long-term use     Anxiety 12/17/2012    Arthritis     Atrial fibrillation     Blood transfusion     Bradycardia     CAD (coronary artery disease) 2/14/2013    [facility] 1998 RCA 50% occlusion    CHF (congestive heart failure)     CKD (chronic kidney disease) stage 3, GFR 30-59 ml/min 2/14/2013    Depression     Dry mouth     Encounter for blood transfusion     Hepatitis B 2/14/2013    Hiatal hernia 4/11/2019    Hyperlipidemia 12/17/2012    Hypertension 12/17/2012    Kidney stones     Reactive airway disease with wheezing 2/19/2013    Sleep apnea     Thyroid disease     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    Type II diabetes mellitus 12/17/2012    Vitamin D deficiency disease 12/17/2012       Lab Results   Component Value Date     05/20/2024    K 4.2 05/20/2024     05/20/2024    CO2 29 05/20/2024    BUN 24 (H) 05/20/2024    CREATININE 1.1 05/20/2024    ANIONGAP 8 05/20/2024     Lab Results   Component Value Date    HGBA1C 5.9 (H) 02/20/2024     Lab Results   Component Value Date     (H) 05/22/2021     (H) 01/30/2021     (H) 01/22/2021       Lab Results   Component Value Date    WBC 12.24 05/20/2024    HGB 14.6 05/20/2024    HCT 45.9 05/20/2024     05/20/2024    GRAN 9.1 (H) 05/20/2024    GRAN 74.3 (H) 05/20/2024     Lab Results   Component Value Date    CHOL 121 02/20/2024    HDL 44 02/20/2024    HDL 46 10/03/2023    LDLCALC 59.0 (L) 02/20/2024    TRIG 90 02/20/2024    TRIG 166 10/03/2023        Outpatient Encounter Medications as of 5/30/2024   Medication Sig Dispense Refill    albuterol (PROVENTIL) 2.5 mg /3 mL (0.083 %) nebulizer solution Take 3 mLs (2.5 mg total) by nebulization every 6 (six) hours as needed for Wheezing or Shortness of Breath. Rescue 1 Box 1    albuterol (PROVENTIL/VENTOLIN HFA) 90  "mcg/actuation inhaler INHALE 2 PUFFS INTO THE LUNGS EVERY 4 HOURS AS NEEDED FOR WHEEZING OR SHORTNESS OF BREATH. RESCUE 54 g 1    amlodipine (NORVASC) 5 MG tablet Take 5 mg by mouth once daily.      atorvastatin (LIPITOR) 40 MG tablet Take 40 mg by mouth once daily.      blood sugar diagnostic Strp Blood glucose up to 1 time daily.  Or when feeling ill. 60 strip 0    cetirizine (ZYRTEC) 10 MG tablet Take 1 tablet (10 mg total) by mouth once daily. 30 tablet 1    cyanocobalamin 1,000 mcg/mL injection Inject 1 mL (1,000 mcg total) into the skin every 30 days. 10 mL 0    ELIQUIS 5 mg Tab Take 5 mg by mouth 2 (two) times daily.      escitalopram oxalate (LEXAPRO) 10 MG tablet Take 10 mg by mouth once daily.  3    FARXIGA 5 mg Tab tablet Take 5 mg by mouth once daily.      ferrous sulfate (FEROSUL) 325 mg (65 mg iron) Tab tablet Take 1 tablet by mouth twice daily 180 tablet 2    lancets Misc Blood glucose up to 1 time daily.  Or when feeling ill. 60 each 0    levothyroxine (SYNTHROID) 50 MCG tablet Take 50 mcg by mouth once daily.      melatonin 5 mg Cap Take 1 capsule (5 mg total) by mouth every evening. 30 each 5    metoprolol succinate (TOPROL-XL) 25 MG 24 hr tablet Take 25 mg by mouth 2 (two) times daily.       needle, disp, 25 gauge 25 gauge x 3/4" Ndle 1 Units by Misc.(Non-Drug; Combo Route) route As instructed (use with syringe.). 10 each 0    semaglutide (OZEMPIC) 0.25 mg or 0.5 mg (2 mg/3 mL) pen injector INJECT 0.5 MG INTO THE SKIN EVERY 7 DAYS. 9 mL 3    syringe with needle (SYRINGE 3CC/25GX1") 3 mL 25 gauge x 1" Syrg 1 Syringe by Misc.(Non-Drug; Combo Route) route As instructed (1ml to Deltoid Muscle or shoulder weekly x10 then monthly after that.).      traZODone (DESYREL) 50 MG tablet Take 1 tablet (50 mg total) by mouth every evening. 30 tablet 11    [DISCONTINUED] HYDROcodone-acetaminophen (NORCO) 5-325 mg per tablet Take 1 tablet by mouth every 8 (eight) hours as needed for Pain. 8 tablet 0    " HYDROcodone-acetaminophen (NORCO) 5-325 mg per tablet Take 1 tablet by mouth every 12 (twelve) hours as needed for Pain. 20 tablet 0    [DISCONTINUED] tamsulosin (FLOMAX) 0.4 mg Cap Take 1 capsule (0.4 mg total) by mouth once daily. until the kidney stone is passed, or complete resolution of pain 7 capsule 0     No facility-administered encounter medications on file as of 5/30/2024.          Assessment:       1. Hospital discharge follow-up    2. Rib pain on right side    3. Compression fracture of T1 vertebra, initial encounter    4. Status post fall    5. Morbid obesity    6. Chronic obstructive pulmonary disease, unspecified COPD type           Plan:         Hospital discharge follow-up   -patient was seen in hospital after a fall several weeks ago.  Having continued right-sided chest pain with deep inspiration.   - tenderness to palpation on exam.  Primarily under left breast.  Lung sounds normal.  Vitals are stable including SpO2 of 96%.   - advised patient that getting x-ray of ribs on right side as advised.   - due to possible fracture, will give refill on Norco 5/325 mg to be used up to 2 times per day, patient has been using 1 time per day for the last week.    Rib pain on right side  -     X-Ray Ribs 3 Views Bilateral; Future; Expected date: 05/30/2024   -x-ray shows fracture to agreeable on left side.  Old healing fracture on right side visible as well as old vertebral compression fracture.    Compression fracture of T1 vertebra, initial encounter  -     HYDROcodone-acetaminophen (NORCO) 5-325 mg per tablet; Take 1 tablet by mouth every 12 (twelve) hours as needed for Pain.  Dispense: 20 tablet; Refill: 0   -     Status post fall  -     X-Ray Ribs 3 Views Bilateral; Future; Expected date: 05/30/2024    Morbid obesity   - continue to focus on weight control and diet.    Chronic obstructive pulmonary disease, unspecified COPD type   - can use inhalers as needed for respiratory concerns.

## 2024-07-30 ENCOUNTER — OFFICE VISIT (OUTPATIENT)
Dept: PRIMARY CARE CLINIC | Facility: CLINIC | Age: 89
End: 2024-07-30
Payer: MEDICARE

## 2024-07-30 VITALS
OXYGEN SATURATION: 97 % | DIASTOLIC BLOOD PRESSURE: 70 MMHG | SYSTOLIC BLOOD PRESSURE: 128 MMHG | BODY MASS INDEX: 32.28 KG/M2 | RESPIRATION RATE: 16 BRPM | HEART RATE: 60 BPM | TEMPERATURE: 98 F | WEIGHT: 171 LBS | HEIGHT: 61 IN

## 2024-07-30 DIAGNOSIS — E66.01 MORBID OBESITY: ICD-10-CM

## 2024-07-30 DIAGNOSIS — R53.81 DEBILITY: ICD-10-CM

## 2024-07-30 DIAGNOSIS — I10 PRIMARY HYPERTENSION: ICD-10-CM

## 2024-07-30 DIAGNOSIS — N18.31 CKD STAGE 3A, GFR 45-59 ML/MIN: ICD-10-CM

## 2024-07-30 DIAGNOSIS — Z00.00 ANNUAL PHYSICAL EXAM: Primary | ICD-10-CM

## 2024-07-30 DIAGNOSIS — I25.10 CORONARY ARTERY DISEASE, UNSPECIFIED VESSEL OR LESION TYPE, UNSPECIFIED WHETHER ANGINA PRESENT, UNSPECIFIED WHETHER NATIVE OR TRANSPLANTED HEART: ICD-10-CM

## 2024-07-30 DIAGNOSIS — J20.9 ACUTE BRONCHITIS, UNSPECIFIED ORGANISM: ICD-10-CM

## 2024-07-30 DIAGNOSIS — R06.02 SOB (SHORTNESS OF BREATH): ICD-10-CM

## 2024-07-30 PROCEDURE — 99214 OFFICE O/P EST MOD 30 MIN: CPT | Mod: S$PBB,,, | Performed by: STUDENT IN AN ORGANIZED HEALTH CARE EDUCATION/TRAINING PROGRAM

## 2024-07-30 PROCEDURE — 99999 PR PBB SHADOW E&M-EST. PATIENT-LVL III: CPT | Mod: PBBFAC,,, | Performed by: STUDENT IN AN ORGANIZED HEALTH CARE EDUCATION/TRAINING PROGRAM

## 2024-07-30 PROCEDURE — 99213 OFFICE O/P EST LOW 20 MIN: CPT | Mod: PBBFAC,PN | Performed by: STUDENT IN AN ORGANIZED HEALTH CARE EDUCATION/TRAINING PROGRAM

## 2024-07-30 RX ORDER — ALBUTEROL SULFATE 0.83 MG/ML
2.5 SOLUTION RESPIRATORY (INHALATION) EVERY 6 HOURS PRN
Qty: 1 EACH | Refills: 1 | Status: SHIPPED | OUTPATIENT
Start: 2024-07-30

## 2024-07-30 NOTE — PROGRESS NOTES
Subjective:           Patient ID: Claribel Moran   Age:  89 y.o.  Sex: female     Chief Complaint:   Follow-up (Rib pain, post fall & annual)      History of Present Illness:    Claribel Moran is a 89 y.o. female who presents today with a chief complaint of Follow-up (Rib pain, post fall & annual)  .    89-year-old female presenting for follow-up on rib pain status post fall that was evaluated in office visit on 5/30/24.       States that her rib pains have been better.    Generally doing well today.      Is taking Farxiga and Elequis as directed.  Tolerating well.  BP meds from cardiologist.  Including the above as well as Amlodipine 5mg, lipitor 40mg, Metoprolol succinate 25mg BID.     Thyroid med in AM, synthroid 50mcg.   Taking Lexapro 10mg daily.  Has rx for Trazodone 50mg nightly.     Is taking Ozempic 0.5mg weekly at this time.     States is spending most of her day in bed.  Does have increased depression from having 2 kids pass already, has 1 left and prays she passes before her child does.       Review of Systems   Constitutional:  Negative for activity change, appetite change, fatigue, fever and unexpected weight change.   HENT:  Negative for congestion, nosebleeds, postnasal drip, sinus pressure, sinus pain, sneezing and sore throat.    Respiratory:  Negative for cough, shortness of breath and wheezing.    Cardiovascular:  Positive for chest pain. Negative for palpitations and leg swelling.   Gastrointestinal:  Positive for abdominal pain. Negative for abdominal distention, constipation, diarrhea, nausea and vomiting.   Endocrine: Negative for polyuria.   Genitourinary:  Positive for flank pain and urgency. Negative for difficulty urinating, dysuria and frequency.   Musculoskeletal:  Positive for arthralgias (Attributes to age) and myalgias (Attributes to age). Negative for back pain, gait problem, joint swelling and neck stiffness.   Skin:  Negative for pallor and rash.   Neurological:  Positive for  "weakness. Negative for syncope, numbness and headaches.   Psychiatric/Behavioral:  Positive for decreased concentration and sleep disturbance. Negative for agitation, confusion and hallucinations. The patient is not nervous/anxious.            Objective:        Vitals:    07/30/24 0953   BP: 128/70   BP Location: Right arm   Patient Position: Sitting   BP Method: Medium (Manual)   Pulse: 60   Resp: 16   Temp: 97.9 °F (36.6 °C)   TempSrc: Oral   SpO2: 97%   Weight: 77.6 kg (171 lb)   Height: 5' 1" (1.549 m)       Body mass index is 32.31 kg/m².      Physical Exam  Constitutional:       General: She is not in acute distress.     Appearance: Normal appearance.   HENT:      Head: Normocephalic and atraumatic.      Right Ear: External ear normal.      Left Ear: External ear normal.      Nose: No rhinorrhea.      Mouth/Throat:      Mouth: Mucous membranes are moist.   Eyes:      Extraocular Movements: Extraocular movements intact.      Conjunctiva/sclera: Conjunctivae normal.   Cardiovascular:      Rate and Rhythm: Normal rate.   Pulmonary:      Effort: Pulmonary effort is normal. No respiratory distress.   Musculoskeletal:      Right lower leg: No edema.      Left lower leg: No edema.   Skin:     Capillary Refill: Capillary refill takes less than 2 seconds.      Coloration: Skin is jaundiced.      Findings: Bruising present.   Neurological:      General: No focal deficit present.      Mental Status: She is alert and oriented to person, place, and time.      Motor: No weakness.      Gait: Gait normal.   Psychiatric:         Mood and Affect: Mood normal.           Past Medical History:   Diagnosis Date    Acid reflux 2/14/2013    Anticoagulant long-term use     Anxiety 12/17/2012    Arthritis     Atrial fibrillation     Blood transfusion     Bradycardia     CAD (coronary artery disease) 2/14/2013    [facility] 1998 RCA 50% occlusion    CHF (congestive heart failure)     CKD (chronic kidney disease) stage 3, GFR 30-59 ml/min " 2/14/2013    Depression     Dry mouth     Encounter for blood transfusion     Hepatitis B 2/14/2013    Hiatal hernia 4/11/2019    Hyperlipidemia 12/17/2012    Hypertension 12/17/2012    Kidney stones     Reactive airway disease with wheezing 2/19/2013    Sleep apnea     Thyroid disease     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    Type II diabetes mellitus 12/17/2012    Vitamin D deficiency disease 12/17/2012       Lab Results   Component Value Date     07/09/2024    K 4.2 07/09/2024     07/09/2024    CO2 20 (L) 07/09/2024    BUN 21 07/09/2024    CREATININE 1.1 07/09/2024    ANIONGAP 11 07/09/2024     Lab Results   Component Value Date    HGBA1C 5.9 (H) 02/20/2024     Lab Results   Component Value Date     (H) 05/22/2021     (H) 01/30/2021     (H) 01/22/2021       Lab Results   Component Value Date    WBC 8.57 07/09/2024    HGB 14.1 07/09/2024    HCT 42.9 07/09/2024     07/09/2024    GRAN 5.9 07/09/2024    GRAN 68.9 07/09/2024     Lab Results   Component Value Date    CHOL 121 02/20/2024    HDL 44 02/20/2024    HDL 46 10/03/2023    LDLCALC 59.0 (L) 02/20/2024    TRIG 90 02/20/2024    TRIG 166 10/03/2023        Outpatient Encounter Medications as of 7/30/2024   Medication Sig Dispense Refill    albuterol (PROVENTIL/VENTOLIN HFA) 90 mcg/actuation inhaler INHALE 2 PUFFS INTO THE LUNGS EVERY 4 HOURS AS NEEDED FOR WHEEZING OR SHORTNESS OF BREATH. RESCUE 54 g 1    amlodipine (NORVASC) 5 MG tablet Take 5 mg by mouth once daily.      atorvastatin (LIPITOR) 40 MG tablet Take 40 mg by mouth once daily.      blood sugar diagnostic Strp Blood glucose up to 1 time daily.  Or when feeling ill. 60 strip 0    cetirizine (ZYRTEC) 10 MG tablet Take 1 tablet (10 mg total) by mouth once daily. 30 tablet 1    cyanocobalamin 1,000 mcg/mL injection Inject 1 mL (1,000 mcg total) into the skin every 30 days. 10 mL 0    ELIQUIS 5 mg Tab Take 5 mg by mouth 2 (two) times daily.      escitalopram oxalate  "(LEXAPRO) 10 MG tablet Take 10 mg by mouth once daily.  3    FARXIGA 5 mg Tab tablet Take 5 mg by mouth once daily.      ferrous sulfate (FEROSUL) 325 mg (65 mg iron) Tab tablet Take 1 tablet by mouth twice daily 180 tablet 2    lancets Misc Blood glucose up to 1 time daily.  Or when feeling ill. 60 each 0    levothyroxine (SYNTHROID) 50 MCG tablet Take 50 mcg by mouth once daily.      melatonin 5 mg Cap Take 1 capsule (5 mg total) by mouth every evening. 30 each 5    metoprolol succinate (TOPROL-XL) 25 MG 24 hr tablet Take 25 mg by mouth 2 (two) times daily.       needle, disp, 25 gauge 25 gauge x 3/4" Ndle 1 Units by Misc.(Non-Drug; Combo Route) route As instructed (use with syringe.). 10 each 0    semaglutide (OZEMPIC) 0.25 mg or 0.5 mg (2 mg/3 mL) pen injector INJECT 0.5 MG INTO THE SKIN EVERY 7 DAYS. 9 mL 3    syringe with needle (SYRINGE 3CC/25GX1") 3 mL 25 gauge x 1" Syrg 1 Syringe by Misc.(Non-Drug; Combo Route) route As instructed (1ml to Deltoid Muscle or shoulder weekly x10 then monthly after that.).      traZODone (DESYREL) 50 MG tablet Take 1 tablet (50 mg total) by mouth every evening. 30 tablet 11    [DISCONTINUED] albuterol (PROVENTIL) 2.5 mg /3 mL (0.083 %) nebulizer solution Take 3 mLs (2.5 mg total) by nebulization every 6 (six) hours as needed for Wheezing or Shortness of Breath. Rescue 1 Box 1    albuterol (PROVENTIL) 2.5 mg /3 mL (0.083 %) nebulizer solution Take 3 mLs (2.5 mg total) by nebulization every 6 (six) hours as needed for Wheezing or Shortness of Breath. Rescue 1 each 1    [DISCONTINUED] HYDROcodone-acetaminophen (NORCO) 5-325 mg per tablet Take 1 tablet by mouth every 12 (twelve) hours as needed for Pain. 20 tablet 0     No facility-administered encounter medications on file as of 7/30/2024.          Assessment:       1. Annual physical exam    2. Primary hypertension    3. CKD stage 3a, GFR 45-59 ml/min    4. SOB (shortness of breath)    5. Acute bronchitis, unspecified organism  "   6. Morbid obesity    7. Debility    8. Coronary artery disease, unspecified vessel or lesion type, unspecified whether angina present, unspecified whether native or transplanted heart           Plan:         Annual physical exam   - doing well today.  Monitor.  Reviewed hx, meds and did physical.    Primary hypertension   - controlled at this time.  Taking meds as per cardiology.      CKD stage 3a, GFR 45-59 ml/min   - improving.    - saw Nephro recently.       SOB (shortness of breath)  -     albuterol (PROVENTIL) 2.5 mg /3 mL (0.083 %) nebulizer solution; Take 3 mLs (2.5 mg total) by nebulization every 6 (six) hours as needed for Wheezing or Shortness of Breath. Rescue  Dispense: 1 each; Refill: 1   - doing well.  Rib pain resolved.    - using Neb in AM for cough.      Acute bronchitis, unspecified organism  -     albuterol (PROVENTIL) 2.5 mg /3 mL (0.083 %) nebulizer solution; Take 3 mLs (2.5 mg total) by nebulization every 6 (six) hours as needed for Wheezing or Shortness of Breath. Rescue  Dispense: 1 each; Refill: 1   -       Morbid obesity   - advise healthy diet.      Debility   - continue to remain active as possible.       Coronary artery disease, unspecified vessel or lesion type, unspecified whether angina present, unspecified whether native or transplanted heart   - continue f/u with cardiology.

## 2024-07-30 NOTE — PATIENT INSTRUCTIONS
Annual physical exam   - doing well today.  Monitor.  Reviewed hx, meds and did physical.    Primary hypertension   - controlled at this time.  Taking meds as per cardiology.      CKD stage 3a, GFR 45-59 ml/min   - improving.    - saw Nephro recently.       SOB (shortness of breath)  -     albuterol (PROVENTIL) 2.5 mg /3 mL (0.083 %) nebulizer solution; Take 3 mLs (2.5 mg total) by nebulization every 6 (six) hours as needed for Wheezing or Shortness of Breath. Rescue  Dispense: 1 each; Refill: 1   - doing well.  Rib pain resolved.    - using Neb in AM for cough.      Acute bronchitis, unspecified organism  -     albuterol (PROVENTIL) 2.5 mg /3 mL (0.083 %) nebulizer solution; Take 3 mLs (2.5 mg total) by nebulization every 6 (six) hours as needed for Wheezing or Shortness of Breath. Rescue  Dispense: 1 each; Refill: 1   -       Morbid obesity   - advise healthy diet.      Debility   - continue to remain active as possible.       Coronary artery disease, unspecified vessel or lesion type, unspecified whether angina present, unspecified whether native or transplanted heart   - continue f/u with cardiology.

## 2024-09-02 DIAGNOSIS — R53.83 FATIGUE, UNSPECIFIED TYPE: ICD-10-CM

## 2024-09-03 DIAGNOSIS — E11.9 DIABETES MELLITUS TYPE II, NON INSULIN DEPENDENT: ICD-10-CM

## 2024-09-03 RX ORDER — SEMAGLUTIDE 0.68 MG/ML
0.5 INJECTION, SOLUTION SUBCUTANEOUS
Qty: 9 ML | Refills: 3 | OUTPATIENT
Start: 2024-09-03

## 2024-09-03 NOTE — TELEPHONE ENCOUNTER
----- Message from Cesar Wagnerhop sent at 9/3/2024  9:23 AM CDT -----  Regarding: Refill  Contact: Pt [phone]  Requesting an RX refill or new RX.    Is this a refill or new RX: Refill    RX name and strength (copy/paste from chart):  semaglutide (OZEMPIC) 0.25 mg or 0.5 mg (2 mg/3 mL) pen injector    Is this a 30 day or 90 day RX:     Pharmacy name and phone # (copy/paste from chart):      [id number] - [place] - [address]  [address] [place] [address]  Phone: [phone] Fax: [phone]

## 2024-09-03 NOTE — TELEPHONE ENCOUNTER
Care Due:                  Date            Visit Type   Department     Provider  --------------------------------------------------------------------------------                                EP -                              PRIMARY [facility]  Last Visit: 07-      CARE ([facility])   PRIMARY [facility]   [facility] Singh                               [facility]                              [facility]  Next Visit: 01-      CARE ([facility])   PRIMARY CARE   John Singh                                                            Last  Test          Frequency    Reason                     Performed    Due Date  --------------------------------------------------------------------------------    HBA1C.......  6 months...  semaglutide..............  02- 08-    [facility] Embedded Care Due Messages. Reference number: [id number].   9/03/2024 9:37:56 AM CDT

## 2024-09-04 RX ORDER — SEMAGLUTIDE 0.68 MG/ML
0.5 INJECTION, SOLUTION SUBCUTANEOUS
Qty: 9 ML | Refills: 3 | Status: SHIPPED | OUTPATIENT
Start: 2024-09-04

## 2024-09-04 RX ORDER — CYANOCOBALAMIN 1000 UG/ML
INJECTION, SOLUTION INTRAMUSCULAR; SUBCUTANEOUS
Qty: 9 ML | Refills: 0 | Status: SHIPPED | OUTPATIENT
Start: 2024-09-04

## 2024-09-19 ENCOUNTER — PATIENT MESSAGE (OUTPATIENT)
Dept: PRIMARY CARE CLINIC | Facility: CLINIC | Age: 89
End: 2024-09-19
Payer: MEDICARE

## 2024-11-22 ENCOUNTER — PATIENT MESSAGE (OUTPATIENT)
Dept: ADMINISTRATIVE | Facility: HOSPITAL | Age: 89
End: 2024-11-22
Payer: MEDICARE

## 2025-01-14 DIAGNOSIS — Z00.00 ENCOUNTER FOR MEDICARE ANNUAL WELLNESS EXAM: ICD-10-CM

## 2025-01-28 DIAGNOSIS — D50.9 IRON DEFICIENCY ANEMIA, UNSPECIFIED IRON DEFICIENCY ANEMIA TYPE: ICD-10-CM

## 2025-01-30 RX ORDER — FERROUS SULFATE 325(65) MG
TABLET ORAL 2 TIMES DAILY
Qty: 180 TABLET | Refills: 0 | Status: SHIPPED | OUTPATIENT
Start: 2025-01-30

## 2025-01-30 NOTE — TELEPHONE ENCOUNTER
Refill Routing Note   Medication(s) are not appropriate for processing by [facility] for the following reason(s):        Outside of protocol    [facility] action(s):  Route               Appointments  past 12m or future 3m with PCP    Date Provider   Last Visit   7/30/2024 John Singh MD   Next Visit   4/7/2025 John Singh MD   ED visits in past 90 days: 0        Note composed:11:27 AM 01/30/2025

## 2025-04-07 ENCOUNTER — OFFICE VISIT (OUTPATIENT)
Dept: PRIMARY CARE CLINIC | Facility: CLINIC | Age: OVER 89
End: 2025-04-07
Payer: MEDICARE

## 2025-04-07 VITALS
HEIGHT: 61 IN | SYSTOLIC BLOOD PRESSURE: 116 MMHG | DIASTOLIC BLOOD PRESSURE: 70 MMHG | WEIGHT: 165.44 LBS | HEART RATE: 60 BPM | BODY MASS INDEX: 31.23 KG/M2 | OXYGEN SATURATION: 97 %

## 2025-04-07 DIAGNOSIS — G45.9 TIA (TRANSIENT ISCHEMIC ATTACK): ICD-10-CM

## 2025-04-07 DIAGNOSIS — N18.31 CKD STAGE 3A, GFR 45-59 ML/MIN: ICD-10-CM

## 2025-04-07 DIAGNOSIS — Z00.00 ANNUAL PHYSICAL EXAM: Primary | ICD-10-CM

## 2025-04-07 DIAGNOSIS — I25.10 CORONARY ARTERY DISEASE, UNSPECIFIED VESSEL OR LESION TYPE, UNSPECIFIED WHETHER ANGINA PRESENT, UNSPECIFIED WHETHER NATIVE OR TRANSPLANTED HEART: ICD-10-CM

## 2025-04-07 DIAGNOSIS — I11.0 HYPERTENSIVE HEART DISEASE WITH HEART FAILURE: ICD-10-CM

## 2025-04-07 DIAGNOSIS — R06.02 SOB (SHORTNESS OF BREATH): ICD-10-CM

## 2025-04-07 DIAGNOSIS — I10 PRIMARY HYPERTENSION: ICD-10-CM

## 2025-04-07 DIAGNOSIS — J44.9 CHRONIC OBSTRUCTIVE PULMONARY DISEASE, UNSPECIFIED COPD TYPE: ICD-10-CM

## 2025-04-07 DIAGNOSIS — I48.0 PAROXYSMAL ATRIAL FIBRILLATION: ICD-10-CM

## 2025-04-07 DIAGNOSIS — E11.9 DIABETES MELLITUS TYPE II, NON INSULIN DEPENDENT: Chronic | ICD-10-CM

## 2025-04-07 DIAGNOSIS — I48.0 PAROXYSMAL A-FIB: ICD-10-CM

## 2025-04-07 DIAGNOSIS — E66.01 MORBID OBESITY: ICD-10-CM

## 2025-04-07 PROCEDURE — 99214 OFFICE O/P EST MOD 30 MIN: CPT | Mod: PBBFAC,PN | Performed by: STUDENT IN AN ORGANIZED HEALTH CARE EDUCATION/TRAINING PROGRAM

## 2025-04-07 PROCEDURE — 99999 PR PBB SHADOW E&M-EST. PATIENT-LVL IV: CPT | Mod: PBBFAC,,, | Performed by: STUDENT IN AN ORGANIZED HEALTH CARE EDUCATION/TRAINING PROGRAM

## 2025-04-07 RX ORDER — ALBUTEROL SULFATE 0.83 MG/ML
2.5 SOLUTION RESPIRATORY (INHALATION) EVERY 6 HOURS PRN
Qty: 1 EACH | Refills: 1 | Status: SHIPPED | OUTPATIENT
Start: 2025-04-07

## 2025-04-07 RX ORDER — CITALOPRAM 20 MG/1
20 TABLET, FILM COATED ORAL DAILY
COMMUNITY
Start: 2024-11-19

## 2025-04-07 RX ORDER — ALBUTEROL SULFATE 90 UG/1
2 INHALANT RESPIRATORY (INHALATION) EVERY 8 HOURS PRN
Qty: 18 G | Refills: 5 | Status: SHIPPED | OUTPATIENT
Start: 2025-04-07

## 2025-04-07 NOTE — PROGRESS NOTES
Assessment:       1. Annual physical exam    2. Diabetes mellitus type II, non insulin dependent    3. CKD stage 3a, GFR 45-59 ml/min    4. Morbid obesity    5. Primary hypertension    6. Coronary artery disease, unspecified vessel or lesion type, unspecified whether angina present, unspecified whether native or transplanted heart    7. TIA (transient ischemic attack)    8. Paroxysmal atrial fibrillation    9. Chronic obstructive pulmonary disease, unspecified COPD type    10. Hypertensive heart disease with heart failure    11. 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 Eduardo for cardiac evaluation, including EKG  - Follow-up to assess kidney function, weight loss, and medication effectiveness    I11.0 HYPERTENSIVE HEART DISEASE WITH HEART FAILURE:  - Blood pressure checked and found to be great.  - Detected a heart murmur during physical exam.  - Continued atorvastatin (Lipitor) for cholesterol management and heart protection.    J44.9 CHRONIC OBSTRUCTIVE PULMONARY DISEASE,  UNSPECIFIED COPD TYPE:  - Noted patient's report of occasional difficulty breathing deeply.  - Confirmed use of inhalers for breathing difficulties.  - These factors will be considered in the treatment plan.    E11.9 DIABETES MELLITUS TYPE II, NON INSULIN DEPENDENT:  - Continued Ozempic 0.5 mg weekly injection for weight and diabetes management.    E66.01 MORBID OBESITY:  - Assessed weight loss of 5 lbs since May, considering it appropriate.  - Continued Ozempic 0.5 mg weekly injection for weight management.    Z00.00 ANNUAL PHYSICAL EXAM:  - Reviewed recent labs, noting improved renal function compared to a year ago.  - Noted presence of a heart murmur.    I25.10 CORONARY ARTERY DISEASE, UNSPECIFIED VESSEL OR LESION TYPE, UNSPECIFIED WHETHER ANGINA PRESENT, UNSPECIFIED WHETHER NATIVE OR TRANSPLANTED HEART:  - Discussed continuation of statin (atorvastatin/Lipitor) despite age, weighing cardiovascular benefits against potential side effects.  - Explained its purpose in reducing risk of heart attacks and strokes.  - Noted potential side effects such as muscle aches in the legs.  - Continued atorvastatin (Lipitor) at current dose for cholesterol management.    G45.9 TIA (TRANSIENT ISCHEMIC ATTACK):  - Continued atorvastatin (Lipitor) at current dose for cholesterol management and to reduce risk of future TIAs.    I48.0 PAROXYSMAL ATRIAL FIBRILLATION:  - Scheduled an upcoming appointment with Dr. Eduardo for cardiac evaluation, including EKG.   - murmur on exam today.   - advised continued monitoring by Cardiology. Requesting records to check for recent Echo or other cardiac testing.     E11.22 TYPE 2 DIABETES MELLITUS WITH DIABETIC CHRONIC KIDNEY DISEASE:  - Reviewed recent labs, noting improved renal function compared to a year ago.  - Continued weekly Ozempic injections (0.5mg) for diabetes management, which has resulted in weight loss.    N18.4 CKD (CHRONIC KIDNEY DISEASE) STAGE 4, GFR 15-29 ML/MIN:  - Assessed  kidney function through recent labs and found it to be slightly down but improving compared to a year ago.  - Discontinued iron supplements due to normal blood counts and lack of anemia.             Annual physical exam    Diabetes mellitus type II, non insulin dependent    CKD stage 3a, GFR 45-59 ml/min    Morbid obesity    Primary hypertension    Coronary artery disease, unspecified vessel or lesion type, unspecified whether angina present, unspecified whether native or transplanted heart    TIA (transient ischemic attack)    Paroxysmal atrial fibrillation    Chronic obstructive pulmonary disease, unspecified COPD type  -     albuterol (PROVENTIL) 2.5 mg /3 mL (0.083 %) nebulizer solution; Take 3 mLs (2.5 mg total) by nebulization every 6 (six) hours as needed for Wheezing or Shortness of Breath. Rescue  Dispense: 1 each; Refill: 1  -     albuterol (PROVENTIL/VENTOLIN HFA) 90 mcg/actuation inhaler; Inhale 2 puffs into the lungs every 8 (eight) hours as needed for Wheezing or Shortness of Breath. Rescue  Dispense: 18 g; Refill: 5    Hypertensive heart disease with heart failure    SOB (shortness of breath)  -     albuterol (PROVENTIL) 2.5 mg /3 mL (0.083 %) nebulizer solution; Take 3 mLs (2.5 mg total) by nebulization every 6 (six) hours as needed for Wheezing or Shortness of Breath. Rescue  Dispense: 1 each; Refill: 1  -     albuterol (PROVENTIL/VENTOLIN HFA) 90 mcg/actuation inhaler; Inhale 2 puffs into the lungs every 8 (eight) hours as needed for Wheezing or Shortness of Breath. Rescue  Dispense: 18 g; Refill: 5                This note was generated with the assistance of ambient listening technology. Verbal consent was obtained by the patient and accompanying visitor(s) for the recording of patient appointment to facilitate this note. I attest to having reviewed and edited the generated note for accuracy, though some syntax or spelling errors may persist. Please contact the author of this note for any  "clarification.      Subjective:           Patient ID: Claribel Moran   Age:  89 y.o.  Sex: female     Chief Complaint:   Follow-up      History of Present Illness:    Claribel Moran is a 89 y.o. female who presents today with a chief complaint of Follow-up  .    History of Present Illness    CHIEF COMPLAINT:  Claribel presents today for follow-up.    CURRENT SYMPTOMS:  She reports pain in upper back and occasional difficulty breathing deeply, describing an inability to breathe "far down enough." She denies chest pain. She has a chronic lump in back present for several years.    MEDICATIONS:  She is currently taking iron supplements, Citalopram, Ozempic 0.5 mg weekly injections, and Atorvastatin. Since starting Ozempic, she reports difficulty sleeping and reduced appetite with associated weight loss. She denies GI issues with Ozempic.    RECENT LABS:  Blood count was perfect. Kidney function was slightly down but improving compared to a year ago.    WEIGHT MANAGEMENT:  Current weight is 165 lbs, with a 5-pound weight loss since May.    SURGICAL HISTORY:  History of multiple spinal injections, bilateral ankle surgeries, bilateral knee replacements, two  sections, and kidney surgery with drainage tube placement for infection. She has a history of multiple falls with associated rib fractures, including left sixth rib fracture in May.    GI CONCERNS:  She reports occasional hemorrhoids managed with OTC suppositories as needed.    FAMILY HISTORY:  Sister passed away at age 96, mother at age 92, and another sister at age 68 due to complications from receiving tainted blood following surgery.      ROS:  Cardiovascular: -chest pain  Respiratory: +difficulty breathing  Gastrointestinal: +hemorrhoids, +loss of appetite, +stool color changes  Musculoskeletal: +back pain  Psychiatric: +sleep difficulty           Review of Systems   Constitutional:  Negative for activity change, appetite change, fatigue, fever and " "unexpected weight change.   HENT:  Negative for congestion, nosebleeds, postnasal drip, sinus pressure, sinus pain, sneezing and sore throat.    Respiratory:  Positive for shortness of breath. Negative for cough and wheezing.    Cardiovascular:  Negative for chest pain, palpitations and leg swelling.   Gastrointestinal:  Negative for abdominal distention, abdominal pain, constipation, diarrhea, nausea and vomiting.   Endocrine: Negative for polyuria.   Genitourinary:  Positive for flank pain and urgency. Negative for difficulty urinating, dysuria and frequency.   Musculoskeletal:  Positive for arthralgias (Attributes to age), back pain and myalgias (Attributes to age). Negative for gait problem, joint swelling and neck stiffness.   Skin:  Negative for pallor and rash.   Neurological:  Positive for weakness. Negative for syncope, numbness and headaches.   Psychiatric/Behavioral:  Positive for decreased concentration and sleep disturbance. Negative for agitation, confusion and hallucinations. The patient is not nervous/anxious.            Objective:        Vitals:    04/07/25 1352   BP: 116/70   BP Location: Right arm   Patient Position: Sitting   Pulse: 60   SpO2: 97%   Weight: 75 kg (165 lb 7.3 oz)   Height: 5' 1" (1.549 m)       Body mass index is 31.26 kg/m².      Physical Exam  Vitals reviewed.   Constitutional:       General: She is not in acute distress.     Appearance: She is obese.   HENT:      Head: Normocephalic and atraumatic.      Right Ear: External ear normal.      Left Ear: External ear normal.      Nose: No rhinorrhea.      Mouth/Throat:      Mouth: Mucous membranes are moist.   Eyes:      Extraocular Movements: Extraocular movements intact.      Conjunctiva/sclera: Conjunctivae normal.   Cardiovascular:      Rate and Rhythm: Normal rate and regular rhythm.      Heart sounds: Murmur heard.   Pulmonary:      Effort: Pulmonary effort is normal. No respiratory distress.   Abdominal:      Palpations: " Abdomen is soft.   Musculoskeletal:      Right lower leg: No edema.      Left lower leg: No edema.   Skin:     Capillary Refill: Capillary refill takes less than 2 seconds.      Coloration: Skin is jaundiced.      Findings: Bruising present.   Neurological:      General: No focal deficit present.      Mental Status: She is alert and oriented to person, place, and time.      Motor: No weakness.      Gait: Gait normal.   Psychiatric:         Mood and Affect: Mood normal.         Physical Exam    Vitals: Weight: 165 lbs. Blood pressure looks great.  Cardiovascular: Definite murmur.               Past Medical History[1]    Lab Results   Component Value Date     03/24/2025     12/28/2024    K 3.5 03/24/2025    K 4.0 12/28/2024     03/24/2025     12/28/2024    CO2 26 03/24/2025    CO2 25 12/28/2024    BUN 19 03/24/2025    CREATININE 1.0 03/24/2025    GLUCOSE 107 03/24/2025    ANIONGAP 11 03/24/2025     Lab Results   Component Value Date    HGBA1C 5.7 (H) 12/28/2024     Lab Results   Component Value Date     (H) 05/22/2021     (H) 01/30/2021     (H) 01/22/2021       Lab Results   Component Value Date    WBC 7.37 03/24/2025    HGB 14.9 03/24/2025    HGB 14.9 12/28/2024    HCT 46.0 03/24/2025    HCT 45.9 12/28/2024     03/24/2025     12/28/2024    GRAN 6.0 09/04/2024    GRAN 71.0 09/04/2024     Lab Results   Component Value Date    CHOL 145 12/28/2024    HDL 52 12/28/2024    HDL 46 10/03/2023    LDLCALC 66.8 12/28/2024    TRIG 131 12/28/2024    TRIG 166 10/03/2023        Encounter Medications[2]              [1]   Past Medical History:  Diagnosis Date    Acid reflux 2/14/2013    Anticoagulant long-term use     Anxiety 12/17/2012    Arthritis     Atrial fibrillation     Blood transfusion     Bradycardia     CAD (coronary artery disease) 2/14/2013    [facility] 1998 RCA 50% occlusion    CHF (congestive heart failure)     CKD (chronic kidney disease) stage 3, GFR 30-59 ml/min  "2/14/2013    Depression     Dry mouth     Encounter for blood transfusion     Hepatitis B 2/14/2013    Hiatal hernia 4/11/2019    Hyperlipidemia 12/17/2012    Hypertension 12/17/2012    Kidney stones     Reactive airway disease with wheezing 2/19/2013    Sleep apnea     Thyroid disease     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    Type II diabetes mellitus 12/17/2012    Vitamin D deficiency disease 12/17/2012   [2]   Outpatient Encounter Medications as of 4/7/2025   Medication Sig Dispense Refill    atorvastatin (LIPITOR) 40 MG tablet Take 40 mg by mouth once daily.      blood sugar diagnostic Strp Blood glucose up to 1 time daily.  Or when feeling ill. 60 strip 0    cetirizine (ZYRTEC) 10 MG tablet Take 1 tablet (10 mg total) by mouth once daily. 30 tablet 1    citalopram (CELEXA) 20 MG tablet Take 20 mg by mouth once daily.      cyanocobalamin 1,000 mcg/mL injection INJECT 1ML (1000 MCG TOTAL) INTO THE SKIN EVERY 30 DAYS 9 mL 0    ELIQUIS 5 mg Tab Take 5 mg by mouth 2 (two) times daily.      FARXIGA 5 mg Tab tablet Take 5 mg by mouth once daily.      lancets Misc Blood glucose up to 1 time daily.  Or when feeling ill. 60 each 0    levothyroxine (SYNTHROID) 50 MCG tablet Take 50 mcg by mouth once daily.      melatonin 5 mg Cap Take 1 capsule (5 mg total) by mouth every evening. 30 each 5    metoprolol succinate (TOPROL-XL) 25 MG 24 hr tablet Take 25 mg by mouth 2 (two) times daily.       needle, disp, 25 gauge 25 gauge x 3/4" Ndle 1 Units by Misc.(Non-Drug; Combo Route) route As instructed (use with syringe.). 10 each 0    semaglutide (OZEMPIC) 0.25 mg or 0.5 mg (2 mg/3 mL) pen injector Inject 0.5 mg into the skin every 7 days. 9 mL 3    syringe with needle (SYRINGE 3CC/25GX1") 3 mL 25 gauge x 1" Syrg 1 Syringe by Misc.(Non-Drug; Combo Route) route As instructed (1ml to Deltoid Muscle or shoulder weekly x10 then monthly after that.).      [DISCONTINUED] albuterol (PROVENTIL) 2.5 mg /3 mL (0.083 %) nebulizer " solution Take 3 mLs (2.5 mg total) by nebulization every 6 (six) hours as needed for Wheezing or Shortness of Breath. Rescue 1 each 1    [DISCONTINUED] albuterol (PROVENTIL/VENTOLIN HFA) 90 mcg/actuation inhaler INHALE 2 PUFFS INTO THE LUNGS EVERY 4 HOURS AS NEEDED FOR WHEEZING OR SHORTNESS OF BREATH. RESCUE 54 g 1    [DISCONTINUED] IRON, FERROUS SULFATE, 325 mg (65 mg iron) Tab tablet Take 1 tablet by mouth twice daily 180 tablet 0    [DISCONTINUED] traZODone (DESYREL) 50 MG tablet Take 1 tablet (50 mg total) by mouth every evening. 30 tablet 11    albuterol (PROVENTIL) 2.5 mg /3 mL (0.083 %) nebulizer solution Take 3 mLs (2.5 mg total) by nebulization every 6 (six) hours as needed for Wheezing or Shortness of Breath. Rescue 1 each 1    albuterol (PROVENTIL/VENTOLIN HFA) 90 mcg/actuation inhaler Inhale 2 puffs into the lungs every 8 (eight) hours as needed for Wheezing or Shortness of Breath. Rescue 18 g 5    [DISCONTINUED] amlodipine (NORVASC) 5 MG tablet Take 5 mg by mouth once daily. (Patient not taking: Reported on 4/7/2025)      [DISCONTINUED] escitalopram oxalate (LEXAPRO) 10 MG tablet Take 10 mg by mouth once daily. (Patient not taking: Reported on 4/7/2025)  3     No facility-administered encounter medications on file as of 4/7/2025.

## 2025-04-07 NOTE — PATIENT INSTRUCTIONS
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 Eduardo for cardiac evaluation, including EKG  - Follow-up to assess kidney function, weight loss, and medication effectiveness    I11.0 HYPERTENSIVE HEART DISEASE WITH HEART FAILURE:  - Blood pressure checked and found to be great.  - Detected a heart murmur during physical exam.  - Continued atorvastatin (Lipitor) for cholesterol management and heart protection.    J44.9 CHRONIC OBSTRUCTIVE PULMONARY DISEASE, UNSPECIFIED COPD TYPE:  - Noted patient's report of occasional difficulty breathing deeply.  - Confirmed use of inhalers for breathing difficulties.  - These factors will be considered in the treatment plan.    E11.9 DIABETES MELLITUS TYPE II, NON INSULIN DEPENDENT:  - Continued Ozempic 0.5 mg weekly injection for weight and diabetes management.    E66.01 MORBID OBESITY:  - Assessed weight loss of 5 lbs since May, considering it appropriate.  - Continued Ozempic 0.5 mg weekly injection for weight management.    Z00.00 ANNUAL PHYSICAL EXAM:  - Reviewed recent labs, noting improved  renal function compared to a year ago.  - Noted presence of a heart murmur.    I25.10 CORONARY ARTERY DISEASE, UNSPECIFIED VESSEL OR LESION TYPE, UNSPECIFIED WHETHER ANGINA PRESENT, UNSPECIFIED WHETHER NATIVE OR TRANSPLANTED HEART:  - Discussed continuation of statin (atorvastatin/Lipitor) despite age, weighing cardiovascular benefits against potential side effects.  - Explained its purpose in reducing risk of heart attacks and strokes.  - Noted potential side effects such as muscle aches in the legs.  - Continued atorvastatin (Lipitor) at current dose for cholesterol management.    G45.9 TIA (TRANSIENT ISCHEMIC ATTACK):  - Continued atorvastatin (Lipitor) at current dose for cholesterol management and to reduce risk of future TIAs.    I48.0 PAROXYSMAL ATRIAL FIBRILLATION:  - Scheduled an upcoming appointment with Dr. Eduardo for cardiac evaluation, including EKG.    E11.22 TYPE 2 DIABETES MELLITUS WITH DIABETIC CHRONIC KIDNEY DISEASE:  - Reviewed recent labs, noting improved renal function compared to a year ago.  - Continued weekly Ozempic injections (0.5mg) for diabetes management, which has resulted in weight loss.    N18.4 CKD (CHRONIC KIDNEY DISEASE) STAGE 4, GFR 15-29 ML/MIN:  - Assessed kidney function through recent labs and found it to be slightly down but improving compared to a year ago.  - Discontinued iron supplements due to normal blood counts and lack of anemia.

## 2025-07-16 DIAGNOSIS — E11.9 DIABETES MELLITUS TYPE II, NON INSULIN DEPENDENT: ICD-10-CM

## 2025-07-16 RX ORDER — SEMAGLUTIDE 0.68 MG/ML
INJECTION, SOLUTION SUBCUTANEOUS
Qty: 9 ML | Refills: 0 | Status: SHIPPED | OUTPATIENT
Start: 2025-07-16

## 2025-07-16 NOTE — TELEPHONE ENCOUNTER
Care Due:                  Date            Visit Type   Department     Provider  --------------------------------------------------------------------------------                                EP -                              PRIMARY [facility]  Last Visit: 04-      CARE ([facility])   PRIMARY CARE   John Singh                              [facility]                              [facility]  Next Visit: 10-      CARE ([facility])   PRIMARY CARE   John Singh                                                            Last  Test          Frequency    Reason                     Performed    Due Date  --------------------------------------------------------------------------------    HBA1C.......  6 months...  semaglutide..............  12- 06-    [facility] Embedded Care Due Messages. Reference number: [id number].   7/16/2025 7:00:57 AM JAYLENT

## 2025-07-16 NOTE — TELEPHONE ENCOUNTER
Refill Routing Note   Medication(s) are not appropriate for processing by [facility] for the following reason(s):        Required labs outdated    [facility] action(s):  Defer     Requires labs : Yes             Appointments  past 12m or future 3m with PCP    Date Provider   Last Visit   4/7/2025 John Singh MD   Next Visit   10/13/2025 John Singh MD   ED visits in past 90 days: 0        Note composed:10:54 AM 07/16/2025